# Patient Record
Sex: FEMALE | Race: BLACK OR AFRICAN AMERICAN | NOT HISPANIC OR LATINO | Employment: UNEMPLOYED | URBAN - METROPOLITAN AREA
[De-identification: names, ages, dates, MRNs, and addresses within clinical notes are randomized per-mention and may not be internally consistent; named-entity substitution may affect disease eponyms.]

---
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
## 2018-08-19 NOTE — PROGRESS NOTES
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 She underwent reconstruction of the right ventricular outflow tract and placement of a bioprosthetic pulmonary valve in early childhood.  She has had multiple surgical valve replacements. Her most recent open heart surgery involved placement of a bioprosthetic valve in the pulmonary position and placement of a bioprosthetic valve in the tricuspid position in 2007. Recently, she had a heart catheterization for recurrent tricuspid valve insufficiency and stenosis.  The procedure at this time employed a trans-catheter approach. It entailed tricuspid valve balloon valvuloplasty with a Z-Med 25 x 5 cm balloon followed by placement of an Sotelo S3 26 mm valve. The catheterization also showed that she had mild pulmonary valve stenosis and mild insufficiency.  She comes to [facility]iology Clinic today for followup, and for an exercise stress test..   Kacey reports that she is doing well.  She denies any problems with chest pain, abnormally rapid or slow heart rates. She does have palpitations. She lead a rather sedentary lifestyle, and is overweight.  She currently is on a baby aspirin a day, Digoxin 0.125 mg every day, Norvasc 2.5 mg every day. and Lasix 30 mg every day, which was recently changed to HCTZ 25 mg once a day.  (Kacey reports that she does not like the Lasix; it op-sets her stomach.  Consequentley, she did not take it ! )     REVIEW OF SYSTEMS:  There are no visual disturbances. No:   HAs, lightheadedness, syncope or seizures. No swallowing disorder. No difficulty breathing, SOB, wheezing or rhonchi. No asthma. No abdominal pain.  Bowel movements are normal.  No problems with urination.  No DM of thyroid disorder.  No lipid disorder. No arterial disease. No known intrinsic problem with the lungs, liver or  kidneys. No bleeding disorder. No smoking or ETOH use.        PHYSICAL EXAMINATION:   GENERAL:  The patient is overweight 17 -year-old female in no distress.  VITAL SIGNS:  Her weight is 121.9 kg (269 lbs) and her height is 1.702 meters (5'7''). Heart rate is 110 beats per minute and regular.  Sat 96%.   Blood pressure in the right arm is 120/76.  Color and perfusion are good.   HEENT:  Eye movements are normal.  No bruits are noted over the head.  No jugular venous distention or pulsations.  Mucous membranes are moist and pink.  There is no adenopathy.  The neck is supple.  SKIN:  Showed is pink and well perfused. CHEST:  There is a well-healed midline scar.  Lungs are clear to auscultation bilaterally, although the breath sounds are somewhat decreased at the base. There are no wheezes or rhonchi. CARDIOVASCULAR:  Precordial activity is normal.  The first heart sound is prominent and crisp.  The second heart sound is narrowly split. There is a grade 1/6 systolic ejection murmur heard best up the left sternal border and across the base.  No diastolic murmur is heard today.  ABDOMEN:  There is exogenous obesity.  The liver edge is palpable about 2-3 FB at the right costal margin.  It is nonpulsatile and nontender.  Bowel sounds appear to be normal.  EXTREMITIES:  Pulses are 2+ throughout.  Capillary refill is good.  There is no cyanosis or peripheral edema.  No bruising.              ........................................................................................................................................        ECG:  Normal sinus rhythm. Ist degree block. RBBB.  Slight LAD. T wave abnormality.               ECHOCARDIOGRAM (AT LAST VISIT):  An echocardiogram was performed.  There is no pericardial effusion.  No clots or vegetations. The bioprosthetic tricuspid valve is seen to be in good position. Annulus is 18 mm. The struts are easily seen.  The leaflets are mobile.  No evidence for thrombus formation.   The right atrium is enlarged measuring 5.4 x 4.8 cm (was 6.3 x 5.4 cm). One can also see  the bioprosthetic tricuspid valve in a cross sectional view.  It appears circular and measures 2 cm x 2 cm.  Again, no clots or vegetations are seen.  No right ventricular or left ventricular outflow tract obstruction.  The right ventricular circumferential area of shortening is 35%, which is reduced.  Circumferential area of the RA is 23.4 cm2 (enlarged).   M-MODE:   LV 5.7 cm, RV 3.6 cm, Ao 3.0 cm, LA 3.8 cm, Sep 1.0 cm, PW 1.1 cm, EF 42 % (reduced).   DOPPLER VELOCITY ANALYSIS:  There is no insufficiency of the recently-placed bioprosthetic tricuspid valve.  Mild turbulent antegrade flow is seen.  Continuous wave Doppler analysis shows a peak pressure drop of 19 mmHg with a mean value of 10 mmHg.  Importantly, the leaflets are moving freely.  There is no mitral insufficiency or aortic insufficiency.  Peak pressure drop across the aortic valve is 4 mmHg.  Peak pressure drop across the bioprosthetic pulmonary valve is 12 mmHg.           .................................................................................................................................................               ASSESSMENT:  In summary, we have a 17-year-old female originally diagnosed to have pulmonary atresia with intact inter-ventricular septum, who is status post multiple heart operations involving both the pulmonary and tricuspid valves.  She recently in February 2017 had placement of an Sotelo S3 26 mm bioprosthetic tricuspid valve via the transvenous approach.  She tolerated the procedure well.  I believe she has a good hemodynamic result at this time.  Clinically, she is doing well.  She does not have any significant symptoms compared to her status in the past.   PLAN:  Given our findings, our suggestions are as follows:  1.  She of course needs antibiotic prophylaxis for any invasive procedure.   2.  She continues on her baby aspirin  a day.  Also, she should continue with Digoxin as described above. I would like to place her on HCTZ 25 mg q 12 hrs, rather than Lasix.  3.  I want to see her back in Cardiology Clinic in about six months for followup.  At that time, an echocardiogram and ECG will be performed.  Today I have placed a Holter monitor. Sometime in the future, I would like to perform an exercise stress test to see how well she can tolerate the activity.     If there are any questions concerning to cardiac status of this patient, please feel free to contact us.  Thank you so much for allowing us to partake in the care of this patient. Donovan Gonzalez PhD, MD.                                                                                                                                                     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  She underwent reconstruction of the right ventricular outflow tract and placement of a bioprosthetic pulmonary valve in early childhood.  She has had multiple surgical valve replacements. Her most recent open heart surgery involved placement of a bioprosthetic valve in the pulmonary position and placement of a bioprosthetic valve in the tricuspid position in 2007. Recently, she had a heart catheterization for recurrent tricuspid valve insufficiency and stenosis.  The procedure at this time employed a trans-catheter approach. It entailed tricuspid valve balloon valvuloplasty with a Z-Med 25 x 5 cm balloon followed by placement of an Sotelo S3 26 mm valve. The catheterization also showed that she had mild pulmonary valve stenosis and mild insufficiency.  She comes to [facility]iology Clinic today for followup.   Kacey reports that she is doing well.  She denies any problems with chest pain, palpitations, abnormally rapid or slow heart rates.  She  does lead a rather sedentary lifestyle, and is overweight.  She currently is on a baby aspirin a day, Digoxin 0.125 mg every day, Lasix 30 mg every day. She is also on Norvasc 2.5 mg qd.     Kacey reports that she does not like the Lasix; it op-sets her stomach.  Consequentley, she does not take it !  In the last month, her face has been puffy in the am !     REVIEW OF SYSTEMS:  There are no visual disturbances. No:   HAs, lightheadedness, syncope or seizures. No difficulty breathing, SOB, wheezing or rhonchi. No asthma. No abdominal pain.  Bowel movements are normal.  No problems with urination.  No DM of thyroid disorder.  No lipid disorder. No arterial disease. No known intrinsic problem with the lungs, liver or kidneys. No bleeding disorder. No smoking or ETOH use.        PHYSICAL EXAMINATION:   GENERAL:  The patient is overweight 17 -year-old female in no distress.  VITAL SIGNS:  Her weight is 121.9 kg (269 lbs) and her height is 1.702 meters (5'7''). Heart rate is 110 beats per minute and regular.  Sat 96%.   Blood pressure in the right arm is 120/76.  Color and perfusion are good.   HEENT:  Eye movements are normal.  No bruits are noted over the head.  No jugular venous distention or pulsations.  Mucous membranes are moist and pink.  There is no adenopathy.  The neck is supple.  SKIN:  Showed is pink and well perfused.  CHEST:  There is a well-healed midline scar.  Lungs are clear to auscultation bilaterally, although the breath sounds are somewhat decreased at the base. There are no wheezes or rhonchi.  CARDIOVASCULAR:  Precordial activity is normal.  The first heart sound is prominent and crisp.  The second heart sound is narrowly split. There is a grade 1/6 systolic ejection murmur heard best up the left sternal border and across the base.  No diastolic murmur is heard today.  ABDOMEN:  There is exogenous obesity.  The liver edge is palpable about 2-3 FB at the right costal margin.  It is nonpulsatile and  nontender.  Bowel sounds appear to be normal.  EXTREMITIES:  Pulses are 2+ throughout.  Capillary refill is good.  There is no cyanosis or peripheral edema.  No bruising.          ........................................................................................................................................       ECG:  Normal sinus rhythm. Ist degree block. RBBB.  Slight LAD. T wave abnormality.           ECHOCARDIOGRAM:  An echocardiogram was performed.  There is no pericardial effusion.  No clots or vegetations. The bioprosthetic tricuspid valve is seen to be in good position. Annulus is 18 mm. The struts are easily seen.  The leaflets are mobile.  No evidence for thrombus formation.  The right atrium is enlarged measuring 5.4 x 4.8 cm (was 6.3 x 5.4 cm). One can also see  the bioprosthetic tricuspid valve in a cross sectional view.  It appears circular and measures 2 cm x 2 cm.  Again, no clots or vegetations are seen.  No right ventricular or left ventricular outflow tract obstruction.  The right ventricular circumferential area of shortening is 35%, which is reduced.  Circumferential area of the RA is 23.4 cm2 (enlarged).   M-MODE:   LV 5.7 cm, RV 3.6 cm, Ao 3.0 cm, LA 3.8 cm, Sep 1.0 cm, PW 1.1 cm, EF 42 % (reduced).   DOPPLER VELOCITY ANALYSIS:  There is no insufficiency of the recently-placed bioprosthetic tricuspid valve.  Mild turbulent antegrade flow is seen.  Continuous wave Doppler analysis shows a peak pressure drop of 19 mmHg with a mean value of 10 mmHg.  Importantly, the leaflets are moving freely.  There is no mitral insufficiency or aortic insufficiency.  Peak pressure drop across the aortic valve is 4 mmHg.  Peak pressure drop across the bioprosthetic pulmonary valve is 12 mmHg.        .................................................................................................................................................           ASSESSMENT:  In summary, we have a 17-year-old  female originally diagnosed to have pulmonary atresia with intact inter-ventricular septum, who is status post multiple heart operations involving both the pulmonary and tricuspid valves.  She recently in February 2017 had placement of an Sotelo S3 26 mm bioprosthetic tricuspid valve via the transvenous approach.  She tolerated the procedure well.  I believe she has a good hemodynamic result at this time.  Clinically, she is doing well.  She does not have any significant symptoms compared to her status in the past.   PLAN:  Given our findings, our suggestions are as follows:  1.  She of course needs antibiotic prophylaxis for any invasive procedure.   2.  She continues on her baby aspirin a day.  Also, she should continue with Digoxin as described above. I would like to place her on HCTZ 25 mg q 12 hrs, rather than Lasix.  3.  I want to see her back in Cardiology Clinic in about six months for followup.  At that time, an echocardiogram and ECG will be performed.  Today I have placed a Holter monitor. Sometime in the future, I would like to perform an exercise stress test to see how well she can tolerate the activity.     If there are any questions concerning to cardiac status of this patient, please feel free to contact us.  Thank you so much for allowing us to partake in the care of this patient. Donovan Gonzalez PhD, MD.

## 2018-08-20 ENCOUNTER — OFFICE VISIT (OUTPATIENT)
Dept: CARDIOLOGY | Facility: CLINIC | Age: 18
End: 2018-08-20
Payer: MEDICAID

## 2018-08-20 ENCOUNTER — CLINICAL SUPPORT (OUTPATIENT)
Dept: CARDIOLOGY | Facility: CLINIC | Age: 18
End: 2018-08-20
Payer: MEDICAID

## 2018-08-20 ENCOUNTER — TELEPHONE (OUTPATIENT)
Dept: CARDIOLOGY | Facility: CLINIC | Age: 18
End: 2018-08-20

## 2018-08-20 VITALS — OXYGEN SATURATION: 96 % | BODY MASS INDEX: 42.16 KG/M2 | HEIGHT: 67 IN | WEIGHT: 268.63 LBS | HEART RATE: 110 BPM

## 2018-08-20 DIAGNOSIS — R00.0 TACHYCARDIA: ICD-10-CM

## 2018-08-20 DIAGNOSIS — Z95.3 HISTORY OF TRICUSPID VALVE REPLACEMENT WITH BIOPROSTHETIC VALVE: ICD-10-CM

## 2018-08-20 DIAGNOSIS — R00.0 TACHYCARDIA: Primary | ICD-10-CM

## 2018-08-20 DIAGNOSIS — Z98.890 HISTORY OF OPEN HEART SURGERY: ICD-10-CM

## 2018-08-20 DIAGNOSIS — E66.01 MORBID OBESITY: ICD-10-CM

## 2018-08-20 PROCEDURE — 93303 ECHO TRANSTHORACIC: CPT | Mod: S$GLB,,, | Performed by: PEDIATRICS

## 2018-08-20 PROCEDURE — 99214 OFFICE O/P EST MOD 30 MIN: CPT | Mod: S$GLB,,, | Performed by: PEDIATRICS

## 2018-08-20 PROCEDURE — 93320 DOPPLER ECHO COMPLETE: CPT | Mod: S$GLB,,, | Performed by: PEDIATRICS

## 2018-08-20 PROCEDURE — 93000 ELECTROCARDIOGRAM COMPLETE: CPT | Mod: S$GLB,,, | Performed by: PEDIATRICS

## 2018-08-20 PROCEDURE — 93325 DOPPLER ECHO COLOR FLOW MAPG: CPT | Mod: S$GLB,,, | Performed by: PEDIATRICS

## 2018-09-04 ENCOUNTER — HOSPITAL ENCOUNTER (EMERGENCY)
Facility: OTHER | Age: 18
Discharge: HOME | End: 2018-09-04
Attending: EMERGENCY MEDICINE
Payer: MEDICAID

## 2018-09-04 VITALS
OXYGEN SATURATION: 98 % | SYSTOLIC BLOOD PRESSURE: 149 MMHG | TEMPERATURE: 100 F | WEIGHT: 180 LBS | RESPIRATION RATE: 18 BRPM | DIASTOLIC BLOOD PRESSURE: 70 MMHG | HEIGHT: 66 IN | HEART RATE: 88 BPM | BODY MASS INDEX: 28.93 KG/M2

## 2018-09-04 DIAGNOSIS — R51.9 NONINTRACTABLE EPISODIC HEADACHE, UNSPECIFIED HEADACHE TYPE: Primary | ICD-10-CM

## 2018-09-04 DIAGNOSIS — R00.0 TACHYCARDIA: ICD-10-CM

## 2018-09-04 LAB
B-HCG UR QL: NEGATIVE
BACTERIA #/AREA URNS HPF: ABNORMAL /HPF
BILIRUB UR QL STRIP: NEGATIVE
CAOX CRY URNS QL MICRO: ABNORMAL
CLARITY UR: ABNORMAL
COLOR UR: YELLOW
CTP QC/QA: YES
GLUCOSE UR QL STRIP: NEGATIVE
HGB UR QL STRIP: NEGATIVE
KETONES UR QL STRIP: NEGATIVE
LEUKOCYTE ESTERASE UR QL STRIP: ABNORMAL
MICROSCOPIC COMMENT: ABNORMAL
NITRITE UR QL STRIP: NEGATIVE
PH UR STRIP: 6 [PH] (ref 5–8)
PROT UR QL STRIP: NEGATIVE
RBC #/AREA URNS HPF: 2 /HPF (ref 0–4)
SP GR UR STRIP: 1.01 (ref 1–1.03)
SQUAMOUS #/AREA URNS HPF: 5 /HPF
URN SPEC COLLECT METH UR: ABNORMAL
UROBILINOGEN UR STRIP-ACNC: NEGATIVE EU/DL
WBC #/AREA URNS HPF: 6 /HPF (ref 0–5)

## 2018-09-04 PROCEDURE — 80048 BASIC METABOLIC PNL TOTAL CA: CPT

## 2018-09-04 PROCEDURE — 81025 URINE PREGNANCY TEST: CPT | Performed by: EMERGENCY MEDICINE

## 2018-09-04 PROCEDURE — 25000003 PHARM REV CODE 250: Performed by: EMERGENCY MEDICINE

## 2018-09-04 PROCEDURE — 96360 HYDRATION IV INFUSION INIT: CPT

## 2018-09-04 PROCEDURE — 83690 ASSAY OF LIPASE: CPT

## 2018-09-04 PROCEDURE — 81000 URINALYSIS NONAUTO W/SCOPE: CPT

## 2018-09-04 PROCEDURE — 93010 ELECTROCARDIOGRAM REPORT: CPT | Mod: ,,, | Performed by: INTERNAL MEDICINE

## 2018-09-04 PROCEDURE — 99284 EMERGENCY DEPT VISIT MOD MDM: CPT | Mod: 25

## 2018-09-04 PROCEDURE — 93005 ELECTROCARDIOGRAM TRACING: CPT

## 2018-09-04 RX ORDER — ACETAMINOPHEN 500 MG
1000 TABLET ORAL
Status: COMPLETED | OUTPATIENT
Start: 2018-09-04 | End: 2018-09-04

## 2018-09-04 RX ADMIN — LIDOCAINE HYDROCHLORIDE: 20 SOLUTION ORAL; TOPICAL at 06:09

## 2018-09-04 RX ADMIN — SODIUM CHLORIDE 500 ML: 0.9 INJECTION, SOLUTION INTRAVENOUS at 05:09

## 2018-09-04 RX ADMIN — ACETAMINOPHEN 1000 MG: 500 TABLET ORAL at 06:09

## 2018-09-04 NOTE — ED NOTES
"Per Simon in Lab, "green top and lavender top" are hemolyzed. MD notified and verbal order to discontinue collecting blood work at this time.   "

## 2018-09-04 NOTE — ED PROVIDER NOTES
Encounter Date: 9/4/2018    SCRIBE #1 NOTE: I, Juma Quezada, am scribing for, and in the presence of, Dr. Perry.       History     Chief Complaint   Patient presents with    Multiple medical complaints     Pt with reports of abdominal pain, SOB, diarrhea and HA x 3 days. Pt denies nausea, vomiting.      Seen by provider: 5:21 PM    Patient is a 17 y.o. female who presents to the ED with complaint of intermittent headaches for two weeks. Pain is located in the center of her head. She reports taking ibuprofen last night with minimal relief, however she has taken nothing for her headache today. She denies dizziness or photophobia. She was recently changed from lasix to HCTZ by her cardiologist, Dr. Gonzalez.    Patient also complains of abdominal pain which began two days ago. Pain is located in the periumbilical region. She reports associated nausea. She denies vomiting, dysuria, fever, or chest pain. She does note chronic shortness of breath which is unchanged. Her LMP was last month and she denies possible pregnancy.      The history is provided by the patient.     Review of patient's allergies indicates:  No Known Allergies  Past Medical History:   Diagnosis Date    Obesity     Pulmonary atresia with intact ventricular septum     SVT (supraventricular tachycardia)      Past Surgical History:   Procedure Laterality Date    MEMO PROCEDURE      bt shunt and transannular patch    CARDIAC VALVE REPLACEMENT      PULMONARY VALVE REPLACEMENT  01/02/2007    25mm sharyn charles    TRICUSPID VALVE REPLACEMENT  01/02/2007    25 mm sharyn-charles     No family history on file.  Social History     Tobacco Use    Smoking status: Never Smoker   Substance Use Topics    Alcohol use: No    Drug use: No     Review of Systems   Constitutional: Negative for chills and fever.   HENT: Negative for congestion.    Respiratory: Positive for shortness of breath (chronic). Negative for cough.    Cardiovascular:  Negative for chest pain.   Gastrointestinal: Positive for abdominal pain and nausea. Negative for vomiting.   Genitourinary: Negative for dysuria.   Musculoskeletal: Negative for back pain.   Skin: Negative for rash.   Neurological: Positive for headaches. Negative for dizziness.   Psychiatric/Behavioral: Negative for confusion.       Physical Exam     Initial Vitals [09/04/18 1708]   BP Pulse Resp Temp SpO2   (!) 141/69 (!) 130 18 99.5 °F (37.5 °C) 98 %      MAP       --         Physical Exam    Nursing note and vitals reviewed.  Constitutional: She appears well-developed and well-nourished. She is not diaphoretic. She is Obese . No distress.   HENT:   Head: Normocephalic and atraumatic.   Mouth/Throat: Oropharynx is clear and moist.   Eyes: Conjunctivae and EOM are normal. Pupils are equal, round, and reactive to light.   Neck: Normal range of motion. Neck supple. No JVD (no elevated JVD) present.   No meningismus.   Cardiovascular: Normal rate and regular rhythm.   Mechanical heart sounds.   Pulmonary/Chest: Breath sounds normal. No respiratory distress.   No crackles.   Abdominal: Soft. There is no tenderness.   Abdomen is obese, non-tender.   Musculoskeletal: Normal range of motion. She exhibits no edema (no lower extremity edema).   Neurological: She is alert and oriented to person, place, and time.   Skin: Skin is warm and dry.   Psychiatric: She has a normal mood and affect. Her behavior is normal. Judgment and thought content normal.         ED Course   Procedures  Labs Reviewed   URINALYSIS, REFLEX TO URINE CULTURE - Abnormal; Notable for the following components:       Result Value    Appearance, UA Hazy (*)     Leukocytes, UA 3+ (*)     All other components within normal limits    Narrative:     Preferred Collection Type->Urine, Clean Catch   URINALYSIS MICROSCOPIC - Abnormal; Notable for the following components:    WBC, UA 6 (*)     Bacteria, UA Few (*)     All other components within normal limits     Narrative:     Preferred Collection Type->Urine, Clean Catch   CBC W/ AUTO DIFFERENTIAL   BASIC METABOLIC PANEL   LIPASE   POCT URINE PREGNANCY     EKG Readings: (Independently Interpreted)   Sinus tachycardia. Rate of 136. Right bundle branch block present. T wave inversions in leads V2, V3, and V5. Unchanged from 2017.       Imaging Results    None          Medical Decision Making:   Initial Assessment:   Urgent evaluation of a 17-year-old female with prosthetic pulmonary/tricuspid valves  w cath performed recently 8/20 w pulm valv stenosis/mild insuff currently on asa, digoxin, norvasc and supposed to be on lasix, but not taking it and reportedly started on HCTZ instead 8/20.  Patient has known resting tachycardia, recent Holter monitor with average 98 beats per minute, here with complaints of headache and nausea. On exam pt is obese, well appearing, ambulatory without issue, obese non tender abd. I do not suspect serious bacterial infection at this time or serious abdominal pathology to warrant labs or imaging. Pt encouraged bland diet, NSAIDs, and fu PCP, headache diary.   Independently Interpreted Test(s):   I have ordered and independently interpreted EKG Reading(s) - see prior notes  Clinical Tests:   Lab Tests: Ordered and Reviewed  Medical Tests: Ordered and Reviewed            Scribe Attestation:   Scribe #1: I performed the above scribed service and the documentation accurately describes the services I performed. I attest to the accuracy of the note.    Attending Attestation:           Physician Attestation for Scribe:  Physician Attestation Statement for Scribe #1: I, Dr. Perry, reviewed documentation, as scribed by Juma Quezada in my presence, and it is both accurate and complete.                    Clinical Impression:     1. Nonintractable episodic headache, unspecified headache type    2. Tachycardia         Disposition:   Disposition: Discharged  Condition: Stable                         Bharti Perry MD  09/04/18 2771

## 2018-09-04 NOTE — ED NOTES
"Pt presents with c/o HA x3 days with lower abdominal pain. Pt   describes the pain " like someone is kicking me". Pt denies any releif from HA with Advil. Pt has hx of pulmonary atresia.  Pt denies any dizziness, V/D, or photophobia. Pt is AAOx4. RR are even and unlabored. Skin is warm and dry. Pt is placed on cont cardiac, bp and pulse ox monitoring. Bed is locked and in lowest position with side rails up x2. Call ligth is within reach. Mother is at the bedside.   "

## 2018-09-20 ENCOUNTER — OFFICE VISIT (OUTPATIENT)
Dept: CARDIOLOGY | Facility: CLINIC | Age: 18
End: 2018-09-20
Payer: MEDICAID

## 2018-09-20 ENCOUNTER — TELEPHONE (OUTPATIENT)
Dept: CARDIOLOGY | Facility: CLINIC | Age: 18
End: 2018-09-20

## 2018-09-20 ENCOUNTER — CLINICAL SUPPORT (OUTPATIENT)
Dept: CARDIOLOGY | Facility: CLINIC | Age: 18
End: 2018-09-20
Payer: MEDICAID

## 2018-09-20 VITALS
HEIGHT: 66 IN | SYSTOLIC BLOOD PRESSURE: 130 MMHG | WEIGHT: 268.19 LBS | HEART RATE: 98 BPM | DIASTOLIC BLOOD PRESSURE: 76 MMHG | OXYGEN SATURATION: 97 % | BODY MASS INDEX: 43.1 KG/M2

## 2018-09-20 DIAGNOSIS — Z95.2 S/P PULMONARY VALVE REPLACEMENT: ICD-10-CM

## 2018-09-20 DIAGNOSIS — Z95.3 HISTORY OF PULMONARY VALVE REPLACEMENT WITH BIOPROSTHETIC VALVE: ICD-10-CM

## 2018-09-20 DIAGNOSIS — E66.01 MORBID OBESITY: ICD-10-CM

## 2018-09-20 DIAGNOSIS — R00.2 PALPITATIONS: Primary | ICD-10-CM

## 2018-09-20 DIAGNOSIS — Q25.5 PULMONARY ATRESIA WITH INTACT VENTRICULAR SEPTUM: Primary | ICD-10-CM

## 2018-09-20 DIAGNOSIS — Z95.3 HISTORY OF TRICUSPID VALVE REPLACEMENT WITH BIOPROSTHETIC VALVE: ICD-10-CM

## 2018-09-20 DIAGNOSIS — Z95.4 S/P TRICUSPID VALVE REPLACEMENT: ICD-10-CM

## 2018-09-20 DIAGNOSIS — R00.2 PALPITATIONS: ICD-10-CM

## 2018-09-20 DIAGNOSIS — I07.2: ICD-10-CM

## 2018-09-20 DIAGNOSIS — Z98.890 HISTORY OF OPEN HEART SURGERY: ICD-10-CM

## 2018-09-20 PROCEDURE — 93000 ELECTROCARDIOGRAM COMPLETE: CPT | Mod: 59,S$GLB,, | Performed by: PEDIATRICS

## 2018-09-20 PROCEDURE — 99213 OFFICE O/P EST LOW 20 MIN: CPT | Mod: 25,S$GLB,, | Performed by: PEDIATRICS

## 2018-09-20 PROCEDURE — 93015 CV STRESS TEST SUPVJ I&R: CPT | Mod: S$GLB,,, | Performed by: PEDIATRICS

## 2018-09-20 NOTE — PROGRESS NOTES
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 She underwent reconstruction of the right ventricular outflow tract and placement of a bioprosthetic pulmonary valve in early childhood.  She has had multiple surgical valve replacements. Her most recent open heart surgery involved placement of a bioprosthetic valve in the pulmonary position and placement of a bioprosthetic valve in the tricuspid position in 2007. Recently, she had a heart catheterization for recurrent tricuspid valve insufficiency and stenosis.  The procedure at this time employed a trans-catheter approach. It entailed tricuspid valve balloon valvuloplasty with a Z-Med 25 x 5 cm balloon followed by placement of an Sotelo S3 26 mm valve. The catheterization also showed that she had mild pulmonary valve stenosis and mild insufficiency.  She comes to Cardiology Clinic today for followup, and for an exercise stress test..   Kacey reports that she is doing well.  She denies any problems with chest pain, abnormally rapid or slow heart rates. She does have palpitations. She lead a rather sedentary lifestyle, and is overweight.  She currently is on a baby aspirin a day, Digoxin 0.125 mg every day, Norvasc 2.5 mg every day. The Lasix was recently changed to HCTZ 25 mg once a day. She likes the HCTZ better.     REVIEW OF SYSTEMS:  There are no visual disturbances. No HAs, lightheadedness, syncope or seizures. No swallowing disorder. No difficulty breathing, SOB, wheezing or rhonchi. No asthma. No abdominal pain.  Bowel movements are normal.  No pelvic pain. No problems with urination.  No DM of thyroid disorder.  No lipid disorder. No arterial disease. No known intrinsic problem with the lungs, liver or kidneys. No bleeding disorder. No smoking or ETOH use.        PHYSICAL EXAMINATION:   GENERAL:  The patient is overweight  17 -year-old female in no distress.  VITAL SIGNS:  Her weight is 121.65 kg (268 lbs) and her height is 1.676 meters (5'6''). Heart rate is 98 beats per minute and regular.  Sat 97 %.   Blood pressure in the right arm is 130/76.  Color and perfusion are good.   HEENT:  Eye movements are normal.  No bruits are noted over the head.  No jugular venous distention or pulsations.  Mucous membranes are moist and pink.  There is no adenopathy.  The neck is supple.  SKIN:  Showed is pink and well perfused. CHEST:  There is a well-healed midline scar.  Lungs are clear to auscultation bilaterally, although the breath sounds are somewhat decreased at the base. There are no wheezes or rhonchi. CARDIOVASCULAR:  Precordial activity is normal.  The first heart sound is prominent and crisp.  The second heart sound is narrowly split. There is a grade 1/6 systolic ejection murmur heard best up the left sternal border and across the base.  No diastolic murmur is heard today.  ABDOMEN:  There is exogenous obesity.  The liver edge is palpable about 2-3 FB at the right costal margin.  It is nonpulsatile and nontender.  Bowel sounds appear to be normal.  EXTREMITIES:  Pulses are 2+ throughout.  Capillary refill is good.  There is no cyanosis or peripheral edema.  No bruising.              ........................................................................................................................................        ECG:  Normal sinus rhythm. Ist degree block. RBBB.  Slight LAD. T wave abnormality. QTc 514 ms.              ECHOCARDIOGRAM (AT LAST VISIT):  An echocardiogram was performed.  There is no pericardial effusion.  No clots or vegetations. The bioprosthetic tricuspid valve is seen to be in good position. Annulus is 18 mm. The struts are easily seen.  The leaflets are mobile.  No evidence for thrombus formation.  The right atrium is enlarged measuring 5.4 x 4.8 cm (was 6.3 x 5.4 cm). One can also see  the bioprosthetic  tricuspid valve in a cross sectional view.  It appears circular and measures 2 cm x 2 cm.  Again, no clots or vegetations are seen.  No right ventricular or left ventricular outflow tract obstruction.  The right ventricular circumferential area of shortening is 35%, which is reduced.  Circumferential area of the RA is 23.4 cm2 (enlarged).   M-MODE:   LV 5.7 cm, RV 3.6 cm, Ao 3.0 cm, LA 3.8 cm, Sep 1.0 cm, PW 1.1 cm, EF 42 % (reduced).   DOPPLER VELOCITY ANALYSIS:  There is no insufficiency of the recently-placed bioprosthetic tricuspid valve.  Mild turbulent antegrade flow is seen.  Continuous wave Doppler analysis shows a peak pressure drop of 19 mmHg with a mean value of 10 mmHg.  Importantly, the leaflets are moving freely.  There is no mitral insufficiency or aortic insufficiency.  Peak pressure drop across the aortic valve is 4 mmHg.  Peak pressure drop across the bioprosthetic pulmonary valve is 12 mmHg.      EXERCISE STRESS TEST:  This was a modified stress test. Zero grade. Baseline HR 83 bpm and /78 mmHg.  Decreased exercise capacity.  Three mins at 1.8 mph.  bpm.   Three mins at 2.8 mph.   bpm.  Went 1 min 39 s at 3.8 mph.  Stopped due to fatigue. Max  bpm.  /94 mmHg.  (Abnormal DBP).   No chest pain.  No rhythm disturbance. No ST-T abnormalties.   Normal recovery phase. I was present for the entire study.   Final  bpm and /85 mmHg.           .................................................................................................................................................               ASSESSMENT:  In summary, we have a 17-year-old female originally diagnosed to have pulmonary atresia with intact inter-ventricular septum, who is status post multiple heart operations involving both the pulmonary and tricuspid valves.  She recently in February 2017 had placement of an Sotelo S3 26 mm bioprosthetic tricuspid valve via the transvenous approach.  She  tolerated the procedure well.  I believe she has a good hemodynamic result at this time.  Clinically, she is doing well.  She does not have any significant symptoms compared to her status in the past.   PLAN:  Given our findings, our suggestions are as follows:  1.  She of course needs antibiotic prophylaxis for any invasive procedure.   2.  She continues on her baby aspirin a day.  Also, she should continue with Digoxin as described above. I placed her on HCTZ 25 mg qd, rather than Lasix.  3.  I want to see her back in Cardiology Clinic in about six months for followup.  At that time, an echocardiogram and ECG will be performed.  I advised her to start a weight reduction program.     If there are any questions concerning to cardiac status of this patient, please feel free to contact us.  Thank you so much for allowing us to partake in the care of this patient. Donovan Gonzalez PhD, MD.

## 2018-11-12 NOTE — PROGRESS NOTES
LETTER FOR SSI.      To whom it concerns:    I, Donovan Gonzalez, am a cardiologist that specializes in congenital heart disease.  I have taken care   of Kacey Ruth since her birth.   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 This is a rare form of congenital heart disease. She was critically-ill as a baby. Initially Kacey underwent reconstruction of the right ventricular outflow tract and placement of a bioprosthetic (pig) pulmonary valve in early childhood, at [facility]. She remained quite ill as a child, and required multiple open-heart surgical valve replacements. Her most recent operation involved replacement of the bioprosthetic (pig) valve in the pulmonary position and a bioprosthetic (pig) valve in the tricuspid position, in 2007 at [facility] in [facility].  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  The procedure at this time employed a trans-catheter approach. It entailed tricuspid valve balloon valvuloplasty with a Z-Med 25 x 5 cm balloon followed by placement of an Sotelo S3 2.6 cm valve. The catheterization also showed that she had only mild residual bioprosthetic pulmonary valve obstruction and leakage.  Thus, no bioprosthetic pulmonary valve intervention was undertaken.      As can be seen, Kacey has severe heart disease, and she continues to require close cardiology follow-up. The multiple open heart operations, which require stopping her heart, have greatly compromised her heart function, and her prognosis for the future is guarded.  The contractile function of the ventricles (pumping chambers) is significantly diminished. Also, Kacey continues to have problems  "with heart rhythm disturbances, even to this day. fatigue with minimal exercise, SOB, occasional CP, and palpitations.  She denies any sustained abnormally rapid or slow heart rates. Given these symptoms, she leads a rather sedentary lifestyle, and is overweight.  She currently is on a baby aspirin a day, Digoxin 0.125 mg every day, Norvasc 2.5 mg every day and HCTZ 25 mg once a day.  Kacey reports that on a few occasions she has experienced "  panic attacks involving shaking, tingling of the fingers, and SOB"; they are short-lived (~ few minutes) ? Suspect rhythm disturbance.                                HISTORY OF PRESENT ILLNESS:  This patient is now a 18 -year-old -American female, who originally was born with complex heart disease consisting of pulmonary atresia and intact inter-ventricular septum. She underwent reconstruction of the right ventricular outflow tract and placement of a biop[facility] She has had multiple surgical valve replacements over the years. Her most recent open heart surgery involved placement of a bioprosthetic (pig) valve in the pulmonary position and a bioprosthetic (pig) valve in the tricuspid position in 2007. 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  The procedure at this time employed a trans-catheter approach. It entailed tricuspid valve balloon valvuloplasty with a Z-Med 25 x 5 cm balloon followed by placement of an Sotelo S3 26 mm valve. The catheterization also showed that she had only mild residual bioprosthetic pulmonary valve stenosis and mild insufficiency. Thus, no bioprosthetic pulmonary valve intervention was undertaken. She comes to Cardiology Clinic today for follow-up.   Kacey continues to have fatigue with minimal exercise, SOB, occasional CP, and palpitations.  She denies any sustained abnormally rapid or slow heart rates. " "Given these symptoms, she leads a rather sedentary lifestyle, and is overweight.  She currently is on a baby aspirin a day, Digoxin 0.125 mg every day, Norvasc 2.5 mg every day and HCTZ 25 mg once a day.  Kacey reports that on a few occasions she has experienced "  panic attacks involving shaking, tingling of the fingers, and SOB"; they are short-lived (~ few minutes) ? Suspect rhythm disturbance.     REVIEW OF SYSTEMS:  There are no visual disturbances. No HAs, lightheadedness, syncope or seizures. No swallowing disorder. No PIETER.  No difficulty breathing, SOB or wheezing. No asthma. No abdominal pain.  Bowel movements are normal.  No pelvic pain. No problems with urination.  No DM of thyroid disorder.  No lipid disorder. No arterial disease. No known intrinsic problem with the lungs, liver or kidneys. No bleeding disorder. No smoking or ETOH use.        PHYSICAL EXAMINATION:   GENERAL:  The patient is overweight 17 -year-old female in no distress.  VITAL SIGNS:  Her weight is 127 kg (273 1/2 lbs) and her height is 1.676 meters (5'6''). Heart rate is ~ 120 beats per minute, occasional couplets, and a sudden pause to a rate of 66 bpm.  Sat 97 %.   Blood pressure in the right arm is 1107/75 mmHg.  Color and perfusion are good.   HEENT:  Eye movements are normal.  No bruits are noted over the head.  No jugular venous distention or pulsations.  Mucous membranes are moist and pink.  There is no adenopathy.  The neck is supple.  SKIN:  Showed is pink and well perfused. CHEST:  There is a well-healed midline scar.  Lungs are clear to auscultation bilaterally, although the breath sounds are somewhat decreased at the base. There are no wheezes or rhonchi. CARDIOVASCULAR:  At times the HR would abruptly increase to ~ 130 bpm and then suddenly stop. Precordial activity is normal.  The first heart sound is prominent and crisp.  The second heart sound is narrowly split. There is a grade 1/6 systolic ejection murmur heard " best up the left sternal border and across the base.  No diastolic murmur is heard today.  ABDOMEN:  There is exogenous obesity. The liver edge is about 2-3 FB at the right costal margin.  It is nonpulsatile and nontender.  Bowel sounds appear to be normal.  EXTREMITIES:  Pulses are 2+ throughout.  Capillary refill is good.  There is no cyanosis or peripheral edema.  No bruising.              ........................................................................................................................................        ECG (TODAY'S VISIT):  Normal sinus rhythm. Atrial abnormality. Ist degree block. RBBB.  Slight LAD. T wave abnormality. QTc 502 ms. The rhythm strip revealed episodes of supraventricular couplets. Several short runs of SVT or atrial flutter, at a ventricular rate of ~ 125 - 135 bpm.      ECHOCARDIOGRAM (TODAY'S VISIT):   An echocardiogram was performed because of the rhythm disturbance.  There is no pericardial effusion.  No clots or vegetations. The bioprosthetic tricuspid valve is seen to be in good position. Annulus is ~ 2.3 cm. The struts are easily seen.  The leaflets are mobile.  No evidence for thrombus formation.  The right atrium is enlarged measuring 5.5 x 5.0 cm. One can also see  the bioprosthetic tricuspid valve in a cross sectional view.  It appears circular and measures 2 cm x 2 cm.  Again, no clots or vegetations are seen. No right ventricular or left ventricular outflow tract obstruction.  The right ventricular circumferential area of shortening is 35%, which is reduced.  Circumferential area of the RA is 24 cm2, which is dilated. M-MODE:   LV 5.5 cm, RV 3.3 cm, Ao 3.0 cm, LA 4.5 cm, Sep 1.0 cm, PW 1.0 cm, EF 42 % (reduced).   DOPPLER VELOCITY ANALYSIS:  There is no insufficiency of the recently-placed bioprosthetic tricuspid valve.  Mild turbulent antegrade flow is seen across the Mami valve..  Continuous wave Doppler analysis shows a peak pressure drop of 14 mmHg  with a mean value of 8 mmHg.  Importantly, the leaflets are moving freely.  There is no mitral insufficiency or aortic insufficiency.  Peak pressure drop across the aortic valve is 4 mmHg.  Peak pressure drop across the bioprosthetic pulmonary valve is 5 mmHg with trivial leakage.       ECHOCARDIOGRAM (AT LAST VISIT):  An echocardiogram was performed.  There is no pericardial effusion.  No clots or vegetations. The bioprosthetic tricuspid valve is seen to be in good position. Annulus is 18 mm. The struts are easily seen.  The leaflets are mobile.  No evidence for thrombus formation.  The right atrium is enlarged measuring 5.4 x 4.8 cm (was 6.3 x 5.4 cm). One can also see  the bioprosthetic tricuspid valve in a cross sectional view.  It appears circular and measures 2 cm x 2 cm.  Again, no clots or vegetations are seen.  No right ventricular or left ventricular outflow tract obstruction.  The right ventricular circumferential area of shortening is 35%, which is reduced.  Circumferential area of the RA is 23.4 cm2, which is dilated. M-MODE:   LV 5.7 cm, RV 3.6 cm, Ao 3.0 cm, LA 3.8 cm, Sep 1.0 cm, PW 1.1 cm, EF 42 % (reduced).   DOPPLER VELOCITY ANALYSIS:  There is no insufficiency of the recently-placed bioprosthetic tricuspid valve.  Mild turbulent antegrade flow is seen across the valve..  Continuous wave Doppler analysis shows a peak pressure drop of 19 mmHg with a mean value of 10 mmHg.  Importantly, the leaflets are moving freely.  There is no mitral insufficiency or aortic insufficiency.  Peak pressure drop across the aortic valve is 4 mmHg.  Peak pressure drop across the bioprosthetic pulmonary valve is 12 mmHg.        EXERCISE STRESS TEST (FROM SEPT VISIT):  This was a modified stress test. Zero grade. Baseline HR 83 bpm and /78 mmHg. Markedly decreased exercise capacity.  Three mins at 1.8 mph.  bpm. Three mins at 2.8 mph.   bpm.  Went 1 min 39 s at 3.8 mph.  Stopped due to extream fatigue.  Max  bpm.  /94 mmHg.  (Abnormal DBP).   No chest pain.  No rhythm disturbance. No ST-T abnormalties.   Normal recovery phase. I was present for the entire study.   Final  bpm and /85 mmHg.           .................................................................................................................................................               ASSESSMENT:  In summary, we have a 17-year-old female originally diagnosed to have pulmonary atresia with intact inter-ventricular septum, who is status post multiple heart operations involving both the pulmonary and tricuspid valves.  She recently in February 2017 had placement of an Sotelo S3 26 mm bioprosthetic tricuspid valve via the transvenous approach.  She tolerated the procedure well.  I believe she has a good hemodynamic result at this time.  Clinically, she is doing well.  She does not have any significant symptoms compared to her status in the past.  However, in clinic today,  she was found to have short runs of SVT or possibility atrial flutter.  The ventricular rates were ~ 130 bpm.  PLAN:  Given our findings, our suggestions are as follows:  1.  She of course needs antibiotic prophylaxis for any invasive procedure.   2.  She continues on her baby aspirin a day.  Also, she should continue with Digoxin as described above. I placed her on HCTZ 25 mg qd, rather than Lasix, which she is tolerating better..  3. We started her on Metoprolol 25 mg po q 12 hrs, because of her rhythm disturbance today.   I want to see her back in Cardiology Clinic on Monday (i.e. in ~ 6 days) with an ECG and 24 hr Holter.  We will discuss Kacey with the EP team on main campus next week.    If there are any questions concerning to cardiac status of this patient, please feel free to contact us.  Thank you so much for allowing us to partake in the care of this patient. Donovan Gonzalez PhD, MD.

## 2018-11-13 ENCOUNTER — CLINICAL SUPPORT (OUTPATIENT)
Dept: CARDIOLOGY | Facility: CLINIC | Age: 18
End: 2018-11-13
Payer: MEDICAID

## 2018-11-13 ENCOUNTER — OFFICE VISIT (OUTPATIENT)
Dept: CARDIOLOGY | Facility: CLINIC | Age: 18
End: 2018-11-13
Payer: MEDICAID

## 2018-11-13 VITALS
OXYGEN SATURATION: 98 % | DIASTOLIC BLOOD PRESSURE: 75 MMHG | WEIGHT: 273.38 LBS | BODY MASS INDEX: 43.93 KG/M2 | SYSTOLIC BLOOD PRESSURE: 107 MMHG | HEIGHT: 66 IN | HEART RATE: 127 BPM

## 2018-11-13 DIAGNOSIS — Z95.3 HISTORY OF TRICUSPID VALVE REPLACEMENT WITH BIOPROSTHETIC VALVE: ICD-10-CM

## 2018-11-13 DIAGNOSIS — Z98.890 HISTORY OF OPEN HEART SURGERY: ICD-10-CM

## 2018-11-13 DIAGNOSIS — I47.10 SVT (SUPRAVENTRICULAR TACHYCARDIA): ICD-10-CM

## 2018-11-13 DIAGNOSIS — Z95.3 HISTORY OF PULMONARY VALVE REPLACEMENT WITH BIOPROSTHETIC VALVE: ICD-10-CM

## 2018-11-13 DIAGNOSIS — R00.0 TACHYCARDIA WITH HEART RATE 121-140 BEATS PER MINUTE: Primary | ICD-10-CM

## 2018-11-13 DIAGNOSIS — R00.0 TACHYCARDIA: ICD-10-CM

## 2018-11-13 PROCEDURE — 99214 OFFICE O/P EST MOD 30 MIN: CPT | Mod: S$GLB,,, | Performed by: PEDIATRICS

## 2018-11-13 PROCEDURE — 93325 DOPPLER ECHO COLOR FLOW MAPG: CPT | Mod: S$GLB,,, | Performed by: PEDIATRICS

## 2018-11-13 PROCEDURE — 93303 ECHO TRANSTHORACIC: CPT | Mod: S$GLB,,, | Performed by: PEDIATRICS

## 2018-11-13 PROCEDURE — 93000 ELECTROCARDIOGRAM COMPLETE: CPT | Mod: S$GLB,,, | Performed by: PEDIATRICS

## 2018-11-13 PROCEDURE — 93320 DOPPLER ECHO COMPLETE: CPT | Mod: S$GLB,,, | Performed by: PEDIATRICS

## 2018-11-14 NOTE — PROGRESS NOTES
LETTER FOR [facility]SI.         To whom it concerns:     I, Donovan Gonzalez, am a cardiologist that specializes in congenital heart disease.  I have taken care of Kacey Ruth since her birth.   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 This is a rare form of congenital heart disease. She remained critically-ill as a baby. Initially, Kacey underwent reconstruction of the right ventricular outflow tract and placement of a bioprosthetic (pig) valve in the pulmonary position, in early childhood, at [facility]. She remained quite ill, and subsequently required multiple open-heart operations for valve replacements. Her most-recent operation, in 2007, involved replacement of the bioprosthetic (pig) valve in the pulmonary position and then placement of a bioprosthetic (pig) valve in the tricuspid position, at [facility] in [place]. 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  The procedure this time employed a trans-catheter approach. It entailed tricuspid valve balloon valvuloplasty with a Z-Med 25 x 5 cm balloon followed by placement of an Sotelo S3 2.6 cm valve. The catheterization also showed that she had only mild residual bioprosthetic pulmonary valve obstruction and leakage.  Thus, no bioprosthetic pulmonary valve intervention was undertaken.        Kacey has severe heart disease, and continues to require close cardiology follow-up. The multiple open-heart operations, which required stopping the heart to replace the valves, have greatly compromised the heart's pumping chamber contractile function  (EF ~ 40%). Thus, her prognosis for the future remains guarded.   Also, Kacey continues to have problems with the heart's rhythm, even to this day.  In clinic recently, she was found to be in a dangerous rhythm disturbance that required emergency treatment. I suspect she will need an EP study of her heart. Kacey has continued to have:  fatigue with minimal exercise, SOB, occasional CP, palpitations and abnormally rapid or slow heart rates. Because of these symptoms, she leads a rather sedentary lifestyle, and is overweight.  She currently is on a baby aspirin a day, Digoxin 0.125 mg every day, Norvasc 2.5 mg every day and HCTZ 25 mg once a day.  I have started her on Metorprolol 25 mg q 12 hrs.     Kacey is a wonder young woman who needs our help. Mom has been completely dedicated to her care. Kacey is not capable of performing work- related physical activities, such an lifting, standing, walking more than a block or bending much.  She has poor sustained concentration and memory recall, and would have difficulty following directions.  I respectfully request that you virgie Kacey disability benefits under the Social Security Act.  If you need additional information, please feel free to contact me. Thank you for considering this matter.    Sincerely, Donovan Gonzalez PhD, MD.                   HISTORY OF PRESENT ILLNESS:  This patient now is a 18 -year-old -American female, who originally carried a diagnosis of pulmonary atresia with intact inter-ventricular septum. She underwent reconstruction of the right ventricular outflow tract and placement of a bioprosthetic pulmonary valve in early childhood.  She has had multiple surgical valve replacements. Her most recent open heart surgery involved placement of a bioprosthetic valve in the pulmonary position and placement of a bioprosthetic valve in the tricuspid position in 2007. Recently, she had a heart catheterization for recurrent tricuspid valve insufficiency and stenosis.  The procedure at this time employed a trans-catheter approach. It entailed tricuspid valve balloon valvuloplasty with a Z-Med 25 x 5  cm balloon followed by placement of an Sotelo S3 26 mm valve. The catheterization also showed that she had mild pulmonary valve stenosis and mild insufficiency.  She comes to Cardiology Clinic today for followup, and for an exercise stress test..   Kacey reports that she is doing well.  She denies any problems with chest pain, abnormally rapid or slow heart rates. She does have palpitations. She lead a rather sedentary lifestyle, and is overweight.  She currently is on a baby aspirin a day, Digoxin 0.125 mg every day, Norvasc 2.5 mg every day. The Lasix was recently changed to HCTZ 25 mg once a day. She likes the HCTZ better.     REVIEW OF SYSTEMS:  There are no visual disturbances. No HAs, lightheadedness, syncope or seizures. No swallowing disorder. No difficulty breathing, SOB, wheezing or rhonchi. No asthma. No abdominal pain.  Bowel movements are normal.  No pelvic pain. No problems with urination.  No DM of thyroid disorder.  No lipid disorder. No arterial disease. No known intrinsic problem with the lungs, liver or kidneys. No bleeding disorder. No smoking or ETOH use.        PHYSICAL EXAMINATION:   GENERAL:  The patient is overweight 17 -year-old female in no distress.  VITAL SIGNS:  Her weight is 121.65 kg (268 lbs) and her height is 1.676 meters (5'6''). Heart rate is 98 beats per minute and regular.  Sat 97 %.   Blood pressure in the right arm is 130/76.  Color and perfusion are good.   HEENT:  Eye movements are normal.  No bruits are noted over the head.  No jugular venous distention or pulsations.  Mucous membranes are moist and pink.  There is no adenopathy.  The neck is supple.  SKIN:  Showed is pink and well perfused. CHEST:  There is a well-healed midline scar.  Lungs are clear to auscultation bilaterally, although the breath sounds are somewhat decreased at the base. There are no wheezes or rhonchi. CARDIOVASCULAR:  Precordial activity is normal.  The first heart sound is prominent and crisp.   The second heart sound is narrowly split. There is a grade 1/6 systolic ejection murmur heard best up the left sternal border and across the base.  No diastolic murmur is heard today.  ABDOMEN:  There is exogenous obesity.  The liver edge is palpable about 2-3 FB at the right costal margin.  It is nonpulsatile and nontender.  Bowel sounds appear to be normal.  EXTREMITIES:  Pulses are 2+ throughout.  Capillary refill is good.  There is no cyanosis or peripheral edema.  No bruising.              ........................................................................................................................................        ECG:  Normal sinus rhythm. Ist degree block. RBBB.  Slight LAD. T wave abnormality. QTc 514 ms.              ECHOCARDIOGRAM (AT LAST VISIT):  An echocardiogram was performed.  There is no pericardial effusion.  No clots or vegetations. The bioprosthetic tricuspid valve is seen to be in good position. Annulus is 18 mm. The struts are easily seen.  The leaflets are mobile.  No evidence for thrombus formation.  The right atrium is enlarged measuring 5.4 x 4.8 cm (was 6.3 x 5.4 cm). One can also see  the bioprosthetic tricuspid valve in a cross sectional view.  It appears circular and measures 2 cm x 2 cm.  Again, no clots or vegetations are seen.  No right ventricular or left ventricular outflow tract obstruction.  The right ventricular circumferential area of shortening is 35%, which is reduced.  Circumferential area of the RA is 23.4 cm2 (enlarged).   M-MODE:   LV 5.7 cm, RV 3.6 cm, Ao 3.0 cm, LA 3.8 cm, Sep 1.0 cm, PW 1.1 cm, EF       ~ 40 % (reduced).   DOPPLER VELOCITY ANALYSIS:  There is no insufficiency of the recently-placed bioprosthetic tricuspid valve.  Mild turbulent antegrade flow is seen.  Continuous wave Doppler analysis shows a peak pressure drop of 19 mmHg with a mean value of 10 mmHg.  Importantly, the leaflets are moving freely.  There is no mitral insufficiency or  aortic insufficiency.  Peak pressure drop across the aortic valve is 4 mmHg.  Peak pressure drop across the bioprosthetic pulmonary valve is 12 mmHg.        EXERCISE STRESS TEST:  This was a modified stress test. Zero grade. Baseline HR 83 bpm and /78 mmHg.  Decreased exercise capacity.  Three mins at 1.8 mph.  bpm.   Three mins at 2.8 mph.   bpm.  Went 1 min 39 s at 3.8 mph.  Stopped due to fatigue. Max  bpm.  /94 mmHg.  (Abnormal DBP).   No chest pain.  No rhythm disturbance. No ST-T abnormalties.   Normal recovery phase. I was present for the entire study.   Final  bpm and /85 mmHg.           .................................................................................................................................................               ASSESSMENT:  In summary, we have a 17-year-old female originally diagnosed to have pulmonary atresia with intact inter-ventricular septum, who is status post multiple heart operations involving both the pulmonary and tricuspid valves.  She recently in February 2017 had placement of an Sotelo S3 26 mm bioprosthetic tricuspid valve via the transvenous approach.  She tolerated the procedure well.  I believe she has a good hemodynamic result at this time.  Clinically, she is doing well.  She does not have any significant symptoms compared to her status in the past.   PLAN:  Given our findings, our suggestions are as follows:  1.  She of course needs antibiotic prophylaxis for any invasive procedure.   2.  She continues on her baby aspirin a day.  Also, she should continue with Digoxin as described above. I placed her on HCTZ 25 mg qd, rather than Lasix.  3.  I want to see her back in Cardiology Clinic in about six months for followup.  At that time, an echocardiogram and ECG will be performed.  I advised her to start a weight reduction program.     If there are any questions concerning to cardiac status of this patient, please  feel free to contact us.  Thank you so much for allowing us to partake in the care of this patient. Donovan Gonzalez PhD, MD.      The patient was found to have SVT of atrial flutter, she was started on Metoprolol ER.  See note in the chart.

## 2018-11-26 ENCOUNTER — CLINICAL SUPPORT (OUTPATIENT)
Dept: CARDIOLOGY | Facility: CLINIC | Age: 18
End: 2018-11-26
Payer: MEDICAID

## 2018-11-26 VITALS
TEMPERATURE: 98 F | BODY MASS INDEX: 44.27 KG/M2 | DIASTOLIC BLOOD PRESSURE: 82 MMHG | OXYGEN SATURATION: 98 % | WEIGHT: 274.25 LBS | SYSTOLIC BLOOD PRESSURE: 120 MMHG | HEART RATE: 124 BPM

## 2018-11-26 DIAGNOSIS — Z98.890 HISTORY OF OPEN HEART SURGERY: ICD-10-CM

## 2018-11-26 DIAGNOSIS — E66.01 MORBID OBESITY: ICD-10-CM

## 2018-11-26 DIAGNOSIS — I48.92 ATRIAL FLUTTER, UNSPECIFIED TYPE: Primary | ICD-10-CM

## 2018-11-26 PROCEDURE — 99213 OFFICE O/P EST LOW 20 MIN: CPT | Mod: S$GLB,,, | Performed by: PEDIATRICS

## 2018-11-26 PROCEDURE — 93000 ELECTROCARDIOGRAM COMPLETE: CPT | Mod: S$GLB,,, | Performed by: PEDIATRICS

## 2018-11-26 NOTE — PROGRESS NOTES
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 She underwent reconstruction of the right ventricular outflow tract and placement of a bioprosthetic pulmonary valve in early childhood. She has had multiple valve replacements. Her most recent open heart surgery involved placement of a bioprosthetic valve in the pulmonary position and placement of a bioprosthetic valve in the tricuspid position in 2007. Recently, she had a heart catheterization for recurrent tricuspid valve insufficiency and stenosis.  The procedure at this time employed a trans-catheter approach. It entailed tricuspid valve balloon valvuloplasty with a Z-Med 25 x 5 cm balloon followed by placement of an Sotelo S3 26 mm valve. The catheterization also showed that she had only mild pulmonary valve stenosis and insufficiency.  She comes to Cardiology Clinic today for followup, and a Holter monitor.   Kacey reports that she is doing well.  She denies any problems with chest pain, abnormally rapid or slow heart rates. She does have palpitations. She lead a rather sedentary lifestyle, and is overweight.  She currently is on a baby aspirin a day, Digoxin 0.125 mg once a day, Norvasc 2.5 mg once a day. The Lasix was recently changed to HCTZ 25 mg once a day. She likes the HCTZ better. At the last clinic visit, Kacey was found to be in and out of either SVT or atrial flutter, and thus was started on Metoprolol 25 mg twice a day. Mom reports that since starting the beta blocker, the rhythm has gotten better.       REVIEW OF SYSTEMS:  There are no visual disturbances. No HAs, lightheadedness, syncope or seizures. No swallowing disorder. No difficulty breathing, SOB, wheezing or rhonchi. No asthma. No abdominal pain.  Bowel movements are normal.  No pelvic pain. No problems with urination.  No DM of thyroid  disorder.  No lipid disorder. No arterial disease. No known intrinsic problem with the lungs, liver or kidneys. No bleeding disorder. No smoking or ETOH use.        PHYSICAL EXAMINATION:   GENERAL:  The patient is overweight 17 -year-old female in no distress.  VITAL SIGNS:  Her weight is 124.4 kg (274 lbs) and her height is 1.676 meters (5'6''). Heart rate is 124 beats per minute and regular.  Sat 98 %.   Blood pressure in the right arm is 120/82.  Color and perfusion are good.   HEENT:  Eye movements are normal.  No bruits are noted over the head.  No jugular venous distention or pulsations.  Mucous membranes are moist and pink.  There is no adenopathy.  The neck is supple.  SKIN:  Showed is pink and well perfused. CHEST:  There is a well-healed midline scar.  Lungs are clear to auscultation bilaterally, although the breath sounds are somewhat decreased at the base. There are no wheezes or rhonchi. CARDIOVASCULAR:  Precordial activity is normal.  The first heart sound is prominent and crisp.  The second heart sound is narrowly split. There is a grade 1/6 systolic ejection murmur heard best up the left sternal border and across the base.  No diastolic murmur is heard today.  ABDOMEN:  There is exogenous obesity.  The liver edge is palpable about 2-3 FB at the right costal margin.  It is nonpulsatile and nontender.  Bowel sounds appear to be normal.  EXTREMITIES:  Pulses are 2+ throughout.  Capillary refill is good.  There is no cyanosis or peripheral edema.  No bruising.              ........................................................................................................................................        ECG:  Non-sinus rhythm at a ventricular rate of 115-120 bpm. (Her normal sinus rate is ~ 80 bpm).  Probably atrial flutter given an enlarged RA, rather than re-entry SVT..  RBBB.  Slight LAD. T wave abnormality. QTc 514 ms is prolonged.              ECHOCARDIOGRAM (AT LAST VISIT):  An  echocardiogram was performed.  2-D STUDY: There is no pericardial effusion.  No clots or vegetations. The bioprosthetic tricuspid valve is seen to be in good position. Annulus is 18 mm. The struts are easily seen.  The leaflets are mobile.  No evidence for thrombus formation.  The right atrium is enlarged measuring 5.4 x 4.8 cm (was 6.3 x 5.4 cm). One can also see  the bioprosthetic tricuspid valve in a cross sectional view.  It appears circular and measures 2 cm x 2 cm.  Again, no clots or vegetations are seen.  No right ventricular or left ventricular outflow tract obstruction.  The right ventricular circumferential area of shortening is 35%, which is reduced.  Circumferential area of the RA is 23.4 cm2 (enlarged).   M-MODE:   LV 5.7 cm, RV 3.6 cm, Ao 3.0 cm, LA 3.8 cm, Sep 1.0 cm, PW 1.1 cm, EF ~ 40 % (reduced). DOPPLER VELOCITY ANALYSIS:  There is no insufficiency of the recently-placed bioprosthetic tricuspid valve.  Mild turbulent antegrade flow is seen.  Continuous wave Doppler analysis shows a peak pressure drop of 19 mmHg with a mean value of 10 mmHg.  Importantly, the leaflets are moving freely.  There is no mitral insufficiency or aortic insufficiency.  Peak pressure drop across the aortic valve is 4 mmHg.  Peak pressure drop across the bioprosthetic pulmonary valve is 12 mmHg.                .................................................................................................................................................               ASSESSMENT:  In summary, we have a 18-year-old female originally diagnosed to have pulmonary atresia with intact inter-ventricular septum, who is status-post multiple open heart operations involving both the pulmonary and tricuspid valves.  She recently in February 2017 had placement of an Sotelo S3 26 mm bioprosthetic tricuspid valve via the transvenous approach.  She tolerated the procedure well.  Clinically, she has been stable. However, she continues to  have poor exercise capacity, chronic fatigue and more recently developed a new rhythm disturbance, which can be dangerous.    PLAN:  Given our findings, our suggestions are as follows:  1.  She of course needs antibiotic prophylaxis for any invasive procedure.   2.  She should continue with the current heart medications, as outlined above. She now will be taking Metoprolol in addition for the abnormal supraventricular tachycardia.  We increased the dose to 50 mg twice a day. We have placed a 24 hr Holter.  3.  I want to see her back in Cardiology Clinic in 24 hours, and then in one-to-two weeks, for followup.  At that time, an echocardiogram and ECG will be performed. 4.  I advised her to start a weight reduction program.     If there are any questions concerning the cardiac status of this patient, please feel free to contact us.  Thank you so much for allowing us to partake in the care of this patient. Donovan Gonzalez PhD, MD.

## 2018-11-27 ENCOUNTER — OFFICE VISIT (OUTPATIENT)
Dept: CARDIOLOGY | Facility: CLINIC | Age: 18
End: 2018-11-27
Payer: MEDICAID

## 2018-11-27 VITALS
HEART RATE: 79 BPM | HEIGHT: 66 IN | SYSTOLIC BLOOD PRESSURE: 128 MMHG | DIASTOLIC BLOOD PRESSURE: 67 MMHG | BODY MASS INDEX: 43.68 KG/M2 | WEIGHT: 271.81 LBS | OXYGEN SATURATION: 97 %

## 2018-11-27 DIAGNOSIS — Z98.890 HISTORY OF OPEN HEART SURGERY: ICD-10-CM

## 2018-11-27 DIAGNOSIS — I47.9 TACHYCARDIA, PAROXYSMAL: Primary | ICD-10-CM

## 2018-11-27 PROCEDURE — 99212 OFFICE O/P EST SF 10 MIN: CPT | Mod: S$GLB,,, | Performed by: PEDIATRICS

## 2018-11-27 PROCEDURE — 93000 ELECTROCARDIOGRAM COMPLETE: CPT | Mod: S$GLB,,, | Performed by: PEDIATRICS

## 2018-11-27 NOTE — PROGRESS NOTES
Progress Notes              HISTORY OF PRESENT ILLNESS:  This patient now is a 18 -year-old -American female, who was born with pulmonary atresia with intact inter-ventricular septum and an atrial septal defect. She underwent reconstruction of the right ventricular outflow tract and placement of a bioprosthetic pulmonary valve in early childhood. She has had multiple valve replacements. Her most recent open heart surgery involved placement of a bioprosthetic valve in the pulmonary position and placement of a bioprosthetic valve in the tricuspid position in 2007. Recently, she had a heart catheterization for recurrent tricuspid valve insufficiency and stenosis.  The procedure at this time employed a trans-catheter approach. It entailed tricuspid valve balloon valvuloplasty with a Z-Med 25 x 5 cm balloon followed by placement of an Sotelo S3 26 mm valve. The catheterization also showed that she had only mild pulmonary valve stenosis and insufficiency.  She comes to Cardiology Clinic today for followup, and a Holter monitor.   Kacey reports that she is doing well.  She denies any problems with chest pain, abnormally rapid or slow heart rates. She does have palpitations. She lead a rather sedentary lifestyle, and is overweight.  She currently is on a baby aspirin a day, Digoxin 0.125 mg once a day, Norvasc 2.5 mg once a day. The Lasix was recently changed to HCTZ 25 mg once a day. She likes the HCTZ better. At the last clinic visit, Kacey was found to be in and out of either SVT or atrial flutter, and thus was started on Metoprolol 25 mg twice a day. Mom reports that since starting the beta blocker, the rhythm has gotten better.  The ECG showed she was in and out of probably atrial flutter, given the dilated right atrium.        REVIEW OF SYSTEMS:  There are no visual disturbances. No HAs, lightheadedness, syncope or seizures. No swallowing disorder. No difficulty breathing, SOB, wheezing or rhonchi. No  asthma. No abdominal pain.  Bowel movements are normal.  No pelvic pain. No problems with urination.  No DM of thyroid disorder.  No lipid disorder. No arterial disease. No known intrinsic problem with the lungs, liver or kidneys. No bleeding disorder. No smoking or ETOH use.        PHYSICAL EXAMINATION:   GENERAL:  The patient is overweight 17 -year-old female in no distress.  VITAL SIGNS:  Her weight is 123.3 kg (272 lbs) and her height is 1.676 meters (5'6''). Heart rate is 79 beats per minute and regular.  Sat 97 %.   Blood pressure in the right arm is 128/67.  Color and perfusion are good.   HEENT:  Eye movements are normal.  No bruits are noted over the head.  No jugular venous distention or pulsations.  Mucous membranes are moist and pink.  There is no adenopathy.  The neck is supple.  SKIN:  Showed is pink and well perfused. CHEST:  There is a well-healed midline scar.  Lungs are clear to auscultation bilaterally, although the breath sounds are somewhat decreased at the base. There are no wheezes or rhonchi. CARDIOVASCULAR:  Precordial activity is normal.  The first heart sound is prominent and crisp.  The second heart sound is narrowly split. There is a grade 1/6 systolic ejection murmur heard best up the left sternal border and across the base.  No diastolic murmur is heard today.  ABDOMEN:  There is exogenous obesity.  The liver edge is palpable about 2-3 FB at the right costal margin.  It is nonpulsatile and nontender.  Bowel sounds appear to be normal.  EXTREMITIES:  Pulses are 2+ throughout.  Capillary refill is good.  There is no cyanosis or peripheral edema.  No bruising.              ........................................................................................................................................        ECG: Appears to have runs of sinus rhythm with ist-degree block, at a ventricular rate of 105 bpm. (Her normal sinus rate is usually ~ 80 bpm).  Premature supra-ventricular  beats present.  RBBB.  Slight LAD. T wave abnormality. QTc 502 ms is prolonged.              ECHOCARDIOGRAM (AT A PREVIOUS VISIT):  An echocardiogram was performed.  2-D STUDY: There is no pericardial effusion.  No clots or vegetations. The bioprosthetic tricuspid valve is seen to be in good position. Annulus is 18 mm. The struts are easily seen.  The leaflets are mobile.  No evidence for thrombus formation.  The right atrium is enlarged measuring 5.4 x 4.8 cm (was 6.3 x 5.4 cm). One can also see  the bioprosthetic tricuspid valve in a cross sectional view.  It appears circular and measures 2 cm x 2 cm.  Again, no clots or vegetations are seen.  No right ventricular or left ventricular outflow tract obstruction.  The right ventricular circumferential area of shortening is 35%, which is reduced.  Circumferential area of the RA is 23.4 cm2 (enlarged).   M-MODE:   LV 5.7 cm, RV 3.6 cm, Ao 3.0 cm, LA 3.8 cm, Sep 1.0 cm, PW 1.1 cm, EF ~ 40 % (reduced). DOPPLER VELOCITY ANALYSIS:  There is no insufficiency of the recently-placed bioprosthetic tricuspid valve.  Mild turbulent antegrade flow is seen.  Continuous wave Doppler analysis shows a peak pressure drop of 19 mmHg with a mean value of 10 mmHg.  Importantly, the leaflets are moving freely.  There is no mitral insufficiency or aortic insufficiency.  Peak pressure drop across the aortic valve is 4 mmHg.  Peak pressure drop across the bioprosthetic pulmonary valve is 12 mmHg.                 .................................................................................................................................................               ASSESSMENT:  In summary, we have a 18-year-old female originally diagnosed to have pulmonary atresia with intact inter-ventricular septum, who is status-post multiple open heart operations involving both the pulmonary and tricuspid valves.  She recently in February 2017 had placement of an Sotelo S3 26 mm bioprosthetic  tricuspid valve via the transvenous approach.  She tolerated the procedure well.  Clinically, she has been stable. However, she continues to have poor exercise capacity, chronic fatigue and more recently developed a new rhythm disturbance, which can be dangerous.    PLAN:  Given our findings, our suggestions are as follows:  1.  She of course needs antibiotic prophylaxis for any invasive procedure.   2.  She should continue with the current heart medications, as outlined above. She now will be taking Metoprolol for the abnormal supraventricular tachycardia.  We increased the dose to 50 mg twice a day. Yesterdal, we placed her on a 24 hr Holter. It will be reviewed today.  We started her on Warfarin 5 mg qd.      3.  I want to see her back in Cardiology Clinic in one week, for an INR and follow-up for the rhythm.  At that time, an echocardiogram and ECG will be performed. 4.  I advised her to start a weight reduction program.     If there are any questions concerning the cardiac status of this patient, please feel free to contact us.  Thank you so much for allowing us to partake in the care of this patient. Donovan Gonzalez PhD, MD.

## 2018-12-03 ENCOUNTER — CLINICAL SUPPORT (OUTPATIENT)
Dept: CARDIOLOGY | Facility: CLINIC | Age: 18
End: 2018-12-03
Payer: MEDICAID

## 2018-12-03 VITALS
OXYGEN SATURATION: 98 % | BODY MASS INDEX: 43.14 KG/M2 | WEIGHT: 268.44 LBS | SYSTOLIC BLOOD PRESSURE: 125 MMHG | DIASTOLIC BLOOD PRESSURE: 75 MMHG | HEIGHT: 66 IN | HEART RATE: 82 BPM

## 2018-12-03 DIAGNOSIS — Z95.3 HISTORY OF PULMONARY VALVE REPLACEMENT WITH BIOPROSTHETIC VALVE: ICD-10-CM

## 2018-12-03 DIAGNOSIS — Z98.890 HISTORY OF OPEN HEART SURGERY: ICD-10-CM

## 2018-12-03 DIAGNOSIS — I47.10 SVT (SUPRAVENTRICULAR TACHYCARDIA): Primary | ICD-10-CM

## 2018-12-03 DIAGNOSIS — Z95.3 HISTORY OF TRICUSPID VALVE REPLACEMENT WITH BIOPROSTHETIC VALVE: ICD-10-CM

## 2018-12-03 PROCEDURE — 99212 OFFICE O/P EST SF 10 MIN: CPT | Mod: 25,S$GLB,, | Performed by: PEDIATRICS

## 2018-12-03 PROCEDURE — 93000 ELECTROCARDIOGRAM COMPLETE: CPT | Mod: 59,S$GLB,, | Performed by: PEDIATRICS

## 2018-12-03 PROCEDURE — 93268 ECG RECORD/REVIEW: CPT | Mod: S$GLB,,, | Performed by: PEDIATRICS

## 2018-12-03 NOTE — PROGRESS NOTES
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 She underwent reconstruction of the right ventricular outflow tract and placement of a bioprosthetic pulmonary valve in early childhood. She has had multiple valve replacements. Her most recent open heart surgery involved placement of a bioprosthetic valve in the pulmonary position and placement of a bioprosthetic valve in the tricuspid position in 2007. Recently, she had a heart catheterization for recurrent tricuspid valve insufficiency and stenosis.  The procedure at this time employed a trans-catheter approach. It entailed tricuspid valve balloon valvuloplasty with a Z-Med 25 x 5 cm balloon followed by placement of an Sotelo S3 26 mm valve. The catheterization also showed that she had only mild pulmonary valve stenosis and insufficiency.  She comes to Cardiology Clinic today for followup, and a Holter monitor.   Kacey reports that she is doing well.  She denies any problems with chest pain, abnormally rapid or slow heart rates. She does have palpitations. She lead a rather sedentary lifestyle, and is overweight.  She currently is on a baby aspirin a day, Digoxin 0.125 mg once a day, Norvasc 2.5 mg once a day. The Lasix was recently changed to HCTZ 25 mg once a day. She likes the HCTZ better. At the last clinic visit, Kacey was found to be in and out of either SVT or atrial flutter, and thus was started on Metoprolol 25 mg twice a day. Mom reports that since starting the beta blocker, the rhythm has gotten better. There are still some epsisodes of lightheadedness, since starting the beta blocker.         REVIEW OF SYSTEMS:  There are no visual disturbances. No HAs, syncope or seizures. No swallowing disorder. No difficulty breathing, SOB, wheezing or rhonchi. No asthma. No abdominal pain.  Bowel movements are  normal.  No pelvic pain. No problems with urination.  No DM of thyroid disorder.  No lipid disorder. No arterial disease. No known intrinsic problem with the lungs, liver or kidneys. No bleeding disorder. No smoking or ETOH use.        PHYSICAL EXAMINATION:   GENERAL:  The patient is overweight 18 -year-old female in no distress.  VITAL SIGNS:  Her weight is 121.7 kg (268 1/2 lbs) and her height is 1.676 meters (5'6''). Heart rate is 82 beats per minute and regular.  Sat 98 %.   Blood pressure in the right arm is 125/75.  Color and perfusion are good.   HEENT:  Eye movements are normal.  No bruits are noted over the head.  No jugular venous distention or pulsations.  Mucous membranes are moist and pink.  There is no adenopathy.  The neck is supple.  SKIN:  Showed is pink and well perfused. CHEST:  There is a well-healed midline scar.  Lungs are clear to auscultation bilaterally, although the breath sounds are somewhat decreased at the base. There are no wheezes or rhonchi. CARDIOVASCULAR:  Precordial activity is normal.  The first heart sound is prominent and crisp.  The second heart sound is narrowly split. There is a grade 1/6 systolic ejection murmur heard best up the left sternal border and across the base.  No diastolic murmur is heard today.  ABDOMEN:  There is exogenous obesity.  The liver edge is palpable about 2-3 FB at the right costal margin.  It is nonpulsatile and nontender.  Bowel sounds appear to be normal.  EXTREMITIES:  Pulses are 2+ throughout.  Capillary refill is good.  There is no cyanosis or peripheral edema.  No bruising.              ........................................................................................................................................        ECG:  Sinus rhythm at a ventricular rate of 81 bpm.  RBBB.  Slight LAD. T wave abnormality. QTc 501 ms is prolonged.              ECHOCARDIOGRAM (AT A PREVIOUS VISIT):  An echocardiogram was performed.  2-D STUDY: There  is no pericardial effusion.  No clots or vegetations. The bioprosthetic tricuspid valve is seen to be in good position. Annulus is 18 mm. The struts are easily seen.  The leaflets are mobile.  No evidence for thrombus formation.  The right atrium is enlarged measuring 5.4 x 4.8 cm (was 6.3 x 5.4 cm). One can also see  the bioprosthetic tricuspid valve in a cross sectional view.  It appears circular and measures 2 cm x 2 cm.  Again, no clots or vegetations are seen.  No right ventricular or left ventricular outflow tract obstruction.  The right ventricular circumferential area of shortening is 35%, which is reduced.  Circumferential area of the RA is 23.4 cm2 (enlarged).   M-MODE:   LV 5.7 cm, RV 3.6 cm, Ao 3.0 cm, LA 3.8 cm, Sep 1.0 cm, PW 1.1 cm, EF ~ 40 % (reduced). DOPPLER VELOCITY ANALYSIS:  There is no insufficiency of the recently-placed bioprosthetic tricuspid valve.  Mild turbulent antegrade flow is seen.  Continuous wave Doppler analysis shows a peak pressure drop of 19 mmHg with a mean value of 10 mmHg.  Importantly, the leaflets are moving freely.  There is no mitral insufficiency or aortic insufficiency.  Peak pressure drop across the aortic valve is 4 mmHg.  Peak pressure drop across the bioprosthetic pulmonary valve is 12 mmHg.               HOLTER 24 HRS: Normal heart rate range, but mean value is increased at 100 bpm. Runs of SVT vs atrial flutter at ~ 130 bpm, which break into sinus.  Isolated PVCs.               ASSESSMENT:  In summary, we have a 18-year-old female originally diagnosed to have pulmonary atresia with intact inter-ventricular septum, who is status-post multiple open heart operations involving both the pulmonary and tricuspid valves.  She recently in February 2017 had placement of an Sotelo S3 26 mm bioprosthetic tricuspid valve via the transvenous approach.  She tolerated the procedure well.  Clinically, she has been stable. However, she continues to have poor exercise capacity,  chronic fatigue and more recently developed a new rhythm disturbance.    PLAN:  Given our findings, our suggestions are as follows:  1. She of course needs antibiotic prophylaxis for any invasive procedure.   2. She should continue with the current heart medications, as outlined above. She now will be taking Metoprolol 50 mg qd for the abnormal supraventricular tachycardia.  A previous Holter showed runs of non-sinus tachycardia at ~ 130 bpm, SVT vs atrial flutter.  She was given an event recorder today.  She will be seeing the EP team on Dec 11, 2018.  We started her on Warfarin 5 mg qd.  INR today was 1.3. We increased the dose to 7.5 mg qd.  3.  I want to see her back in Cardiology Clinic in one week, for an INR and follow-up with EP for the rhythm.  4.  I advised her to start a weight reduction program.     If there are any questions concerning the cardiac status of this patient, please feel free to contact us.  Thank you so much for allowing us to partake in the care of this patient. Donovan Gonzaelz PhD, MD.

## 2018-12-10 NOTE — PROGRESS NOTES
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 She underwent reconstruction of the right ventricular outflow tract and placement of a bioprosthetic pulmonary valve in early childhood. She has had multiple valve replacements. Her most recent open heart surgery involved placement of a bioprosthetic valve in the pulmonary position and placement of a bioprosthetic valve in the tricuspid position, in 2007. Recently, she had a heart catheterization for recurrent insufficiency and obstruction of the bioprosthetic valve in the tricuspid position.  The procedure at this time employed a trans-catheter approach. It entailed valvuloplasty with a Z-Med 25 x 5 cm balloon followed by placement of an Sotelo S3 26 mm bioprosthetic valve in the tricuspid position.. The catheterization also showed that she had only mild bioprosthetic pulmonary valve stenosis and insufficiency. She leads a rather sedentary lifestyle, and is overweight.  She currently is on a baby aspirin a day, Digoxin 0.125 mg once a day, Norvasc 2.5 mg once a day. The Lasix was recently changed to HCTZ 25 mg once a day. She likes the HCTZ better. At the last clinic visit, Kacey was found to be in and out of either SVT or more likely atrial flutter, and thus was started on Metoprolol 50 mg once a day. Mom reports that since starting the beta blocker, the rhythm has gotten better.          REVIEW OF SYSTEMS:  There are no visual disturbances. No HAs, lightheadedness, syncope or seizures. No swallowing disorder. No difficulty breathing, SOB, wheezing or rhonchi. No asthma. No abdominal pain.  Bowel movements are normal.  No pelvic pain. No problems with urination.  No DM of thyroid disorder.  No lipid disorder. No arterial disease. No known intrinsic problem with the lungs, liver or kidneys. No bleeding disorder. No smoking or ETOH  use.        PHYSICAL EXAMINATION:   GENERAL:  The patient is overweight 18 -year-old female in no distress.  VITAL SIGNS:  Her weight is 124.3 kg (274 lbs) and her height is 1.676 meters (5' 6''). Heart rate is 78 beats per minute and regular.  Sat 97 %.   Blood pressure in the right arm is 129/65 mmHg in the RA..  Color and perfusion are good.   HEENT:  Eye movements are normal.  No bruits are noted over the head.  No jugular venous distention or pulsations.  Mucous membranes are moist and pink.  There is no adenopathy.  The neck is supple.  No carotid bruits. SKIN:  Showed is pink and well perfused. CHEST:  There is a well-healed midline scar.  Lungs are clear to auscultation bilaterally, although the breath sounds are somewhat decreased at the base. There are no wheezes or rhonchi. CARDIOVASCULAR:  Precordial activity is normal.  The first heart sound is prominent and crisp.  The second heart sound is narrowly split. There is a grade 1/6 systolic ejection murmur heard best up the left sternal border and across the base.  No diastolic murmur is heard today.  ABDOMEN:  There is exogenous obesity.  The liver edge is palpable about 2-3 FB at the right costal margin.  It is nonpulsatile and nontender.  Bowel sounds appear to be normal.  EXTREMITIES:  Pulses are 2+ throughout.  Capillary refill is good.  There is no cyanosis or peripheral edema.  No bruising or rashes.              ........................................................................................................................................        ECG: Sinus rhythm at a rate of 78 bpm. RBBB. Minor T wave abnormality.  Prolonged QTc but she has a wide RBBB.              ECHOCARDIOGRAM (AT A PREVIOUS VISIT):  An echocardiogram was performed.  2-D STUDY: There is no pericardial effusion.  No clots or vegetations. The bioprosthetic tricuspid valve is seen to be in good position. Annulus is 18 mm. The struts are easily seen.  The leaflets are  mobile.  No evidence for thrombus formation.  The right atrium is enlarged measuring 5.4 x 4.8 cm (was 6.3 x 5.4 cm). One can also see  the bioprosthetic tricuspid valve in a cross sectional view.  It appears circular and measures 2 cm x 2 cm.  Again, no clots or vegetations are seen.  No right ventricular or left ventricular outflow tract obstruction.  The right ventricular circumferential area of shortening is 35%, which is reduced.  Circumferential area of the RA is 23.4 cm2 (enlarged).   M-MODE:   LV 5.7 cm, RV 3.6 cm, Ao 3.0 cm, LA 3.8 cm, Sep 1.0 cm, PW 1.1 cm, EF ~ 40 % (reduced). DOPPLER VELOCITY ANALYSIS:  There is no insufficiency of the recently-placed bioprosthetic tricuspid valve.  Mild turbulent antegrade flow is seen.  Continuous wave Doppler analysis shows a peak pressure drop of 19 mmHg with a mean value of 10 mmHg.  Importantly, the leaflets are moving freely.  There is no mitral insufficiency or aortic insufficiency.  Peak pressure drop across the aortic valve is 4 mmHg.  Peak pressure drop across the bioprosthetic pulmonary valve is 12 mmHg.       ECHO (Limited study today):  There is no pericardial effusion.  No clots or vegetations. The bioprosthetic tricuspid valve is seen to be in good position. Annulus is 18 mm. The struts are easily seen. Most of the valve struts sit in the RV.  At the junction of the medial portion of the bioprosthetic tricuspid valve and the atrial septum the region appears to be clear. The leaflets are mobile.  No evidence for thrombus formation in the right atrium. The right atrium is enlarged measuring 5.4 x 4.8 cm (was 6.3 x 5.4 cm). One can also see  the bioprosthetic tricuspid valve in a cross sectional view.  It appears circular and measures 2 cm x 2 cm.  No right ventricular or left ventricular outflow tract obstruction. The right ventricular circumferential area of shortening is ~ 35%, which is reduced. Circumferential area of the RA is 23.4 cm2  (enlarged).         .................................................................................................................................................               ASSESSMENT:  In summary, we have a 18-year-old female originally diagnosed to have pulmonary atresia with intact inter-ventricular septum, who is status-post multiple open heart operations involving both the pulmonary and tricuspid valves.  She recently in February 2017 had placement of an Sotelo S3 26 mm bioprosthetic tricuspid valve via the transvenous approach.  She tolerated the procedure well.  Clinically, she has been stable. However, she continues to have poor exercise capacity, chronic fatigue and more recently developed a new rhythm disturbance, SVT vs atrial flutter   PLAN:  Given our findings, our suggestions are as follows:  1.  She of course needs antibiotic prophylaxis for any invasive procedure.   2.  She should continue with the current heart medications, as outlined above. She now is also taking Metoprolol for the abnormal supraventricular tachycardia.  She is on 50 mg once a day.  (She did not tolerate the 50 mg twice a day). We also started her on Warfarin 5 mg qd.  INR today  Is 1.4.   Thus, we increased the Wararin dose  to 7.5 mg on Sat and Sun rather than 5 mg.  3. Kacey was seen by EP (Dr Woodward) today. She likely will require a study and possible ablation after Christmas.  4.  I advised her to start on a weight reduction program.  If there are any questions concerning the cardiac status of this patient, please feel free to contact us.  Thank you so much for allowing us to partake in the care of this patient. Donovan Gonzalez PhD, MD.

## 2018-12-11 ENCOUNTER — OFFICE VISIT (OUTPATIENT)
Dept: CARDIOLOGY | Facility: CLINIC | Age: 18
End: 2018-12-11
Payer: MEDICAID

## 2018-12-11 ENCOUNTER — OFFICE VISIT (OUTPATIENT)
Dept: PEDIATRIC CARDIOLOGY | Facility: CLINIC | Age: 18
End: 2018-12-11
Attending: PEDIATRICS
Payer: MEDICAID

## 2018-12-11 ENCOUNTER — CLINICAL SUPPORT (OUTPATIENT)
Dept: CARDIOLOGY | Facility: CLINIC | Age: 18
End: 2018-12-11
Payer: MEDICAID

## 2018-12-11 VITALS
HEART RATE: 78 BPM | SYSTOLIC BLOOD PRESSURE: 129 MMHG | HEIGHT: 66 IN | OXYGEN SATURATION: 97 % | WEIGHT: 274.06 LBS | RESPIRATION RATE: 18 BRPM | BODY MASS INDEX: 44.04 KG/M2 | DIASTOLIC BLOOD PRESSURE: 65 MMHG

## 2018-12-11 VITALS
HEART RATE: 118 BPM | HEIGHT: 66 IN | OXYGEN SATURATION: 97 % | SYSTOLIC BLOOD PRESSURE: 129 MMHG | BODY MASS INDEX: 44.04 KG/M2 | DIASTOLIC BLOOD PRESSURE: 65 MMHG | WEIGHT: 274.06 LBS

## 2018-12-11 DIAGNOSIS — I47.10 SVT (SUPRAVENTRICULAR TACHYCARDIA): Primary | ICD-10-CM

## 2018-12-11 DIAGNOSIS — Z95.3 HISTORY OF TRICUSPID VALVE REPLACEMENT WITH BIOPROSTHETIC VALVE: ICD-10-CM

## 2018-12-11 DIAGNOSIS — Z98.890 HISTORY OF OPEN HEART SURGERY: ICD-10-CM

## 2018-12-11 DIAGNOSIS — Q24.9 ADULT CONGENITAL HEART DISEASE: ICD-10-CM

## 2018-12-11 DIAGNOSIS — Z95.3 HISTORY OF PULMONARY VALVE REPLACEMENT WITH BIOPROSTHETIC VALVE: ICD-10-CM

## 2018-12-11 DIAGNOSIS — I47.10 SVT (SUPRAVENTRICULAR TACHYCARDIA): ICD-10-CM

## 2018-12-11 DIAGNOSIS — Q25.5 PULMONARY ATRESIA WITH INTACT VENTRICULAR SEPTUM: ICD-10-CM

## 2018-12-11 DIAGNOSIS — Z95.4 S/P TRICUSPID VALVE REPLACEMENT: ICD-10-CM

## 2018-12-11 DIAGNOSIS — I07.2 TRICUSPID STENOSIS AND INSUFFICIENCY, UNSPECIFIED ETIOLOGY: Primary | ICD-10-CM

## 2018-12-11 DIAGNOSIS — Z95.2 S/P PULMONARY VALVE REPLACEMENT: ICD-10-CM

## 2018-12-11 PROCEDURE — 99215 OFFICE O/P EST HI 40 MIN: CPT | Mod: 25,S$GLB,, | Performed by: PEDIATRICS

## 2018-12-11 PROCEDURE — 93308 TTE F-UP OR LMTD: CPT | Mod: S$GLB,,, | Performed by: PEDIATRICS

## 2018-12-11 PROCEDURE — 93000 ELECTROCARDIOGRAM COMPLETE: CPT | Mod: S$GLB,,, | Performed by: PEDIATRICS

## 2018-12-11 PROCEDURE — 99215 PR OFFICE/OUTPT VISIT, EST, LEVL V, 40-54 MIN: ICD-10-PCS | Mod: 25,S$GLB,, | Performed by: PEDIATRICS

## 2018-12-11 PROCEDURE — 99213 OFFICE O/P EST LOW 20 MIN: CPT | Mod: S$GLB,,, | Performed by: PEDIATRICS

## 2018-12-11 NOTE — PROGRESS NOTES
Thank you for referring your patient Kacey Ruth to the electrophysiology clinic for consultation. The patient is accompanied by her mother. Please review my findings below.    CHIEF COMPLAINT: EP evaluation of atrial arrhythmia vs. SVT    HISTORY OF PRESENT ILLNESS:   19 y/o female with history of pulmonary atresia intact ventricular septum and ASD.  She underwent reconstruction of the right ventricular outflow tract and placement of a bioprosthetic pulmonary valve in early childhood. She has had multiple valve replacements. Her most recent open heart surgery involved placement of a bioprosthetic valve in the pulmonary position and placement of a bioprosthetic valve in the tricuspid position in 2007. Recently, she had a heart catheterization for recurrent tricuspid valve insufficiency and stenosis.  The procedure at this time employed a trans-catheter approach. It entailed tricuspid valve balloon valvuloplasty with a Z-Med 25 x 5 cm balloon followed by placement of an Sotelo S3 26 mm valve in Feb 2017. The catheterization also showed that she had only mild pulmonary valve stenosis and insufficiency.  She was recently found by Dr. Gonzalez to have SVT vs. Atrial flutter and started on Metoprolol.    She was referred to EP clinic today by Dr. Gonzalez for further evaluation of arrhythmia.  In office Dr. Gonzalez was able to have her bear down and break arrhythmia and return to sinus.  She is currently on Metoprolol XL 50mg daily and digoxin 0.125 daily.     REVIEW OF SYSTEMS:     GENERAL: No fever, chills, fatigability or weight loss.  SKIN: No rashes, itching or changes in color or texture of skin.  CHEST: Denies SR, cyanosis, wheezing, cough and sputum production.  CARDIOVASCULAR: Denies chest pain or reduced exercise tolerance.  ABDOMEN: Appetite fine. No weight loss. Denies diarrhea, abdominal pain, or vomiting.  PERIPHERAL VASCULAR: No claudication or cyanosis.  MUSCULOSKELETAL: No joint stiffness or  "swelling.   NEUROLOGIC: No history of seizures,  alteration of gait or coordination.    PAST MEDICAL HISTORY:   Past Medical History:   Diagnosis Date    Obesity     Pulmonary atresia with intact ventricular septum     SVT (supraventricular tachycardia)            FAMILY HISTORY:   No family history on file.      SOCIAL HISTORY:   Social History     Socioeconomic History    Marital status: Single     Spouse name: Not on file    Number of children: Not on file    Years of education: Not on file    Highest education level: Not on file   Social Needs    Financial resource strain: Not on file    Food insecurity - worry: Not on file    Food insecurity - inability: Not on file    Transportation needs - medical: Not on file    Transportation needs - non-medical: Not on file   Occupational History    Not on file   Tobacco Use    Smoking status: Never Smoker   Substance and Sexual Activity    Alcohol use: No    Drug use: No    Sexual activity: No   Other Topics Concern    Not on file   Social History Narrative    Not on file       ALLERGIES:  Review of patient's allergies indicates:  No Known Allergies    MEDICATIONS:    Current Outpatient Medications:     amlodipine (NORVASC) 2.5 MG tablet, Take 1 tablet (2.5 mg total) by mouth once daily., Disp: 30 tablet, Rfl: 11    aspirin (ECOTRIN) 81 MG EC tablet, Take 1 tablet (81 mg total) by mouth once daily., Disp: , Rfl:     coenzyme Q10 (COQ-10) 100 mg capsule, Take 1 capsule (100 mg total) by mouth 2 (two) times daily., Disp: 62 capsule, Rfl: 6    digoxin (LANOXIN) 125 mcg tablet, Take 1 tablet (125 mcg total) by mouth once daily., Disp: 31 tablet, Rfl: 6    furosemide (LASIX) 20 MG tablet, Take 1 tablet (20 mg total) by mouth once daily., Disp: 31 tablet, Rfl: 6      PHYSICAL EXAM:   Vitals:    12/11/18 1025   BP: 129/65   Pulse: 78   Resp: 18   SpO2: 97%   Weight: 124.3 kg (274 lb 0.5 oz)   Height: 5' 6" (1.676 m)         GENERAL: Awake, well-developed " well-nourished, no apparent distress  HEENT: mucous membranes moist and pink, normocephalic atraumatic, no cranial or carotid bruits, sclera anicteric  NECK: no jugular venous distention, no lymphadenopathy  CHEST: Good air movement, clear to auscultation bilaterally  CARDIOVASCULAR: Quiet precordium, regular rate and rhythm, S1S2, no murmurs rubs or gallops  ABDOMEN: Soft, nontender nondistended, no hepatomegaly, no aortic bruits  EXTREMITIES: Warm well perfused, 2+ radial/pedal pulses, capillary refill 2 seconds, no clubbing, cyanosis, or edema  NEURO: Alert and oriented, cooperative with exam, face symmetric, moves all extremities well    STUDIES:  ECG: Normal Sinus rhythm, right bundle branch block      ASSESSMENT:  19 y/o female with pulmonary atresia intact septum with biventricular repair with charles valve in tricuspid position who has likely IART now controlled on beta blocker but feels tired with beta blocker.    PLAN:   Continue Metoprolol and Digoxin  Continue Coumadin  Schedule IART/SVT ablation and SAI and possible loop recorder implant with myself and Dr. Robles in January 2019 (EP RN to coordinate)  Will need to stop Metoprolol and digoxin 5 days prior to ablation.  Call for further palpitations, chest pain, syncope, or any other questions or concerns.      Time Spent: 40 (min) with over 50% in direct patient and family consultation regarding evaluation and management of SVT vs. IART with congenital heart disease..      The patient's doctor will be notified via EPIC    I hope this brings you up-to-date on Kacey Ruth  Please contact me with any questions or concerns.    Ricardo Woodward MD  Pediatric and Adult Congenital Electrophysiologist  Pediatric Cardiologist

## 2018-12-11 NOTE — LETTER
December 31, 2018      Donovan Gonzalez MD  [address]  [address]  [place] [facility] [place] [address]           [facility] - Pediatric Cardiology  [address]  [facility] [address]  Phone: [phone]  Fax: [phone]          Patient: Kacey Ruth   MR Number: [id number]   YOB: 2000   Date of Visit: 12/11/2018       Dear Dr. Donovan Gonzalez:    Thank you for referring Kacey Ruth to me for evaluation. Attached you will find relevant portions of my assessment and plan of care.    If you have questions, please do not hesitate to call me. I look forward to following Kacey Ruth along with you.    Sincerely,    Ricardo Woodward MD    Enclosure  CC:  No Recipients    If you would like to receive this communication electronically, please contact [email or web address] or [phone] to request more information on [employer] access.    For providers and/or their staff who would like to refer a patient to [facility], please contact us through our one-stop-shop provider referral line, [facility], at [phone].    If you feel you have received this communication in error or would no longer like to receive these types of communications, please e-mail [email or web address]

## 2018-12-13 ENCOUNTER — TELEPHONE (OUTPATIENT)
Dept: PEDIATRIC CARDIOLOGY | Facility: CLINIC | Age: 18
End: 2018-12-13

## 2018-12-18 ENCOUNTER — OFFICE VISIT (OUTPATIENT)
Dept: CARDIOLOGY | Facility: CLINIC | Age: 18
End: 2018-12-18
Payer: MEDICAID

## 2018-12-18 VITALS
SYSTOLIC BLOOD PRESSURE: 133 MMHG | HEART RATE: 116 BPM | WEIGHT: 272.94 LBS | HEIGHT: 66 IN | DIASTOLIC BLOOD PRESSURE: 77 MMHG | BODY MASS INDEX: 43.86 KG/M2 | OXYGEN SATURATION: 98 %

## 2018-12-18 DIAGNOSIS — I47.19 ATRIAL TACHYCARDIA: Primary | ICD-10-CM

## 2018-12-18 DIAGNOSIS — Z95.3 HISTORY OF TRICUSPID VALVE REPLACEMENT WITH BIOPROSTHETIC VALVE: ICD-10-CM

## 2018-12-18 DIAGNOSIS — Z98.890 HISTORY OF OPEN HEART SURGERY: ICD-10-CM

## 2018-12-18 PROCEDURE — 93000 ELECTROCARDIOGRAM COMPLETE: CPT | Mod: S$GLB,,, | Performed by: PEDIATRICS

## 2018-12-18 PROCEDURE — 99213 OFFICE O/P EST LOW 20 MIN: CPT | Mod: 25,S$GLB,, | Performed by: PEDIATRICS

## 2018-12-18 NOTE — PROGRESS NOTES
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 She underwent reconstruction of the right ventricular outflow tract and placement of a bioprosthetic pulmonary valve in early childhood. She has had multiple valve replacements. Her most recent open heart surgery involved placement of a bioprosthetic valve in the pulmonary position and placement of a bioprosthetic valve in the tricuspid position, in 2007. Recently, she had a heart catheterization for recurrent insufficiency and obstruction of the bioprosthetic valve in the tricuspid position.  The procedure at this time employed a trans-catheter approach. It entailed valvuloplasty with a Z-Med 25 x 5 cm balloon followed by placement of an Sotelo S3 26 mm bioprosthetic valve in the tricuspid position.. The catheterization also showed that she had only mild bioprosthetic pulmonary valve stenosis and insufficiency. She leads a rather sedentary lifestyle, and is overweight.  She currently is on a baby aspirin a day, Digoxin 0.125 mg once a day, Norvasc 2.5 mg once a day. The Lasix was recently changed to HCTZ 25 mg once a day. She likes the HCTZ better. At the last clinic visit, Kacey was found to be in "sinus rhythm", as opposed to either SVT or more likely atrial flutter.  She was started on Metoprolol 50 mg once a day. Mom reports that since starting the beta blocker, the rhythm has gotten better.          REVIEW OF SYSTEMS:  There are no visual disturbances. No HAs, lightheadedness, syncope or seizures. No swallowing disorder. No difficulty breathing, SOB, wheezing or rhonchi. No asthma. No abdominal pain.  Bowel movements are normal.  No pelvic pain. No problems with urination.  No DM of thyroid disorder.  No lipid disorder. No arterial disease. No known intrinsic problem with the lungs, liver or kidneys. No bleeding " disorder. No smoking or ETOH use.        PHYSICAL EXAMINATION:   GENERAL:  The patient is overweight 18 -year-old female in no distress.  VITAL SIGNS:  Her weight is 123.8 kg (274 lbs) and her height is 1.676 meters (5' 6''). Heart rate is 116 beats per minute and regular.  Sat 98 %.   Blood pressure in the right arm is 133/77 mmHg in the RA..  Color and perfusion are good.   HEENT:  Eye movements are normal.  No bruits are noted over the head.  No jugular venous distention or pulsations.  Mucous membranes are moist and pink.  There is no adenopathy.  The neck is supple.  No carotid bruits. SKIN:  Showed is pink and well perfused. CHEST:  There is a well-healed midline scar.  Lungs are clear to auscultation bilaterally, although the breath sounds are somewhat decreased at the base. There are no wheezes or rhonchi. CARDIOVASCULAR:  Precordial activity is normal.  The first heart sound is prominent and crisp.  The second heart sound is narrowly split. There is a grade 1/6 systolic ejection murmur heard best up the left sternal border and across the base.  No diastolic murmur is heard today.  ABDOMEN:  There is exogenous obesity.  The liver edge is palpable about 2-3 FB at the right costal margin.  It is nonpulsatile and nontender.  Bowel sounds appear to be normal.  EXTREMITIES:  Pulses are 2+ throughout.  Capillary refill is good.  There is no cyanosis or peripheral edema.  No bruising or rashes.              ........................................................................................................................................        ECG: Sinus rhythm at a rate of 85 bpm. RBBB (156 ms). Minor T wave abnormality.  Prolonged QTc but she has a wide RBBB.              ECHOCARDIOGRAM (AT LAST VISIT):  An echocardiogram was performed.  2-D STUDY: There is no pericardial effusion.  No clots or vegetations. The bioprosthetic tricuspid valve is seen to be in good position. Annulus is 18 mm. The struts are  easily seen.  The leaflets are mobile.  No evidence for thrombus formation.  The right atrium is enlarged measuring 5.4 x 4.8 cm (was 6.3 x 5.4 cm). One can also see  the bioprosthetic tricuspid valve in a cross sectional view.  It appears circular and measures 2 cm x 2 cm.  Again, no clots or vegetations are seen.  No right ventricular or left ventricular outflow tract obstruction.  The right ventricular circumferential area of shortening is 35%, which is reduced.  Circumferential area of the RA is 23.4 cm2 (enlarged).   M-MODE:   LV 5.7 cm, RV 3.6 cm, Ao 3.0 cm, LA 3.8 cm, Sep 1.0 cm, PW 1.1 cm, EF ~ 40 % (reduced). DOPPLER VELOCITY ANALYSIS:  There is no insufficiency of the recently-placed bioprosthetic tricuspid valve.  Mild turbulent antegrade flow is seen.  Continuous wave Doppler analysis shows a peak pressure drop of 19 mmHg with a mean value of 10 mmHg.  Importantly, the leaflets are moving freely.  There is no mitral insufficiency or aortic insufficiency.  Peak pressure drop across the aortic valve is 4 mmHg.  Peak pressure drop across the bioprosthetic pulmonary valve is 12 mmHg.                   .................................................................................................................................................               ASSESSMENT:  In summary, we have a 18-year-old female originally diagnosed to have pulmonary atresia with intact inter-ventricular septum, who is status-post multiple open heart operations involving both the pulmonary and tricuspid valves.  She recently in February 2017 had placement of an Sotelo S3 26 mm bioprosthetic tricuspid valve via the transvenous approach.  She tolerated the procedure well.  Clinically, she has been stable. However, she continues to have poor exercise capacity, chronic fatigue and more recently developed a new rhythm disturbance, SVT vs atrial flutter   PLAN:  Given our findings, our suggestions are as follows:  1.  She of  course needs antibiotic prophylaxis for any invasive procedure.   2.  She should continue with the current heart medications, as outlined above. She now is also taking Metoprolol for the abnormal supraventricular tachycardia.  She is on 50 mg once a day.  (She did not tolerate the 50 mg twice a day). We also started her on Warfarin.  INR today  Is 1.4.   We increased the Wararin dose  to 7.5 mg on M, T, W, Th.  F, Sat and Sun 5 mg.  3. Kacey was seen by EP (Dr Woodward) today. She will require a study and possible ablation in early Feb.  4.  I advised her to start on a weight reduction program.  If there are any questions concerning the cardiac status of this patient, please feel free to contact us.  Thank you so much for allowing us to partake in the care of this patient. Donovan Gonzalez PhD, MD.

## 2019-01-04 PROBLEM — I47.10 SVT (SUPRAVENTRICULAR TACHYCARDIA): Status: ACTIVE | Noted: 2019-01-04

## 2019-01-04 PROBLEM — Q24.9 ADULT CONGENITAL HEART DISEASE: Status: ACTIVE | Noted: 2019-01-04

## 2019-01-10 ENCOUNTER — OFFICE VISIT (OUTPATIENT)
Dept: CARDIOLOGY | Facility: CLINIC | Age: 19
End: 2019-01-10
Payer: MEDICAID

## 2019-01-10 ENCOUNTER — OFFICE VISIT (OUTPATIENT)
Dept: PEDIATRIC CARDIOLOGY | Facility: CLINIC | Age: 19
End: 2019-01-10
Attending: PEDIATRICS
Payer: MEDICAID

## 2019-01-10 VITALS
BODY MASS INDEX: 43.66 KG/M2 | DIASTOLIC BLOOD PRESSURE: 76 MMHG | SYSTOLIC BLOOD PRESSURE: 131 MMHG | HEIGHT: 66 IN | WEIGHT: 271.69 LBS | OXYGEN SATURATION: 98 % | HEART RATE: 86 BPM

## 2019-01-10 VITALS
BODY MASS INDEX: 43.66 KG/M2 | WEIGHT: 271.69 LBS | DIASTOLIC BLOOD PRESSURE: 76 MMHG | HEART RATE: 86 BPM | SYSTOLIC BLOOD PRESSURE: 131 MMHG | HEIGHT: 66 IN | OXYGEN SATURATION: 98 %

## 2019-01-10 DIAGNOSIS — I36.2: ICD-10-CM

## 2019-01-10 DIAGNOSIS — I48.92 ATRIAL FLUTTER, UNSPECIFIED TYPE: ICD-10-CM

## 2019-01-10 DIAGNOSIS — Q24.9 ADULT CONGENITAL HEART DISEASE: ICD-10-CM

## 2019-01-10 DIAGNOSIS — Z95.4 S/P TRICUSPID VALVE REPLACEMENT: ICD-10-CM

## 2019-01-10 DIAGNOSIS — E66.01 CLASS 2 SEVERE OBESITY DUE TO EXCESS CALORIES WITH SERIOUS COMORBIDITY IN ADULT, UNSPECIFIED BMI: ICD-10-CM

## 2019-01-10 DIAGNOSIS — I47.10 SVT (SUPRAVENTRICULAR TACHYCARDIA): Primary | ICD-10-CM

## 2019-01-10 DIAGNOSIS — Z95.2 S/P PULMONARY VALVE REPLACEMENT: ICD-10-CM

## 2019-01-10 PROCEDURE — 99213 PR OFFICE/OUTPT VISIT, EST, LEVL III, 20-29 MIN: ICD-10-PCS | Mod: 25,S$GLB,, | Performed by: PEDIATRICS

## 2019-01-10 PROCEDURE — 93000 ELECTROCARDIOGRAM COMPLETE: CPT | Mod: S$GLB,,, | Performed by: PEDIATRICS

## 2019-01-10 PROCEDURE — 93000 PR ELECTROCARDIOGRAM, COMPLETE: ICD-10-PCS | Mod: S$GLB,,, | Performed by: PEDIATRICS

## 2019-01-10 PROCEDURE — 99214 OFFICE O/P EST MOD 30 MIN: CPT | Mod: 25,S$GLB,, | Performed by: PEDIATRICS

## 2019-01-10 PROCEDURE — 99213 OFFICE O/P EST LOW 20 MIN: CPT | Mod: 25,S$GLB,, | Performed by: PEDIATRICS

## 2019-01-10 PROCEDURE — 99214 PR OFFICE/OUTPT VISIT, EST, LEVL IV, 30-39 MIN: ICD-10-PCS | Mod: 25,S$GLB,, | Performed by: PEDIATRICS

## 2019-01-10 NOTE — LETTER
January 23, 2019        Sharad Yun MD  [address]  [address]  [facility] [address]             [facility] Pediatric Cardiology  [address] [place] [address] [address] [place] [id number]  Phone: [phone]  Fax: [phone]   Patient: Kacey Ruth   MR Number: [id number]   YOB: 2000   Date of Visit: 1/10/2019       Dear Dr. Yun:    Thank you for referring Kacey Ruth to me for evaluation. Attached you will find relevant portions of my assessment and plan of care.    If you have questions, please do not hesitate to call me. I look forward to following Kacey Ruth along with you.    Sincerely,      Ricardo Woodward MD            CC  No Recipients    Enclosure

## 2019-01-10 NOTE — PROGRESS NOTES
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 She underwent reconstruction of the right ventricular outflow tract and placement of a bioprosthetic pulmonary valve in early childhood. She has had multiple valve replacements. Her most recent open heart surgery involved placement of a bioprosthetic valve in the pulmonary position and placement of a bioprosthetic valve in the tricuspid position, in 2007. Recently, she had a heart catheterization for recurrent insufficiency and obstruction of the bioprosthetic valve in the tricuspid position.  The procedure at this time employed a trans-catheter approach. It entailed valvuloplasty with a Z-Med 25 x 5 cm balloon followed by placement of an Sotelo S3 26 mm bioprosthetic valve in the tricuspid position.. 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 She leads a rather sedentary lifestyle, and is overweight.  She currently is on a baby aspirin a day, Digoxin 0.125 mg once a day, Norvasc 2.5 mg once a day. The Lasix was recently changed to HCTZ 25 mg once a day. She likes the HCTZ better. At the last clinic visit, Kacey was found to be in "sinus rhythm", as opposed to either SVT or more likely atrial flutter.  She was started on Metoprolol 50 mg once a day. Mom reports that since starting the beta blocker, the rhythm has gotten better. " Just a few short episodes of rapid HRs."        REVIEW OF SYSTEMS:  There are no visual disturbances. No HAs, lightheadedness, syncope or seizures. No swallowing disorder. No difficulty breathing, SOB, wheezing or rhonchi. No asthma. No abdominal pain.  Bowel movements are normal.  No pelvic pain. No problems with urination.  No DM of thyroid disorder.  No lipid disorder. No arterial disease. No known intrinsic problem with the lungs, " liver or kidneys. No bleeding disorder. No smoking or ETOH use.        PHYSICAL EXAMINATION:   GENERAL:  The patient is overweight 18 -year-old female in no distress.  VITAL SIGNS:  Her weight is 123.2 kg (272 lbs) and her height is 1.676 meters (5' 6''). Heart rate is 86 beats per minute and regular.  Sat 98 %.   Blood pressure in the right arm is 131/76 mmHg in the RA..  Color and perfusion are good.  HEENT:  Eye movements are normal.  No bruits are noted over the head.  No jugular venous distention or pulsations.  Mucous membranes are moist and pink.  There is no adenopathy.  The neck is supple.  No carotid bruits. SKIN:  Showed is pink and well perfused. CHEST:  There is a well-healed midline scar.  Lungs are clear to auscultation bilaterally, although the breath sounds are somewhat decreased at the base. There are no wheezes or rhonchi. CARDIOVASCULAR:  Precordial activity is normal.  The first heart sound is prominent and crisp.  The second heart sound is narrowly split. There is a grade 1/6 systolic ejection murmur heard best up the left sternal border and across the base.  No diastolic murmur is heard today.  ABDOMEN:  There is exogenous obesity.  The liver edge is palpable about 2-3 FB at the right costal margin.  It is nonpulsatile and nontender.  Bowel sounds appear to be normal.  EXTREMITIES:  Pulses are 2+ throughout.  Capillary refill is good.  There is no cyanosis or peripheral edema.  No bruising or rashes.              ........................................................................................................................................        ECG: Sinus rhythm at a rate of 81 bpm. RBBB (160 ms). Minor T wave abnormality.  Prolonged QTc but she has a wide RBBB.              ECHOCARDIOGRAM (AT LAST VISIT):  An echocardiogram was performed.  2-D STUDY: There is no pericardial effusion.  No clots or vegetations. The bioprosthetic tricuspid valve is seen to be in good position. Annulus  is 18 mm. The struts are easily seen.  The leaflets are mobile.  No evidence for thrombus formation.  The right atrium is enlarged measuring 5.4 x 4.8 cm (was 6.3 x 5.4 cm). One can also see  the bioprosthetic tricuspid valve in a cross sectional view.  It appears circular and measures 2 cm x 2 cm.  Again, no clots or vegetations are seen.  No right ventricular or left ventricular outflow tract obstruction.  The right ventricular circumferential area of shortening is 35%, which is reduced.  Circumferential area of the RA is 23.4 cm2 (enlarged).   M-MODE:   LV 5.7 cm, RV 3.6 cm, Ao 3.0 cm, LA 3.8 cm, Sep 1.0 cm, PW 1.1 cm, EF ~ 40 % (reduced). DOPPLER VELOCITY ANALYSIS:  There is no insufficiency of the recently-placed bioprosthetic tricuspid valve.  Mild turbulent antegrade flow is seen.  Continuous wave Doppler analysis shows a peak pressure drop of 19 mmHg with a mean value of 10 mmHg.  Importantly, the leaflets are moving freely.  There is no mitral insufficiency or aortic insufficiency.  Peak pressure drop across the aortic valve is 4 mmHg.  Peak pressure drop across the bioprosthetic pulmonary valve is 12 mmHg.                    .................................................................................................................................................               ASSESSMENT:  In summary, we have a 18-year-old female originally diagnosed to have pulmonary atresia with intact inter-ventricular septum, who is status-pos multiple open heart operations involving both the pulmonary and tricuspid valves.  She recently in February 2017 had placement of an Sotelo S3 26 mm bioprosthetic tricuspid valve via the transvenous approach.  She tolerated the procedure well.  Clinically, she has been stable. However, she continues to have poor exercise capacity, chronic fatigue and more recently developed a new rhythm disturbance, SVT vs atrial flutter   PLAN:  Given our findings, our suggestions are as  follows:  1.  She of course needs antibiotic prophylaxis for any invasive procedure.   2.  She should DC the ASA now, and the digoxin and beta blocker 5 days before the EPS.   She now is also taking Metoprolol for the abnormal supraventricular tachycardia.  Would increase to 50 mg in the AM and 25 in the PM. We also started her on Warfarin.  INR today  Is 1.7.   The Wararin dose  to 7.5 mg on M, T, W, Th.  F, Sat and Sun 5 mg.  3. Kacey was seen by EP (Dr Woodward) today. She has an EPS study and possible ablation on Feb 6 th.  If there are any questions concerning the cardiac status of this patient, please feel free to contact us.  Thank you so much for allowing us to partake in the care of this patient. Donovan Gonzalez PhD, MD.

## 2019-01-10 NOTE — PROGRESS NOTES
Thank you for referring your patient Kacey Ruth to the electrophysiology clinic for consultation. The patient is accompanied by her mother. Please review my findings below.    CHIEF COMPLAINT: EP evaluation of atrial arrhythmia vs. SVT    HISTORY OF PRESENT ILLNESS:   19 y/o female with history of pulmonary atresia intact ventricular septum and ASD.  She underwent reconstruction of the right ventricular outflow tract and placement of a bioprosthetic pulmonary valve in early childhood. She has had multiple valve replacements. Her most recent open heart surgery involved placement of a bioprosthetic valve in the pulmonary position and placement of a bioprosthetic valve in the tricuspid position in 2007. Recently, she had a heart catheterization for recurrent tricuspid valve insufficiency and stenosis.  The procedure at this time employed a trans-catheter approach. It entailed tricuspid valve balloon valvuloplasty with a Z-Med 25 x 5 cm balloon followed by placement of an Sotelo S3 26 mm valve in Feb 2017. The catheterization also showed that she had only mild pulmonary valve stenosis and insufficiency.  She was recently found by Dr. Gonzalez to have SVT vs. Atrial flutter and started on Metoprolol.    She was referred to EP clinic today by Dr. Gonzalez for further evaluation of arrhythmia.  In office Dr. Gonzalez was able to have her bear down and break arrhythmia and return to sinus. Kacey was seen by me in December 2018.  She was referred to EP lab for EP study and possible ablation.  She is scheduled for February 6th with Dr. Robles and myself for ablation procedure.      REVIEW OF SYSTEMS:     GENERAL: No fever, chills, fatigability or weight loss.  SKIN: No rashes, itching or changes in color or texture of skin.  CHEST: Denies SR, cyanosis, wheezing, cough and sputum production.  CARDIOVASCULAR: Denies chest pain or reduced exercise tolerance.  ABDOMEN: Appetite fine. No weight loss. Denies diarrhea,  abdominal pain, or vomiting.  PERIPHERAL VASCULAR: No claudication or cyanosis.  MUSCULOSKELETAL: No joint stiffness or swelling.   NEUROLOGIC: No history of seizures,  alteration of gait or coordination.    PAST MEDICAL HISTORY:   Past Medical History:   Diagnosis Date    Obesity     Pulmonary atresia with intact ventricular septum     SVT (supraventricular tachycardia)            FAMILY HISTORY:   No family history on file.      SOCIAL HISTORY:   Social History     Socioeconomic History    Marital status: Single     Spouse name: Not on file    Number of children: Not on file    Years of education: Not on file    Highest education level: Not on file   Social Needs    Financial resource strain: Not on file    Food insecurity - worry: Not on file    Food insecurity - inability: Not on file    Transportation needs - medical: Not on file    Transportation needs - non-medical: Not on file   Occupational History    Not on file   Tobacco Use    Smoking status: Never Smoker   Substance and Sexual Activity    Alcohol use: No    Drug use: No    Sexual activity: No   Other Topics Concern    Not on file   Social History Narrative    Not on file       ALLERGIES:  Review of patient's allergies indicates:  No Known Allergies    MEDICATIONS:    Current Outpatient Medications:     amlodipine (NORVASC) 2.5 MG tablet, Take 1 tablet (2.5 mg total) by mouth once daily., Disp: 30 tablet, Rfl: 11    aspirin (ECOTRIN) 81 MG EC tablet, Take 1 tablet (81 mg total) by mouth once daily., Disp: , Rfl:     coenzyme Q10 (COQ-10) 100 mg capsule, Take 1 capsule (100 mg total) by mouth 2 (two) times daily., Disp: 62 capsule, Rfl: 6    digoxin (LANOXIN) 125 mcg tablet, Take 1 tablet (125 mcg total) by mouth once daily., Disp: 31 tablet, Rfl: 6    furosemide (LASIX) 20 MG tablet, Take 1 tablet (20 mg total) by mouth once daily., Disp: 31 tablet, Rfl: 6      PHYSICAL EXAM:   Vitals:    01/10/19 1107   BP: 131/76   Pulse: 86  "  SpO2: 98%   Weight: 123.2 kg (271 lb 11.5 oz)   Height: 5' 6" (1.676 m)         GENERAL: Awake, well-developed well-nourished, no apparent distress  HEENT: mucous membranes moist and pink, normocephalic atraumatic, no cranial or carotid bruits, sclera anicteric  NECK: no jugular venous distention, no lymphadenopathy  CHEST: Good air movement, clear to auscultation bilaterally  CARDIOVASCULAR: Quiet precordium, regular rate and rhythm, S1S2, no murmurs rubs or gallops  ABDOMEN: Soft, nontender nondistended, no hepatomegaly, no aortic bruits  EXTREMITIES: Warm well perfused, 2+ radial/pedal pulses, capillary refill 2 seconds, no clubbing, cyanosis, or edema  NEURO: Alert and oriented, cooperative with exam, face symmetric, moves all extremities well    STUDIES:  ECG: Normal Sinus rhythm, right bundle branch block      ASSESSMENT:  19 y/o female with pulmonary atresia intact septum with biventricular repair with charles valve in tricuspid position who has likely IART now controlled on beta blocker but feels tired with beta blocker.  She is scheduled for ablation on 2/6/19.    PLAN:   Continue Metoprolol and Digoxin  Continue Coumadin  Schedule IART/SVT ablation and SAI and possible loop recorder implant with myself and Dr. Robles in January 2019 (EP RN to coordinate)  Will need to stop Metoprolol and digoxin 5 days prior to ablation.  Call for further palpitations, chest pain, syncope, or any other questions or concerns.      Time Spent: 40 (min) with over 50% in direct patient and family consultation regarding evaluation and management of SVT vs. IART with congenital heart disease..      The patient's doctor will be notified via EPIC    I hope this brings you up-to-date on Kacey Ruth  Please contact me with any questions or concerns.    Ricardo Woodward MD  Pediatric and Adult Congenital Electrophysiologist  Pediatric Cardiologist       "

## 2019-01-23 NOTE — PROGRESS NOTES
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 She underwent reconstruction of the right ventricular outflow tract and placement of a bioprosthetic pulmonary valve in early childhood. She has had multiple valve replacements. Her most recent open heart surgery involved placement of a bioprosthetic valve in the pulmonary position and placement of a bioprosthetic valve in the tricuspid position, in 2007. Recently, she had a heart catheterization for recurrent insufficiency and obstruction of the bioprosthetic valve in the tricuspid position.  The procedure at this time employed a trans-catheter approach. It entailed valvuloplasty with a Z-Med 25 x 5 cm balloon followed by placement of an Sotelo S3 26 mm bioprosthetic valve in the tricuspid position.. 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 She leads a rather sedentary lifestyle, and is overweight. She currently is on a baby aspirin a day, Digoxin 0.125 mg once a day, Norvasc 2.5 mg once a day. The Lasix was recently changed to HCTZ 25 mg once a day. (She likes the HCTZ better.) At the last clinic visit, Kacey was found to be in "sinus rhythm", as opposed to either SVT or more likely atrial flutter. She was started on Metoprolol 50 mg once a day and Warfarin 7.5 mg and 5 mg alternating. Mom reports that since starting the beta blocker, the rhythm has gotten better. " Just a few short episodes of rapid HRs."          REVIEW OF SYSTEMS:  There are no visual disturbances. No HAs, lightheadedness, syncope or seizures. No swallowing disorder or PIETER. No difficulty with breathing, SOB,  wheezing or rhonchi. No asthma. No abdominal pain.  Bowel movements are normal.  No pelvic pain. Urination is normal.  No DM of thyroid disorder.  No lipid disorder. No arterial " disease. No known intrinsic problem with the lungs, liver or kidneys. No bleeding disorder. No smoking or ETOH use.        PHYSICAL EXAMINATION:   GENERAL:  The patient is overweight 18 -year-old female in no distress.  VITAL SIGNS:  Her weight is 124.3 kg (274 lbs) and her height is 1.676 meters (5' 6''). Heart rate is 79 beats per minute and regular.  Sat 96 %.   Blood pressure in the right arm is 112/76 mmHg in the RA..  Color and perfusion are good.  HEENT:  Eye movements are normal.  No bruits are noted over the head.  No jugular venous distention or pulsations.  Mucous membranes are moist and pink.  There is no adenopathy.  The neck is supple.  No carotid bruits. SKIN:  Showed is pink and well perfused. CHEST:  There is a well-healed midline scar.  Lungs are clear to auscultation bilaterally, although the breath sounds are somewhat decreased at the base. There are no wheezes or rhonchi. CARDIOVASCULAR:  Precordial activity is normal.  The first heart sound is prominent and crisp.  The second heart sound is narrowly split. There is a grade 1/6 systolic ejection murmur heard best up the left sternal border and across the base.  No diastolic murmur is heard today.  ABDOMEN:  There is exogenous obesity.  The liver edge is palpable about 2-3 FB at the right costal margin.  It is nonpulsatile and nontender.  Bowel sounds appear to be normal.  EXTREMITIES:  Pulses are 2+ throughout.  Capillary refill is good.  There is no cyanosis or peripheral edema.  No bruising or rashes.              ........................................................................................................................................        ECG: Sinus rhythm at a ventricular rate of 77 bpm. Prolonged  ms. Atrial abnormality.  RBBB (160 ms). Minor T wave abnormality.  Prolonged QTc but she has a wide RBBB.              ECHOCARDIOGRAM (AT A RECENT VISIT):  An echocardiogram was performed.  2-D STUDY: There is no pericardial  effusion.  No clots or vegetations. The bioprosthetic tricuspid valve is seen to be in good position. Annulus is 18 mm. The struts are easily seen.  The leaflets are mobile.  No evidence for thrombus formation.  The right atrium is enlarged measuring 5.4 x 4.8 cm (was 6.3 x 5.4 cm). One can also see  the bioprosthetic tricuspid valve in a cross sectional view.  It appears circular and measures 2 cm x 2 cm.  Again, no clots or vegetations are seen.  No right ventricular or left ventricular outflow tract obstruction.  The right ventricular circumferential area of shortening is 35%, which is reduced.  Circumferential area of the RA is 23.4 cm2, which is dilated. M-MODE:   LV 5.7 cm, RV 3.6 cm, Ao 3.0 cm, LA 3.8 cm, Sep 1.0 cm, PW 1.1 cm, EF ~ 40 % (reduced). DOPPLER VELOCITY ANALYSIS:  There is no insufficiency of the recently-placed bioprosthetic tricuspid valve.  Mild turbulent antegrade flow is seen.  Continuous wave Doppler analysis shows a peak pressure drop of 19 mmHg with a mean value of 10 mmHg.  Importantly, the leaflets are moving freely.  There is no mitral insufficiency or aortic insufficiency.  Peak pressure drop across the aortic valve is 4 mmHg.  Peak pressure drop across the bioprosthetic pulmonary valve is 12 mmHg.                    .................................................................................................................................................               ASSESSMENT:  In summary, we have a 18-year-old female originally diagnosed to have pulmonary atresia with intact inter-ventricular septum, who is status-pos multiple open heart operations involving both the pulmonary and tricuspid valves.  She recently in February 2017 had placement of an Sotelo S3 26 mm bioprosthetic tricuspid valve via the transvenous approach.  She tolerated the procedure well.  Clinically, she has been stable. However, she continues to have poor exercise capacity, chronic fatigue and more  recently developed a new rhythm disturbance, SVT vs atrial flutter.   PLAN:  Given our findings, our suggestions are as follows:  1.  She of course needs antibiotic prophylaxis for any invasive procedure.   2.  She should DC the ASA now, and the digoxin and beta blocker 5 days before the EPS.   She now is also taking Metoprolol for the abnormal supraventricular tachycardia, at 50 mg in the AM. We also started her on Warfarin.  INR today  is (1.1) previous was 1.7.   Increase Wararin dose  to 7.5 mg qd.  3. Kacey has been seen by EP (Dr Woodward). She has an EPS study and possible ablation on Feb 6 th (a Weds).  The Metoprolol and Dig will be DCed 5 days prior to the EPS.  Can continue on the Warfarin. If there are any questions concerning the cardiac status of this patient, please feel free to contact us.  Thank you so much for allowing us to partake in the care of this patient. oDnovan Gonzalez PhD, MD.

## 2019-01-24 ENCOUNTER — OFFICE VISIT (OUTPATIENT)
Dept: CARDIOLOGY | Facility: CLINIC | Age: 19
End: 2019-01-24
Payer: MEDICAID

## 2019-01-24 ENCOUNTER — TELEPHONE (OUTPATIENT)
Dept: ELECTROPHYSIOLOGY | Facility: CLINIC | Age: 19
End: 2019-01-24

## 2019-01-24 VITALS
HEART RATE: 79 BPM | DIASTOLIC BLOOD PRESSURE: 76 MMHG | WEIGHT: 274.06 LBS | SYSTOLIC BLOOD PRESSURE: 112 MMHG | BODY MASS INDEX: 44.04 KG/M2 | HEIGHT: 66 IN | OXYGEN SATURATION: 96 %

## 2019-01-24 DIAGNOSIS — Z95.3 HISTORY OF TRICUSPID VALVE REPLACEMENT WITH BIOPROSTHETIC VALVE: ICD-10-CM

## 2019-01-24 DIAGNOSIS — Z98.890 HISTORY OF OPEN HEART SURGERY: ICD-10-CM

## 2019-01-24 DIAGNOSIS — I47.10 SVT (SUPRAVENTRICULAR TACHYCARDIA): Primary | ICD-10-CM

## 2019-01-24 DIAGNOSIS — I48.4 ATYPICAL ATRIAL FLUTTER: Primary | ICD-10-CM

## 2019-01-24 PROCEDURE — 93000 PR ELECTROCARDIOGRAM, COMPLETE: ICD-10-PCS | Mod: S$GLB,,, | Performed by: PEDIATRICS

## 2019-01-24 PROCEDURE — 99212 OFFICE O/P EST SF 10 MIN: CPT | Mod: S$GLB,,, | Performed by: PEDIATRICS

## 2019-01-24 PROCEDURE — 99212 PR OFFICE/OUTPT VISIT, EST, LEVL II, 10-19 MIN: ICD-10-PCS | Mod: S$GLB,,, | Performed by: PEDIATRICS

## 2019-01-24 PROCEDURE — 93000 ELECTROCARDIOGRAM COMPLETE: CPT | Mod: S$GLB,,, | Performed by: PEDIATRICS

## 2019-01-24 NOTE — TELEPHONE ENCOUNTER
Spoke with patient's mother-. Went over the following with patient: pre-operative lab work date and time, EP procedure date, arrival time, pre-operative hold of medications.     Patient's mother reports that patient is taking coumadin and  follows coumadin.      Annika SHEIKH CCM

## 2019-01-24 NOTE — PROGRESS NOTES
ABLATION EDUCATION CHECKLIST    PRE-PROCEDURE TESTIN-30-19 @ 10 AM  Pre-Procedure labs have been ordered for you at:  [facility]   · Be sure to arrive at your scheduled time. YOU DO NOT HAVE TO FAST FOR THIS LAB WORK!    DAY OF PROCEDURE:    19 @ 10:30 AM  Report to Cardiology Waiting Room on 3rd floor of the Hospital    · Do not eat or drink anything after: 12 mn on the night before your procedure  · Please do not wear makeup (especially mascara) when arriving for your procedure    Medications:   · Continue coumadin  · You may take your other usual morning medications with a sip of water      Directions to the Cardiology Waiting Room  If you park in the Parking Garage:  Take elevators to the 2nd floor  Walk up ramp and turn right by [address] Elevators  Take elevator to the 3rd floor  Upon exiting the elevator, turn away from the clinic areas  Walk long giles around to front of hospital to area with windows overlooking [facility]  Check in at Reception Desk  OR  If family is dropping you off:  Have them drop you off at the front of the Hospital  (Near the ER, where all the flags are hung).  Take the E elevators to the 3rd floor.  Check in at the Reception Desk in the waiting room.        · You will be spending the night after your procedure.  · You will need someone to drive you home the day after your procedure.  · Your pain during your procedure will be managed by the anesthesia team.     Any need to reschedule or cancel procedures, or any questions regarding your procedures should be addressed directly with the Arrhythmia Department Nurses at the following phone number: [phone]

## 2019-01-24 NOTE — TELEPHONE ENCOUNTER
----- Message from Cody Duff MA sent at 1/24/2019  1:49 PM CST -----  Contact: Patient's mother      ----- Message -----  From: Miya Dubois  Sent: 1/24/2019  12:52 PM  To: Travis Walters Staff    The Pt's mother is returning a call to Margy. Please call her back @ [phone]. Thanks, Miya

## 2019-01-29 ENCOUNTER — OFFICE VISIT (OUTPATIENT)
Dept: CARDIOLOGY | Facility: CLINIC | Age: 19
End: 2019-01-29
Payer: MEDICAID

## 2019-01-29 ENCOUNTER — HOSPITAL ENCOUNTER (OUTPATIENT)
Dept: CARDIOLOGY | Facility: OTHER | Age: 19
Discharge: HOME | End: 2019-01-29
Attending: PEDIATRICS
Payer: MEDICAID

## 2019-01-29 VITALS
DIASTOLIC BLOOD PRESSURE: 51 MMHG | HEART RATE: 112 BPM | WEIGHT: 267 LBS | HEIGHT: 66 IN | BODY MASS INDEX: 42.91 KG/M2 | SYSTOLIC BLOOD PRESSURE: 116 MMHG | OXYGEN SATURATION: 94 %

## 2019-01-29 DIAGNOSIS — Q25.5 PULMONARY ATRESIA WITH INTACT VENTRICULAR SEPTUM: Primary | ICD-10-CM

## 2019-01-29 DIAGNOSIS — Q25.5 PULMONARY ATRESIA WITH INTACT VENTRICULAR SEPTUM: ICD-10-CM

## 2019-01-29 PROCEDURE — 93000 PR ELECTROCARDIOGRAM, COMPLETE: ICD-10-PCS | Mod: S$GLB,,, | Performed by: PEDIATRICS

## 2019-01-29 PROCEDURE — 93005 ELECTROCARDIOGRAM TRACING: CPT

## 2019-01-29 PROCEDURE — 99213 OFFICE O/P EST LOW 20 MIN: CPT | Mod: S$GLB,,, | Performed by: PEDIATRICS

## 2019-01-29 PROCEDURE — 93000 ELECTROCARDIOGRAM COMPLETE: CPT | Mod: S$GLB,,, | Performed by: PEDIATRICS

## 2019-01-29 PROCEDURE — 93010 ELECTROCARDIOGRAM REPORT: CPT | Mod: ,,, | Performed by: INTERNAL MEDICINE

## 2019-01-29 PROCEDURE — 93010 EKG 12-LEAD: ICD-10-PCS | Mod: ,,, | Performed by: INTERNAL MEDICINE

## 2019-01-29 PROCEDURE — 99213 PR OFFICE/OUTPT VISIT, EST, LEVL III, 20-29 MIN: ICD-10-PCS | Mod: S$GLB,,, | Performed by: PEDIATRICS

## 2019-01-29 NOTE — PROGRESS NOTES
"      HISTORY OF PRESENT ILLNESS:  (1/29/2019) This patient is now a 18 -year-old female, who was born with pulmonary atresia with [id number] inter-ventricular septum, and an atrial septal defect. She underwent reconstruction of the right ventricular outflow tract and placement of a bioprosthetic pulmonary valve in early childhood. She has had multiple valve replacements. Her most recent open heart surgery involved placement of a bioprosthetic valve in the pulmonary position and placement of a bioprosthetic valve in the tricuspid position, in 2007. Recently, she had a heart catheterization for recurrent insufficiency and obstruction of the bioprosthetic valve in the tricuspid position.  The procedure at this time employed a trans-catheter approach. It entailed valvuloplasty with a Z-Med 25 x 5 cm balloon followed by placement of an Sotelo S3 26 mm bioprosthetic valve in the tricuspid position.. 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 She leads a rather sedentary lifestyle, and is overweight. She currently is on a baby aspirin a day, Digoxin 0.125 mg once a day, Norvasc 2.5 mg once a day. The Lasix was recently changed to HCTZ 25 mg once a day. (She likes the HCTZ better.) At the last clinic visit, Kacey was found to be in "sinus rhythm", as opposed to either SVT or more likely atrial flutter. She is on Metoprolol 50 mg once a day and Warfarin 7.5 mg and 5 mg alternating. Mom reports that since starting the beta blocker, the rhythm has gotten better. " Just a few short episodes of rapid HRs."       The INR today is 1.3.        REVIEW OF SYSTEMS:  There are no visual disturbances. No HAs, lightheadedness, syncope or seizures. No swallowing disorder or PIETER. No difficulty with breathing, SOB,  wheezing or rhonchi. No asthma. No abdominal pain.  Bowel movements are normal.  No pelvic pain. Urination is normal.  No DM of thyroid disorder.  No lipid " disorder. No arterial disease. No known intrinsic problem with the lungs, liver or kidneys. No bleeding disorder. No smoking or ETOH use.        PHYSICAL EXAMINATION:   GENERAL:  The patient is overweight 18 -year-old female in no distress.  VITAL SIGNS:  Her weight is 121.1 kg (267 lbs) and her height is 1.676 meters (5' 6''). Heart rate is 112 beats per minute (probably not sinus).  Sat 94 %.   Blood pressure in the right arm is 112/51 mmHg in the RA..  Color and perfusion are good.  HEENT:  Eye movements are normal.  No bruits are noted over the head.  No jugular venous distention or pulsations.  Mucous membranes are moist and pink.  There is no adenopathy.  The neck is supple.  No carotid bruits. SKIN:  Showed is pink and well perfused. CHEST:  There is a well-healed midline scar.  Lungs are clear to auscultation bilaterally, although the breath sounds are somewhat decreased at the base. There are no wheezes or rhonchi. CARDIOVASCULAR:  Precordial activity is normal.  The first heart sound is prominent and crisp.  The second heart sound is narrowly split. There is a grade 1/6 systolic ejection murmur heard best up the left sternal border and across the base.  Short diastolic murmur is heard today.  ABDOMEN:  There is exogenous obesity.  The liver edge is palpable about 2-3 FB at the right costal margin.  It is nonpulsatile and nontender.  Bowel sounds appear to be normal.  EXTREMITIES:  Pulses are 2+ throughout.  Capillary refill is good.  There is no cyanosis or peripheral edema.  No bruising or rashes.              ........................................................................................................................................        ECG (TODAY): Sinus rhythm at a ventricular rate of 83 bpm.  ms. Atrial abnormality.  RBBB (154 ms). Minor T wave abnormality.  Prolonged QTc but she has a wide RBBB.              ECHOCARDIOGRAM (AT A RECENT VISIT):  An echocardiogram was performed.   2-D STUDY: There is no pericardial effusion.  No clots or vegetations. The bioprosthetic tricuspid valve is seen to be in good position. Annulus is 18 mm. The struts are easily seen.  The leaflets are mobile.  No evidence for thrombus formation.  The right atrium is enlarged measuring 5.4 x 4.8 cm (was 6.3 x 5.4 cm). One can also see  the bioprosthetic tricuspid valve in a cross sectional view.  It appears circular and measures 2 cm x 2 cm.  Again, no clots or vegetations are seen.  No right ventricular or left ventricular outflow tract obstruction.  The right ventricular circumferential area of shortening is 35%, which is reduced.  Circumferential area of the RA is 23.4 cm2, which is dilated. M-MODE:   LV 5.7 cm, RV 3.6 cm, Ao 3.0 cm, LA 3.8 cm, Sep 1.0 cm, PW 1.1 cm, EF ~ 40 % (reduced). DOPPLER VELOCITY ANALYSIS:  There is no insufficiency of the recently-placed bioprosthetic tricuspid valve.  Mild turbulent antegrade flow is seen.  Continuous wave Doppler analysis shows a peak pressure drop of 19 mmHg with a mean value of 10 mmHg.  Importantly, the leaflets are moving freely.  There is no mitral insufficiency or aortic insufficiency.  Peak pressure drop across the aortic valve is 4 mmHg.  Peak pressure drop across the bioprosthetic pulmonary valve is 12 mmHg.                    .................................................................................................................................................               ASSESSMENT:  In summary, we have a 18-year-old female originally diagnosed to have pulmonary atresia with intact inter-ventricular septum, who is status-pos multiple open heart operations involving both the pulmonary and tricuspid valves.  She recently in February 2017 had placement of an Sotelo S3 26 mm bioprosthetic tricuspid valve via the transvenous approach.  She tolerated the procedure well.  Clinically, she has been stable. However, she continues to have poor exercise  capacity, chronic fatigue and more recently developed a new rhythm disturbance, SVT vs atrial flutter.   PLAN:  Given our findings, our suggestions are as follows:  1.  She of course needs antibiotic prophylaxis for any invasive procedure.   2.  She should DC the ASA now, and the digoxin and beta blocker 5 days before the EPS.   She now is also taking Metoprolol for the abnormal supraventricular tachycardia, at 50 mg in the PM. We also started her on Warfarin.  INR today is (1.3).  I AGAIN EMPHASIZED TAKING THE MEDICINE.  Increase Wararin dose  to 7.5 mg qd.  3. Kacey has been seen by EP (Dr Woodward). She has an EPS study and possible ablation on Feb 6 th (a Weds).  She will be off Metoprolol, Norvasc and Dig for 5 days prior to the EPS.  Can continue on the Warfarin and HCTZ.   If there are any questions concerning the cardiac status of this patient, please feel free to contact us.  Thank you so much for allowing us to partake in the care of this patient. Donovan Gonzalez PhD, MD.

## 2019-01-30 ENCOUNTER — LAB VISIT (OUTPATIENT)
Dept: LAB | Facility: HOSPITAL | Age: 19
End: 2019-01-30
Attending: INTERNAL MEDICINE
Payer: MEDICAID

## 2019-01-30 DIAGNOSIS — I47.10 SVT (SUPRAVENTRICULAR TACHYCARDIA): ICD-10-CM

## 2019-01-30 LAB
ANION GAP SERPL CALC-SCNC: 8 MMOL/L
APTT BLDCRRT: 28.9 SEC
BASOPHILS # BLD AUTO: 0.04 K/UL
BASOPHILS NFR BLD: 0.5 %
BUN SERPL-MCNC: 10 MG/DL
CALCIUM SERPL-MCNC: 9.8 MG/DL
CHLORIDE SERPL-SCNC: 102 MMOL/L
CO2 SERPL-SCNC: 27 MMOL/L
CREAT SERPL-MCNC: 0.8 MG/DL
DIFFERENTIAL METHOD: ABNORMAL
EOSINOPHIL # BLD AUTO: 0.1 K/UL
EOSINOPHIL NFR BLD: 1.1 %
ERYTHROCYTE [DISTWIDTH] IN BLOOD BY AUTOMATED COUNT: 12.9 %
EST. GFR  (AFRICAN AMERICAN): >60 ML/MIN/1.73 M^2
EST. GFR  (NON AFRICAN AMERICAN): >60 ML/MIN/1.73 M^2
GLUCOSE SERPL-MCNC: 129 MG/DL
HCT VFR BLD AUTO: 41.2 %
HGB BLD-MCNC: 12.9 G/DL
IMM GRANULOCYTES # BLD AUTO: 0.03 K/UL
IMM GRANULOCYTES NFR BLD AUTO: 0.4 %
INR PPP: 1.3
LYMPHOCYTES # BLD AUTO: 2.2 K/UL
LYMPHOCYTES NFR BLD: 27.2 %
MCH RBC QN AUTO: 26.7 PG
MCHC RBC AUTO-ENTMCNC: 31.3 G/DL
MCV RBC AUTO: 85 FL
MONOCYTES # BLD AUTO: 0.7 K/UL
MONOCYTES NFR BLD: 8.5 %
NEUTROPHILS # BLD AUTO: 4.9 K/UL
NEUTROPHILS NFR BLD: 62.3 %
NRBC BLD-RTO: 0 /100 WBC
PLATELET # BLD AUTO: 311 K/UL
PMV BLD AUTO: 9.8 FL
POTASSIUM SERPL-SCNC: 3.3 MMOL/L
PROTHROMBIN TIME: 13 SEC
RBC # BLD AUTO: 4.83 M/UL
SODIUM SERPL-SCNC: 137 MMOL/L
WBC # BLD AUTO: 7.89 K/UL

## 2019-01-30 PROCEDURE — 80048 BASIC METABOLIC PNL TOTAL CA: CPT

## 2019-01-30 PROCEDURE — 85730 THROMBOPLASTIN TIME PARTIAL: CPT

## 2019-01-30 PROCEDURE — 36415 COLL VENOUS BLD VENIPUNCTURE: CPT | Mod: PO

## 2019-01-30 PROCEDURE — 85025 COMPLETE CBC W/AUTO DIFF WBC: CPT

## 2019-01-30 PROCEDURE — 85610 PROTHROMBIN TIME: CPT

## 2019-02-05 ENCOUNTER — TELEPHONE (OUTPATIENT)
Dept: ELECTROPHYSIOLOGY | Facility: CLINIC | Age: 19
End: 2019-02-05

## 2019-02-05 NOTE — TELEPHONE ENCOUNTER
Left message for patient's mom to return call to review pre-op instructions for EPS/possible RFA in am. K+=3.3, INR is sub-therapeutic at 1.3. Messages sent to Dr Gonzalez, Dr Woodward, and Dr Robles. Call back # left for patient.

## 2019-02-05 NOTE — TELEPHONE ENCOUNTER
----- Message from Myrtle Love MA sent at 2/5/2019 10:52 AM CST -----  Contact: Addis mother [phone]  Pt.'s mother is returning your call about procedure tomorrow Wed.2/6. Please call Addis

## 2019-02-05 NOTE — TELEPHONE ENCOUNTER
Spoke to patient's momAddis    CONFIRMED procedure arrival time of 10:30am, 3rd floor SSCU for SVT, RFA  Reiterated instructions including:  -Directions to check in desk  -NPO after midnight night prior to procedure  - -Confirmed compliance of Coumadin. INR was sub-therapeutic and message was sent to Dr Hernandez  -Do not wear mascara day of procedure  -K+=3.3, message sent to Dr Hernandez and Dr Woodward. Dr Robles aware and is ok to proceed.      Patient's mom verbalizes understanding of above and appreciates call.

## 2019-02-06 ENCOUNTER — HOSPITAL ENCOUNTER (OUTPATIENT)
Dept: CARDIOLOGY | Facility: CLINIC | Age: 19
Discharge: HOME | End: 2019-02-06
Payer: MEDICAID

## 2019-02-06 ENCOUNTER — HOSPITAL ENCOUNTER (OUTPATIENT)
Facility: HOSPITAL | Age: 19
Discharge: HOME | End: 2019-02-07
Attending: INTERNAL MEDICINE | Admitting: INTERNAL MEDICINE
Payer: MEDICAID

## 2019-02-06 ENCOUNTER — ANESTHESIA EVENT (OUTPATIENT)
Dept: MEDSURG UNIT | Facility: HOSPITAL | Age: 19
End: 2019-02-06
Payer: MEDICAID

## 2019-02-06 ENCOUNTER — ANESTHESIA (OUTPATIENT)
Dept: MEDSURG UNIT | Facility: HOSPITAL | Age: 19
End: 2019-02-06
Payer: MEDICAID

## 2019-02-06 DIAGNOSIS — I47.10 SVT (SUPRAVENTRICULAR TACHYCARDIA): Primary | ICD-10-CM

## 2019-02-06 DIAGNOSIS — I47.19 INTRA-ATRIAL REENTRANT TACHYCARDIA: ICD-10-CM

## 2019-02-06 LAB
ANION GAP SERPL CALC-SCNC: 13 MMOL/L
B-HCG UR QL: NEGATIVE
BUN SERPL-MCNC: 8 MG/DL
CALCIUM SERPL-MCNC: 10.2 MG/DL
CHLORIDE SERPL-SCNC: 104 MMOL/L
CO2 SERPL-SCNC: 20 MMOL/L
CREAT SERPL-MCNC: 0.8 MG/DL
CTP QC/QA: YES
EST. GFR  (AFRICAN AMERICAN): >60 ML/MIN/1.73 M^2
EST. GFR  (NON AFRICAN AMERICAN): >60 ML/MIN/1.73 M^2
GLUCOSE SERPL-MCNC: 107 MG/DL
INR PPP: 1.3
POTASSIUM SERPL-SCNC: 4.1 MMOL/L
PROTHROMBIN TIME: 13.2 SEC
SODIUM SERPL-SCNC: 137 MMOL/L

## 2019-02-06 PROCEDURE — 93320 TRANSESOPHAGEAL ECHO (TEE) (CUPID ONLY): ICD-10-PCS | Mod: 26,S$PBB,, | Performed by: PEDIATRICS

## 2019-02-06 PROCEDURE — 00537 ANES CARDIAC EP PROCEDURES: CPT | Performed by: INTERNAL MEDICINE

## 2019-02-06 PROCEDURE — 37000008 HC ANESTHESIA 1ST 15 MINUTES: Performed by: INTERNAL MEDICINE

## 2019-02-06 PROCEDURE — 63600175 PHARM REV CODE 636 W HCPCS: Performed by: NURSE ANESTHETIST, CERTIFIED REGISTERED

## 2019-02-06 PROCEDURE — 93005 ELECTROCARDIOGRAM TRACING: CPT | Mod: 59

## 2019-02-06 PROCEDURE — 93653 PR ELECTROPHYS EVAL, COMPREHEN, W/SUPRAVENT TACHYCARD TRMT: ICD-10-PCS | Mod: 22,,, | Performed by: INTERNAL MEDICINE

## 2019-02-06 PROCEDURE — 93653 COMPRE EP EVAL TX SVT: CPT | Mod: 22,,, | Performed by: INTERNAL MEDICINE

## 2019-02-06 PROCEDURE — C1730 CATH, EP, 19 OR FEW ELECT: HCPCS | Performed by: INTERNAL MEDICINE

## 2019-02-06 PROCEDURE — 63600175 PHARM REV CODE 636 W HCPCS: Performed by: NURSE PRACTITIONER

## 2019-02-06 PROCEDURE — D9220A PRA ANESTHESIA: Mod: ANES,,, | Performed by: ANESTHESIOLOGY

## 2019-02-06 PROCEDURE — 93320 DOPPLER ECHO COMPLETE: CPT | Mod: 26,S$PBB,, | Performed by: PEDIATRICS

## 2019-02-06 PROCEDURE — 93312 TRANSESOPHAGEAL ECHO (TEE) (CUPID ONLY): ICD-10-PCS | Mod: 26,S$PBB,, | Performed by: PEDIATRICS

## 2019-02-06 PROCEDURE — C1894 INTRO/SHEATH, NON-LASER: HCPCS | Performed by: INTERNAL MEDICINE

## 2019-02-06 PROCEDURE — 25000003 PHARM REV CODE 250: Performed by: ANESTHESIOLOGY

## 2019-02-06 PROCEDURE — 63600175 PHARM REV CODE 636 W HCPCS: Performed by: INTERNAL MEDICINE

## 2019-02-06 PROCEDURE — 63600175 PHARM REV CODE 636 W HCPCS: Performed by: ANESTHESIOLOGY

## 2019-02-06 PROCEDURE — 93010 EKG 12-LEAD: ICD-10-PCS | Mod: ,,, | Performed by: INTERNAL MEDICINE

## 2019-02-06 PROCEDURE — 25000003 PHARM REV CODE 250: Performed by: INTERNAL MEDICINE

## 2019-02-06 PROCEDURE — 93010 EKG 12-LEAD: ICD-10-PCS | Mod: ,,, | Performed by: PEDIATRICS

## 2019-02-06 PROCEDURE — 27201423 OPTIME MED/SURG SUP & DEVICES STERILE SUPPLY: Performed by: INTERNAL MEDICINE

## 2019-02-06 PROCEDURE — 93653 COMPRE EP EVAL TX SVT: CPT | Mod: 22 | Performed by: INTERNAL MEDICINE

## 2019-02-06 PROCEDURE — 85610 PROTHROMBIN TIME: CPT

## 2019-02-06 PROCEDURE — 93010 ELECTROCARDIOGRAM REPORT: CPT | Mod: ,,, | Performed by: PEDIATRICS

## 2019-02-06 PROCEDURE — D9220A PRA ANESTHESIA: Mod: CRNA,,, | Performed by: NURSE ANESTHETIST, CERTIFIED REGISTERED

## 2019-02-06 PROCEDURE — 93621 COMP EP EVL L PAC&REC C SINS: CPT | Mod: 26,,, | Performed by: INTERNAL MEDICINE

## 2019-02-06 PROCEDURE — 93621 COMP EP EVL L PAC&REC C SINS: CPT | Performed by: INTERNAL MEDICINE

## 2019-02-06 PROCEDURE — 80048 BASIC METABOLIC PNL TOTAL CA: CPT

## 2019-02-06 PROCEDURE — 81025 URINE PREGNANCY TEST: CPT | Performed by: NURSE PRACTITIONER

## 2019-02-06 PROCEDURE — 93621: ICD-10-PCS | Mod: 26,,, | Performed by: INTERNAL MEDICINE

## 2019-02-06 PROCEDURE — 93613 INTRACARDIAC EPHYS 3D MAPG: CPT | Performed by: INTERNAL MEDICINE

## 2019-02-06 PROCEDURE — D9220A PRA ANESTHESIA: ICD-10-PCS | Mod: ANES,,, | Performed by: ANESTHESIOLOGY

## 2019-02-06 PROCEDURE — 37000009 HC ANESTHESIA EA ADD 15 MINS: Performed by: INTERNAL MEDICINE

## 2019-02-06 PROCEDURE — 25000003 PHARM REV CODE 250: Performed by: NURSE ANESTHETIST, CERTIFIED REGISTERED

## 2019-02-06 PROCEDURE — 25000003 PHARM REV CODE 250: Performed by: NURSE PRACTITIONER

## 2019-02-06 PROCEDURE — 93613 PR INTRACARD ELECTROPHYS 3-DIMENS MAPPING: ICD-10-PCS | Mod: ,,, | Performed by: INTERNAL MEDICINE

## 2019-02-06 PROCEDURE — 94761 N-INVAS EAR/PLS OXIMETRY MLT: CPT

## 2019-02-06 PROCEDURE — D9220A PRA ANESTHESIA: ICD-10-PCS | Mod: CRNA,,, | Performed by: NURSE ANESTHETIST, CERTIFIED REGISTERED

## 2019-02-06 PROCEDURE — 27000221 HC OXYGEN, UP TO 24 HOURS

## 2019-02-06 PROCEDURE — 93312 ECHO TRANSESOPHAGEAL: CPT | Mod: 26,S$PBB,, | Performed by: PEDIATRICS

## 2019-02-06 PROCEDURE — 36415 COLL VENOUS BLD VENIPUNCTURE: CPT

## 2019-02-06 PROCEDURE — C2630 CATH, EP, COOL-TIP: HCPCS | Performed by: INTERNAL MEDICINE

## 2019-02-06 PROCEDURE — 93613 INTRACARDIAC EPHYS 3D MAPG: CPT | Mod: ,,, | Performed by: INTERNAL MEDICINE

## 2019-02-06 PROCEDURE — 93010 ELECTROCARDIOGRAM REPORT: CPT | Mod: ,,, | Performed by: INTERNAL MEDICINE

## 2019-02-06 PROCEDURE — 93325 TRANSESOPHAGEAL ECHO (TEE) (CUPID ONLY): ICD-10-PCS | Mod: 26,S$PBB,, | Performed by: PEDIATRICS

## 2019-02-06 PROCEDURE — 93325 DOPPLER ECHO COLOR FLOW MAPG: CPT | Mod: PBBFAC | Performed by: PEDIATRICS

## 2019-02-06 RX ORDER — SODIUM CHLORIDE 0.9 % (FLUSH) 0.9 %
5 SYRINGE (ML) INJECTION
Status: ACTIVE | OUTPATIENT
Start: 2019-02-06

## 2019-02-06 RX ORDER — HYDROCHLOROTHIAZIDE 12.5 MG/1
25 TABLET ORAL DAILY
Status: ON HOLD | COMMUNITY
End: 2019-05-09 | Stop reason: SDUPTHER

## 2019-02-06 RX ORDER — SODIUM CHLORIDE 9 MG/ML
INJECTION, SOLUTION INTRAVENOUS CONTINUOUS
Status: ACTIVE | OUTPATIENT
Start: 2019-02-06

## 2019-02-06 RX ORDER — FENTANYL CITRATE 50 UG/ML
INJECTION, SOLUTION INTRAMUSCULAR; INTRAVENOUS
Status: DISCONTINUED | OUTPATIENT
Start: 2019-02-06 | End: 2019-02-06

## 2019-02-06 RX ORDER — FENTANYL CITRATE 50 UG/ML
25 INJECTION, SOLUTION INTRAMUSCULAR; INTRAVENOUS EVERY 5 MIN PRN
Status: DISCONTINUED | OUTPATIENT
Start: 2019-02-06 | End: 2019-02-06 | Stop reason: HOSPADM

## 2019-02-06 RX ORDER — SUCCINYLCHOLINE CHLORIDE 20 MG/ML
INJECTION INTRAMUSCULAR; INTRAVENOUS
Status: DISCONTINUED | OUTPATIENT
Start: 2019-02-06 | End: 2019-02-06

## 2019-02-06 RX ORDER — METOPROLOL SUCCINATE 25 MG/1
50 TABLET, EXTENDED RELEASE ORAL DAILY
Status: DISCONTINUED | OUTPATIENT
Start: 2019-02-07 | End: 2019-02-07

## 2019-02-06 RX ORDER — ROCURONIUM BROMIDE 10 MG/ML
INJECTION, SOLUTION INTRAVENOUS
Status: DISCONTINUED | OUTPATIENT
Start: 2019-02-06 | End: 2019-02-06

## 2019-02-06 RX ORDER — PROPOFOL 10 MG/ML
VIAL (ML) INTRAVENOUS
Status: DISCONTINUED | OUTPATIENT
Start: 2019-02-06 | End: 2019-02-06

## 2019-02-06 RX ORDER — ADENOSINE 3 MG/ML
INJECTION, SOLUTION INTRAVENOUS
Status: DISCONTINUED | OUTPATIENT
Start: 2019-02-06 | End: 2019-02-06 | Stop reason: HOSPADM

## 2019-02-06 RX ORDER — SODIUM CHLORIDE 0.9 % (FLUSH) 0.9 %
3 SYRINGE (ML) INJECTION
Status: DISCONTINUED | OUTPATIENT
Start: 2019-02-06 | End: 2019-02-06 | Stop reason: HOSPADM

## 2019-02-06 RX ORDER — WARFARIN 7.5 MG/1
7.5 TABLET ORAL DAILY
Status: DISCONTINUED | OUTPATIENT
Start: 2019-02-07 | End: 2019-02-07 | Stop reason: HOSPADM

## 2019-02-06 RX ORDER — ACETAMINOPHEN 325 MG/1
650 TABLET ORAL EVERY 6 HOURS PRN
Status: DISCONTINUED | OUTPATIENT
Start: 2019-02-06 | End: 2019-02-07 | Stop reason: HOSPADM

## 2019-02-06 RX ORDER — LIDOCAINE HCL/PF 100 MG/5ML
SYRINGE (ML) INTRAVENOUS
Status: DISCONTINUED | OUTPATIENT
Start: 2019-02-06 | End: 2019-02-06

## 2019-02-06 RX ORDER — MIDAZOLAM HYDROCHLORIDE 1 MG/ML
INJECTION, SOLUTION INTRAMUSCULAR; INTRAVENOUS
Status: DISCONTINUED | OUTPATIENT
Start: 2019-02-06 | End: 2019-02-06

## 2019-02-06 RX ORDER — METOPROLOL SUCCINATE 50 MG/1
50 TABLET, EXTENDED RELEASE ORAL DAILY
Status: ON HOLD | COMMUNITY
End: 2019-05-09 | Stop reason: HOSPADM

## 2019-02-06 RX ORDER — WARFARIN SODIUM 5 MG/1
7.5 TABLET ORAL DAILY
Status: ON HOLD | COMMUNITY
End: 2019-05-09 | Stop reason: HOSPADM

## 2019-02-06 RX ORDER — DIGOXIN 125 MCG
125 TABLET ORAL DAILY
Status: DISCONTINUED | OUTPATIENT
Start: 2019-02-06 | End: 2019-02-07 | Stop reason: HOSPADM

## 2019-02-06 RX ORDER — LIDOCAINE HYDROCHLORIDE 20 MG/ML
INJECTION, SOLUTION INFILTRATION; PERINEURAL
Status: DISCONTINUED | OUTPATIENT
Start: 2019-02-06 | End: 2019-02-06 | Stop reason: HOSPADM

## 2019-02-06 RX ORDER — HYDROCHLOROTHIAZIDE 12.5 MG/1
12.5 TABLET ORAL DAILY
Status: DISCONTINUED | OUTPATIENT
Start: 2019-02-06 | End: 2019-02-07 | Stop reason: HOSPADM

## 2019-02-06 RX ORDER — PHENYLEPHRINE HYDROCHLORIDE 10 MG/ML
INJECTION INTRAVENOUS
Status: DISCONTINUED | OUTPATIENT
Start: 2019-02-06 | End: 2019-02-06

## 2019-02-06 RX ORDER — CEFAZOLIN SODIUM 1 G/3ML
2 INJECTION, POWDER, FOR SOLUTION INTRAMUSCULAR; INTRAVENOUS
Status: COMPLETED | OUTPATIENT
Start: 2019-02-06 | End: 2019-02-06

## 2019-02-06 RX ORDER — ONDANSETRON 2 MG/ML
INJECTION INTRAMUSCULAR; INTRAVENOUS
Status: DISCONTINUED | OUTPATIENT
Start: 2019-02-06 | End: 2019-02-06

## 2019-02-06 RX ADMIN — PHENYLEPHRINE HYDROCHLORIDE 100 MCG: 10 INJECTION INTRAVENOUS at 02:02

## 2019-02-06 RX ADMIN — SODIUM CHLORIDE, SODIUM GLUCONATE, SODIUM ACETATE, POTASSIUM CHLORIDE, MAGNESIUM CHLORIDE, SODIUM PHOSPHATE, DIBASIC, AND POTASSIUM PHOSPHATE: .53; .5; .37; .037; .03; .012; .00082 INJECTION, SOLUTION INTRAVENOUS at 03:02

## 2019-02-06 RX ADMIN — ONDANSETRON 4 MG: 2 INJECTION INTRAMUSCULAR; INTRAVENOUS at 04:02

## 2019-02-06 RX ADMIN — FENTANYL CITRATE 25 MCG: 50 INJECTION, SOLUTION INTRAMUSCULAR; INTRAVENOUS at 03:02

## 2019-02-06 RX ADMIN — MIDAZOLAM 2 MG: 1 INJECTION INTRAMUSCULAR; INTRAVENOUS at 01:02

## 2019-02-06 RX ADMIN — SUCCINYLCHOLINE CHLORIDE 60 MG: 20 INJECTION, SOLUTION INTRAMUSCULAR; INTRAVENOUS at 02:02

## 2019-02-06 RX ADMIN — DIGOXIN 125 MCG: 125 TABLET ORAL at 09:02

## 2019-02-06 RX ADMIN — PROPOFOL 200 MG: 10 INJECTION, EMULSION INTRAVENOUS at 01:02

## 2019-02-06 RX ADMIN — ISOPROTERENOL HYDROCHLORIDE 2 MCG/MIN: 0.2 INJECTION, SOLUTION INTRAMUSCULAR; INTRAVENOUS at 03:02

## 2019-02-06 RX ADMIN — SUCCINYLCHOLINE CHLORIDE 140 MG: 20 INJECTION, SOLUTION INTRAMUSCULAR; INTRAVENOUS at 01:02

## 2019-02-06 RX ADMIN — FENTANYL CITRATE 50 MCG: 50 INJECTION, SOLUTION INTRAMUSCULAR; INTRAVENOUS at 04:02

## 2019-02-06 RX ADMIN — SODIUM CHLORIDE: 0.9 INJECTION, SOLUTION INTRAVENOUS at 01:02

## 2019-02-06 RX ADMIN — LIDOCAINE HYDROCHLORIDE 100 MG: 20 INJECTION, SOLUTION INTRAVENOUS at 01:02

## 2019-02-06 RX ADMIN — CEFAZOLIN 2 G: 330 INJECTION, POWDER, FOR SOLUTION INTRAMUSCULAR; INTRAVENOUS at 02:02

## 2019-02-06 RX ADMIN — PROPOFOL 50 MG: 10 INJECTION, EMULSION INTRAVENOUS at 02:02

## 2019-02-06 RX ADMIN — ROCURONIUM BROMIDE 10 MG: 10 INJECTION, SOLUTION INTRAVENOUS at 01:02

## 2019-02-06 RX ADMIN — PROPOFOL 100 MG: 10 INJECTION, EMULSION INTRAVENOUS at 05:02

## 2019-02-06 RX ADMIN — HYDROCHLOROTHIAZIDE 12.5 MG: 12.5 TABLET ORAL at 09:02

## 2019-02-06 RX ADMIN — ACETAMINOPHEN 650 MG: 325 TABLET, FILM COATED ORAL at 09:02

## 2019-02-06 NOTE — BRIEF OP NOTE
Patient is s/p CTI ablation and right atrial lateral wall scar flutter ablation:     Tolerated procedure well. No acute complication noted.  Post op care per protocol.  Will monitor in recovery on tele overnight  Resume warfarin 6 hr after hemostasis  Access: right CFV, hemostasis manual pressure .   Cont digoxin and metoprolol.   Fu EKG  EP service to follow

## 2019-02-06 NOTE — HPI
18 -year-old female, who was born with pulmonary atresia with [id number] inter-ventricular septum, and an atrial septal defect. She underwent reconstruction of the right ventricular outflow tract and placement of a bioprosthetic pulmonary valve in early childhood. She has had multiple valve replacements. Her most recent open heart surgery involved placement of a bioprosthetic valve in the pulmonary position and placement of a bioprosthetic valve in the tricuspid position, in 2007. Recently, she had a heart catheterization for recurrent insufficiency and obstruction of the bioprosthetic valve in the tricuspid position.  The procedure at this time employed a trans-catheter approach. It entailed valvuloplasty with a Z-Med 25 x 5 cm balloon followed by placement of an Sotelo S3 26 mm bioprosthetic valve in the tricuspid position.. 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    Her Pediatric cardiologist is MD Donovan Gonzalez, and given  more recently developed a new rhythm disturbance, SVT vs atrial flutter.   Planned for EPS/Ablation +/- ILR . Patient elected to proceed for planned procedures.     Patient is currently on Digoxin 0.125 mg once a day, Norvasc 2.5 mg once a day. The Lasix was recently changed to HCTZ 25 mg once a day. (She likes the HCTZ better.), Metoprolol 50 mg once daily. Last INR was 1.3 > she is currently on coumadin 7.5 mg daily.   Patient reports occasional  palpitations. Patient denies any chest pains, shortness of breath, fevers/chills.

## 2019-02-06 NOTE — SUBJECTIVE & OBJECTIVE
Past Medical History:   Diagnosis Date    Obesity     Pulmonary atresia with intact ventricular septum     SVT (supraventricular tachycardia)        Past Surgical History:   Procedure Laterality Date    MEMO PROCEDURE      bt shunt and transannular patch    CARDIAC VALVE REPLACEMENT      PULMONARY VALVE REPLACEMENT  01/02/2007    25mm sharyn charles    REPLACEMENT-VALVE-PULMONARY N/A 2/16/2017    Performed by [facility] at [facility] C[facility]    TRANSESOPHAGEAL ECHOCARDIOGRAM (SAI) N/A 2/16/2017    Performed by [facility] Jr[facility] at [facility] CATH LAB    TRICUSPID VALVE REPLACEMENT  01/02/2007    25 mm sharyn-charles       Review of patient's allergies indicates:  No Known Allergies    No current facility-administered medications on file prior to encounter.      Current Outpatient Medications on File Prior to Encounter   Medication Sig    hydroCHLOROthiazide (HYDRODIURIL) 12.5 MG Tab Take 25 mg by mouth once daily.    metoprolol succinate (TOPROL-XL) 50 MG 24 hr tablet Take 50 mg by mouth once daily.    warfarin (COUMADIN) 5 MG tablet Take 7.5 mg by mouth once daily.    [DISCONTINUED] furosemide (LASIX) 20 MG tablet Take 1 tablet (20 mg total) by mouth once daily.    amlodipine (NORVASC) 2.5 MG tablet Take 1 tablet (2.5 mg total) by mouth once daily.    coenzyme Q10 (COQ-10) 100 mg capsule Take 1 capsule (100 mg total) by mouth 2 (two) times daily.    digoxin (LANOXIN) 125 mcg tablet Take 1 tablet (125 mcg total) by mouth once daily.    [DISCONTINUED] aspirin (ECOTRIN) 81 MG EC tablet Take 1 tablet (81 mg total) by mouth once daily.     Family History     None        Tobacco Use    Smoking status: Never Smoker   Substance and Sexual Activity    Alcohol use: No    Drug use: No    Sexual activity: No     ROS  Objective:     Vital Signs (Most Recent):  Temp: 99.4 °F (37.4 °C) (02/06/19 1044)  Pulse: 97 (02/06/19 1044)  Resp: 18 (02/06/19 1044)  BP: 128/63 (02/06/19 1049)  SpO2: 97  % (02/06/19 1044) Vital Signs (24h Range):  Temp:  [99.4 °F (37.4 °C)] 99.4 °F (37.4 °C)  Pulse:  [97] 97  Resp:  [18] 18  SpO2:  [97 %] 97 %  BP: (128-137)/(63-65) 128/63       Weight: 122.5 kg (270 lb 1 oz)  Body mass index is 43.59 kg/m².    SpO2: 97 %  O2 Device (Oxygen Therapy): room air    Physical Exam          PHYSICAL EXAMINATION:     General: Well developed, well nourished, No distress on room air   HEENT: Head is normocephalic, atraumatic;  Lungs: Clear to auscultation bilaterally.  No wheezing. Normal respiratory effort.  Cardiovascular: S1 S2   1/6 systolic ejection murmur heard best up the left sternal border and across the base.  Short diastolic murmur is heard today.    There is a well-healed midline scar  Extremities: No LE edema. Pulses 2+ and symmetric.   Abdomen: Abdomen is soft, non-tender non-distended with normal bowel sounds.  Skin: Skin color, texture, turgor normal. No rashes.  Musculoskeletal: normal range of motion   Neurologic: Normal strength and tone. No focal numbness or weakness.   Psychiatric: normal mood and affect.  behavior is normal. Alert and oriented X 3.  Labs reviewed       Significant Imaging:

## 2019-02-06 NOTE — H&P
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 She underwent reconstruction of the right ventricular outflow tract and placement of a bioprosthetic pulmonary valve in early childhood. She has had multiple valve replacements. Her most recent open heart surgery involved placement of a bioprosthetic valve in the pulmonary position and placement of a bioprosthetic valve in the tricuspid position, in 2007. Recently, she had a heart catheterization for recurrent insufficiency and obstruction of the bioprosthetic valve in the tricuspid position.  The procedure at this time employed a trans-catheter approach. It entailed valvuloplasty with a Z-Med 25 x 5 cm balloon followed by placement of an Sotelo S3 26 mm bioprosthetic valve in the tricuspid position.. 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    Her Pediatric cardiologist is MD Donovan Gonzalez, and given  more recently developed a new rhythm disturbance, SVT vs atrial flutter and was started on metoprolol   Patient follows with EP MD Crissy ATKINS.> recommended for   for EPS/Ablation +/- ILR . Patient elected to proceed for planned procedures.     Patient presented to short stay cardiac unit for planned procedure, she denies any acute symptoms at present     Patient is currently on Digoxin 0.125 mg once a day, Norvasc 2.5 mg once a day. The Lasix was recently changed to HCTZ 25 mg once a day. (She likes the HCTZ better.), Metoprolol 50 mg once daily. Last INR was 1.3 > she is currently on coumadin 7.5 mg daily. Denies any bleeding    Patient reports occasional  palpitations. Patient denies any chest pains, shortness of breath, fevers/chills.       Past Medical History:   Diagnosis Date    Obesity     Pulmonary atresia with intact ventricular septum     SVT (supraventricular tachycardia)        Past Surgical History:   Procedure Laterality Date    MEMO PROCEDURE      bt shunt and transannular patch    CARDIAC VALVE REPLACEMENT      PULMONARY VALVE REPLACEMENT  01/02/2007    25mm sharyn charles    REPLACEMENT-VALVE-PULMONARY N/A 2/16/2017    [facility] CATH LAB    TRANSESOPHAGEAL ECHOCARDIOGRAM (SAI) N/A 2/16/2017    Per[facility] by [facility] CATH LAB    TRICUSPID VALVE REPLACEMENT  01/02/2007    25 mm sharyn-charles       Review of patient's allergies indicates:  No Known Allergies    No current facility-administered medications on file prior to encounter.      Current Outpatient Medications on File Prior to Encounter   Medication Sig    hydroCHLOROthiazide (HYDRODIURIL) 12.5 MG Tab Take 25 mg by mouth once daily.    metoprolol succinate (TOPROL-XL) 50 MG 24 hr tablet Take 50 mg by mouth once daily.    warfarin (COUMADIN) 5 MG tablet Take 7.5 mg by mouth once daily.    [DISCONTINUED] furosemide (LASIX) 20 MG tablet Take 1 tablet (20 mg total) by mouth once daily.    amlodipine (NORVASC) 2.5 MG tablet Take 1 tablet (2.5 mg total) by mouth once daily.    coenzyme Q10 (COQ-10) 100 mg capsule Take 1 capsule (100 mg total) by mouth 2 (two) times daily.    digoxin (LANOXIN) 125 mcg tablet Take 1 tablet (125 mcg total) by mouth once daily.    [DISCONTINUED] aspirin (ECOTRIN) 81 MG EC tablet Take 1 tablet (81 mg total) by mouth once daily.     Family History     None        Tobacco Use    Smoking status: Never Smoker   Substance and Sexual Activity    Alcohol use: No    Drug use: No    Sexual activity: No     ROS  Review of Systems   Constitution: Negative for  fevers/chills.   Positive for easily gets    weak and has malaise/fatigue.   HENT: Negative for ear pain and tinnitus.   Eyes: Negative for blurred vision.   Cardiovascular: Positive for palpitations. Negative for chest pain,  near-syncope, and syncope.   Respiratory:  Negative  for shortness of breath   Endocrine:  Negative for polyuria.   Hematologic/Lymphatic: Negative for bruise/bleed easily.   Skin:  Negative for rash.   Musculoskeletal: Negative for joint pain and muscle weakness.   Extremity: Negative for swelling in the lower extremities.   Gastrointestinal:  Negative for abdominal pain and change in bowel habit.   Genitourinary: Negative for frequency.   Neurological:  Negative for dizziness.   Psychiatric/Behavioral:  Negative for depression, anxiety         Objective:     Vital Signs (Most Recent):  Temp: 99.4 °F (37.4 °C) (02/06/19 1044)  Pulse: 97 (02/06/19 1044)  Resp: 18 (02/06/19 1044)  BP: 128/63 (02/06/19 1049)  SpO2: 97 % (02/06/19 1044) Vital Signs (24h Range):  Temp:  [99.4 °F (37.4 °C)] 99.4 °F (37.4 °C)  Pulse:  [97] 97  Resp:  [18] 18  SpO2:  [97 %] 97 %  BP: (128-137)/(63-65) 128/63       Weight: 122.5 kg (270 lb 1 oz)  Body mass index is 43.59 kg/m².    SpO2: 97 %  O2 Device (Oxygen Therapy): room air    Physical Exam    General: Well developed, well nourished, No distress on room air   HEENT: Head is normocephalic, atraumatic;  Lungs: Clear to auscultation bilaterally.  No wheezing. Normal respiratory effort.  Cardiovascular: S1 S2   1/6 systolic ejection murmur heard best up the left sternal border and across the base.  Short diastolic murmur is heard today.    There is a well-healed midline scar  Extremities: No LE edema. Pulses 2+ and symmetric.   Abdomen: Abdomen is soft, non-tender non-distended with normal bowel sounds.  Skin: Skin color, texture, turgor normal. No rashes.  Musculoskeletal: normal range of motion   Neurologic: Normal strength and tone. No focal numbness or weakness.    Psychiatric: normal mood and affect.  behavior is normal. Alert and oriented X 3.  Labs reviewed  > INR/BPM in progress       Significant Imaging: chart reviewed  EKG 2/6/19 Sinus tachycardia at 105 BPM      Assessment and Plan:     * SVT (supraventricular tachycardia)       EPS/ ART/SVT ablation  and possible loop recorder implant ( by  Dr. Travis MCCANN  and Crissy ATKINS) >metoprolol /digoxin held last dose 1/29/19  Last coumadin was last night, took 7.5 mg   SAI prior to rule out thrombus   Anesthesia for sedation          Prior to procedure, extensive discussion with patient regarding risks and benefits of EPS/   ablation, and patient  would like to proceed.    The patient and mother Ms jhonatan dutta  ( at bedside)  voices understanding and all questions have been answered. No further questions/concerns voiced at this time.        Tamera Quintero, VEENA  Cardiac Electrophysiology  [facility]    STAFF MD Blu Robles

## 2019-02-06 NOTE — HPI
Pt is a 18 year old who we are asked to perform SAI during EPS/Ablation    She has a hx of pulmonary atresia with intact inter-ventricular septum, and an atrial septal defect. She underwent reconstruction of the right ventricular outflow tract and placement of a bioprosthetic pulmonary valve in early childhood. She has had multiple valve replacements. Her most recent open heart surgery involved placement of a bioprosthetic valve in the pulmonary position and placement of a bioprosthetic valve in the tricuspid position, in 2007. Recently, she had a heart catheterization for recurrent insufficiency and obstruction of the bioprosthetic valve in the tricuspid position.  The procedure at this time employed a trans-catheter approach. It entailed valvuloplasty with a Z-Med 25 x 5 cm balloon followed by placement of an Sotelo S3 26 mm bioprosthetic valve in the tricuspid position. At a recent clinic visit she was noted to be in AFL vs SVT.

## 2019-02-06 NOTE — OPERATIVE NOTE ADDENDUM
Certification of Assistant at Surgery       Surgery Date: 2/6/2019     Participating Surgeons:  Surgeon(s) and Role:  Panel 1:     * Romeo Robles MD - Primary     * Ricardo Woodward MD - Second Attending     * Irvin Roberts MD - Fellow  Panel 2:     * [facility] Diagnostic Provider - Primary     * Romeo Babcock MD - Assisting     * Kay Billingsley MD - Fellow    Procedures:  Procedure(s) (LRB):  ABLATION, SVT, ACCESSORY PATHWAY (Bilateral)  INSERTION, IMPLANTABLE LOOP RECORDER (N/A)  ECHOCARDIOGRAM,TRANSESOPHAGEAL (N/A)    Assistant Surgeon's Certification of Necessity:  I understand that section 1842 (b) (6) (d) of the Social Security Act generally prohibits Medicare Part B reasonable charge payment for the services of assistants at surgery in teaching hospitals when qualified residents are available to furnish such services. I certify that the services for which payment is claimed were medically necessary, and that no qualified resident was available to perform the services. I further understand that these services are subject to post-payment review by the Medicare carrier.    I was personally present and participated in the EP study and ablation portions of this procedure.  I was needed for the case given the complex adult congenital heart disease and complex adult congenital arrhythmia.  Although a fellow was present he did not have adequate expertise in congenital heart disease and congenital arrhythmia ablation that I was able to provide for this case.  I performed the mapping and pacing protocols while Dr. Robles was manipulating the catheters.      Ricardo Woodward MD  Pediatric and Adult Congenital Electrophysiologist  Pediatric Cardiologist    02/06/2019  4:46 PM

## 2019-02-06 NOTE — ANESTHESIA PREPROCEDURE EVALUATION
02/06/2019  Kacey Ruth is a 18 y.o., female with history of pulmonary atresia, intact ventricular septum, - s/p pulmonary valve  replacement  - s/p tricuspid valve replacement  - s/p Sotelo Valve (26 mm) in the tricuspid position.    2D ECHO:  Sotelo valve in the tricuspid position. The valve is well seated.  There is a mean gradient of ~8 mm Hg across the Sotelo valve and no  regurgitation.  Moderate right atrial dilation.  Normal left atrial size.  Right ventricle appears relatively normal in size  Normal left ventricle structure and size.  There is paradoxical septal motion with normal movement of left ventricular free  wall suggesting low normal left ventricular systolic function  Mildly depressed right ventricular systolic function.    Anesthesia Evaluation    I have reviewed the Patient Summary Reports.    I have reviewed the Nursing Notes.   I have reviewed the Medications.     Review of Systems  Anesthesia Hx:  No problems with previous Anesthesia  History of prior surgery of interest to airway management or planning: Denies Family Hx of Anesthesia complications.   Denies Personal Hx of Anesthesia complications.   Hematology/Oncology:  Hematology Normal   Oncology Normal     EENT/Dental:EENT/Dental Normal   Cardiovascular:   Exercise tolerance: poor ECG has been reviewed. Hx of Pulmonary atresia with intact ventricular septum, SVT   Pulmonary:   Sleep Apnea    Renal/:  Renal/ Normal     Hepatic/GI:  Hepatic/GI Normal    Neurological:  Neurology Normal    Endocrine:  Endocrine Normal        Physical Exam  General:  Well nourished, Morbid Obesity    Airway/Jaw/Neck:  Airway Findings: Mouth Opening: Normal Tongue: Large  Mallampati: II  TM Distance: 4 - 6 cm  Jaw/Neck Findings:  Neck ROM: Normal ROM      Dental:  Dental Findings: In tact   Chest/Lungs:  Chest/Lungs Findings: Clear to  auscultation, Normal Respiratory Rate     Heart/Vascular:  Heart Findings: Rate: Normal  Rhythm: Regular Rhythm        Mental Status:  Mental Status Findings:  Cooperative, Anxious         Anesthesia Plan  Type of Anesthesia, risks & benefits discussed:  Anesthesia Type:  general  Patient's Preference:   Intra-op Monitoring Plan: standard ASA monitors  Intra-op Monitoring Plan Comments:   Post Op Pain Control Plan: multimodal analgesia  Post Op Pain Control Plan Comments:   Induction:   IV  Beta Blocker:  Patient is on a Beta-Blocker and has received one dose within the past 24 hours (No further documentation required).       Informed Consent: Patient understands risks and agrees with Anesthesia plan.  Questions answered. Anesthesia consent signed with patient.  ASA Score: 3     Day of Surgery Review of History & Physical:    H&P update referred to the surgeon.         Ready For Surgery From Anesthesia Perspective.

## 2019-02-06 NOTE — H&P
[facility]  Cardiology  History and Physical     Patient Name: Kacey Ruth  MRN: [id number]  Admission Date: 2/6/2019  Code Status: No Order   Attending Provider: Romeo Robles MD   Primary Care Physician: Sharad Yun MD  Principal Problem:SVT (supraventricular tachycardia)    Patient information was obtained from patient and ER records.     Subjective:     Chief Complaint:  AFL     HPI:  Pt is a 18 year old who we are asked to perform SAI during EPS/Ablation    She has a hx of pulmonary atresia with intact inter-ventricular septum, and an atrial septal defect. She underwent reconstruction of the right ventricular outflow tract and placement of a bioprosthetic pulmonary valve in early childhood. She has had multiple valve replacements. Her most recent open heart surgery involved placement of a bioprosthetic valve in the pulmonary position and placement of a bioprosthetic valve in the tricuspid position, in 2007. Recently, she had a heart catheterization for recurrent insufficiency and obstruction of the bioprosthetic valve in the tricuspid position.  The procedure at this time employed a trans-catheter approach. It entailed valvuloplasty with a Z-Med 25 x 5 cm balloon followed by placement of an Sotelo S3 26 mm bioprosthetic valve in the tricuspid position. At a recent clinic visit she was noted to be in AFL vs SVT.     Past Medical History:   Diagnosis Date    Obesity     Pulmonary atresia with intact ventricular septum     SVT (supraventricular tachycardia)        Past Surgical History:   Procedure Laterality Date    MEMO PROCEDURE      bt shunt and transannular patch    CARDIAC VALVE REPLACEMENT      PULMONARY VALVE REPLACEMENT  01/02/2007    25mm sharyn sotelo    REPLACEMENT-VALVE-PULMONARY N/A 2/16/2017    [facility] CATH LAB    TRANSESOPHAGEAL ECHOCARDIOGRAM (SAI) N/A 2/16/2017    Performed by [facility] at [facility]  CATH LAB    TRICUSPID VALVE REPLACEMENT  01/02/2007    25 mm sharyn-charles       Review of patient's allergies indicates:  No Known Allergies    No current facility-administered medications on file prior to encounter.      Current Outpatient Medications on File Prior to Encounter   Medication Sig    hydroCHLOROthiazide (HYDRODIURIL) 12.5 MG Tab Take 25 mg by mouth once daily.    metoprolol succinate (TOPROL-XL) 50 MG 24 hr tablet Take 50 mg by mouth once daily.    warfarin (COUMADIN) 5 MG tablet Take 7.5 mg by mouth once daily.    [DISCONTINUED] furosemide (LASIX) 20 MG tablet Take 1 tablet (20 mg total) by mouth once daily.    amlodipine (NORVASC) 2.5 MG tablet Take 1 tablet (2.5 mg total) by mouth once daily.    coenzyme Q10 (COQ-10) 100 mg capsule Take 1 capsule (100 mg total) by mouth 2 (two) times daily.    digoxin (LANOXIN) 125 mcg tablet Take 1 tablet (125 mcg total) by mouth once daily.    [DISCONTINUED] aspirin (ECOTRIN) 81 MG EC tablet Take 1 tablet (81 mg total) by mouth once daily.     Family History     None        Tobacco Use    Smoking status: Never Smoker   Substance and Sexual Activity    Alcohol use: No    Drug use: No    Sexual activity: No     Review of Systems   All other systems reviewed and are negative.    Objective:     Vital Signs (Most Recent):  Temp: 99.4 °F (37.4 °C) (02/06/19 1044)  Pulse: 97 (02/06/19 1044)  Resp: 18 (02/06/19 1044)  BP: 128/63 (02/06/19 1049)  SpO2: 97 % (02/06/19 1044) Vital Signs (24h Range):  Temp:  [99.4 °F (37.4 °C)] 99.4 °F (37.4 °C)  Pulse:  [97] 97  Resp:  [18] 18  SpO2:  [97 %] 97 %  BP: (128-137)/(63-65) 128/63     Weight: 122.5 kg (270 lb 1 oz)  Body mass index is 43.59 kg/m².    SpO2: 97 %  O2 Device (Oxygen Therapy): room air    No intake or output data in the 24 hours ending 02/06/19 1259    Lines/Drains/Airways     Peripheral Intravenous Line                 Peripheral IV - Single Lumen 02/06/19 1129 Right Hand less than 1 day                 Physical Exam  GEN: Alert and oriented in NAD  NECK: no JVD appreciated  CVS: RRR, s1/s2, no MRG  PULM: CTAB no rales  ABD: NT/ND BS +  Extremities: warm and dry, palpable pulses, no edema  NEURO: Alert and oriented x 3  PSYCH: appropriate affect.         Significant Labs:   Recent Lab Results       02/06/19  1141   02/06/19  1133        Anion Gap 13       BUN, Bld 8       Calcium 10.2       Chloride 104       CO2 20       Creatinine 0.8       eGFR if  >60.0       eGFR if non  >60.0  Comment:  Calculation used to obtain the estimated glomerular filtration  rate (eGFR) is the CKD-EPI equation.          Glucose 107       Potassium 4.1  Comment:  *Result may be interfered by visible hemolysis       Preg Test, Ur   Negative      Acceptable   Yes     Sodium 137             Significant Imaging: EKG: NSR    Assessment and Plan:     * SVT (supraventricular tachycardia)        Assessment & Plan:     PLAN:  1. SAI for evaluation of NIKO    -The risks, benefits & alternatives of the procedure were explained to the patient.    -The risks of transesophageal echo include but are not limited to:  Dental trauma, esophageal trauma/perforation, bleeding, laryngospasm/brochospasm, aspiration, sore throat/hoarseness, & dislodgement of the endotracheal tube/nasogastric tube (where applicable).    -The risks of moderate sedation include hypotension, respiratory depression, arrhythmias, bronchospasm, & death.    -Informed consent was obtained & the patient is agreeable to proceed with the procedure.    I will discuss with the attending physician. Attending addendum is to follow.     Further recommendations per attending addendum    Kay Billinsgley MD  Cardiology Fellow               VTE Risk Mitigation (From admission, onward)    None          Kay Billingsley MD  Cardiology   [facility]

## 2019-02-06 NOTE — TRANSFER OF CARE
"Anesthesia Transfer of Care Note    Patient: Kacey Ruth    Procedure(s) Performed: Procedure(s) (LRB):  ABLATION, SVT, ACCESSORY PATHWAY (Bilateral)  INSERTION, IMPLANTABLE LOOP RECORDER (N/A)  ECHOCARDIOGRAM,TRANSESOPHAGEAL (N/A)    Patient location: PACU    Anesthesia Type: general    Transport from OR: Transported from OR on 6-10 L/min O2 by face mask with adequate spontaneous ventilation    Post pain: adequate analgesia    Post assessment: no apparent anesthetic complications and tolerated procedure well    Post vital signs: stable    Level of consciousness: responds to stimulation    Nausea/Vomiting: no nausea/vomiting    Complications: none    Transfer of care protocol was followed      Last vitals:   Visit Vitals  /74 (BP Location: Left arm, Patient Position: Lying)   Pulse 97   Temp 36.2 °C (97.2 °F) (Temporal)   Resp 17   Ht 5' 6" (1.676 m)   Wt 122.5 kg (270 lb 1 oz)   LMP 02/05/2019 (Exact Date)   SpO2 99%   Breastfeeding? No   BMI 43.59 kg/m²     "

## 2019-02-06 NOTE — ASSESSMENT & PLAN NOTE
Assessment & Plan:     PLAN:  1. SAI for evaluation of NIKO    -The risks, benefits & alternatives of the procedure were explained to the patient.    -The risks of transesophageal echo include but are not limited to:  Dental trauma, esophageal trauma/perforation, bleeding, laryngospasm/brochospasm, aspiration, sore throat/hoarseness, & dislodgement of the endotracheal tube/nasogastric tube (where applicable).    -The risks of moderate sedation include hypotension, respiratory depression, arrhythmias, bronchospasm, & death.    -Informed consent was obtained & the patient is agreeable to proceed with the procedure.    I will discuss with the attending physician. Attending addendum is to follow.     Further recommendations per attending addendum    Kay Billingsley MD  Cardiology Fellow

## 2019-02-06 NOTE — SUBJECTIVE & OBJECTIVE
Past Medical History:   Diagnosis Date    Obesity     Pulmonary atresia with intact ventricular septum     SVT (supraventricular tachycardia)        Past Surgical History:   Procedure Laterality Date    MEMO PROCEDURE      bt shunt and transannular patch    CARDIAC VALVE REPLACEMENT      PULMONARY VALVE REPLACEMENT  01/02/2007    25mm sharyn charles    REPLACEMENT-VALVE-PULMONARY N/A 2/16/2017    [facility] CATH LAB    TRANSESOPHAGEAL ECHOCARDIOGRAM (SAI) N/A 2/16/2017    Performed by [facility] at [facility] CATH LAB    TRICUSPID VALVE REPLACEMENT  01/02/2007    25 mm sharyn-charles       Review of patient's allergies indicates:  No Known Allergies    No current facility-administered medications on file prior to encounter.      Current Outpatient Medications on File Prior to Encounter   Medication Sig    hydroCHLOROthiazide (HYDRODIURIL) 12.5 MG Tab Take 25 mg by mouth once daily.    metoprolol succinate (TOPROL-XL) 50 MG 24 hr tablet Take 50 mg by mouth once daily.    warfarin (COUMADIN) 5 MG tablet Take 7.5 mg by mouth once daily.    [DISCONTINUED] furosemide (LASIX) 20 MG tablet Take 1 tablet (20 mg total) by mouth once daily.    amlodipine (NORVASC) 2.5 MG tablet Take 1 tablet (2.5 mg total) by mouth once daily.    coenzyme Q10 (COQ-10) 100 mg capsule Take 1 capsule (100 mg total) by mouth 2 (two) times daily.    digoxin (LANOXIN) 125 mcg tablet Take 1 tablet (125 mcg total) by mouth once daily.    [DISCONTINUED] aspirin (ECOTRIN) 81 MG EC tablet Take 1 tablet (81 mg total) by mouth once daily.     Family History     None        Tobacco Use    Smoking status: Never Smoker   Substance and Sexual Activity    Alcohol use: No    Drug use: No    Sexual activity: No     Review of Systems   All other systems reviewed and are negative.    Objective:     Vital Signs (Most Recent):  Temp: 99.4 °F (37.4 °C) (02/06/19 1044)  Pulse: 97 (02/06/19  1044)  Resp: 18 (02/06/19 1044)  BP: 128/63 (02/06/19 1049)  SpO2: 97 % (02/06/19 1044) Vital Signs (24h Range):  Temp:  [99.4 °F (37.4 °C)] 99.4 °F (37.4 °C)  Pulse:  [97] 97  Resp:  [18] 18  SpO2:  [97 %] 97 %  BP: (128-137)/(63-65) 128/63     Weight: 122.5 kg (270 lb 1 oz)  Body mass index is 43.59 kg/m².    SpO2: 97 %  O2 Device (Oxygen Therapy): room air    No intake or output data in the 24 hours ending 02/06/19 1259    Lines/Drains/Airways     Peripheral Intravenous Line                 Peripheral IV - Single Lumen 02/06/19 1129 Right Hand less than 1 day                Physical Exam  GEN: Alert and oriented in NAD  NECK: no JVD appreciated  CVS: RRR, s1/s2, no MRG  PULM: CTAB no rales  ABD: NT/ND BS +  Extremities: warm and dry, palpable pulses, no edema  NEURO: Alert and oriented x 3  PSYCH: appropriate affect.         Significant Labs:   Recent Lab Results       02/06/19  1141   02/06/19  1133        Anion Gap 13       BUN, Bld 8       Calcium 10.2       Chloride 104       CO2 20       Creatinine 0.8       eGFR if  >60.0       eGFR if non  >60.0  Comment:  Calculation used to obtain the estimated glomerular filtration  rate (eGFR) is the CKD-EPI equation.          Glucose 107       Potassium 4.1  Comment:  *Result may be interfered by visible hemolysis       Preg Test, Ur   Negative      Acceptable   Yes     Sodium 137             Significant Imaging: EKG: NSR

## 2019-02-07 VITALS
RESPIRATION RATE: 18 BRPM | DIASTOLIC BLOOD PRESSURE: 75 MMHG | OXYGEN SATURATION: 95 % | SYSTOLIC BLOOD PRESSURE: 123 MMHG | BODY MASS INDEX: 43.4 KG/M2 | WEIGHT: 270.06 LBS | HEIGHT: 66 IN | HEART RATE: 86 BPM | TEMPERATURE: 99 F

## 2019-02-07 PROCEDURE — 25000003 PHARM REV CODE 250: Performed by: INTERNAL MEDICINE

## 2019-02-07 RX ORDER — ACETAMINOPHEN 325 MG/1
650 TABLET ORAL EVERY 6 HOURS PRN
Qty: 15 TABLET | Refills: 0 | Status: ON HOLD | OUTPATIENT
Start: 2019-02-07 | End: 2019-05-09 | Stop reason: HOSPADM

## 2019-02-07 RX ORDER — METOPROLOL SUCCINATE 25 MG/1
50 TABLET, EXTENDED RELEASE ORAL DAILY
Status: DISCONTINUED | OUTPATIENT
Start: 2019-02-07 | End: 2019-02-07 | Stop reason: HOSPADM

## 2019-02-07 RX ADMIN — METOPROLOL SUCCINATE 50 MG: 25 TABLET, EXTENDED RELEASE ORAL at 08:02

## 2019-02-07 RX ADMIN — HYDROCHLOROTHIAZIDE 12.5 MG: 12.5 TABLET ORAL at 08:02

## 2019-02-07 RX ADMIN — DIGOXIN 125 MCG: 125 TABLET ORAL at 08:02

## 2019-02-07 NOTE — NURSING TRANSFER
Nursing Transfer Note      2/6/2019     Transfer To: [facility]    Transfer via stretcher    Transfer with cardiac monitoring, pt glasses and pt belongings bag    Transported by RN    Medicines sent: N/A    Chart send with patient: Yes    Notified: family

## 2019-02-07 NOTE — PLAN OF CARE
Problem: Adult Inpatient Plan of Care  Goal: Plan of Care Review  Outcome: Ongoing (interventions implemented as appropriate)  Pt remains free of trauma, falls, injury. VSS. Pt no longer on bed rest. Pt complains of throat soreness with cough drops given. Pt groin site remains clean dry intact. Pt to be discharged home today. Reviewed plan of care with pt and family answered all questions. Pt tolerating plan of care.

## 2019-02-07 NOTE — DISCHARGE SUMMARY
[facility]  Discharge Summary      Admit Date: 2/6/2019    Discharge Date and Time:  02/07/2019 7:41 AM    Attending Physician: Romeo Rboles MD     Reason for Admission: A flutter ablation     Procedures Performed: Procedure(s) (LRB):  ABLATION, SVT, ACCESSORY PATHWAY (Bilateral)  INSERTION, IMPLANTABLE LOOP RECORDER (N/A)  ECHOCARDIOGRAM,TRANSESOPHAGEAL (N/A)    Hospital Course     Patient is s/p CTI ablation and right atrial lateral wall scar flutter ablation:     Tolerated procedure well.   No acute complication noted.  Clinically she denies any complains to me in the morning.   HR b/w 75 and 110 overnight . Seems sinus rhythm/sinus tach.   Access site RFV,  Soft and non tender, no bleeding or hematoma.   Cont digoxin and metoprolol.   Cont warfarin for now - further duration of OAC will b decided in the clinic.   All instructions given to patient and family. All questions answered in detail.   Cont  warfarin for now, Follow up with coumadin clinic on d/s  Follow with Dr Woodward and Dr Gonzalez on 3/14/19.  Discussed plan with Dr Woodward and Dr Robles.    Final Diagnoses:    Principal Problem: SVT (supraventricular tachycardia)   Secondary Diagnoses:   Active Hospital Problems    Diagnosis  POA    *SVT (supraventricular tachycardia) [I47.1]  Yes      Resolved Hospital Problems   No resolved problems to display.       Discharged Condition: stable    Disposition: Home or Self Care    Follow Up/Patient Instructions:     Medications:  Reconciled Home Medications:      Medication List      START taking these medications    acetaminophen 325 MG tablet  Commonly known as:  TYLENOL  Take 2 tablets (650 mg total) by mouth every 6 (six) hours as needed.        CONTINUE taking these medications    amLODIPine 2.5 MG tablet  Commonly known as:  NORVASC  Take 1 tablet (2.5 mg total) by mouth once daily.     coenzyme Q10 100 mg capsule  Commonly known as:  COQ-10  Take 1 capsule (100 mg total) by  mouth 2 (two) times daily.     digoxin 125 mcg tablet  Commonly known as:  LANOXIN  Take 1 tablet (125 mcg total) by mouth once daily.     hydroCHLOROthiazide 12.5 MG Tab  Commonly known as:  HYDRODIURIL  Take 25 mg by mouth once daily.     metoprolol succinate 50 MG 24 hr tablet  Commonly known as:  TOPROL-XL  Take 50 mg by mouth once daily.     warfarin 5 MG tablet  Commonly known as:  COUMADIN  Take 7.5 mg by mouth once daily.          Discharge Procedure Orders   Diet Cardiac     No driving until:   Scheduling Instructions: For 24 hrs     Lifting restrictions   Scheduling Instructions: For 7 days     Notify your health care provider if you experience any of the following:  temperature >100.4     Notify your health care provider if you experience any of the following:  persistent nausea and vomiting or diarrhea     Notify your health care provider if you experience any of the following:  severe uncontrolled pain     Notify your health care provider if you experience any of the following:  redness, tenderness, or signs of infection (pain, swelling, redness, odor or green/yellow discharge around incision site)     Notify your health care provider if you experience any of the following:  difficulty breathing or increased cough     Notify your health care provider if you experience any of the following:  severe persistent headache     Notify your health care provider if you experience any of the following:  worsening rash     Notify your health care provider if you experience any of the following:  persistent dizziness, light-headedness, or visual disturbances     Notify your health care provider if you experience any of the following:  increased confusion or weakness     No dressing needed     Follow-up Information     Ricardo Woodward MD On 3/14/2019.    Specialty:  Pediatric Cardiology  Why:  post ablation  Contact information:  [address]  [place] [address]  [phone]             Donovan Gonzalez MD  On 3/14/2019.    Specialty:  Pediatric Cardiology  Why:  post ablation  Contact information:  [address] [id number] [phone]

## 2019-02-07 NOTE — PLAN OF CARE
Problem: Adult Inpatient Plan of Care  Goal: Plan of Care Review  Outcome: Ongoing (interventions implemented as appropriate)  Patient verbalizes no complaints overnight; denies chest pain, SOB, or other discomfort. Pt s/p ablation. R groin access site CDI. Family at BS. Pt remains free of falls or injuries. Pt verbalizes complete understanding of plan of care. Will continue to monitor.

## 2019-02-07 NOTE — NURSING
Pt discharged per MD orders.  Tele and IV discontinued.  Catheter tip intact x2.  Medication list and prescriptions reviewed; prescriptions sent to pt preferred pharmacy.  Pt verbalizes understanding of all written and verbal discharge instructions. Pt awaiting escort arrival.

## 2019-02-07 NOTE — NURSING TRANSFER
Nursing Transfer Note      2/6/2019    Transfer From: PACU    Transfer via stretcher    Transfer with cardiac monitoring    Transported by RN    Medicines sent: n/a    Chart send with patient: Yes    Notified: family in waiting area    Patient reassessed at: 02/06/2019 @ 1920    Upon arrival to floor: cardiac monitor applied, patient oriented to room, call bell in reach and bed in lowest position

## 2019-02-12 ENCOUNTER — OFFICE VISIT (OUTPATIENT)
Dept: CARDIOLOGY | Facility: CLINIC | Age: 19
End: 2019-02-12
Payer: MEDICAID

## 2019-02-12 ENCOUNTER — HOSPITAL ENCOUNTER (OUTPATIENT)
Dept: CARDIOLOGY | Facility: OTHER | Age: 19
Discharge: HOME | End: 2019-02-12
Attending: PEDIATRICS
Payer: MEDICAID

## 2019-02-12 VITALS
OXYGEN SATURATION: 98 % | HEART RATE: 102 BPM | WEIGHT: 269.81 LBS | DIASTOLIC BLOOD PRESSURE: 80 MMHG | SYSTOLIC BLOOD PRESSURE: 132 MMHG | HEIGHT: 66 IN | BODY MASS INDEX: 43.36 KG/M2

## 2019-02-12 DIAGNOSIS — Q25.5 PULMONARY ATRESIA WITH INTACT VENTRICULAR SEPTUM: ICD-10-CM

## 2019-02-12 DIAGNOSIS — Z95.3 HISTORY OF TRICUSPID VALVE REPLACEMENT WITH BIOPROSTHETIC VALVE: ICD-10-CM

## 2019-02-12 DIAGNOSIS — I47.10 SVT (SUPRAVENTRICULAR TACHYCARDIA): ICD-10-CM

## 2019-02-12 DIAGNOSIS — I48.4 ATYPICAL ATRIAL FLUTTER: Primary | ICD-10-CM

## 2019-02-12 LAB — BSA FOR ECHO PROCEDURE: 2.39 M2

## 2019-02-12 PROCEDURE — 93010 ELECTROCARDIOGRAM REPORT: CPT | Mod: ,,, | Performed by: INTERNAL MEDICINE

## 2019-02-12 PROCEDURE — 93272 PR EXT ECG,PT DEMAND EVENT, SYMPT MEMORY LOOP, PHYS REVIEW&INTERP: ICD-10-PCS | Mod: S$GLB,,, | Performed by: PEDIATRICS

## 2019-02-12 PROCEDURE — 93325 DOPPLER ECHO COLOR FLOW MAPG: CPT

## 2019-02-12 PROCEDURE — 99214 PR OFFICE/OUTPT VISIT, EST, LEVL IV, 30-39 MIN: ICD-10-PCS | Mod: 25,S$GLB,, | Performed by: PEDIATRICS

## 2019-02-12 PROCEDURE — 99214 OFFICE O/P EST MOD 30 MIN: CPT | Mod: 25,S$GLB,, | Performed by: PEDIATRICS

## 2019-02-12 PROCEDURE — 93010 EKG 12-LEAD: ICD-10-PCS | Mod: ,,, | Performed by: INTERNAL MEDICINE

## 2019-02-12 PROCEDURE — 93272 ECG/REVIEW INTERPRET ONLY: CPT | Mod: S$GLB,,, | Performed by: PEDIATRICS

## 2019-02-12 PROCEDURE — 93005 ELECTROCARDIOGRAM TRACING: CPT

## 2019-02-12 PROCEDURE — 93306 TTE W/DOPPLER COMPLETE: CPT

## 2019-02-18 NOTE — PROGRESS NOTES
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 She underwent reconstruction of the right ventricular outflow tract and placement of a bioprosthetic pulmonary valve in early childhood. She subsequently has required multiple valve replacements. Her most recent heart surgery involved placement of a bioprosthetic valve in the pulmonary position and placement of a bioprosthetic valve in the tricuspid position, in 2007. Recently, she had a heart catheterization for recurrent insufficiency and obstruction of the bioprosthetic valve that had been placed in the tricuspid position.  The current procedure employed a trans-catheter approach. It entailed valvuloplasty with a Z-Med 25 x 5 cm balloon followed by placement of an Sotelo S3 26 mm bioprosthetic valve in the tricuspid position.. 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 She leads a rather sedentary lifestyle, and is overweight. She currently is on: Digoxin 0.125 mg once a day, Norvasc 2.5 mg once a day. The Lasix was recently changed to HCTZ 25 mg once a day. (She likes the HCTZ better.) She should additionally be on Metoprolol Succinate 50 mg qd and Warfarin 7.5 mg qd     Kacey recently underwent an EPS with successful transcatheter ablation for both typical and atypical reentry atrial tachycardia, at [facility], by Dr Robles and Dr Woodward.  She comes to clinic today for follow-up. She reports having problems with rapid HRs in the middle of the night. It happens about 3 times per week, and it gets her up.     .         REVIEW OF SYSTEMS:  There are no visual disturbances. No HAs, lightheadedness, syncope or seizures. No swallowing disorder or PIETER. No difficulty with breathing, SOB,  wheezing or rhonchi. No asthma. No abdominal pain.   Bowel movements are normal.  No pelvic pain. Urination is normal.  No DM of thyroid disorder.  No lipid disorder. No arterial disease. No known intrinsic problem with the lungs, liver or kidneys. No bleeding disorder. No smoking or ETOH use.        PHYSICAL EXAMINATION:   GENERAL:  The patient is overweight 18 -year-old female in no distress.  VITAL SIGNS:  Her weight is 122.4.4 kg (270 lbs) and her height is 1.676 meters (5' 6''). Heart rate is 72 beats per minute ( sinus).  Sat 98 %.   Blood pressure in the right arm is 137/82 mmHg in the RA..  Color and perfusion are good.  HEENT:  Eye movements are normal.  No bruits are noted over the head.  No jugular venous distention or pulsations.  Mucous membranes are moist and pink.  There is no adenopathy.  The neck is supple.  No carotid bruits. SKIN:  Showed is pink and well perfused. CHEST:  There is a well-healed midline scar.  Lungs are clear to auscultation bilaterally, although the breath sounds are somewhat decreased at the base. There are no wheezes or rhonchi. CARDIOVASCULAR:  Precordial activity is normal.  HR ~ 140 bpm. The first heart sound is prominent and crisp.  The second heart sound is narrowly split. There is a grade 1/6 systolic ejection murmur heard best up the left sternal border and across the base.  Short diastolic murmur is heard today.  ABDOMEN:  There is exogenous obesity.  The liver edge is palpable about 2-3 FB at the right costal margin.  It is nonpulsatile and nontender.  Bowel sounds appear to be normal.  EXTREMITIES:  Pulses are 2+ throughout.  Capillary refill is good.  There is no cyanosis or peripheral edema.  No bruising in the groins or exts and no rashes.  No bruits in the groins.              ........................................................................................................................................        ECG (TODAY): Sinus rhythm at a ventricular rate of 89 bpm.  ms.  RBBB (154 ms). Minor T wave  abnormality.  Prolonged QTc but she has a wide RBBB.                ECHOCARDIOGRAM  RECENT VISIT):  An echocardiogram was performed.  2-D STUDY: There is no pericardial effusion.  No clots or vegetations. The bioprosthetic tricuspid valve is seen to be in good position. Annulus is 18 mm. The struts are easily seen.  The leaflets are mobile.  No evidence for thrombus formation.  The right atrium is enlarged measuring 5.4 x 4.8 cm. One can also see  the bioprosthetic tricuspid valve in a cross sectional view.  It appears circular and measures 2 cm x 2 cm. Again, no clots or vegetations are seen.  No right ventricular or left ventricular outflow tract obstruction.  The right ventricular circumferential area of shortening is 30%,  which is reduced.  Circumferential area of the RA is 23.4 cm2, which is dilated. M-MODE:   LV 5.1 cm, RV 3.1 cm, Ao 3.0 cm, LA 3.4 cm, Sep 1.1 cm, PW 1.1 cm, EF ~ 39 % (reduced). DOPPLER VELOCITY ANALYSIS:  There is no insufficiency of the recently-placed bioprosthetic tricuspid valve.  Mild turbulent antegrade flow is seen. Continuous wave Doppler analysis shows a peak pressure drop of 19 mmHg with a mean value of 10 mmHg.  Importantly, the leaflets are moving freely.  There is no mitral insufficiency or aortic insufficiency.  Peak pressure drop across the aortic valve is 4 mmHg.  Peak pressure drop across the bioprosthetic pulmonary valve is 12 mmHg.                    .................................................................................................................................................               ASSESSMENT:  In summary, we have a 18-year-old AA female originally diagnosed to have pulmonary atresia with intact inter-ventricular septum, who is status-post multiple open heart operations involving both the pulmonary and tricuspid valves.  She recently in February 2017 had placement of an Sotelo S3 26 mm bioprosthetic tricuspid valve via the transvenous approach.   She tolerated the procedure well.  Clinically, she has been stable. However, she continues to have poor exercise capacity, chronic fatigue and more recently developed a new rhythm disturbance, diagnosed to be atrial flutter. She recently underwent successful ablation for the atrial rhythm disturbance. She has complaints of continued rapid HRs at night, even after the ablation. PLAN:  Given our findings, our suggestions are as follows:  1.  She of course needs antibiotic prophylaxis for any invasive procedure, especially dental work.   2.  She should continue taking Metoprolol 50 mg in the PM, HCTZ 25 mg qd, and her other meds.  We restarted Warfarin 7.5 mg qd.  INR today was 1.3.  Would increase dose to 10 mg Fri, Sat and Sun..   3. Kacey is also being seen by (Dr Woodward) post-ablation.  4. RTC in 1 wk with ECHO, ECG and INR.  5. Place event recorder.  If there are any questions concerning the cardiac status of this patient, please feel free to contact us.  Thank you so much for allowing us to partake in the care of this patient. Donovan Gonzalez PhD, MD.                     PS:  RECENT EPS with ABLATION for Atypical and Typical Atrial Flutter. (S/P) Mami type valve placement in the tricuspid position.  The ablation catheter was advanced into the right atrium and positioned at the atrial aspect of the tricuspid annulus.  Lesions were tracked using the [employer] (3D) mapping system. The catheter was guided using fluoroscopy and electroanatomic mapping. The post ablation rhythm was sinus rhythm. After a continuous linear lesion set was made from the prosthetic tricuspid valve to the IVC, the TCL was changed from 205 to 215 ms. Ablation from the lateral scar inferior border to the CTI line did not change the TCL. Ablation along the most anterior portion of the line, near the inferior/lateral tricuspid annulus resulted in termination of the tachycardia. Bidirectional block across the CTI and lateral scar lines was  confirmed. This was maintained after 20 minutes.

## 2019-02-19 ENCOUNTER — OFFICE VISIT (OUTPATIENT)
Dept: CARDIOLOGY | Facility: CLINIC | Age: 19
End: 2019-02-19
Payer: MEDICAID

## 2019-02-19 DIAGNOSIS — I48.92 ATRIAL FLUTTER, UNSPECIFIED TYPE: Primary | ICD-10-CM

## 2019-02-19 PROCEDURE — 99213 OFFICE O/P EST LOW 20 MIN: CPT | Mod: 25,S$GLB,, | Performed by: PEDIATRICS

## 2019-02-19 PROCEDURE — 93000 ELECTROCARDIOGRAM COMPLETE: CPT | Mod: S$GLB,,, | Performed by: PEDIATRICS

## 2019-02-19 PROCEDURE — 93000 EKG 12-LEAD: ICD-10-PCS | Mod: S$GLB,,, | Performed by: INTERNAL MEDICINE

## 2019-02-19 PROCEDURE — 93000 ELECTROCARDIOGRAM COMPLETE: CPT | Mod: S$GLB,,, | Performed by: INTERNAL MEDICINE

## 2019-02-19 PROCEDURE — 93000 PR ELECTROCARDIOGRAM, COMPLETE: ICD-10-PCS | Mod: S$GLB,,, | Performed by: PEDIATRICS

## 2019-02-19 PROCEDURE — 99213 PR OFFICE/OUTPT VISIT, EST, LEVL III, 20-29 MIN: ICD-10-PCS | Mod: 25,S$GLB,, | Performed by: PEDIATRICS

## 2019-02-26 ENCOUNTER — CLINICAL SUPPORT (OUTPATIENT)
Dept: CARDIOLOGY | Facility: CLINIC | Age: 19
End: 2019-02-26
Payer: MEDICAID

## 2019-02-26 ENCOUNTER — OFFICE VISIT (OUTPATIENT)
Dept: CARDIOLOGY | Facility: CLINIC | Age: 19
End: 2019-02-26
Payer: MEDICAID

## 2019-02-26 VITALS
BODY MASS INDEX: 43.62 KG/M2 | SYSTOLIC BLOOD PRESSURE: 93 MMHG | HEIGHT: 66 IN | DIASTOLIC BLOOD PRESSURE: 74 MMHG | OXYGEN SATURATION: 98 % | HEART RATE: 86 BPM | WEIGHT: 271.38 LBS

## 2019-02-26 DIAGNOSIS — Z95.3 HISTORY OF PULMONARY VALVE REPLACEMENT WITH BIOPROSTHETIC VALVE: ICD-10-CM

## 2019-02-26 DIAGNOSIS — I48.92 ATRIAL FLUTTER: ICD-10-CM

## 2019-02-26 DIAGNOSIS — I50.20 SYSTOLIC CHF: ICD-10-CM

## 2019-02-26 DIAGNOSIS — Z95.3 HISTORY OF TRICUSPID VALVE REPLACEMENT WITH BIOPROSTHETIC VALVE: ICD-10-CM

## 2019-02-26 DIAGNOSIS — I48.92 ATRIAL FLUTTER, UNSPECIFIED TYPE: Primary | ICD-10-CM

## 2019-02-26 DIAGNOSIS — I50.20 SYSTOLIC CONGESTIVE HEART FAILURE, UNSPECIFIED HF CHRONICITY: ICD-10-CM

## 2019-02-26 PROCEDURE — 99213 OFFICE O/P EST LOW 20 MIN: CPT | Mod: 25,S$GLB,, | Performed by: PEDIATRICS

## 2019-02-26 PROCEDURE — 93000 PR ELECTROCARDIOGRAM, COMPLETE: ICD-10-PCS | Mod: S$GLB,,, | Performed by: PEDIATRICS

## 2019-02-26 PROCEDURE — 93325 DOPPLER ECHO COLOR FLOW MAPG: CPT | Mod: S$GLB,,, | Performed by: PEDIATRICS

## 2019-02-26 PROCEDURE — 93320 DOPPLER ECHO COMPLETE: CPT | Mod: S$GLB,,, | Performed by: PEDIATRICS

## 2019-02-26 PROCEDURE — 93000 ELECTROCARDIOGRAM COMPLETE: CPT | Mod: S$GLB,,, | Performed by: PEDIATRICS

## 2019-02-26 PROCEDURE — 99213 PR OFFICE/OUTPT VISIT, EST, LEVL III, 20-29 MIN: ICD-10-PCS | Mod: 25,S$GLB,, | Performed by: PEDIATRICS

## 2019-02-26 PROCEDURE — 93303 ECHO TRANSTHORACIC: CPT | Mod: S$GLB,,, | Performed by: PEDIATRICS

## 2019-02-26 PROCEDURE — 93325 PR DOPPLER COLOR FLOW VELOCITY MAP: ICD-10-PCS | Mod: S$GLB,,, | Performed by: PEDIATRICS

## 2019-02-26 PROCEDURE — 93320 PR DOPPLER ECHO HEART,COMPLETE: ICD-10-PCS | Mod: S$GLB,,, | Performed by: PEDIATRICS

## 2019-02-26 PROCEDURE — 93303 PR ECHO XTHORACIC,CONG A2M,COMPLETE: ICD-10-PCS | Mod: S$GLB,,, | Performed by: PEDIATRICS

## 2019-02-26 NOTE — PROGRESS NOTES
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 She underwent reconstruction of the right ventricular outflow tract and placement of a bioprosthetic pulmonary valve in early childhood. She subsequently has required multiple valve replacements. Her most recent heart surgery involved placement of a bioprosthetic valve in the pulmonary position and placement of a bioprosthetic valve in the tricuspid position, in 2007. Recently, she had a heart catheterization for recurrent insufficiency and obstruction of the bioprosthetic valve that had been placed in the tricuspid position.  The current procedure employed a trans-catheter approach. It entailed valvuloplasty with a Z-Med 25 x 5 cm balloon followed by placement of an Sotelo S3 26 mm bioprosthetic valve in the tricuspid position.. 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 She leads a rather sedentary lifestyle, and is overweight. She currently is on: Digoxin 0.125 mg once a day, Norvasc 2.5 mg once a day for BP. The Lasix was changed to HCTZ 25 mg once a day. (She likes the HCTZ better.) She should additionally be on Metoprolol ER 50 mg qd and Warfarin 7.5 mg M, Tu, W, and Th; and 10 mg Fr, Sa, andSun.     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  She comes to clinic today for follow-up. She reports having had several short episodes of rapid HRs in the middle of the night. It happens about 3 times per week; they get her up. A recent transmission showed a run of atrial flutter at ~ 170 bpm.     .         REVIEW OF SYSTEMS:  There are no visual disturbances. No HAs, lightheadedness, syncope or seizures.  No swallowing disorder or PIETER. No difficulty with breathing, SOB,  wheezing or rhonchi. No asthma. No abdominal pain.  Bowel movements are normal.  No pelvic pain. Urination is normal.  No DM of thyroid disorder.  No lipid disorder. No arterial disease. No known intrinsic problem with the lungs, liver or kidneys. No bleeding disorder. No smoking or ETOH use.        PHYSICAL EXAMINATION:   GENERAL:  The patient is overweight 18 -year-old female in no distress.  VITAL SIGNS:  Her weight is 123.1 kg (271 lbs) and her height is 1.676 meters (5' 6''). Heart rate is 86 beats per minute ( sinus).  Sat 98 %.   Blood pressure in the right arm is 98/74 mmHg in the RA..  Color and perfusion are good.  HEENT:  Eye movements are normal.  No bruits are noted over the head.  No jugular venous distention or pulsations.  Mucous membranes are moist and pink.  There is no adenopathy.  The neck is supple.  No carotid bruits. SKIN:  Showed is pink and well perfused. CHEST:  There is a well-healed midline scar.  Lungs are clear to auscultation bilaterally, although the breath sounds are somewhat decreased at the base. There are no wheezes or rhonchi. CARDIOVASCULAR:  Precordial activity is normal.  HR ~ 80 bpm.  The first heart sound is prominent and crisp.  The second heart sound is narrowly split. There is a grade 1/6 systolic ejection murmur heard best up the left sternal border and across the base.  Short diastolic murmur is heard today.  ABDOMEN:  There is exogenous obesity.  The liver edge is palpable about 2-3 FB at the right costal margin.  It is nonpulsatile and nontender.  Bowel sounds appear to be normal.  EXTREMITIES:  Pulses are 2+ throughout.  Capillary refill is good.  There is no cyanosis or peripheral edema.  No bruising in the groins or exts and no rashes.  No bruits in the  groins.              ........................................................................................................................................        ECG (TODAY): Sinus rhythm at a ventricular rate of 86 bpm. HI 198 ms.  RBBB (158 ms). Minor T wave abnormality.  Prolonged QTc (512 ms) but she has a wide RBBB.                ECHOCARDIOGRAM  (PREVIOUS VISIT): She is in sinus rhythm.  An echocardiogram was performed.  2-D STUDY: There is no pericardial effusion.  No clots or vegetations. The bioprosthetic tricuspid valve is seen to be in good position. Annulus is 18 mm. The struts are easily seen.  The leaflets are mobile.  No evidence for thrombus formation.  The right atrium is enlarged measuring 5.4 x 4.8 cm or ~ 28.2 cm2 One can also see  the bioprosthetic tricuspid valve in a cross sectional view.  It appears circular and measures 2 cm x 2 cm. Again, no clots or vegetations are seen.  No right ventricular or left ventricular outflow tract obstruction.  The right ventricular circumferential area of shortening is 30%,  which is reduced.  Circumferential area of the RA is 23.4 cm2, which is dilated. M-MODE:   LV 4.4 cm, RV 3.8 cm, Ao 2.8 cm, LA 4.4 cm, Sep 1.1 cm, PW 1.1 cm, EF ~ 49 % (reduced). DOPPLER VELOCITY ANALYSIS:  There is no insufficiency of the recently-placed bioprosthetic tricuspid valve.  Mild turbulent antegrade flow is seen. Continuous wave Doppler analysis shows a peak pressure drop of 12-17 mmHg with a mean value of 7-10 mmHg.  Importantly, the leaflets are moving freely.  There is no mitral insufficiency.  Mild aortic insufficiency  (P1/2 567 ms).  Peak pressure drop across the aortic valve is 5 mmHg.  Peak pressure drop across the bioprosthetic pulmonary valve is 16 mmHg.                    .................................................................................................................................................               ASSESSMENT:  In summary, we have a  18-year-old AA female originally diagnosed to have pulmonary atresia with intact inter-ventricular septum, who is status-post multiple open heart operations involving both the pulmonary and tricuspid valves.  She recently in February 2017 had placement of an Sotelo S3 26 mm bioprosthetic tricuspid valve via the transvenous approach.  She tolerated the procedure well.  Clinically, she has been stable. However, she continues to have poor exercise capacity, chronic fatigue and more recently developed a new rhythm disturbance, diagnosed to be atrial flutter. She recently underwent successful ablation for the atrial rhythm abnormality. She continues to have complaints of rapid HRs at night.         PLAN:  Given our findings, our suggestions are as follows:  1.  She of course needs antibiotic prophylaxis for any invasive procedure, especially dental work.   2.  She should continue taking Metoprolol 50 mg in the AM and increased to 25 mg in the PM, HCTZ 25 mg qd, and her other remaining meds.  We restarted Warfarin 7.5 mg M, and Tu ; and 10 mg Wed, Thur, Fr, Sat, and Edmondson.  INR today was 1.2.  Would increase dose to 7.5 mg Mon, Tues; and 10 mg Wed, Thur, Fri, Sat and Sun.   3. Kacey is also will be seen by (Dr Woodward) post-ablation.  4. RTC in 1 wk with ECG and INR.  5. Continue with event recorder.  If there are any questions concerning the cardiac status of this patient, please feel free to contact us.  Thank you so much for allowing us to partake in the care of this patient. Donovan Gonzalez PhD, MD.                                        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 She underwent reconstruction of the right ventricular outflow tract and placement of a bioprosthetic pulmonary valve in early childhood. She subsequently has required multiple valve replacements. Her  most recent heart surgery involved placement of a bioprosthetic valve in the pulmonary position and placement of a bioprosthetic valve in the tricuspid position, in 2007. Recently, she had a heart catheterization for recurrent insufficiency and obstruction of the bioprosthetic valve that had been placed in the tricuspid position.  The current procedure employed a trans-catheter approach. It entailed valvuloplasty with a Z-Med 25 x 5 cm balloon followed by placement of an Sotelo S3 26 mm bioprosthetic valve in the tricuspid position.. 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 She leads a rather sedentary lifestyle, and is overweight. She currently is on: Digoxin 0.125 mg once a day, Norvasc 2.5 mg once a day for BP. The Lasix was recently changed to HCTZ 25 mg once a day. (She likes the HCTZ better.) She should additionally be on Metoprolol ER 50 mg qd and Warfarin 7.5 mg M, Tu, W, and Th; and 10 mg Fr, Sa, andSun.     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  She comes to clinic today for follow-up. She reports having problems with rapid HRs in the middle of the night. It happens about 3 times per week, and it gets her up. A transmission from last night showed a run of atrial flutter at ~ 170 bpm.     .         REVIEW OF SYSTEMS:  There are no visual disturbances. No HAs, lightheadedness, syncope or seizures. No swallowing disorder or PIETER. No difficulty with breathing, SOB,  wheezing or rhonchi. No asthma. No abdominal pain.  Bowel movements are normal.  No pelvic pain. Urination is normal.  No DM of thyroid disorder.  No lipid disorder. No arterial disease. No known intrinsic problem with the lungs, liver or kidneys. No bleeding disorder. No smoking or ETOH use.        PHYSICAL  EXAMINATION:   GENERAL:  The patient is overweight 18 -year-old female in no distress.  VITAL SIGNS:  Her weight is 123.1 kg (271 lbs) and her height is 1.676 meters (5' 6''). Heart rate is 86 beats per minute ( sinus).  Sat 98 %.   Blood pressure in the right arm is 98/74 mmHg in the RA..  Color and perfusion are good.  HEENT:  Eye movements are normal.  No bruits are noted over the head.  No jugular venous distention or pulsations.  Mucous membranes are moist and pink.  There is no adenopathy.  The neck is supple.  No carotid bruits. SKIN:  Showed is pink and well perfused. CHEST:  There is a well-healed midline scar.  Lungs are clear to auscultation bilaterally, although the breath sounds are somewhat decreased at the base. There are no wheezes or rhonchi. CARDIOVASCULAR:  Precordial activity is normal.  HR ~ 80 bpm.  The first heart sound is prominent and crisp.  The second heart sound is narrowly split. There is a grade 1/6 systolic ejection murmur heard best up the left sternal border and across the base.  Short diastolic murmur is heard today.  ABDOMEN:  There is exogenous obesity.  The liver edge is palpable about 2-3 FB at the right costal margin.  It is nonpulsatile and nontender.  Bowel sounds appear to be normal.  EXTREMITIES:  Pulses are 2+ throughout.  Capillary refill is good.  There is no cyanosis or peripheral edema.  No bruising in the groins or exts and no rashes.  No bruits in the groins.              ........................................................................................................................................        ECG (TODAY): Sinus rhythm at a ventricular rate of 86 bpm.  ms.  RBBB (158 ms). Minor T wave abnormality.  Prolonged QTc (512 ms) but she has a wide RBBB.                ECHOCARDIOGRAM  TODAYs VISIT): She is in sinus rhythm.  An echocardiogram was performed.  2-D STUDY: There is no pericardial effusion.  No clots or vegetations. The bioprosthetic  tricuspid valve is seen to be in good position. Annulus is 18 mm. The struts are easily seen.  The leaflets are mobile.  No evidence for thrombus formation.  The right atrium is enlarged measuring 5.4 x 4.8 cm or ~ 28.2 cm2 One can also see  the bioprosthetic tricuspid valve in a cross sectional view.  It appears circular and measures 2 cm x 2 cm. Again, no clots or vegetations are seen.  No right ventricular or left ventricular outflow tract obstruction.  The right ventricular circumferential area of shortening is 30%,  which is reduced.  Circumferential area of the RA is 23.4 cm2, which is dilated. M-MODE:   LV 4.4 cm, RV 3.8 cm, Ao 2.8 cm, LA 4.4 cm, Sep 1.1 cm, PW 1.1 cm, EF ~ 49 % (reduced). DOPPLER VELOCITY ANALYSIS:  There is no insufficiency of the recently-placed bioprosthetic tricuspid valve.  Mild turbulent antegrade flow is seen. Continuous wave Doppler analysis shows a peak pressure drop of 12-17 mmHg with a mean value of 7-10 mmHg.  Importantly, the leaflets are moving freely.  There is no mitral insufficiency.  Mild aortic insufficiency  (P1/2 567 ms).  Peak pressure drop across the aortic valve is 5 mmHg.  Peak pressure drop across the bioprosthetic pulmonary valve is 16 mmHg.                    .................................................................................................................................................               ASSESSMENT:  In summary, we have a 18-year-old AA female originally diagnosed to have pulmonary atresia with intact inter-ventricular septum, who is status-post multiple open heart operations involving both the pulmonary and tricuspid valves.  She recently in February 2017 had placement of an Sotelo S3 26 mm bioprosthetic tricuspid valve via the transvenous approach.  She tolerated the procedure well.  Clinically, she has been stable. However, she continues to have poor exercise capacity, chronic fatigue and more recently developed a new rhythm  disturbance, diagnosed to be atrial flutter. She recently underwent successful ablation for the atrial rhythm abnormality. She continues to have complaints of rapid HRs at night.       PLAN:  Given our findings, our suggestions are as follows:  1.  She of course needs antibiotic prophylaxis for any invasive procedure, especially dental work.   2.  She should continue taking Metoprolol 50 mg in the AM and increase to 25 mg in the PM, HCTZ 25 mg qd, and her other meds.  We restarted Warfarin 7.5 mg M, Tu, Wed, Th; and 10 mg  Fr, Sat, and Edmondson.  INR today was 1.2.  Would increase dose to 7.5 mg Mon, Tues; and 10 mg Wed, Thur, Fri, Sat and Sun.   3. Kacey is also being seen by (Dr Woodward) post-ablation.  4. RTC in 1 wk with ECG and INR.  5. Continue with event recorder.  If there are any questions concerning the cardiac status of this patient, please feel free to contact us.  Thank you so much for allowing us to partake in the care of this patient. Donovan Gonzalez PhD, MD.                     PS:  RECENT EPS with ABLATION for Atypical and Typical Atrial Flutter. (S/P) Mami type valve placement in the tricuspid position.  The ablation catheter was advanced into the right atrium and positioned at the atrial aspect of the tricuspid annulus.  Lesions were tracked using the "[employer] (3D) mapping system. The catheter was guided using fluoroscopy and electroanatomic mapping. The post ablation rhythm was sinus rhythm. After a continuous linear lesion set was made from the prosthetic tricuspid valve to the IVC, the TCL was changed from 205 to 215 ms. Ablation from the lateral scar inferior border to the CTI line did not change the TCL. Ablation along the most anterior portion of the line, near the inferior/lateral tricuspid annulus resulted in termination of the tachycardia. Bidirectional block across the CTI and lateral scar lines was confirmed. This was maintained after 20 minutes.

## 2019-02-27 ENCOUNTER — LAB VISIT (OUTPATIENT)
Dept: LAB | Facility: HOSPITAL | Age: 19
End: 2019-02-27
Attending: PEDIATRICS
Payer: MEDICAID

## 2019-02-27 DIAGNOSIS — I50.20 SYSTOLIC CONGESTIVE HEART FAILURE, UNSPECIFIED HF CHRONICITY: ICD-10-CM

## 2019-02-27 LAB
CHOLEST SERPL-MCNC: 192 MG/DL
CHOLEST/HDLC SERPL: 4.1 {RATIO}
HDLC SERPL-MCNC: 47 MG/DL
HDLC SERPL: 24.5 %
LDLC SERPL CALC-MCNC: 126.6 MG/DL
NONHDLC SERPL-MCNC: 145 MG/DL
T4 FREE SERPL-MCNC: 1.03 NG/DL
TRIGL SERPL-MCNC: 92 MG/DL
TSH SERPL DL<=0.005 MIU/L-ACNC: 4.3 UIU/ML

## 2019-02-27 PROCEDURE — 84439 ASSAY OF FREE THYROXINE: CPT

## 2019-02-27 PROCEDURE — 84443 ASSAY THYROID STIM HORMONE: CPT

## 2019-02-27 PROCEDURE — 36415 COLL VENOUS BLD VENIPUNCTURE: CPT | Mod: PO

## 2019-02-27 PROCEDURE — 80061 LIPID PANEL: CPT

## 2019-03-11 ENCOUNTER — OFFICE VISIT (OUTPATIENT)
Dept: CARDIOLOGY | Facility: CLINIC | Age: 19
End: 2019-03-11
Payer: MEDICAID

## 2019-03-11 VITALS
HEART RATE: 91 BPM | HEIGHT: 66 IN | DIASTOLIC BLOOD PRESSURE: 82 MMHG | SYSTOLIC BLOOD PRESSURE: 125 MMHG | WEIGHT: 270.31 LBS | BODY MASS INDEX: 43.44 KG/M2 | OXYGEN SATURATION: 97 %

## 2019-03-11 DIAGNOSIS — I48.92 ATRIAL FLUTTER, UNSPECIFIED TYPE: Primary | ICD-10-CM

## 2019-03-11 DIAGNOSIS — Z98.890 HISTORY OF OPEN HEART SURGERY: ICD-10-CM

## 2019-03-11 DIAGNOSIS — Z95.3 HISTORY OF TRICUSPID VALVE REPLACEMENT WITH BIOPROSTHETIC VALVE: ICD-10-CM

## 2019-03-11 PROCEDURE — 99213 OFFICE O/P EST LOW 20 MIN: CPT | Mod: S$GLB,,, | Performed by: PEDIATRICS

## 2019-03-11 PROCEDURE — 99213 PR OFFICE/OUTPT VISIT, EST, LEVL III, 20-29 MIN: ICD-10-PCS | Mod: S$GLB,,, | Performed by: PEDIATRICS

## 2019-03-11 PROCEDURE — 93000 ELECTROCARDIOGRAM COMPLETE: CPT | Mod: S$GLB,,, | Performed by: PEDIATRICS

## 2019-03-11 PROCEDURE — 93000 PR ELECTROCARDIOGRAM, COMPLETE: ICD-10-PCS | Mod: S$GLB,,, | Performed by: PEDIATRICS

## 2019-03-11 NOTE — PROGRESS NOTES
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 She underwent reconstruction of the right ventricular outflow tract and placement of a bioprosthetic pulmonary valve in early childhood. She subsequently has required multiple valve replacements. Her most recent heart surgery involved placement of a bioprosthetic valve in the pulmonary position and placement of a bioprosthetic valve in the tricuspid position, in 2007. Recently, she had a heart catheterization for recurrent insufficiency and obstruction of the bioprosthetic valve that had been placed in the tricuspid position.  The current procedure employed a trans-catheter approach. It entailed valvuloplasty with a Z-Med 25 x 5 cm balloon followed by placement of an Sotelo S3 26 mm bioprosthetic valve in the tricuspid position.. 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 She leads a rather sedentary lifestyle, and is overweight. She currently is on: Digoxin 0.125 mg once a day, Norvasc 2.5 mg once a day for BP. The Lasix was changed to HCTZ 25 mg once a day.  She should additionally be on Metoprolol ER 50 mg am and 25 mg pm, and Warfarin 7.5 mg M, Tu, W, and Th; and 10 mg Fr, Sa, andSun.     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  She comes to clinic today for follow-up. She reports having had several short episodes of rapid HRs in the middle of the night. It happens about 3 times per week; they get her up. A recent transmission showed a run of atrial flutter at ~ 170 bpm.     .         REVIEW OF SYSTEMS:  There are no visual disturbances. No HAs, lightheadedness, syncope or seizures. No swallowing  disorder or PIETER. No difficulty with breathing, SOB,  wheezing or rhonchi. No asthma. No abdominal pain.  Bowel movements are normal.  No pelvic pain. Urination is normal.  No DM of thyroid disorder.  No lipid disorder. No arterial disease. No known intrinsic problem with the lungs, liver or kidneys. No bleeding disorder. No smoking or ETOH use.        PHYSICAL EXAMINATION:   GENERAL:  The patient is overweight 18 -year-old female in no distress.  VITAL SIGNS:  Her weight is 122.6 kg (270 lbs) and her height is 1.676 meters (5' 6''). Heart rate is 91 beats per minute ( sinus).  Sat 97 %.   Blood pressure in the right arm is 125/82 mmHg in the RA..  Color and perfusion are good.  HEENT:  Eye movements are normal.  No bruits are noted over the head.  No jugular venous distention or pulsations.  Mucous membranes are moist and pink.  There is no adenopathy.  The neck is supple.  No carotid bruits. SKIN:  Showed is pink and well perfused. CHEST:  There is a well-healed midline scar.  Lungs are clear to auscultation bilaterally, although the breath sounds are somewhat decreased at the base. There are no wheezes or rhonchi. CARDIOVASCULAR:  Precordial activity is normal.  HR ~ 80 bpm.  The first heart sound is prominent and crisp.  The second heart sound is narrowly split. There is a grade 1/6 systolic ejection murmur heard best up the left sternal border and across the base.  Short diastolic murmur is heard today.  ABDOMEN:  There is exogenous obesity.  The liver edge is palpable about 2-3 FB at the right costal margin.  It is nonpulsatile and nontender.  Bowel sounds appear to be normal.  EXTREMITIES:  Pulses are 2+ throughout.  Capillary refill is good.  There is no cyanosis or peripheral edema.  No bruising in the groins or exts and no rashes.  No bruits in the  groins.              ........................................................................................................................................        ECG (TODAY): Sinus rhythm at a ventricular rate of 79 bpm. AK 190 ms.  RBBB (160 ms). Minor T wave abnormality.  Prolonged QTc (495 ms) but she has a wide RBBB.                ECHOCARDIOGRAM  (A PREVIOUS VISIT): She is in sinus rhythm.  An echocardiogram was performed.  2-D STUDY: There is no pericardial effusion.  No clots or vegetations. The bioprosthetic tricuspid valve is seen to be in good position. Annulus is 18 mm. The struts are easily seen.  The leaflets are mobile.  No evidence for thrombus formation.  The right atrium is enlarged measuring 5.4 x 4.8 cm or ~ 28.2 cm2 One can also see  the bioprosthetic tricuspid valve in a cross sectional view.  It appears circular and measures 2 cm x 2 cm. Again, no clots or vegetations are seen.  No right ventricular or left ventricular outflow tract obstruction.  The right ventricular circumferential area of shortening is 30%,  which is reduced.  Circumferential area of the RA is 23.4 cm2, which is dilated. M-MODE:   LV 4.4 cm, RV 3.8 cm, Ao 2.8 cm, LA 4.4 cm, Sep 1.1 cm, PW 1.1 cm, EF ~ 49 % (reduced). DOPPLER VELOCITY ANALYSIS:  There is no insufficiency of the recently-placed bioprosthetic tricuspid valve.  Mild turbulent antegrade flow is seen. Continuous wave Doppler analysis shows a peak pressure drop of 12-17 mmHg with a mean value of 7-10 mmHg.  Importantly, the leaflets are moving freely.  There is no mitral insufficiency.  Mild aortic insufficiency  (P1/2 567 ms).  Peak pressure drop across the aortic valve is 5 mmHg.  Peak pressure drop across the bioprosthetic pulmonary valve is 16 mmHg.                    .................................................................................................................................................               ASSESSMENT:  In summary, we have  a 18-year-old AA female originally diagnosed to have pulmonary atresia with intact inter-ventricular septum, who is status-post multiple open heart operations involving both the pulmonary and tricuspid valves.  She recently in February 2017 had placement of an Sotelo S3 26 mm bioprosthetic tricuspid valve via the transvenous approach.  She tolerated the procedure well.  Clinically, she has been stable. However, she continues to have poor exercise capacity, chronic fatigue and more recently developed a new rhythm disturbance, diagnosed to be atrial flutter. She recently underwent successful ablation for the atrial rhythm abnormality. She continues to have complaints of rapid HRs mostly at night.         PLAN:  Given our findings, our suggestions are as follows:  1.  She of course needs antibiotic prophylaxis for any invasive procedure, especially dental work.   2.  She should continue taking Metoprolol 50 mg in the AM and increased to 25 mg in the PM, HCTZ 25 mg qd, and her other remaining meds.  We restarted Warfarin at 7.5 mg on Mon, Tu , Wed, Th, and Fri; and 5 mg Sat, and Sun.  INR today was 1.9.  3. Kacey also will be seening (Dr Woodward) post-ablation on Thurs. 4. Holter today.   If there are any questions concerning the cardiac status of this patient, please feel free to contact us.  Thank you so much for allowing us to partake in the care of this patient. Donovan Gonzalez PhD, MD.

## 2019-03-14 ENCOUNTER — OFFICE VISIT (OUTPATIENT)
Dept: CARDIOLOGY | Facility: CLINIC | Age: 19
End: 2019-03-14
Payer: MEDICAID

## 2019-03-14 ENCOUNTER — OFFICE VISIT (OUTPATIENT)
Dept: PEDIATRIC CARDIOLOGY | Facility: CLINIC | Age: 19
End: 2019-03-14
Attending: PEDIATRICS
Payer: MEDICAID

## 2019-03-14 VITALS
WEIGHT: 268.31 LBS | DIASTOLIC BLOOD PRESSURE: 81 MMHG | HEIGHT: 66 IN | SYSTOLIC BLOOD PRESSURE: 129 MMHG | OXYGEN SATURATION: 97 % | BODY MASS INDEX: 43.12 KG/M2 | HEART RATE: 84 BPM

## 2019-03-14 VITALS
HEART RATE: 84 BPM | BODY MASS INDEX: 43.12 KG/M2 | SYSTOLIC BLOOD PRESSURE: 129 MMHG | WEIGHT: 268.31 LBS | OXYGEN SATURATION: 97 % | DIASTOLIC BLOOD PRESSURE: 81 MMHG | HEIGHT: 66 IN

## 2019-03-14 DIAGNOSIS — Z95.3 HISTORY OF TRICUSPID VALVE REPLACEMENT WITH BIOPROSTHETIC VALVE: ICD-10-CM

## 2019-03-14 DIAGNOSIS — I36.2: ICD-10-CM

## 2019-03-14 DIAGNOSIS — Z95.2 S/P PULMONARY VALVE REPLACEMENT: ICD-10-CM

## 2019-03-14 DIAGNOSIS — I47.10 SVT (SUPRAVENTRICULAR TACHYCARDIA): Primary | ICD-10-CM

## 2019-03-14 DIAGNOSIS — I48.92 ATRIAL FLUTTER, UNSPECIFIED TYPE: Primary | ICD-10-CM

## 2019-03-14 DIAGNOSIS — Z98.890 HISTORY OF OPEN HEART SURGERY: ICD-10-CM

## 2019-03-14 DIAGNOSIS — Q24.9 ADULT CONGENITAL HEART DISEASE: ICD-10-CM

## 2019-03-14 DIAGNOSIS — I51.9 LV DYSFUNCTION: ICD-10-CM

## 2019-03-14 DIAGNOSIS — Q25.5 PULMONARY ATRESIA WITH INTACT VENTRICULAR SEPTUM: ICD-10-CM

## 2019-03-14 DIAGNOSIS — R00.2 PALPITATIONS: ICD-10-CM

## 2019-03-14 PROCEDURE — 99212 OFFICE O/P EST SF 10 MIN: CPT | Mod: S$GLB,,, | Performed by: PEDIATRICS

## 2019-03-14 PROCEDURE — 93000 ELECTROCARDIOGRAM COMPLETE: CPT | Mod: S$GLB,,, | Performed by: PEDIATRICS

## 2019-03-14 PROCEDURE — 99214 OFFICE O/P EST MOD 30 MIN: CPT | Mod: 25,S$GLB,, | Performed by: PEDIATRICS

## 2019-03-14 PROCEDURE — 93000 PR ELECTROCARDIOGRAM, COMPLETE: ICD-10-PCS | Mod: S$GLB,,, | Performed by: PEDIATRICS

## 2019-03-14 PROCEDURE — 99214 PR OFFICE/OUTPT VISIT, EST, LEVL IV, 30-39 MIN: ICD-10-PCS | Mod: 25,S$GLB,, | Performed by: PEDIATRICS

## 2019-03-14 PROCEDURE — 99212 PR OFFICE/OUTPT VISIT, EST, LEVL II, 10-19 MIN: ICD-10-PCS | Mod: S$GLB,,, | Performed by: PEDIATRICS

## 2019-03-14 NOTE — PROGRESS NOTES
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 She underwent reconstruction of the right ventricular outflow tract and placement of a bioprosthetic pulmonary valve in early childhood. She subsequently has required multiple valve replacements. Her most recent heart surgery involved placement of a bioprosthetic valve in the pulmonary position and placement of a bioprosthetic valve in the tricuspid position, in 2007. Recently, she had a heart catheterization for recurrent insufficiency and obstruction of the bioprosthetic valve that had been placed in the tricuspid position.  The current procedure employed a trans-catheter approach. It entailed valvuloplasty with a Z-Med 25 x 5 cm balloon followed by placement of an Sotelo S3 26 mm bioprosthetic valve in the tricuspid position.. 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 She leads a rather sedentary lifestyle, and is overweight. She currently is on: Digoxin 0.125 mg once a day, Norvasc 2.5 mg once a day for BP. The Lasix was changed to HCTZ 25 mg once a day.  She should additionally be on Metoprolol ER 25 mg am and pm, and Warfarin 7.5 mg M, Tu, W, and Th; and 10 mg Fr, Sa, andSun.     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  She comes to clinic today for follow-up. She reports having had several short episodes of rapid HRs in the middle of the night. It happens about 3 times per week; they get her up. A recent transmission showed a run of atrial flutter at ~ 170 bpm.  She has complaints of high HRs, mostly at night.     .         REVIEW OF SYSTEMS:  There are no visual disturbances. No HAs,  lightheadedness, syncope or seizures. No swallowing disorder or PIETER. No difficulty with breathing, SOB,  wheezing or rhonchi. No asthma. No abdominal pain.  Bowel movements are normal.  No pelvic pain. Urination is normal.  No DM of thyroid disorder.  No lipid disorder. No arterial disease. No known intrinsic problem with the lungs, liver or kidneys. No bleeding disorder. No smoking or ETOH use.        PHYSICAL EXAMINATION:   GENERAL:  The patient is overweight 18 -year-old female in no distress.  VITAL SIGNS:  Her weight is 121.7 kg (268 lbs) and her height is 1.676 meters (5' 6''). Heart rate is 84 beats per minute ( sinus).  Sat 97 %.   Blood pressure in the right arm is 129/81 mmHg in the RA..  Color and perfusion are good.  HEENT:  Eye movements are normal.  No bruits are noted over the head.  No jugular venous distention or pulsations.  Mucous membranes are moist and pink.  There is no adenopathy.  The neck is supple.  No carotid bruits. The thyroid is not enlarged. SKIN:  Showed is pink and well perfused. CHEST:  There is a well-healed midline scar.  Lungs are clear to auscultation bilaterally, although the breath sounds are somewhat decreased at the base. There are no wheezes or rhonchi. CARDIOVASCULAR:  Precordial activity is normal.  HR ~ 80 bpm.  The first heart sound is prominent and crisp.  The second heart sound is narrowly split. There is a grade 1/6 systolic ejection murmur heard best up the left sternal border and across the base.  Short diastolic murmur is heard today.  ABDOMEN:  There is exogenous obesity.  The liver edge is palpable about 2-3 FB at the right costal margin.  It is nonpulsatile and nontender.  Bowel sounds appear to be normal.  EXTREMITIES:  Pulses are 2+ throughout.  Capillary refill is good.  There is no cyanosis or peripheral edema.  No bruising in the groins or exts and no rashes.  No bruits in the  groins.              ........................................................................................................................................        ECG (TODAY): Sinus rhythm at a ventricular rate of 77 bpm. OK 200 ms.  RBBB (158 ms). Minor T wave abnormality.  Prolonged QTc (450 -495 ms) but she has a wide RBBB.                ECHOCARDIOGRAM  (A PREVIOUS VISIT): She is in sinus rhythm.  An echocardiogram was performed.  2-D STUDY: There is no pericardial effusion.  No clots or vegetations. The bioprosthetic tricuspid valve is seen to be in good position. Annulus is 18 mm. The struts are easily seen.  The leaflets are mobile.  No evidence for thrombus formation.  The right atrium is enlarged measuring 5.4 x 4.8 cm or ~ 28.2 cm2 One can also see  the bioprosthetic tricuspid valve in a cross sectional view.  It appears circular and measures 2 cm x 2 cm. Again, no clots or vegetations are seen.  No right ventricular or left ventricular outflow tract obstruction.  The right ventricular circumferential area of shortening is 30%,  which is reduced.  Circumferential area of the RA is 23.4 cm2, which is dilated. M-MODE:   LV 4.4 cm, RV 3.8 cm, Ao 2.8 cm, LA 4.4 cm, Sep 1.1 cm, PW 1.1 cm, EF ~ 49 % (reduced). DOPPLER VELOCITY ANALYSIS:  There is no insufficiency of the recently-placed bioprosthetic tricuspid valve.  Mild turbulent antegrade flow is seen. Continuous wave Doppler analysis shows a peak pressure drop of 12-17 mmHg with a mean value of 7-10 mmHg.  Importantly, the leaflets are moving freely.  There is no mitral insufficiency.  Mild aortic insufficiency  (P1/2 567 ms).  Peak pressure drop across the aortic valve is 5 mmHg.  Peak pressure drop across the bioprosthetic pulmonary valve is 16 mmHg.                    .................................................................................................................................................               ASSESSMENT:  In summary, we  "have a 18-year-old AA female originally diagnosed to have pulmonary atresia with intact inter-ventricular septum, who is status-post multiple open heart operations involving both the pulmonary and tricuspid valves.  She recently in February 2017 had placement of an Sotelo S3 26 mm bioprosthetic tricuspid valve via the transvenous approach.  She tolerated the procedure well.  Clinically, she has been stable. However, she continues to have poor exercise capacity, chronic fatigue and more recently developed a new rhythm disturbance, diagnosed to be atrial flutter. She recently underwent successful ablation for the atrial rhythm abnormality. "She continues to have complaints of rapid HRs mostly at night".  Transitions show episodes of ~ 170 bpm.     PLAN:  Given our findings, our suggestions are as follows:  1.  She of course needs antibiotic prophylaxis for any invasive procedure, especially dental work.   2.  She should continue taking "Metoprolol 25 mg in the AM and increased to 25 mg in the PM", HCTZ 25 mg qd, and her other remaining meds.  Change "Warfarin to 7.5 mg on Mon, Tu , Wed, Th; and 5 mg on Fri, Sat, and Sun".  INR today was 3.4 (elevated).  3. Kacey saw Dr Woodward today and team is considering repeat EPS. 4. Holter pending.   If there are any questions concerning the cardiac status of this patient, please feel free to contact us.  Thank you so much for allowing us to partake in the care of this patient. Donovan Gonzalez PhD, MD.              "

## 2019-03-14 NOTE — PROGRESS NOTES
Thank you for referring your patient Kacey Ruth to the electrophysiology clinic for consultation. The patient is accompanied by her mother. Please review my findings below.    CHIEF COMPLAINT: EP evaluation of atrial arrhythmia vs. SVT    HISTORY OF PRESENT ILLNESS:   17 y/o female with history of pulmonary atresia intact ventricular septum and ASD.  She underwent reconstruction of the right ventricular outflow tract and placement of a bioprosthetic pulmonary valve in early childhood. She has had multiple valve replacements. Her most recent open heart surgery involved placement of a bioprosthetic valve in the pulmonary position and placement of a bioprosthetic valve in the tricuspid position in 2007. Recently, she had a heart catheterization for recurrent tricuspid valve insufficiency and stenosis.  The procedure at this time employed a trans-catheter approach. It entailed tricuspid valve balloon valvuloplasty with a Z-Med 25 x 5 cm balloon followed by placement of an Sotelo S3 26 mm valve in Feb 2017. The catheterization also showed that she had only mild pulmonary valve stenosis and insufficiency.  She was recently found by Dr. Gonzalez to have SVT vs. Atrial flutter and started on Metoprolol.  Kacey was seen by me in December 2018.  She was referred to EP lab for EP study and possible ablation.     Kacey underwent EP study and ablation with Dr. Robles and myself in Feburary 2019.  She returns today for scheduled follow up.  She complains of intermittent palpitations.  Her event monitor shows brief episodes of nonsustained atrial tachycardia intermittently.  She has a Holter pending.  She has episodes at rest with heart rates in 150's.  She had slowing and then termination with breath holding.  Dr. Gonzalez increased beta blocker.      REVIEW OF SYSTEMS:     GENERAL: No fever, chills, fatigability or weight loss.  SKIN: No rashes, itching or changes in color or texture of skin.  CHEST: Denies SR,  cyanosis, wheezing, cough and sputum production.  CARDIOVASCULAR: see HPI  ABDOMEN: Appetite fine. No weight loss. Denies diarrhea, abdominal pain, or vomiting.  PERIPHERAL VASCULAR: No claudication or cyanosis.  MUSCULOSKELETAL: No joint stiffness or swelling.   NEUROLOGIC: No history of seizures,  alteration of gait or coordination.    PAST MEDICAL HISTORY:   Past Medical History:   Diagnosis Date    Obesity     Pulmonary atresia with intact ventricular septum     SVT (supraventricular tachycardia)        FAMILY HISTORY:   No family history on file.      SOCIAL HISTORY:   Social History     Socioeconomic History    Marital status: Single     Spouse name: Not on file    Number of children: Not on file    Years of education: Not on file    Highest education level: Not on file   Occupational History    Not on file   Social Needs    Financial resource strain: Not on file    Food insecurity:     Worry: Not on file     Inability: Not on file    Transportation needs:     Medical: Not on file     Non-medical: Not on file   Tobacco Use    Smoking status: Never Smoker   Substance and Sexual Activity    Alcohol use: No    Drug use: No    Sexual activity: Never   Lifestyle    Physical activity:     Days per week: Not on file     Minutes per session: Not on file    Stress: Not on file   Relationships    Social connections:     Talks on phone: Not on file     Gets together: Not on file     Attends Shinto service: Not on file     Active member of club or organization: Not on file     Attends meetings of clubs or organizations: Not on file     Relationship status: Not on file   Other Topics Concern    Not on file   Social History Narrative    Not on file       ALLERGIES:  Review of patient's allergies indicates:  No Known Allergies    MEDICATIONS:    Current Outpatient Medications:     acetaminophen (TYLENOL) 325 MG tablet, Take 2 tablets (650 mg total) by mouth every 6 (six) hours as needed., Disp: 15  "tablet, Rfl: 0    amlodipine (NORVASC) 2.5 MG tablet, Take 1 tablet (2.5 mg total) by mouth once daily., Disp: 30 tablet, Rfl: 11    coenzyme Q10 (COQ-10) 100 mg capsule, Take 1 capsule (100 mg total) by mouth 2 (two) times daily., Disp: 62 capsule, Rfl: 6    digoxin (LANOXIN) 125 mcg tablet, Take 1 tablet (125 mcg total) by mouth once daily., Disp: 31 tablet, Rfl: 6    hydroCHLOROthiazide (HYDRODIURIL) 12.5 MG Tab, Take 25 mg by mouth once daily., Disp: , Rfl:     metoprolol succinate (TOPROL-XL) 50 MG 24 hr tablet, Take 50 mg by mouth once daily., Disp: , Rfl:     warfarin (COUMADIN) 5 MG tablet, Take 7.5 mg by mouth once daily., Disp: , Rfl:   No current facility-administered medications for this visit.     Facility-Administered Medications Ordered in Other Visits:     0.9%  NaCl infusion, , Intravenous, Continuous, Slime Salcido NP, Stopped at 02/06/19 1521    sodium chloride 0.9% flush 5 mL, 5 mL, Intravenous, PRN, Slime Salcido NP      PHYSICAL EXAM:   Vitals:    03/14/19 1211   BP: 129/81   Pulse: 84   SpO2: 97%   Weight: 121.7 kg (268 lb 4.8 oz)   Height: 5' 6" (1.676 m)         GENERAL: Awake, well-developed well-nourished, no apparent distress  HEENT: mucous membranes moist and pink, normocephalic atraumatic, no cranial or carotid bruits, sclera anicteric  NECK: no jugular venous distention, no lymphadenopathy  CHEST: Good air movement, clear to auscultation bilaterally  CARDIOVASCULAR: Quiet precordium, regular rate and rhythm, S1S2, no murmurs rubs or gallops  ABDOMEN: Soft, nontender nondistended, no hepatomegaly, no aortic bruits  EXTREMITIES: Warm well perfused, 2+ radial/pedal pulses, capillary refill 2 seconds, no clubbing, cyanosis, or edema  NEURO: Alert and oriented, cooperative with exam, face symmetric, moves all extremities well    STUDIES:  ECG: Normal Sinus rhythm, right bundle branch block      ASSESSMENT:  17 y/o female with pulmonary atresia intact septum with " biventricular repair with charles valve in tricuspid position who has likely IART now controlled on beta blocker but feels tired with beta blocker.  She is s/p atrial flutter ablation now with recurrent palpitations.    PLAN:   Increase Metoprolol to 25mg po BID and Digoxin  Continue Coumadin  F/u on Holter results  [facility].  Discuss with Dr. Robles but likely plan redo EP study (for SVT vs. Flutter vs. AT) and ablation when initial swelling has gone down and would plan for loop implant at that time.. Start Sotalol if nothing inducible.  Call for further palpitations, chest pain, syncope, or any other questions or concerns.      Time Spent: 40 (min) with over 50% in direct patient and family consultation regarding evaluation and management of SVT vs. IART with congenital heart disease..      The patient's doctor will be notified via EPIC    I hope this brings you up-to-date on Kacey Ruth  Please contact me with any questions or concerns.    Ricardo Woodward MD  Pediatric and Adult Congenital Electrophysiologist  Pediatric Cardiologist

## 2019-03-17 NOTE — PROGRESS NOTES
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 She underwent reconstruction of the right ventricular outflow tract and placement of a bioprosthetic pulmonary valve in early childhood. She subsequently has required multiple valve replacements. Her most recent heart surgery involved placement of a bioprosthetic valve in the pulmonary position and placement of a bioprosthetic valve in the tricuspid position, in 2007. Recently, she had a heart catheterization for recurrent insufficiency and obstruction of the bioprosthetic valve that had been placed in the tricuspid position.  The current procedure employed a trans-catheter approach. It entailed valvuloplasty with a Z-Med 25 x 5 cm balloon followed by placement of an Sotelo S3 26 mm bioprosthetic valve in the tricuspid position.. 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 She leads a rather sedentary lifestyle, and is overweight. She currently is on: Digoxin 0.125 mg once a day, Norvasc 2.5 mg once a day for BP. The Lasix was changed to HCTZ 25 mg once a day.  She should additionally be on Metoprolol ER 25 mg am and ? 25 mg pm, and Warfarin 7.5 mg M, Tu, W, and Th; and 5 mg Fr, Sa, andSun.     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  She comes to clinic today for follow-up. She reports having had several short episodes of rapid HRs in the middle of the night. It happens about 3 times per week; they get her up and she gets very frighten. A recent transmission showed a run of probable atrial flutter at        ~ 170 bpm.  "She did recently have thyroid studies and they are normal".   At today's " visit, there has been some improvement since increasing the Metoprolol to 25 mg in the am and 25 mg in the pm.  .         REVIEW OF SYSTEMS:  There are no visual disturbances. No HAs, lightheadedness, syncope or seizures. No swallowing disorder or PIETER. No difficulty with breathing, SOB,  wheezing or rhonchi. No asthma. No abdominal pain.  Bowel movements are normal.  No pelvic pain. Urination is normal.  No DM of thyroid disorder.  No lipid disorder. No arterial disease. No known intrinsic problem with the lungs, liver or kidneys. No bleeding disorder. No smoking or ETOH use.        PHYSICAL EXAMINATION:   GENERAL:  The patient is overweight 18 -year-old female in no distress.  VITAL SIGNS:  Her weight is 122.8 kg (271 lbs) and her height is 1.676 meters (5' 6''). Heart rate is 70 beats per minute ( sinus).  Sat 97 %.   Blood pressure in the right arm is 105/56 mmHg in the RA..  Color and perfusion are good.  HEENT:  Eye movements are normal.  No bruits are noted over the head.  No jugular venous distention or pulsations.  Mucous membranes are moist and pink.  There is no adenopathy.  The neck is supple.  No carotid bruits. The thyroid is not enlarged. SKIN:  Showed is pink and well perfused. CHEST:  There is a well-healed midline scar.  Lungs are clear to auscultation bilaterally, although the breath sounds are somewhat decreased at the base. There are no wheezes or rhonchi. CARDIOVASCULAR:  Precordial activity is normal.  HR ~ 70 bpm.  The first heart sound is prominent and crisp.  The second heart sound is narrowly split. There is a grade 1/6 systolic ejection murmur heard best up the left sternal border and across the base.  Short diastolic murmur is heard today.  ABDOMEN:  There is exogenous obesity.  The liver edge is palpable about 2-3 FB at the right costal margin.  It is nonpulsatile and nontender.  Bowel sounds appear to be normal.  EXTREMITIES:  Pulses are 2+ throughout.  Capillary refill is good.  There  is no cyanosis or peripheral edema.  No bruising in the groins or exts and no rashes.  No bruits in the groins.              ........................................................................................................................................        ECG (TODAY): Sinus rhythm at a ventricular rate of 68 bpm. Atrial abnormality.  DC 168 ms.  RBBB (156 ms). Minor T wave abnormality.  Prolonged QTc,  but she has a wide RBBB.                ECHOCARDIOGRAM  (A PREVIOUS VISIT): She is in sinus rhythm.  An echocardiogram was performed.  2-D STUDY: There is no pericardial effusion.  No clots or vegetations. The bioprosthetic tricuspid valve is seen to be in good position. Annulus is 18 mm. The struts are easily seen.  The leaflets are mobile.  No evidence for thrombus formation.  The right atrium is enlarged measuring 5.4 x 4.8 cm or ~ 28.2 cm2 One can also see  the bioprosthetic tricuspid valve in a cross sectional view.  It appears circular and measures 2 cm x 2 cm. Again, no clots or vegetations are seen.  No right ventricular or left ventricular outflow tract obstruction.  The right ventricular circumferential area of shortening is 30%,  which is reduced.  Circumferential area of the RA is 23.4 cm2, which is dilated. M-MODE:   LV 4.4 cm, RV 3.8 cm, Ao 2.8 cm, LA 4.4 cm, Sep 1.1 cm, PW 1.1 cm, EF ~ 49 % (reduced). DOPPLER VELOCITY ANALYSIS:  There is no insufficiency of the recently-placed bioprosthetic tricuspid valve.  Mild turbulent antegrade flow is seen. Continuous wave Doppler analysis shows a peak pressure drop of 12-17 mmHg with a mean value of 7-10 mmHg.  Importantly, the leaflets are moving freely.  There is no mitral insufficiency.  Mild aortic insufficiency  (P1/2 567 ms).  Peak pressure drop across the aortic valve is 5 mmHg.  Peak pressure drop across the bioprosthetic pulmonary valve  "is 16 mmHg.                    .................................................................................................................................................               ASSESSMENT:  In summary, we have a 18-year-old AA female originally diagnosed to have pulmonary atresia with intact inter-ventricular septum, who is status-post multiple open heart operations involving both the pulmonary and tricuspid valves.  She recently in February 2017 had placement of an Sotelo S3 26 mm bioprosthetic tricuspid valve via the transvenous approach.  She tolerated the procedure well.  Clinically, she has been stable. However, she continues to have poor exercise capacity, chronic fatigue and more recently developed a new rhythm disturbance, diagnosed to be atrial flutter. She recently underwent successful ablation for the atrial rhythm abnormality. "She continues to have rapid HRs mostly at night".  They have decreased since increasing the beta blocker.    PLAN:  Given our findings, our suggestions are as follows:  1.  She of course needs antibiotic prophylaxis for any invasive procedure, especially dental work.   2.  She should continue taking "Metoprolol 25 mg in the AM and 25 mg in the PM", HCTZ 25 mg qd, and her other remaining meds.  We changed "Warfarin to 7.5 mg on Mon, Tu , Wed, Th, Fri and 5 mg on Sat, and Sun".       INR today was 1.7 .       3. Kacey saw Dr Woodward recently and their team is considering repeat EPS.  4. RTC in 2 weeks.    If there are any questions concerning the cardiac status of this patient, please feel free to contact us.  Thank you so much for allowing us to partake in the care of this patient. Donovan Gonzalez PhD, MD.     "

## 2019-03-19 ENCOUNTER — OFFICE VISIT (OUTPATIENT)
Dept: CARDIOLOGY | Facility: CLINIC | Age: 19
End: 2019-03-19
Payer: MEDICAID

## 2019-03-19 VITALS
HEART RATE: 70 BPM | SYSTOLIC BLOOD PRESSURE: 105 MMHG | WEIGHT: 270.81 LBS | BODY MASS INDEX: 43.52 KG/M2 | HEIGHT: 66 IN | DIASTOLIC BLOOD PRESSURE: 56 MMHG | OXYGEN SATURATION: 97 %

## 2019-03-19 DIAGNOSIS — Z95.3 HISTORY OF TRICUSPID VALVE REPLACEMENT WITH BIOPROSTHETIC VALVE: ICD-10-CM

## 2019-03-19 DIAGNOSIS — I48.92 ATRIAL FLUTTER, UNSPECIFIED TYPE: Primary | ICD-10-CM

## 2019-03-19 DIAGNOSIS — Z98.890 HISTORY OF OPEN HEART SURGERY: ICD-10-CM

## 2019-03-19 PROCEDURE — 99212 OFFICE O/P EST SF 10 MIN: CPT | Mod: S$GLB,,, | Performed by: PEDIATRICS

## 2019-03-19 PROCEDURE — 93000 PR ELECTROCARDIOGRAM, COMPLETE: ICD-10-PCS | Mod: S$GLB,,, | Performed by: PEDIATRICS

## 2019-03-19 PROCEDURE — 99212 PR OFFICE/OUTPT VISIT, EST, LEVL II, 10-19 MIN: ICD-10-PCS | Mod: S$GLB,,, | Performed by: PEDIATRICS

## 2019-03-19 PROCEDURE — 93000 ELECTROCARDIOGRAM COMPLETE: CPT | Mod: S$GLB,,, | Performed by: PEDIATRICS

## 2019-04-01 ENCOUNTER — OFFICE VISIT (OUTPATIENT)
Dept: CARDIOLOGY | Facility: CLINIC | Age: 19
End: 2019-04-01
Payer: MEDICAID

## 2019-04-01 VITALS
WEIGHT: 272.94 LBS | SYSTOLIC BLOOD PRESSURE: 121 MMHG | BODY MASS INDEX: 43.86 KG/M2 | OXYGEN SATURATION: 96 % | DIASTOLIC BLOOD PRESSURE: 87 MMHG | HEART RATE: 94 BPM | HEIGHT: 66 IN

## 2019-04-01 DIAGNOSIS — Q24.9 ADULT CONGENITAL HEART DISEASE: ICD-10-CM

## 2019-04-01 DIAGNOSIS — Z98.890 HISTORY OF OPEN HEART SURGERY: ICD-10-CM

## 2019-04-01 DIAGNOSIS — I48.92 ATRIAL FLUTTER, UNSPECIFIED TYPE: Primary | ICD-10-CM

## 2019-04-01 DIAGNOSIS — Z95.3 HISTORY OF TRICUSPID VALVE REPLACEMENT WITH BIOPROSTHETIC VALVE: ICD-10-CM

## 2019-04-01 PROCEDURE — 93224 XTRNL ECG REC UP TO 48 HRS: CPT | Mod: S$GLB,,, | Performed by: PEDIATRICS

## 2019-04-01 PROCEDURE — 93000 PR ELECTROCARDIOGRAM, COMPLETE: ICD-10-PCS | Mod: 59,S$GLB,, | Performed by: PEDIATRICS

## 2019-04-01 PROCEDURE — 93224 PR EXT ECG RECORD CONTIN 48 HR, RECORD/SCAN ANALYSIS/ PHYS REVIEW&INTERP: ICD-10-PCS | Mod: S$GLB,,, | Performed by: PEDIATRICS

## 2019-04-01 PROCEDURE — 99213 OFFICE O/P EST LOW 20 MIN: CPT | Mod: 25,S$GLB,, | Performed by: PEDIATRICS

## 2019-04-01 PROCEDURE — 93000 ELECTROCARDIOGRAM COMPLETE: CPT | Mod: 59,S$GLB,, | Performed by: PEDIATRICS

## 2019-04-01 PROCEDURE — 99213 PR OFFICE/OUTPT VISIT, EST, LEVL III, 20-29 MIN: ICD-10-PCS | Mod: 25,S$GLB,, | Performed by: PEDIATRICS

## 2019-04-01 NOTE — PROGRESS NOTES
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 She underwent reconstruction of the right ventricular outflow tract and placement of a bioprosthetic pulmonary valve in early childhood. She subsequently has required multiple valve replacements. Her most recent heart surgery involved placement of a bioprosthetic valve in the pulmonary position and placement of a bioprosthetic valve in the tricuspid position, in 2007. Recently, she had a heart catheterization for recurrent insufficiency and obstruction of the bioprosthetic valve that had been placed in the tricuspid position.  The current procedure employed a trans-catheter approach. It entailed valvuloplasty with a Z-Med 25 x 5 cm balloon followed by placement of an Sotelo S3 26 mm bioprosthetic valve in the tricuspid position.. 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 She leads a rather sedentary lifestyle, and is overweight. She currently is on: Digoxin 0.125 mg once a day, Norvasc 2.5 mg once a day for BP. The Lasix was changed to HCTZ 25 mg once a day.  She should additionally be on Metoprolol ER 25 mg am and 25 mg pm, and Warfarin 7.5 mg M, Tu, W, and Th; and 5 mg Fr, Sa, andSun.     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  She comes to clinic today for follow-up. She reports having had several short episodes of rapid HRs in the middle of the night. It happens about 3 times per week; they get her up and she gets very frighten. A recent transmission showed a run of probable atrial flutter at  ~ 170 bpm.  "She did recently have thyroid studies and they are normal".   At today's visit, there has " been some improvement since increasing the Metoprolol to 50 mg in the am and 25 mg in the pm.  .         REVIEW OF SYSTEMS:  There are no visual disturbances. No HAs, lightheadedness, syncope or seizures. No swallowing disorder or PIETER. No difficulty with breathing, SOB,  wheezing or rhonchi. No asthma. No abdominal pain.  Bowel movements are normal.  No pelvic pain. Urination is normal.  No DM of thyroid disorder.  No lipid disorder. No arterial disease. No known intrinsic problem with the lungs, liver or kidneys. No bleeding disorder. No smoking or ETOH use.        PHYSICAL EXAMINATION:   GENERAL:  The patient is overweight 18 -year-old female in no distress.  VITAL SIGNS:  Her weight is 122.8 kg (271 lbs) and her height is 1.676 meters (5' 6''). Heart rate is ~ 80  beats per minute ( sinus).  Sat 96 %.   Blood pressure in the right arm is 121/87 mmHg in the RA..  Color and perfusion are good.  HEENT:  Eye movements are normal.  No bruits are noted over the head.  No jugular venous distention or pulsations.  Mucous membranes are moist and pink.  There is no adenopathy.  The neck is supple.  No carotid bruits. The thyroid is not enlarged. SKIN:  Showed is pink and well perfused. CHEST:  There is a well-healed midline scar.  Lungs are clear to auscultation bilaterally, although the breath sounds are somewhat decreased at the base. There are no wheezes or rhonchi. CARDIOVASCULAR:  Precordial activity is normal.  HR ~ 80 bpm.  The first heart sound is prominent and crisp.  The second heart sound is narrowly split. There is a grade 1/6 systolic ejection murmur heard best up the left sternal border and across the base.  Short diastolic murmur is heard today.  ABDOMEN:  There is exogenous obesity.  The liver edge is about 2-3 FB at the right costal margin by percussion  It is nonpulsatile and nontender.  Bowel sounds appear to be normal.  EXTREMITIES:  Pulses are 2+ throughout.  Capillary refill is good.  There is no  cyanosis or peripheral edema.  No bruising in the groins or exts and no rashes.  No bruits in the groins.              ........................................................................................................................................        ECG (TODAY): Sinus rhythm at a ventricular rate of 86 bpm. Atrial abnormality.  RI 190 ms.  RBBB (160 ms). Minor T wave abnormality.  Prolonged QTc 500 ms,  but she has a wide RBBB.                ECHOCARDIOGRAM  (A RECENT VISIT): She is in sinus rhythm.  An echocardiogram was performed.  2-D STUDY: There is no pericardial effusion.  No clots or vegetations. The bioprosthetic tricuspid valve is seen to be in good position. Annulus is 18 mm. The struts are easily seen.  The leaflets are mobile.  No evidence for thrombus formation.  The right atrium is enlarged measuring 5.4 x 4.8 cm or ~ 28.2 cm2 One can also see  the bioprosthetic tricuspid valve in a cross sectional view.  It appears circular and measures 2 cm x 2 cm. Again, no clots or vegetations are seen.  No right ventricular or left ventricular outflow tract obstruction.  The right ventricular circumferential area of shortening is 30%,  which is reduced.  Circumferential area of the RA is 23.4 cm2, which is dilated. M-MODE:   LV 4.4 cm, RV 3.8 cm, Ao 2.8 cm, LA 4.4 cm, Sep 1.1 cm, PW 1.1 cm, EF ~ 49 % (reduced). DOPPLER VELOCITY ANALYSIS:  There is no insufficiency of the recently-placed bioprosthetic tricuspid valve.  Mild turbulent antegrade flow is seen. Continuous wave Doppler analysis shows a peak pressure drop of 12-17 mmHg with a mean value of 7-10 mmHg.  Importantly, the leaflets are moving freely.  There is no mitral insufficiency.  Mild aortic insufficiency  (P1/2 567 ms).  Peak pressure drop across the aortic valve is 5 mmHg.  Peak pressure drop across the bioprosthetic pulmonary valve  "is 16 mmHg.                    .................................................................................................................................................               ASSESSMENT:  In summary, we have a 18-year-old AA female originally diagnosed to have pulmonary atresia with intact inter-ventricular septum, who is status-post multiple open heart operations involving both the pulmonary and tricuspid valves.  She recently in February 2017 had placement of an Sotelo S3 26 mm bioprosthetic tricuspid valve via the transvenous approach.  She tolerated the procedure well.  Clinically, she has been stable. However, she continues to have poor exercise capacity, chronic fatigue and more recently developed a new rhythm disturbance, diagnosed to be atrial flutter. She recently underwent successful ablation for the atrial rhythm abnormality. "She continues to have rapid HRs mostly at night".  They have decreased since increasing the beta blocker.    PLAN:  Given our findings, our suggestions are as follows:  1.  She of course needs antibiotic prophylaxis for any invasive procedure, especially dental work.   2.  She should continue taking "Metoprolol 50 mg in the AM and 25 mg in the PM", HCTZ 25 mg qd, and her other remaining meds.  We increased "Warfarin to 10 mg on Mon, Tu , Wed, Th, Fri and 5 mg on Sat, and Sun".       INR today was 1.1 .       3. Kacey saw Dr Woodward recently and their team is considering repeat EPS.  4. RTC in 2 weeks.    If there are any questions concerning the cardiac status of this patient, please feel free to contact us.  Thank you so much for allowing us to partake in the care of this patient. Donovan Gonzalez PhD, MD.     "

## 2019-04-08 ENCOUNTER — OFFICE VISIT (OUTPATIENT)
Dept: CARDIOLOGY | Facility: CLINIC | Age: 19
End: 2019-04-08
Payer: MEDICAID

## 2019-04-08 VITALS
HEIGHT: 66 IN | SYSTOLIC BLOOD PRESSURE: 129 MMHG | HEART RATE: 90 BPM | BODY MASS INDEX: 43.35 KG/M2 | DIASTOLIC BLOOD PRESSURE: 78 MMHG | WEIGHT: 269.75 LBS | OXYGEN SATURATION: 97 %

## 2019-04-08 DIAGNOSIS — Z98.890 HISTORY OF OPEN HEART SURGERY: ICD-10-CM

## 2019-04-08 DIAGNOSIS — I48.4 ATYPICAL ATRIAL FLUTTER: Primary | ICD-10-CM

## 2019-04-08 DIAGNOSIS — Z95.3 HISTORY OF TRICUSPID VALVE REPLACEMENT WITH BIOPROSTHETIC VALVE: ICD-10-CM

## 2019-04-08 PROCEDURE — 93000 ELECTROCARDIOGRAM COMPLETE: CPT | Mod: S$GLB,,, | Performed by: PEDIATRICS

## 2019-04-08 PROCEDURE — 99213 OFFICE O/P EST LOW 20 MIN: CPT | Mod: 25,S$GLB,, | Performed by: PEDIATRICS

## 2019-04-08 PROCEDURE — 99213 PR OFFICE/OUTPT VISIT, EST, LEVL III, 20-29 MIN: ICD-10-PCS | Mod: 25,S$GLB,, | Performed by: PEDIATRICS

## 2019-04-08 PROCEDURE — 93000 PR ELECTROCARDIOGRAM, COMPLETE: ICD-10-PCS | Mod: S$GLB,,, | Performed by: PEDIATRICS

## 2019-04-08 NOTE — PROGRESS NOTES
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 She underwent reconstruction of the right ventricular outflow tract and placement of a bioprosthetic pulmonary valve in early childhood. She subsequently has required multiple valve replacements. Her most recent heart surgery involved placement of a bioprosthetic valve in the pulmonary position and placement of a bioprosthetic valve in the tricuspid position, in 2007. Recently, she had a heart catheterization for recurrent insufficiency and obstruction of the bioprosthetic valve that had been placed in the tricuspid position.  The current procedure employed a trans-catheter approach. It entailed valvuloplasty with a Z-Med 25 x 5 cm balloon followed by placement of an Sotelo S3 26 mm bioprosthetic valve in the tricuspid position.. 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 She leads a rather sedentary lifestyle, and is overweight. She currently is on: Digoxin 0.125 mg once a day, Norvasc 2.5 mg once a day for BP. The Lasix was changed to HCTZ 25 mg once a day.  She should additionally be on Metoprolol ER 50 mg am and 25 mg pm, and Warfarin 7.5 mg M, Tu, W, and Th; and 10 mg Fr, Sa, andSun.     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 She comes to clinic today for follow-up. She reports having had several short episodes of rapid HRs in the middle of the night. It happens about 3 times per week; they get her up and she gets very frighten. A recent transmission showed a run of probable atrial flutter at  ~ 170 bpm.  "She did recently have thyroid studies and they are normal".   At today's visit, " there has been some improvement since increasing the Metoprolol to 50 mg in the am and 25 mg in the pm.  .         REVIEW OF SYSTEMS:  There are no visual disturbances. No HAs, lightheadedness, syncope or seizures. No swallowing disorder or PIETER. No difficulty with breathing, SOB,  wheezing or rhonchi. No asthma. No abdominal pain.  Bowel movements are normal.  No pelvic pain. Urination is normal.  No DM of thyroid disorder.  No lipid disorder. No arterial disease. No known intrinsic problem with the lungs, liver or kidneys. No bleeding disorder. No smoking or ETOH use.        PHYSICAL EXAMINATION:   GENERAL:  The patient is overweight 18 -year-old female in no distress.  VITAL SIGNS:  Her weight is 122.3 kg (270 lbs) and her height is 1.676 meters (5' 6''). Heart rate is 90 beats per minute ( sinus).  Sat 97 %.   Blood pressure in the right arm is 129/78 mmHg in the RA..  Color and perfusion are good.  HEENT:  Eye movements are normal.  No bruits are noted over the head.  No jugular venous distention or pulsations.  Mucous membranes are moist and pink.  There is no adenopathy.  The neck is supple.  No carotid bruits. The thyroid is not enlarged. SKIN:  Showed is pink and well perfused. CHEST:  There is a well-healed midline scar.  Lungs are clear to auscultation bilaterally, although the breath sounds are somewhat decreased at the base. There are no wheezes or rhonchi. CARDIOVASCULAR:  Precordial activity is normal.  HR ~ 70 bpm.  The first heart sound is prominent and crisp.  The second heart sound is narrowly split. There is a grade 1/6 systolic ejection murmur heard best up the left sternal border and across the base.  Short diastolic murmur is heard today.  ABDOMEN:  There is exogenous obesity.  The liver edge is palpable about 2-3 FB at the right costal margin.  It is nonpulsatile and nontender.  Bowel sounds appear to be normal.  EXTREMITIES:  Pulses are 2+ throughout.  Capillary refill is good.  There is no  cyanosis or peripheral edema.  No bruising in the groins or exts and no rashes.  No bruits in the groins.              ........................................................................................................................................        ECG (TODAY): Sinus rhythm at a ventricular rate of 83 bpm. Atrial abnormality.  UT 202 ms.  RBBB (160 ms). Minor T wave abnormality.  Prolonged QTc at 507,  but she has a wide RBBB.                ECHOCARDIOGRAM  (A PREVIOUS VISIT): She is in sinus rhythm.  An echocardiogram was performed.  2-D STUDY: There is no pericardial effusion.  No clots or vegetations. The bioprosthetic tricuspid valve is seen to be in good position. Annulus is 18 mm. The struts are easily seen.  The leaflets are mobile.  No evidence for thrombus formation.  The right atrium is enlarged measuring 5.4 x 4.8 cm or ~ 28.2 cm2 One can also see  the bioprosthetic tricuspid valve in a cross sectional view.  It appears circular and measures 2 cm x 2 cm. Again, no clots or vegetations are seen.  No right ventricular or left ventricular outflow tract obstruction.  The right ventricular circumferential area of shortening is 30%,  which is reduced.  Circumferential area of the RA is 23.4 cm2, which is dilated. M-MODE:   LV 4.4 cm, RV 3.8 cm, Ao 2.8 cm, LA 4.4 cm, Sep 1.1 cm, PW 1.1 cm, EF ~ 49 % (reduced). DOPPLER VELOCITY ANALYSIS:  There is no insufficiency of the recently-placed bioprosthetic tricuspid valve.  Mild turbulent antegrade flow is seen. Continuous wave Doppler analysis shows a peak pressure drop of 12-17 mmHg with a mean value of 7-10 mmHg.  Importantly, the leaflets are moving freely.  There is no mitral insufficiency.  Mild aortic insufficiency  (P1/2 567 ms).  Peak pressure drop across the aortic valve is 5 mmHg.  Peak pressure drop across the bioprosthetic pulmonary valve  "is 16 mmHg.                    .................................................................................................................................................               ASSESSMENT:  In summary, we have a 18-year-old AA female originally diagnosed to have pulmonary atresia with intact inter-ventricular septum, who is status-post multiple open heart operations involving both the pulmonary and tricuspid valves.  She recently in February 2017 had placement of an Sotelo S3 26 mm bioprosthetic tricuspid valve via the transvenous approach.  She tolerated the procedure well.  Clinically, she has been stable. However, she continues to have poor exercise capacity, chronic fatigue and more recently developed a new rhythm disturbance, diagnosed to be atrial flutter.       She recently underwent successful ablation for the atrial rhythm abnormality.  However, "She continues to have rapid HRs mostly at night".  They have decreased since increasing the beta blocker.  At last visit, we obtained a Holter to assess the rhythm at night.  At today's visit, Holter pending.          PLAN:  Given our findings, our suggestions are as follows:  1.  She of course needs antibiotic prophylaxis for any invasive procedure, especially dental work.   2.  She should continue taking "Metoprolol ER 50 mg in the AM and 25 mg in the PM", HCTZ 25 mg qd, and her other remaining meds.  We changed "Warfarin to 10 mg qd".       INR today was 1.2 !!!!.   Lovenox 60 mg qd x 3 days.    3. Kacey saw Dr Woodward recently and their team is considering a repeat EPS.  4. RTC in 2 weeks.    If there are any questions concerning the cardiac status of this patient, please feel free to contact us.  Thank you so much for allowing us to partake in the care of this patient. Donovan Gonzalez PhD, MD.     "

## 2019-04-18 ENCOUNTER — OFFICE VISIT (OUTPATIENT)
Dept: CARDIOLOGY | Facility: CLINIC | Age: 19
End: 2019-04-18
Payer: MEDICAID

## 2019-04-18 VITALS
WEIGHT: 273.06 LBS | HEIGHT: 66 IN | HEART RATE: 97 BPM | OXYGEN SATURATION: 98 % | BODY MASS INDEX: 43.88 KG/M2 | SYSTOLIC BLOOD PRESSURE: 129 MMHG | DIASTOLIC BLOOD PRESSURE: 85 MMHG

## 2019-04-18 DIAGNOSIS — I48.92 ATRIAL FLUTTER, UNSPECIFIED TYPE: Primary | ICD-10-CM

## 2019-04-18 DIAGNOSIS — Z95.3 HISTORY OF TRICUSPID VALVE REPLACEMENT WITH BIOPROSTHETIC VALVE: ICD-10-CM

## 2019-04-18 DIAGNOSIS — E66.01 MORBID OBESITY: ICD-10-CM

## 2019-04-18 PROCEDURE — 93000 PR ELECTROCARDIOGRAM, COMPLETE: ICD-10-PCS | Mod: S$GLB,,, | Performed by: PEDIATRICS

## 2019-04-18 PROCEDURE — 93000 ELECTROCARDIOGRAM COMPLETE: CPT | Mod: S$GLB,,, | Performed by: PEDIATRICS

## 2019-04-18 PROCEDURE — 99213 OFFICE O/P EST LOW 20 MIN: CPT | Mod: 25,S$GLB,, | Performed by: PEDIATRICS

## 2019-04-18 PROCEDURE — 99213 PR OFFICE/OUTPT VISIT, EST, LEVL III, 20-29 MIN: ICD-10-PCS | Mod: 25,S$GLB,, | Performed by: PEDIATRICS

## 2019-04-18 NOTE — PROGRESS NOTES
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 She underwent reconstruction of the right ventricular outflow tract and placement of a bioprosthetic pulmonary valve in early childhood. She subsequently has required multiple valve replacements. Her most recent heart surgery involved placement of a bioprosthetic valve in the pulmonary position and placement of a bioprosthetic valve in the tricuspid position, in 2007. Recently, she had a heart catheterization for recurrent insufficiency and obstruction of the bioprosthetic valve that had been placed in the tricuspid position.  The current procedure employed a trans-catheter approach. It entailed valvuloplasty with a Z-Med 25 x 5 cm balloon followed by placement of an Sotelo S3 26 mm bioprosthetic valve in the tricuspid position.. 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 She leads a rather sedentary lifestyle, and is overweight. She currently is on: Digoxin 0.125 mg once a day, Norvasc 2.5 mg once a day for BP. The Lasix was changed to HCTZ 25 mg once a day.  She should additionally be on Metoprolol ER 50 mg am and 25 mg pm, and Warfarin 10 mg M, Tu, W, and Th; and 7.5 mg Fr, Sa, andSun.     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 She comes to clinic today for follow-up. She reports having had several short episodes of rapid HRs in the middle of the night. It happens about 3 times per week; they get her up and she gets very frighten. A recent transmission showed a run of probable atrial flutter at  ~ 170 bpm.  "She did recently have thyroid studies and they are normal".   At today's visit, there has " been some improvement since increasing the Metoprolol to 50 mg in the am and 25 mg in the pm.    Her INR today is 1.9.  .         REVIEW OF SYSTEMS:  There are no visual disturbances. No HAs, lightheadedness, syncope or seizures. No swallowing disorder or PIETER. No difficulty with breathing, SOB or wheezing.  No asthma. No abdominal pain.  Bowel movements are normal.  No pelvic pain. Urination is normal.  No DM of thyroid disorder.  No lipid disorder. No arterial disease. No known intrinsic problem with the lungs, liver or kidneys. No bleeding disorder. No smoking or ETOH use.  There is a FH of strokes and Adult unset DM.         PHYSICAL EXAMINATION:   GENERAL:  The patient is overweight 18 -year-old female in no distress.  VITAL SIGNS:  Her weight is 123.9 kg (273 lbs) and her height is 1.676 meters (5' 6''). Heart rate is 97 beats per minute ( sinus).  Sat 98 %.   Blood pressure in the right arm is 129/85 mmHg in the RA..  Color and perfusion are good.  HEENT:  Eye movements are normal.  No bruits are noted over the head.  No jugular venous distention or pulsations.  Mucous membranes are moist and pink.  There is no adenopathy.  The neck is supple.  No carotid bruits. The thyroid is not enlarged. SKIN:  Is pink and well perfused. CHEST:  There is a well-healed midline scar.  Lungs are clear to auscultation bilaterally, although the breath sounds are somewhat decreased at the base. There are no wheezes or rhonchi. CARDIOVASCULAR:  Precordial activity is normal.  HR ~ 85 bpm.  The first heart sound is prominent and crisp.  The second heart sound is narrowly split and eccentuated. There is a grade 1/6 systolic ejection murmur heard best up the left sternal border and across the base.  Short diastolic murmur is heard today.  ABDOMEN:  There is exogenous obesity.  Mild bruising at the mid-abdomen from Lovenox shots. The liver edge is precussed about 2-3 FB at the right costal margin.  It is nonpulsatile and nontender.   Bowel sounds appear to be normal.  EXTREMITIES:  Pulses are 2+ throughout.  Capillary refill is good.  There is no cyanosis or peripheral edema.  No bruising in the groins or exts.  No rashes or cyanosis.  No bruits in the groins.              ........................................................................................................................................        ECG (TODAY): Sinus rhythm at a ventricular rate of 83 bpm. Atrial abnormality.  ME 206 ms.  RBBB (158 ms). Minor T wave abnormality.  Prolonged QTc at 509,  but she has a wide RBBB.                ECHOCARDIOGRAM  (A PREVIOUS VISIT): She is in sinus rhythm.  An echocardiogram was performed.  2-D STUDY: There is no pericardial effusion.  No clots or vegetations. The bioprosthetic tricuspid valve is seen to be in good position. Annulus is 18 mm. The struts are easily seen.  The leaflets are mobile.  No evidence for thrombus formation.  The right atrium is enlarged measuring 5.4 x 4.8 cm or ~ 28.2 cm2 One can also see  the bioprosthetic tricuspid valve in a cross sectional view.  It appears circular and measures 2 cm x 2 cm. Again, no clots or vegetations are seen.  No right ventricular or left ventricular outflow tract obstruction.  The right ventricular circumferential area of shortening is 30%,  which is reduced.  Circumferential area of the RA is 23.4 cm2, which is dilated. M-MODE:   LV 4.4 cm, RV 3.8 cm, Ao 2.8 cm, LA 4.4 cm, Sep 1.1 cm, PW 1.1 cm, EF ~ 49 % (reduced). DOPPLER VELOCITY ANALYSIS:  There is no insufficiency of the recently-placed bioprosthetic tricuspid valve.  Mild turbulent antegrade flow is seen. Continuous wave Doppler analysis shows a peak pressure drop of 12-17 mmHg with a mean value of 7-10 mmHg.  Importantly, the leaflets are moving freely.  There is no mitral insufficiency.  Mild aortic insufficiency  (P1/2 567 ms).  Peak pressure drop across the aortic valve is 5 mmHg.  Peak pressure drop across the  "bioprosthetic pulmonary valve is 16 mmHg.                    .................................................................................................................................................               ASSESSMENT:  In summary, we have a 18-year-old AA female originally diagnosed to have pulmonary atresia with intact inter-ventricular septum, who is status-post multiple open heart operations involving both the pulmonary and tricuspid valves.  She recently in February 2017 had placement of an Sotelo S3 26 mm bioprosthetic tricuspid valve via the transvenous approach.  She tolerated the procedure well. Clinically, she has been stable. However, she continues to have poor exercise capacity, chronic fatigue and more recently developed a new rhythm disturbance, diagnosed to be atrial flutter.         She recently underwent ablation for the atrial rhythm abnormality.  However, "She continues to have short runs of rapid HRs mostly at night".  They have decreased since increasing the beta blocker.  At last visit, we obtained a Holter to assess the rhythm at night.  At today's visit, Holter pending.            PLAN:  Given our findings, our suggestions are as follows:  1.  She of course needs antibiotic prophylaxis for any invasive procedure, especially dental work.   2.  She should continue taking "Metoprolol ER 50 mg in the AM and 25 mg in the PM", HCTZ 25 mg qd, and her other remaining meds.  We changed "Warfarin to 10 mg M, Tu, W, Th and 7.5 mg F, Sat, Sun".       INR today was 1.9.   3. Kacey saw Dr Woodward recently and their team is considering a repeat EPS.  4. RTC in 2 weeks.    If there are any questions concerning the cardiac status of this patient, please feel free to contact us.  Thank you so much for allowing us to partake in the care of this patient. Donovan Gonzalez PhD, MD.     "

## 2019-04-28 PROBLEM — R00.2 PALPITATIONS: Status: ACTIVE | Noted: 2019-04-28

## 2019-04-30 NOTE — PROGRESS NOTES
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 She underwent reconstruction of the right ventricular outflow tract and placement of a bioprosthetic pulmonary valve in early childhood. She subsequently has required multiple valve replacements. Her most recent heart surgery involved placement of a bioprosthetic valve in the pulmonary position and placement of a bioprosthetic valve in the tricuspid position, in 2007. Recently, she had a heart catheterization for recurrent insufficiency and obstruction of the bioprosthetic valve that had been placed in the tricuspid position.  The current procedure employed a trans-catheter approach. It entailed valvuloplasty with a Z-Med 25 x 5 cm balloon followed by placement of an Sotelo S3 26 mm bioprosthetic valve in the tricuspid position.. 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 She leads a rather sedentary lifestyle, and is overweight. She currently is on: Digoxin 0.125 mg once a day, Norvasc 2.5 mg once a day for BP. The Lasix was changed to HCTZ 25 mg once a day.  She should additionally be on Metoprolol ER 50 mg am and 25 mg pm, and Warfarin 10 mg M, Tu, W,Th and Fri; and 7.5 mg Sa, andSun.     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 She comes to clinic today for follow-up. She reports having had several short episodes of rapid HRs in the middle of the night. It happens about 3 times per week; they get her up and she gets very frighten. A recent transmission showed a run of probable atrial flutter at  ~ 170 bpm.  "She did recently have thyroid studies and they are normal".   At today's visit, there has been " some improvement since increasing the Metoprolol to 50 mg in the am and 25 mg in the pm.    Her INR today is 1.6.  .         REVIEW OF SYSTEMS:  There are no visual disturbances. No HAs, lightheadedness, syncope or seizures. No swallowing disorder or PIETER. No difficulty with breathing, SOB or wheezing.  No asthma. No abdominal pain.  Bowel movements are normal.  No pelvic pain. Urination is normal.  No DM of thyroid disorder.  No lipid disorder. No arterial disease. No known intrinsic problem with the lungs, liver or kidneys. No bleeding disorder. No smoking or ETOH use.  There is a FH of strokes and Adult unset DM.         PHYSICAL EXAMINATION:   GENERAL:  The patient is overweight 18 -year-old female in no distress.  VITAL SIGNS:  Her weight is 122.7 kg (270 1/2 lbs) and her height is 1.676 meters (5' 6''). Heart rate is 85 beats per minute ( sinus).  Sat 98 %.   Blood pressure in the right arm is 119/76 mmHg in the RA..  Color and perfusion are good.  HEENT:  Eye movements are normal.  No bruits are noted over the head.  No jugular venous distention or pulsations.  Mucous membranes are moist and pink.  There is no adenopathy.  The neck is supple.  No carotid bruits. The thyroid is not enlarged. SKIN:  Is pink and well perfused. CHEST:  There is a well-healed midline scar.  Lungs are clear to auscultation bilaterally, although the breath sounds are somewhat decreased at the base. There are no wheezes or rhonchi. CARDIOVASCULAR:  Precordial activity is normal.  HR ~ 85 bpm.  The first heart sound is prominent and crisp.  The second heart sound is narrowly split and eccentuated. There is a grade 1/6 systolic ejection murmur heard best up the left sternal border and across the base.  Short diastolic murmur is heard today.  ABDOMEN:  There is exogenous obesity.  Mild bruising at the mid-abdomen from Lovenox shots. The liver edge is precussed about 2-3 FB at the right costal margin.  It is nonpulsatile and nontender.   Bowel sounds appear to be normal.  EXTREMITIES:  Pulses are 2+ throughout.  Capillary refill is good.  There is no cyanosis or peripheral edema.  No bruising in the groins or exts.  No rashes or cyanosis.  No bruits in the groins.              ........................................................................................................................................        ECG (TODAY): Sinus rhythm at a ventricular rate of 85 bpm. Atrial abnormality.  Ist degree block, FL 206 ms.  RBBB (160 ms). Minor T wave abnormality.  Prolonged QTc at 490 ms,  but she has a RBBB.                ECHOCARDIOGRAM  (A PREVIOUS VISIT): She is in sinus rhythm.  An echocardiogram was performed.  2-D STUDY: There is no pericardial effusion.  No clots or vegetations. The bioprosthetic tricuspid valve is seen to be in good position. Annulus is 18 mm. The struts are easily seen.  The leaflets are mobile.  No evidence for thrombus formation.  The right atrium is enlarged measuring 5.4 x 4.8 cm or ~ 28.2 cm2 One can also see  the bioprosthetic tricuspid valve in a cross sectional view.  It appears circular and measures 2 cm x 2 cm. Again, no clots or vegetations are seen.  No right ventricular or left ventricular outflow tract obstruction.  The right ventricular circumferential area of shortening is 30%,  which is reduced.  Circumferential area of the RA is 23.4 cm2, which is dilated. M-MODE:   LV 4.4 cm, RV 3.8 cm, Ao 2.8 cm, LA 4.4 cm, Sep 1.1 cm, PW 1.1 cm, EF ~ 49 % (reduced). DOPPLER VELOCITY ANALYSIS:  There is no insufficiency of the recently-placed bioprosthetic tricuspid valve.  Mild turbulent antegrade flow is seen. Continuous wave Doppler analysis shows a peak pressure drop of 12-17 mmHg with a mean value of 7-10 mmHg.  Importantly, the leaflets are moving freely.  There is no mitral insufficiency.  Mild aortic insufficiency  (P1/2 567 ms).  Peak pressure drop across the aortic valve is 5 mmHg.  Peak pressure drop  "across the bioprosthetic pulmonary valve is 16 mmHg.                    .................................................................................................................................................               ASSESSMENT:  In summary, we have a 18-year-old AA female originally diagnosed to have pulmonary atresia with intact inter-ventricular septum, who is status-post multiple open heart operations involving both the pulmonary and tricuspid valves.  She recently in February 2017 had placement of an Sotelo S3 26 mm bioprosthetic tricuspid valve via the transvenous approach.  She tolerated the procedure well. Clinically, she has been stable. However, she continues to have poor exercise capacity, chronic fatigue and more recently developed a new rhythm disturbance, diagnosed to be atrial flutter.   She recently underwent ablation for an atrial rhythm disturbance.  However, "She continues to have short runs of rapid HRs mostly at night".  They have decreased since increasing the beta blocker.  At last visit, we obtained a Holter to assess the rhythm at night.  The Holter showed short runs of atrial tachycardia at night.            PLAN:  Given our findings, our suggestions are as follows:  1.  She of course needs antibiotic prophylaxis for any invasive procedure, especially dental work.   2.  She should continue taking "Metoprolol ER 50 mg in the AM and 25 mg in the PM", HCTZ 25 mg qd, and her other remaining meds.  We now changed to "Warfarin 10 mg M, Tu, W, Th, Fri, Sat and Sun".       INR today was 1.6.   3. Kacey saw Dr Woodward recently and their team is considering a repeat EPS.  4. RTC in 1 week to see me and Dr Woodward.    If there are any questions concerning the cardiac status of this patient, please feel free to contact us.  Thank you so much for allowing us to partake in the care of this patient. Donovan Gonzalez PhD, MD.         "

## 2019-05-02 ENCOUNTER — OFFICE VISIT (OUTPATIENT)
Dept: CARDIOLOGY | Facility: CLINIC | Age: 19
End: 2019-05-02
Payer: MEDICAID

## 2019-05-02 VITALS
WEIGHT: 270.5 LBS | DIASTOLIC BLOOD PRESSURE: 76 MMHG | BODY MASS INDEX: 43.47 KG/M2 | OXYGEN SATURATION: 98 % | HEART RATE: 85 BPM | HEIGHT: 66 IN | SYSTOLIC BLOOD PRESSURE: 119 MMHG

## 2019-05-02 DIAGNOSIS — Q24.9 ADULT CONGENITAL HEART DISEASE: ICD-10-CM

## 2019-05-02 DIAGNOSIS — E66.01 MORBID OBESITY: ICD-10-CM

## 2019-05-02 DIAGNOSIS — I47.19 ATRIAL TACHYCARDIA: Primary | ICD-10-CM

## 2019-05-02 DIAGNOSIS — Z95.3 HISTORY OF TRICUSPID VALVE REPLACEMENT WITH BIOPROSTHETIC VALVE: ICD-10-CM

## 2019-05-02 DIAGNOSIS — Z95.3 HISTORY OF PULMONARY VALVE REPLACEMENT WITH BIOPROSTHETIC VALVE: ICD-10-CM

## 2019-05-02 PROCEDURE — 99213 PR OFFICE/OUTPT VISIT, EST, LEVL III, 20-29 MIN: ICD-10-PCS | Mod: 25,S$GLB,, | Performed by: PEDIATRICS

## 2019-05-02 PROCEDURE — 93000 ELECTROCARDIOGRAM COMPLETE: CPT | Mod: S$GLB,,, | Performed by: PEDIATRICS

## 2019-05-02 PROCEDURE — 93000 PR ELECTROCARDIOGRAM, COMPLETE: ICD-10-PCS | Mod: S$GLB,,, | Performed by: PEDIATRICS

## 2019-05-02 PROCEDURE — 99213 OFFICE O/P EST LOW 20 MIN: CPT | Mod: 25,S$GLB,, | Performed by: PEDIATRICS

## 2019-05-02 RX ORDER — ENOXAPARIN SODIUM 100 MG/ML
60 INJECTION SUBCUTANEOUS
Status: ON HOLD | COMMUNITY
End: 2019-05-09 | Stop reason: HOSPADM

## 2019-05-06 ENCOUNTER — OFFICE VISIT (OUTPATIENT)
Dept: CARDIOLOGY | Facility: CLINIC | Age: 19
End: 2019-05-06
Payer: MEDICAID

## 2019-05-06 ENCOUNTER — HOSPITAL ENCOUNTER (INPATIENT)
Facility: HOSPITAL | Age: 19
LOS: 3 days | Discharge: HOME | DRG: 262 | End: 2019-05-09
Attending: PEDIATRICS | Admitting: PEDIATRICS
Payer: MEDICAID

## 2019-05-06 VITALS — HEART RATE: 87 BPM | SYSTOLIC BLOOD PRESSURE: 117 MMHG | OXYGEN SATURATION: 99 % | DIASTOLIC BLOOD PRESSURE: 81 MMHG

## 2019-05-06 DIAGNOSIS — Z95.818 STATUS POST PLACEMENT OF IMPLANTABLE LOOP RECORDER: ICD-10-CM

## 2019-05-06 DIAGNOSIS — I47.19 ATRIAL TACHYCARDIA: Primary | ICD-10-CM

## 2019-05-06 DIAGNOSIS — I48.92 ATRIAL FLUTTER: ICD-10-CM

## 2019-05-06 DIAGNOSIS — Z98.890 HISTORY OF OPEN HEART SURGERY: ICD-10-CM

## 2019-05-06 DIAGNOSIS — R00.2 PALPITATIONS: ICD-10-CM

## 2019-05-06 DIAGNOSIS — Q25.5 PULMONARY ATRESIA WITH INTACT VENTRICULAR SEPTUM: ICD-10-CM

## 2019-05-06 DIAGNOSIS — Q24.9 ADULT CONGENITAL HEART DISEASE: ICD-10-CM

## 2019-05-06 DIAGNOSIS — Z95.3 HISTORY OF PULMONARY VALVE REPLACEMENT WITH BIOPROSTHETIC VALVE: ICD-10-CM

## 2019-05-06 DIAGNOSIS — Z95.2 S/P PULMONARY VALVE REPLACEMENT: ICD-10-CM

## 2019-05-06 DIAGNOSIS — I47.10 SVT (SUPRAVENTRICULAR TACHYCARDIA): ICD-10-CM

## 2019-05-06 DIAGNOSIS — Z95.3 HISTORY OF TRICUSPID VALVE REPLACEMENT WITH BIOPROSTHETIC VALVE: ICD-10-CM

## 2019-05-06 DIAGNOSIS — I48.92 ATRIAL FLUTTER, UNSPECIFIED TYPE: ICD-10-CM

## 2019-05-06 LAB
ANION GAP SERPL CALC-SCNC: 9 MMOL/L (ref 8–16)
B-HCG UR QL: NEGATIVE
BUN SERPL-MCNC: 8 MG/DL (ref 6–20)
CALCIUM SERPL-MCNC: 10 MG/DL (ref 8.7–10.5)
CHLORIDE SERPL-SCNC: 107 MMOL/L (ref 95–110)
CO2 SERPL-SCNC: 23 MMOL/L (ref 23–29)
CREAT SERPL-MCNC: 0.7 MG/DL (ref 0.5–1.4)
EST. GFR  (AFRICAN AMERICAN): >60 ML/MIN/1.73 M^2
EST. GFR  (NON AFRICAN AMERICAN): >60 ML/MIN/1.73 M^2
GLUCOSE SERPL-MCNC: 92 MG/DL (ref 70–110)
POTASSIUM SERPL-SCNC: 4 MMOL/L (ref 3.5–5.1)
SODIUM SERPL-SCNC: 139 MMOL/L (ref 136–145)

## 2019-05-06 PROCEDURE — 93010 EKG 12-LEAD PEDIATRIC: ICD-10-PCS | Mod: ,,, | Performed by: PEDIATRICS

## 2019-05-06 PROCEDURE — 93005 ELECTROCARDIOGRAM TRACING: CPT

## 2019-05-06 PROCEDURE — 99222 PR INITIAL HOSPITAL CARE,LEVL II: ICD-10-PCS | Mod: ,,, | Performed by: PEDIATRICS

## 2019-05-06 PROCEDURE — 93010 ELECTROCARDIOGRAM REPORT: CPT | Mod: ,,, | Performed by: PEDIATRICS

## 2019-05-06 PROCEDURE — 99222 1ST HOSP IP/OBS MODERATE 55: CPT | Mod: ,,, | Performed by: PEDIATRICS

## 2019-05-06 PROCEDURE — 80048 BASIC METABOLIC PNL TOTAL CA: CPT

## 2019-05-06 PROCEDURE — 25000003 PHARM REV CODE 250: Performed by: STUDENT IN AN ORGANIZED HEALTH CARE EDUCATION/TRAINING PROGRAM

## 2019-05-06 PROCEDURE — 93000 ELECTROCARDIOGRAM COMPLETE: CPT | Mod: S$GLB,,, | Performed by: PEDIATRICS

## 2019-05-06 PROCEDURE — 93000 PR ELECTROCARDIOGRAM, COMPLETE: ICD-10-PCS | Mod: S$GLB,,, | Performed by: PEDIATRICS

## 2019-05-06 PROCEDURE — 81025 URINE PREGNANCY TEST: CPT

## 2019-05-06 PROCEDURE — 99213 OFFICE O/P EST LOW 20 MIN: CPT | Mod: 25,S$GLB,, | Performed by: PEDIATRICS

## 2019-05-06 PROCEDURE — 11300000 HC PEDIATRIC PRIVATE ROOM

## 2019-05-06 PROCEDURE — 99213 PR OFFICE/OUTPT VISIT, EST, LEVL III, 20-29 MIN: ICD-10-PCS | Mod: 25,S$GLB,, | Performed by: PEDIATRICS

## 2019-05-06 RX ORDER — HYDROCHLOROTHIAZIDE 25 MG/1
25 TABLET ORAL DAILY
Status: DISCONTINUED | OUTPATIENT
Start: 2019-05-07 | End: 2019-05-09 | Stop reason: HOSPADM

## 2019-05-06 RX ORDER — DIGOXIN 125 MCG
125 TABLET ORAL DAILY
Status: DISCONTINUED | OUTPATIENT
Start: 2019-05-07 | End: 2019-05-09 | Stop reason: HOSPADM

## 2019-05-06 RX ORDER — SOTALOL HYDROCHLORIDE 80 MG/1
80 TABLET ORAL 2 TIMES DAILY
Status: DISCONTINUED | OUTPATIENT
Start: 2019-05-06 | End: 2019-05-09 | Stop reason: HOSPADM

## 2019-05-06 RX ORDER — ACETAMINOPHEN 325 MG/1
650 TABLET ORAL EVERY 6 HOURS PRN
Status: DISCONTINUED | OUTPATIENT
Start: 2019-05-06 | End: 2019-05-09 | Stop reason: HOSPADM

## 2019-05-06 RX ORDER — EPINEPHRINE 0.22MG
100 AEROSOL WITH ADAPTER (ML) INHALATION 2 TIMES DAILY
Status: DISCONTINUED | OUTPATIENT
Start: 2019-05-06 | End: 2019-05-09 | Stop reason: HOSPADM

## 2019-05-06 RX ORDER — WARFARIN 10 MG/1
10 TABLET ORAL DAILY
Status: DISCONTINUED | OUTPATIENT
Start: 2019-05-07 | End: 2019-05-08

## 2019-05-06 RX ORDER — IBUPROFEN 600 MG/1
600 TABLET ORAL EVERY 6 HOURS PRN
Status: DISCONTINUED | OUTPATIENT
Start: 2019-05-06 | End: 2019-05-09 | Stop reason: HOSPADM

## 2019-05-06 RX ORDER — AMLODIPINE BESYLATE 2.5 MG/1
2.5 TABLET ORAL DAILY
Status: DISCONTINUED | OUTPATIENT
Start: 2019-05-07 | End: 2019-05-09 | Stop reason: HOSPADM

## 2019-05-06 RX ADMIN — ACETAMINOPHEN 650 MG: 325 TABLET ORAL at 08:05

## 2019-05-06 RX ADMIN — Medication 100 MG: at 09:05

## 2019-05-06 RX ADMIN — SOTALOL HYDROCHLORIDE 80 MG: 80 TABLET ORAL at 09:05

## 2019-05-06 NOTE — NURSING
Received from drs office. Awake, alert. Denies pain, n/v or dizziness at this time. Vss. Placed on bedside monitor. Notified md of arrival. Aunt at bedside, oriented to unit

## 2019-05-06 NOTE — PROGRESS NOTES
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 She underwent reconstruction of the right ventricular outflow tract and placement of a bioprosthetic pulmonary valve in early childhood. She subsequently has required multiple valve replacements. Her most recent heart surgery involved placement of a bioprosthetic valve in the pulmonary position and placement of a bioprosthetic valve in the tricuspid position, in 2007. Recently, she had a heart catheterization for recurrent insufficiency and obstruction of the bioprosthetic valve that had been placed in the tricuspid position.  The current procedure employed a trans-catheter approach. It entailed valvuloplasty with a Z-Med 25 x 5 cm balloon followed by placement of an Sotelo S3 26 mm bioprosthetic valve in the tricuspid position.. 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 She leads a rather sedentary lifestyle, and is overweight. She currently is on: Digoxin 0.125 mg once a day, Norvasc 2.5 mg once a day for BP. The Lasix was changed to HCTZ 25 mg once a day.  She should additionally be on Metoprolol ER 50 mg am and 25 mg pm, and Warfarin 10 mg M, Tu, W,Th and Fri; and 7.5 mg Sa, andSun.     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 She comes to clinic today for follow-up. She reports having had several short episodes of rapid HRs in the middle of the night. It happens about 3 times per week; they get her up and she gets very frighten. A recent transmission showed a run of probable atrial flutter at  ~ 170 bpm.  "She did recently have thyroid studies and they are normal".   At " today's visit, there has been some improvement since increasing the Metoprolol to 50 mg in the am and 25 mg in the pm.      However, Kacey comes to clinic today with SOB, pale and a HR of about 180 bpm by ascultation. Her INR today is 2.4.  She slowed to about 90 bpm after Valsava.   Followup ECGs showed sinus rhythm.   About 30 mins later, there was another short episode.  INR today is 2.4.  .         REVIEW OF SYSTEMS:  There are no visual disturbances. No HAs, lightheadedness, syncope or seizures. No swallowing disorder or PIETER. No difficulty with breathing, SOB or wheezing.  No asthma. No abdominal pain.  Bowel movements are normal.  No pelvic pain. Urination is normal.  No DM of thyroid disorder.  No lipid disorder. No arterial disease. No known intrinsic problem with the lungs, liver or kidneys. No bleeding disorder. No smoking or ETOH use.  There is a FH of strokes and Adult unset DM.         PHYSICAL EXAMINATION:   GENERAL:  The patient is overweight 18 -year-old female in no distress.  VITAL SIGNS:  Her weight is 122.7 kg (270 1/2 lbs) and her height is 1.676 meters (5' 6''). Heart rate is 87 beats per minute ( sinus).  Sat 98 %.   Blood pressure in the right arm is 119/76 mmHg in the RA..  Color and perfusion are good.  HEENT:  Eye movements are normal.  No bruits are noted over the head.  No jugular venous distention or pulsations.  Mucous membranes are moist and pink.  There is no adenopathy.  The neck is supple.  No carotid bruits. The thyroid is not enlarged. SKIN:  Is pink and well perfused. CHEST:  There is a well-healed midline scar.  Lungs are clear to auscultation bilaterally, although the breath sounds are somewhat decreased at the base. There are no wheezes or rhonchi. CARDIOVASCULAR:  Precordial activity is normal.  HR ~ 85 bpm.  The first heart sound is prominent and crisp.  The second heart sound is narrowly split and eccentuated. There is a grade 1/6 systolic ejection murmur heard  best up the left sternal border and across the base.  Short diastolic murmur is heard today.  ABDOMEN:  There is exogenous obesity.  Mild bruising at the mid-abdomen from Lovenox shots. The liver edge is precussed about 2-3 FB at the right costal margin.  It is nonpulsatile and nontender.  Bowel sounds appear to be normal.  EXTREMITIES:  Pulses are 2+ throughout.  Capillary refill is good.  There is no cyanosis or peripheral edema.  No bruising in the groins or exts.  No rashes or cyanosis.  No bruits in the groins.              ........................................................................................................................................        ECG (TODAY): Sinus rhythm at a ventricular rate of 87 bpm. Occasional PVC.  Atrial abnormality.  Ist degree block, NE 204 ms. Mild LAD. RBBB (154 ms). T wave abnormality.  Prolonged QTc at 490-500 ms,  but she has a RBBB.                ECHOCARDIOGRAM  (A PREVIOUS VISIT): She is in sinus rhythm.  An echocardiogram was performed.  2-D STUDY: There is no pericardial effusion.  No clots or vegetations. The bioprosthetic tricuspid valve is seen to be in good position. Annulus is 18 mm. The struts are easily seen.  The leaflets are mobile.  No evidence for thrombus formation.  The right atrium is enlarged measuring 5.4 x 4.8 cm or ~ 28.2 cm2 One can also see  the bioprosthetic tricuspid valve in a cross sectional view.  It appears circular and measures 2 cm x 2 cm. Again, no clots or vegetations are seen.  No right ventricular or left ventricular outflow tract obstruction.  The right ventricular circumferential area of shortening is 30%,  which is reduced.  Circumferential area of the RA is 23.4 cm2, which is dilated. M-MODE:   LV 4.4 cm, RV 3.8 cm, Ao 2.8 cm, LA 4.4 cm, Sep 1.1 cm, PW 1.1 cm, EF ~ 49 % (reduced). DOPPLER VELOCITY ANALYSIS:  There is no insufficiency of the recently-placed bioprosthetic tricuspid valve.  Mild turbulent antegrade flow is  "seen. Continuous wave Doppler analysis shows a peak pressure drop of 12-17 mmHg with a mean value of 7-10 mmHg.  Importantly, the leaflets are moving freely.  There is no mitral insufficiency.  Mild aortic insufficiency  (P1/2 567 ms).  Peak pressure drop across the aortic valve is 5 mmHg.  Peak pressure drop across the bioprosthetic pulmonary valve is 16 mmHg.                    .................................................................................................................................................               ASSESSMENT:  In summary, we have a 18-year-old AA female originally diagnosed to have pulmonary atresia with intact inter-ventricular septum, who is status-post multiple open heart operations involving both the pulmonary and tricuspid valves.  She recently in February 2017 had placement of an Sotelo S3 26 mm bioprosthetic tricuspid valve via the transvenous approach.  She tolerated the procedure well. Clinically, she has been stable. However, she continues to have poor exercise capacity, chronic fatigue and more recently developed a new rhythm disturbance, diagnosed to be atrial flutter.   She recently underwent ablation for an atrial rhythm disturbance.  However, "She continues to have short runs of rapid HRs mostly at night".  They have decreased since increasing the beta blocker. HOWEVER, AT THIS EMERGENCY VISIT TODAY, SHE HAD A HEART RATE  bpm by ascultation.  The episode lasted ~ 30 mins, initially it started at home.  I gave her an additional dose of 25 mgs, as she just had a very short burst prior to leaving for [facility].        PLAN:  Given our findings, our suggestions are as follows:  1.  She of course needs antibiotic prophylaxis for any invasive procedure, especially dental work.   2.  She should continue taking "Metoprolol ER 50 mg in the AM and 25 mg in the PM", HCTZ 25 mg qd, and her other remaining meds.  We now changed to "Warfarin 10 mg M, Tu, W, Th, Fri, Sat " "and Sun".       INR today was 2.4.   3. I spoke with Dr Woodward and we agreed to admit her to the Peds floor at [facility], for additional or Rx.     If there are any questions concerning the cardiac status of this patient, please feel free to contact us.  Thank you so much for allowing us to partake in the care of this patient. Donovan Gonzalez PhD, MD.   (Cell ).         ergency to clinic (5/6/19)  "

## 2019-05-06 NOTE — Clinical Note
A dressing is applied to the incision. 30 minutes after glue is finished drying, then applied pressure dressing and ice pack to incision site.

## 2019-05-07 PROCEDURE — 93010 ELECTROCARDIOGRAM REPORT: CPT | Mod: 76,,, | Performed by: PEDIATRICS

## 2019-05-07 PROCEDURE — 25000003 PHARM REV CODE 250: Performed by: STUDENT IN AN ORGANIZED HEALTH CARE EDUCATION/TRAINING PROGRAM

## 2019-05-07 PROCEDURE — 99232 PR SUBSEQUENT HOSPITAL CARE,LEVL II: ICD-10-PCS | Mod: ,,, | Performed by: PEDIATRICS

## 2019-05-07 PROCEDURE — 99232 SBSQ HOSP IP/OBS MODERATE 35: CPT | Mod: ,,, | Performed by: PEDIATRICS

## 2019-05-07 PROCEDURE — 93010 ELECTROCARDIOGRAM REPORT: CPT | Mod: ,,, | Performed by: PEDIATRICS

## 2019-05-07 PROCEDURE — 93005 ELECTROCARDIOGRAM TRACING: CPT

## 2019-05-07 PROCEDURE — 93010 EKG 12-LEAD: ICD-10-PCS | Mod: 76,,, | Performed by: PEDIATRICS

## 2019-05-07 PROCEDURE — 11300000 HC PEDIATRIC PRIVATE ROOM

## 2019-05-07 RX ADMIN — HYDROCHLOROTHIAZIDE 25 MG: 25 TABLET ORAL at 08:05

## 2019-05-07 RX ADMIN — WARFARIN SODIUM 10 MG: 2.5 TABLET ORAL at 04:05

## 2019-05-07 RX ADMIN — ACETAMINOPHEN 650 MG: 325 TABLET ORAL at 07:05

## 2019-05-07 RX ADMIN — ACETAMINOPHEN 650 MG: 325 TABLET ORAL at 09:05

## 2019-05-07 RX ADMIN — SOTALOL HYDROCHLORIDE 80 MG: 80 TABLET ORAL at 08:05

## 2019-05-07 RX ADMIN — DIGOXIN 125 MCG: 125 TABLET ORAL at 08:05

## 2019-05-07 RX ADMIN — Medication 100 MG: at 08:05

## 2019-05-07 RX ADMIN — IBUPROFEN 600 MG: 600 TABLET ORAL at 11:05

## 2019-05-07 RX ADMIN — IBUPROFEN 600 MG: 600 TABLET ORAL at 01:05

## 2019-05-07 RX ADMIN — AMLODIPINE BESYLATE 2.5 MG: 2.5 TABLET ORAL at 08:05

## 2019-05-07 NOTE — SUBJECTIVE & OBJECTIVE
Interval History: No rhythm concerns overnight on telemetry. She has complaints of a mild headache this morning but otherwise did well overnight.     Objective:     Vital Signs (Most Recent):  Temp: 98 °F (36.7 °C) (05/07/19 0916)  Pulse: 67 (05/07/19 1041)  Resp: 20 (05/07/19 0916)  BP: (!) 123/57 (05/07/19 0916)  SpO2: (!) 63 % (05/07/19 1041) Vital Signs (24h Range):  Temp:  [97.4 °F (36.3 °C)-98.4 °F (36.9 °C)] 98 °F (36.7 °C)  Pulse:  [54-92] 67  Resp:  [18-20] 20  SpO2:  [63 %-100 %] 63 %  BP: (111-133)/(57-81) 123/57     Weight: 123.9 kg (273 lb 2.4 oz)  Body mass index is 44.09 kg/m².     SpO2: (!) 63 %  O2 Device (Oxygen Therapy): room air    Intake/Output - Last 3 Shifts       05/05 0700 - 05/06 0659 05/06 0700 - 05/07 0659 05/07 0700 - 05/08 0659    P.O.  240 520    Total Intake(mL/kg)  240 (1.9) 520 (4.2)    Net  +240 +520           Urine Occurrence  1 x 1 x    Stool Occurrence  0 x     Emesis Occurrence  0 x           Lines/Drains/Airways     Peripheral Intravenous Line                 Peripheral IV - Single Lumen 05/06/19 1800 24 G Posterior;Right Hand less than 1 day                Scheduled Medications:    amLODIPine  2.5 mg Oral Daily    coenzyme Q10  100 mg Oral BID    digoxin  125 mcg Oral Daily    hydroCHLOROthiazide  25 mg Oral Daily    sotalol  80 mg Oral BID    warfarin  10 mg Oral Daily       Continuous Medications:       PRN Medications: acetaminophen, ibuprofen    Physical Exam  General: Well-developed, well-nourished/obese. Awake/Alert and in NAD.   HEENT: Normocephalic. Atraumatic. EOMI. Nares/Oropharynx clear. MMM.   Neck: Supple.   Respiratory: Symmetrical chest wall rise. CTA bilaterally.   Cardiac: Regular rate and normal Rhythm. Normal S1 and physiologically split S2. No murmur, rub or gallop.   Abdomen: Soft. NTND. No juanita hepatosplenomegaly but obese abdomen makes it difficult to palpate. +BS.   Extremities: No cyanosis, clubbing or edema. Brisk capillary refill. Pulses 2+  bilaterally to upper and lower extremities.  Derm: No rashes or lesions noted.   MS: No focal motor weakness. Normal muscle tone.  Neuro: Intact.   Psych: Patient appropriate.     Significant Labs:     CMP  Sodium   Date Value Ref Range Status   05/06/2019 139 136 - 145 mmol/L Final     Potassium   Date Value Ref Range Status   05/06/2019 4.0 3.5 - 5.1 mmol/L Final     Chloride   Date Value Ref Range Status   05/06/2019 107 95 - 110 mmol/L Final     CO2   Date Value Ref Range Status   05/06/2019 23 23 - 29 mmol/L Final     Glucose   Date Value Ref Range Status   05/06/2019 92 70 - 110 mg/dL Final     BUN, Bld   Date Value Ref Range Status   05/06/2019 8 6 - 20 mg/dL Final     Creatinine   Date Value Ref Range Status   05/06/2019 0.7 0.5 - 1.4 mg/dL Final     Calcium   Date Value Ref Range Status   05/06/2019 10.0 8.7 - 10.5 mg/dL Final     Total Protein   Date Value Ref Range Status   02/02/2017 7.9 6.0 - 8.4 g/dL Final     Albumin   Date Value Ref Range Status   02/02/2017 4.2 3.2 - 4.7 g/dL Final     Total Bilirubin   Date Value Ref Range Status   02/02/2017 1.1 (H) 0.1 - 1.0 mg/dL Final     Comment:     For infants and newborns, interpretation of results should be based  on gestational age, weight and in agreement with clinical  observations.  Premature Infant recommended reference ranges:  Up to 24 hours.............<8.0 mg/dL  Up to 48 hours............<12.0 mg/dL  3-5 days..................<15.0 mg/dL  6-29 days.................<15.0 mg/dL       Alkaline Phosphatase   Date Value Ref Range Status   02/02/2017 77 52 - 171 U/L Final     AST   Date Value Ref Range Status   02/02/2017 21 10 - 40 U/L Final     ALT   Date Value Ref Range Status   02/02/2017 15 10 - 44 U/L Final     Anion Gap   Date Value Ref Range Status   05/06/2019 9 8 - 16 mmol/L Final     eGFR if    Date Value Ref Range Status   05/06/2019 >60.0 >60 mL/min/1.73 m^2 Final     eGFR if non    Date Value Ref Range  Status   05/06/2019 >60.0 >60 mL/min/1.73 m^2 Final     Comment:     Calculation used to obtain the estimated glomerular filtration  rate (eGFR) is the CKD-EPI equation.            Significant Imaging:     No new images

## 2019-05-07 NOTE — PLAN OF CARE
Problem: Adult Inpatient Plan of Care  Goal: Plan of Care Review  Outcome: Ongoing (interventions implemented as appropriate)  Vitals stable, afebrile. HR 60s-80s. No tele alarms. Happy and calm behavior. Meds given per order. EKG performed. Patient to be on clears at 6am and NPO at 8 am tomorrow morning, verbalized understanding. Eating/drinking well, voiding well. Plan reviewed with father and patient. Verbalized all understanding. Safety maintained. Will cont to monitor.

## 2019-05-07 NOTE — HPI
Kacey Ruth is a 19 yo female with h/o pulmonary atresia with intact inter-ventricular septum and an atrial septal defect s/p reconstruction of the right ventricular outflow tract and placement of a bioprosthetic pulmonary valve. She subsequently has required multiple valve replacements. Her most recent heart surgery involved placement of a bioprosthetic valve in the pulmonary position and placement of a bioprosthetic valve in the tricuspid position, in 2007. Recently, she had a heart catheterization for recurrent insufficiency and obstruction of the bioprosthetic valve that had been placed in the tricuspid position. 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 She leads a rather sedentary lifestyle, and is overweight.     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 At her recent clinic follow-up, she reported having had several short episodes of rapid HRs in the middle of the night whiched about 3 times per week, woke her up from sleep and frightened her. A recent transmission showed a run of probable atrial flutter at  ~ 170 bpm.  According to the patient, she recently had thyroid studies and they were all normal.     Due to symptomatic tachycardia (180-210 bpm) and hypertension, in addition to prolonged coagulation labs (INR 2.4), the patient was admitted for further management.

## 2019-05-07 NOTE — PLAN OF CARE
05/07/19 1533   Discharge Assessment   Assessment Type Discharge Planning Assessment   Confirmed/corrected address and phone number on facesheet? Yes   Assessment information obtained from? Patient   Expected Length of Stay (days) 3   Communicated expected length of stay with patient/caregiver yes   Prior to hospitilization cognitive status: Alert/Oriented   Prior to hospitalization functional status: Independent   Current cognitive status: Alert/Oriented   Current Functional Status: Independent   Lives With parent(s)   Able to Return to Prior Arrangements yes   Is patient able to care for self after discharge? Yes   Who are your caregiver(s) and their phone number(s)? mother: Addis Fish [phone]   Patient's perception of discharge disposition admitted as an inpatient   Readmission Within the Last 30 Days no previous admission in last 30 days   Patient currently being followed by outpatient case management? No   Patient currently receives any other outside agency services? No   Equipment Currently Used at Home none   Do you have any problems affording any of your prescribed medications? No   Is the patient taking medications as prescribed? yes   Does the patient have transportation home? Yes   Transportation Anticipated family or friend will provide   Does the patient receive services at the [facility]? No   Discharge Plan A Home with family   Discharge Plan B Home with family   DME Needed Upon Discharge  none   Patient/Family in Agreement with Plan yes   Pt admitted with tachycardia, on telemetry, adjusting medications. Pt lives with her mother, has + ride home for dc and [place], [place]. No dc needs notified with pt, explained role of case management, wrote name and number on white board. Will follow.

## 2019-05-07 NOTE — ASSESSMENT & PLAN NOTE
Kacey is a 18-year-old AA female with pulmonary atresia with intact ventricular septum. She underwent reconstruction of the right ventricular outflow tract and placement of a bioprosthetic pulmonary valve in early childhood. She has had multiple valve replacements. Her most recent open heart surgery involved placement of a bioprosthetic valve in the pulmonary position and placement of a bioprosthetic valve in the tricuspid position in 2007. Recently, she had a heart catheterization for recurrent tricuspid valve insufficiency and stenosis. It entailed tricuspid valve balloon valvuloplasty with a Z-Med 25 x 5 cm balloon followed by placement of an Sotelo S3 26 mm valve in Feb 2017. The catheterization also showed that she had only mild pulmonary valve stenosis and insufficiency.  She was found by Dr. Gonzalez to have SVT vs. Atrial flutter and started on Metoprolol. Patient reportedly non-compliant and with complaints of tachycardia in clinic yesterday so admitted for transition to Sotalol.   Plan:  - Monitor on telemetry  - Continue home Amlodipine, CoEnzyme Q10, Digoxin, HCTZ, and Warfarin  - Maintain Sotalol 80mg BID  - EKG daily  - Plan for loop tomorrow.   - NPO at 0600, Clears until 0800  - Need to monitor until at least Thursday due to high risk of lethal arrhythmias with initiation of Sotalol.

## 2019-05-07 NOTE — SUBJECTIVE & OBJECTIVE
Past Medical History:   Diagnosis Date    Obesity     Pulmonary atresia with intact ventricular septum     SVT (supraventricular tachycardia)        Past Surgical History:   Procedure Laterality Date    ABLATION, SVT, ACCESSORY PATHWAY Bilateral 2/6/2019    Performed by [facility]o [facility]les [facility] at [facility] EP LAB    MEMO PROCEDURE      bt shunt and transannular patch    CARDIAC VALVE REPLACEMENT      ECHOCARDIOGRAM,TRANSESOPHAGEAL N/A 2/6/2019    Performed by [facility] at [facility] EP LAB    INSERTION, IMPLANTABLE LOOP RECORDER N/A 2/6/2019    Performed by Romeo Robles [facility] at [facility] EP LAB    PULMONARY VALVE REPLACEMENT  01/02/2007    25mm sharyn charles    REPLACEMENT-VALVE-PULMONARY N/A 2/16/2017    Performed by Zachary Berman Jr., MD at [facility] CATH LAB    TRANSESOPHAGEAL ECHOCARDIOGRAM (SAI) N/A 2/16/2017    Performed by Zachary Berman Jr., MD at [facility] CATH LAB    TRICUSPID VALVE REPLACEMENT  01/02/2007    25 mm sharyn-charles       Review of patient's allergies indicates:  No Known Allergies    Current Facility-Administered Medications on File Prior to Encounter   Medication    0.9%  NaCl infusion    sodium chloride 0.9% flush 5 mL     Current Outpatient Medications on File Prior to Encounter   Medication Sig    acetaminophen (TYLENOL) 325 MG tablet Take 2 tablets (650 mg total) by mouth every 6 (six) hours as needed.    digoxin (LANOXIN) 125 mcg tablet Take 1 tablet (125 mcg total) by mouth once daily.    hydroCHLOROthiazide (HYDRODIURIL) 12.5 MG Tab Take 25 mg by mouth once daily.    metoprolol succinate (TOPROL-XL) 50 MG 24 hr tablet Take 50 mg by mouth once daily.    warfarin (COUMADIN) 5 MG tablet Take 7.5 mg by mouth once daily.    amlodipine (NORVASC) 2.5 MG tablet Take 1 tablet (2.5 mg total) by mouth once daily.    coenzyme Q10 (COQ-10) 100 mg capsule Take 1 capsule (100 mg total) by mouth 2 (two) times daily.    enoxaparin (LOVENOX) 60 mg/0.6 mL Syrg Inject 60 mg  into the skin every 12 (twelve) hours.     Family History     None        Social History     Social History Narrative    Not on file     Review of Systems   Constitutional: Negative.    HENT: Negative.    Eyes: Negative.    Respiratory: Positive for cough and shortness of breath. Negative for wheezing and stridor.    Cardiovascular: Positive for palpitations. Negative for chest pain and leg swelling.   Gastrointestinal: Negative.    Endocrine: Negative.    Genitourinary: Negative.    Musculoskeletal: Negative.    Skin: Negative.    Allergic/Immunologic: Negative.    Neurological: Negative.    Hematological: Negative.    Psychiatric/Behavioral: Negative.      Objective:     Vital Signs (Most Recent):  Temp: 98.4 °F (36.9 °C) (05/06/19 1952)  Pulse: 92 (05/06/19 1952)  Resp: 20 (05/06/19 1952)  BP: 127/72 (05/06/19 1952)  SpO2: 99 % (05/06/19 1952) Vital Signs (24h Range):  Temp:  [98.3 °F (36.8 °C)-98.4 °F (36.9 °C)] 98.4 °F (36.9 °C)  Pulse:  [79-92] 92  Resp:  [20] 20  SpO2:  [99 %-100 %] 99 %  BP: (117-129)/(63-81) 127/72     Weight: 123.9 kg (273 lb 2.4 oz)  Body mass index is 44.09 kg/m².    SpO2: 99 %  O2 Device (Oxygen Therapy): room air    No intake or output data in the 24 hours ending 05/06/19 2026    Lines/Drains/Airways     Peripheral Intravenous Line                 Peripheral IV - Single Lumen 05/06/19 1800 24 G Posterior;Right Hand less than 1 day                Physical Exam   Constitutional: She is oriented to person, place, and time. She appears well-developed and well-nourished. No distress.   Obese, in NAD   HENT:   Head: Normocephalic and atraumatic.   Nose: Nose normal.   Mouth/Throat: Oropharynx is clear and moist.   Eyes: Pupils are equal, round, and reactive to light. Conjunctivae and EOM are normal.   Neck: Normal range of motion. Neck supple.   Cardiovascular: Regular rhythm and normal heart sounds. Exam reveals no gallop and no friction rub.   No murmur heard.  Tachycardic to 130s    Pulmonary/Chest: Effort normal and breath sounds normal. No stridor. She has no wheezes. She has no rales.   Abdominal: Soft. Bowel sounds are normal. She exhibits no distension. There is no tenderness.   Genitourinary:   Genitourinary Comments: Deferred   Musculoskeletal: Normal range of motion.   Neurological: She is alert and oriented to person, place, and time. No cranial nerve deficit or sensory deficit. She exhibits normal muscle tone. Coordination normal.   Skin: Skin is warm. Capillary refill takes less than 2 seconds. No rash noted. She is not diaphoretic.   Psychiatric: She has a normal mood and affect. Her behavior is normal.       Significant Labs:   Recent Lab Results       05/06/19  1720   05/06/19  1708        Anion Gap 9       BUN, Bld 8       Calcium 10.0       Chloride 107       CO2 23       Creatinine 0.7       eGFR if  >60.0       eGFR if non  >60.0  Comment:  Calculation used to obtain the estimated glomerular filtration  rate (eGFR) is the CKD-EPI equation.          Glucose 92       Potassium 4.0       Preg Test, Ur   Negative     Sodium 139             Significant Imaging: EKG: Pending

## 2019-05-07 NOTE — PLAN OF CARE
Problem: Adult Inpatient Plan of Care  Goal: Plan of Care Review  Outcome: Ongoing (interventions implemented as appropriate)  VSS, afebrile. Tele in place, no alarms. RRR. PIV to R hand saline locked. C/o headache pain, resolved with PRN tylenol x1 and motrin x1. EKG done this AM. Started on sotalol this shift. Eating/drinking well. Void x1. Family at bedside. Safety maintained, will continue to monitor.

## 2019-05-07 NOTE — H&P
[facility]  Pediatric Cardiology  History and Physical     Patient Name: Kacey Ruth  MRN: [id number]  Admission Date: 5/6/2019  Code Status: No Order   Attending Provider: Pretty Sawyer MD   Primary Cardiologist: Dr. Donovan Gonzalez  Primary Care Physician: Sharad Yun MD  Principal Problem:<principal problem not specified>    Subjective:     Chief Complaint:  tachycardia     HPI:  [HPI taken from Primary Cardiologist, Dr. Donovan Gonzalez, and expanded upon]     Kacey Ruth is a 19 yo female with h/o pulmonary atresia with intact inter-ventricular septum and an atrial septal defect s/p reconstruction of the right ventricular outflow tract and placement of a bioprosthetic pulmonary valve. She subsequently has required multiple valve replacements. Her most recent heart surgery involved placement of a bioprosthetic valve in the pulmonary position and placement of a bioprosthetic valve in the tricuspid position, in 2007. Recently, she had a heart catheterization for recurrent insufficiency and obstruction of the bioprosthetic valve that had been placed in the tricuspid position. 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 She leads a rather sedentary lifestyle, and is overweight. She currently is on Digoxin 0.125 mg once a day, Norvasc 2.5 mg once a day for BP. The Lasix was changed to HCTZ 25 mg once a day.  She should additionally be on Metoprolol ER 50 mg am and 25 mg pm, and Warfarin 10 mg M, Tu, W,Th and Fri; and 7.5 mg Sa, andSun.     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 She comes to clinic today for follow-up. She reports having had several short episodes of rapid HRs in the middle of the night. It happens about 3 times per week; they get her up and she gets very  magdy. A recent transmission showed a run of probable atrial flutter at  ~ 170 bpm.  According to the patient, she recently have thyroid studies and they were all normal. At today's visit, there has been some improvement since increasing the Metoprolol to 50 mg in the am and 25 mg in the pm.       However, due to symptomatic tachycardia (180-210 bpm) and hypertension, in addition to prolonged coagulation labs (INR 2.4), the patient was admitted for further management.         Past Medical History:   Diagnosis Date    Obesity     Pulmonary atresia with intact ventricular septum     SVT (supraventricular tachycardia)        Past Surgical History:   Procedure Laterality Date    ABLATION, SVT, ACCESSORY PATHWAY Bilateral 2/6/2019    Performed by [facility]les MD at [facility] EP LAB    MEMO PROCEDURE      bt shunt and transannular patch    CARDIAC VALVE REPLACEMENT      ECHOCARDIOGRAM,TRANSESOPHAGEAL N/A 2/6/2019    Performed by [facility] Provider at [facility] EP LAB    INSERTION, IMPLANTABLE LOOP RECORDER N/A 2/6/2019    Performed by Romeo Robles MD at [facility] EP LAB    PULMONARY VALVE REPLACEMENT  01/02/2007    25mm sharyn charles    REPLACEMENT-VALVE-PULMONARY N/A 2/16/2017    Performed by Zachary Berman Jr., MD at [facility] CATH LAB    TRANSESOPHAGEAL ECHOCARDIOGRAM (SAI) N/A 2/16/2017    Performed by Zachary Berman Jr., MD at [facility] CATH LAB    TRICUSPID VALVE REPLACEMENT  01/02/2007    25 mm sharyn-charles       Review of patient's allergies indicates:  No Known Allergies    Current Facility-Administered Medications on File Prior to Encounter   Medication    0.9%  NaCl infusion    sodium chloride 0.9% flush 5 mL     Current Outpatient Medications on File Prior to Encounter   Medication Sig    acetaminophen (TYLENOL) 325 MG tablet Take 2 tablets (650 mg total) by mouth every 6 (six) hours as needed.    digoxin (LANOXIN) 125 mcg tablet Take 1 tablet (125 mcg total) by mouth once daily.     hydroCHLOROthiazide (HYDRODIURIL) 12.5 MG Tab Take 25 mg by mouth once daily.    metoprolol succinate (TOPROL-XL) 50 MG 24 hr tablet Take 50 mg by mouth once daily.    warfarin (COUMADIN) 5 MG tablet Take 7.5 mg by mouth once daily.    amlodipine (NORVASC) 2.5 MG tablet Take 1 tablet (2.5 mg total) by mouth once daily.    coenzyme Q10 (COQ-10) 100 mg capsule Take 1 capsule (100 mg total) by mouth 2 (two) times daily.    enoxaparin (LOVENOX) 60 mg/0.6 mL Syrg Inject 60 mg into the skin every 12 (twelve) hours.     Family History     None        Social History     Social History Narrative    Not on file     Review of Systems   Constitutional: Negative.    HENT: Negative.    Eyes: Negative.    Respiratory: Positive for cough and shortness of breath. Negative for wheezing and stridor.    Cardiovascular: Positive for palpitations. Negative for chest pain and leg swelling.   Gastrointestinal: Negative.    Endocrine: Negative.    Genitourinary: Negative.    Musculoskeletal: Negative.    Skin: Negative.    Allergic/Immunologic: Negative.    Neurological: Negative.    Hematological: Negative.    Psychiatric/Behavioral: Negative.      Objective:     Vital Signs (Most Recent):  Temp: 98.4 °F (36.9 °C) (05/06/19 1952)  Pulse: 92 (05/06/19 1952)  Resp: 20 (05/06/19 1952)  BP: 127/72 (05/06/19 1952)  SpO2: 99 % (05/06/19 1952) Vital Signs (24h Range):  Temp:  [98.3 °F (36.8 °C)-98.4 °F (36.9 °C)] 98.4 °F (36.9 °C)  Pulse:  [79-92] 92  Resp:  [20] 20  SpO2:  [99 %-100 %] 99 %  BP: (117-129)/(63-81) 127/72     Weight: 123.9 kg (273 lb 2.4 oz)  Body mass index is 44.09 kg/m².    SpO2: 99 %  O2 Device (Oxygen Therapy): room air    No intake or output data in the 24 hours ending 05/06/19 2026    Lines/Drains/Airways     Peripheral Intravenous Line                 Peripheral IV - Single Lumen 05/06/19 1800 24 G Posterior;Right Hand less than 1 day                Physical Exam   Constitutional: She is oriented to person,  place, and time. She appears well-developed and well-nourished. No distress.   Obese, in NAD   HENT:   Head: Normocephalic and atraumatic.   Nose: Nose normal.   Mouth/Throat: Oropharynx is clear and moist.   Eyes: Pupils are equal, round, and reactive to light. Conjunctivae and EOM are normal.   Neck: Normal range of motion. Neck supple.   Cardiovascular: Regular rhythm and normal heart sounds. Exam reveals no gallop and no friction rub.   No murmur heard.  Tachycardic to 130s   Pulmonary/Chest: Effort normal and breath sounds normal. No stridor. She has no wheezes. She has no rales.   Abdominal: Soft. Bowel sounds are normal. She exhibits no distension. There is no tenderness.   Genitourinary:   Genitourinary Comments: Deferred   Musculoskeletal: Normal range of motion.   Neurological: She is alert and oriented to person, place, and time. No cranial nerve deficit or sensory deficit. She exhibits normal muscle tone. Coordination normal.   Skin: Skin is warm. Capillary refill takes less than 2 seconds. No rash noted. She is not diaphoretic.   Psychiatric: She has a normal mood and affect. Her behavior is normal.       Significant Labs:   Recent Lab Results       05/06/19  1720   05/06/19  1708        Anion Gap 9       BUN, Bld 8       Calcium 10.0       Chloride 107       CO2 23       Creatinine 0.7       eGFR if  >60.0       eGFR if non  >60.0  Comment:  Calculation used to obtain the estimated glomerular filtration  rate (eGFR) is the CKD-EPI equation.          Glucose 92       Potassium 4.0       Preg Test, Ur   Negative     Sodium 139             Significant Imaging: EKG: Pending    Assessment and Plan:     Cardiac/Vascular  Atrial flutter  Kacey is a 18-year-old AA female originally diagnosed to have pulmonary atresia with intact inter-ventricular septum, who is status-post multiple open heart operations involving both the pulmonary and tricuspid valves, who presents with  prolonged tachycardia of 180+, presumably due IART.    Tachycardia  - Currently resolved, will continue to monitor  - Ordered home Amlodipine, CoEnzyme Q10, Digoxin, HCTZ, and Warfarin  - Added Sotalol 80mg BID  - Ordered EKG  - Monitoring for symptoms    FEN/GI  - Tolerating regular diet  - No acute intervention needed at this time            Vadim Vee MD  Pediatric Cardiology   [facility]

## 2019-05-07 NOTE — PROGRESS NOTES
[facility]  Pediatric Cardiology  Progress Note    Patient Name: Kacey Ruth  MRN: [id number]  Admission Date: 5/6/2019  Hospital Length of Stay: 1 days  Code Status: No Order   Attending Physician: Pretty Sawyer MD   Primary Care Physician: Sharad Yun MD  Expected Discharge Date: 5/10/2019  Principal Problem:<principal problem not specified>    Subjective:     Interval History: No rhythm concerns overnight on telemetry. She has complaints of a mild headache this morning but otherwise did well overnight.     Objective:     Vital Signs (Most Recent):  Temp: 98 °F (36.7 °C) (05/07/19 0916)  Pulse: 67 (05/07/19 1041)  Resp: 20 (05/07/19 0916)  BP: (!) 123/57 (05/07/19 0916)  SpO2: (!) 63 % (05/07/19 1041) Vital Signs (24h Range):  Temp:  [97.4 °F (36.3 °C)-98.4 °F (36.9 °C)] 98 °F (36.7 °C)  Pulse:  [54-92] 67  Resp:  [18-20] 20  SpO2:  [63 %-100 %] 63 %  BP: (111-133)/(57-81) 123/57     Weight: 123.9 kg (273 lb 2.4 oz)  Body mass index is 44.09 kg/m².     SpO2: (!) 63 %  O2 Device (Oxygen Therapy): room air    Intake/Output - Last 3 Shifts       05/05 0700 - 05/06 0659 05/06 0700 - 05/07 0659 05/07 0700 - 05/08 0659    P.O.  240 520    Total Intake(mL/kg)  240 (1.9) 520 (4.2)    Net  +240 +520           Urine Occurrence  1 x 1 x    Stool Occurrence  0 x     Emesis Occurrence  0 x           Lines/Drains/Airways     Peripheral Intravenous Line                 Peripheral IV - Single Lumen 05/06/19 1800 24 G Posterior;Right Hand less than 1 day                Scheduled Medications:    amLODIPine  2.5 mg Oral Daily    coenzyme Q10  100 mg Oral BID    digoxin  125 mcg Oral Daily    hydroCHLOROthiazide  25 mg Oral Daily    sotalol  80 mg Oral BID    warfarin  10 mg Oral Daily       Continuous Medications:       PRN Medications: acetaminophen, ibuprofen    Physical Exam  General: Well-developed, well-nourished/obese. Awake/Alert and in NAD.   HEENT: Normocephalic. Atraumatic. EOMI.  Nares/Oropharynx clear. MMM.   Neck: Supple.   Respiratory: Symmetrical chest wall rise. CTA bilaterally.   Cardiac: Regular rate and normal Rhythm. Normal S1 and physiologically split S2. No murmur, rub or gallop.   Abdomen: Soft. NTND. No juanita hepatosplenomegaly but obese abdomen makes it difficult to palpate. +BS.   Extremities: No cyanosis, clubbing or edema. Brisk capillary refill. Pulses 2+ bilaterally to upper and lower extremities.  Derm: No rashes or lesions noted.   MS: No focal motor weakness. Normal muscle tone.  Neuro: Intact.   Psych: Patient appropriate.     Significant Labs:     CMP  Sodium   Date Value Ref Range Status   05/06/2019 139 136 - 145 mmol/L Final     Potassium   Date Value Ref Range Status   05/06/2019 4.0 3.5 - 5.1 mmol/L Final     Chloride   Date Value Ref Range Status   05/06/2019 107 95 - 110 mmol/L Final     CO2   Date Value Ref Range Status   05/06/2019 23 23 - 29 mmol/L Final     Glucose   Date Value Ref Range Status   05/06/2019 92 70 - 110 mg/dL Final     BUN, Bld   Date Value Ref Range Status   05/06/2019 8 6 - 20 mg/dL Final     Creatinine   Date Value Ref Range Status   05/06/2019 0.7 0.5 - 1.4 mg/dL Final     Calcium   Date Value Ref Range Status   05/06/2019 10.0 8.7 - 10.5 mg/dL Final     Total Protein   Date Value Ref Range Status   02/02/2017 7.9 6.0 - 8.4 g/dL Final     Albumin   Date Value Ref Range Status   02/02/2017 4.2 3.2 - 4.7 g/dL Final     Total Bilirubin   Date Value Ref Range Status   02/02/2017 1.1 (H) 0.1 - 1.0 mg/dL Final     Comment:     For infants and newborns, interpretation of results should be based  on gestational age, weight and in agreement with clinical  observations.  Premature Infant recommended reference ranges:  Up to 24 hours.............<8.0 mg/dL  Up to 48 hours............<12.0 mg/dL  3-5 days..................<15.0 mg/dL  6-29 days.................<15.0 mg/dL       Alkaline Phosphatase   Date Value Ref Range Status   02/02/2017 77 52 -  171 U/L Final     AST   Date Value Ref Range Status   02/02/2017 21 10 - 40 U/L Final     ALT   Date Value Ref Range Status   02/02/2017 15 10 - 44 U/L Final     Anion Gap   Date Value Ref Range Status   05/06/2019 9 8 - 16 mmol/L Final     eGFR if    Date Value Ref Range Status   05/06/2019 >60.0 >60 mL/min/1.73 m^2 Final     eGFR if non    Date Value Ref Range Status   05/06/2019 >60.0 >60 mL/min/1.73 m^2 Final     Comment:     Calculation used to obtain the estimated glomerular filtration  rate (eGFR) is the CKD-EPI equation.            Significant Imaging:     No new images      Assessment and Plan:     Cardiac/Vascular  Atrial flutter  Kacey is a 18-year-old AA female with pulmonary atresia with intact ventricular septum. She underwent reconstruction of the right ventricular outflow tract and placement of a bioprosthetic pulmonary valve in early childhood. She has had multiple valve replacements. Her most recent open heart surgery involved placement of a bioprosthetic valve in the pulmonary position and placement of a bioprosthetic valve in the tricuspid position in 2007. Recently, she had a heart catheterization for recurrent tricuspid valve insufficiency and stenosis. It entailed tricuspid valve balloon valvuloplasty with a Z-Med 25 x 5 cm balloon followed by placement of an Sotelo S3 26 mm valve in Feb 2017. The catheterization also showed that she had only mild pulmonary valve stenosis and insufficiency.  She was found by Dr. Gonzalez to have SVT vs. Atrial flutter and started on Metoprolol. Patient reportedly non-compliant and with complaints of tachycardia in clinic yesterday so admitted for transition to Sotalol.   Plan:  - Monitor on telemetry  - Continue home Amlodipine, CoEnzyme Q10, Digoxin, HCTZ, and Warfarin  - Maintain Sotalol 80mg BID  - EKG daily  - Plan for loop tomorrow.   - NPO at 0600, Clears until 0800  - Need to monitor until at least Thursday due to high  risk of lethal arrhythmias with initiation of Sotalol.         PAWAN Gomes  Pediatric Cardiology  [facility]

## 2019-05-07 NOTE — ASSESSMENT & PLAN NOTE
Kacey is a 18-year-old AA female originally diagnosed to have pulmonary atresia with intact inter-ventricular septum, who is status-post multiple open heart operations involving both the pulmonary and tricuspid valves, who presents with prolonged tachycardia of 180+, presumably due to A-fib.    Tachycardia  - Currently resolved, will continue to monitor  - Ordered home Amlodipine, CoEnzyme Q10, Digoxin, HCTZ, and Warfarin  - Added Sotalol 80mg BID  - Ordered EKG  - Monitoring for symptoms    FEN/GI  - Tolerating regular diet  - No acute intervention needed at this time

## 2019-05-08 ENCOUNTER — ANESTHESIA EVENT (OUTPATIENT)
Dept: MEDSURG UNIT | Facility: HOSPITAL | Age: 19
DRG: 262 | End: 2019-05-08
Payer: MEDICAID

## 2019-05-08 ENCOUNTER — ANESTHESIA (OUTPATIENT)
Dept: MEDSURG UNIT | Facility: HOSPITAL | Age: 19
DRG: 262 | End: 2019-05-08
Payer: MEDICAID

## 2019-05-08 DIAGNOSIS — I48.92 ATRIAL FLUTTER, UNSPECIFIED TYPE: ICD-10-CM

## 2019-05-08 DIAGNOSIS — I47.10 SVT (SUPRAVENTRICULAR TACHYCARDIA): Primary | ICD-10-CM

## 2019-05-08 PROBLEM — Z95.818 STATUS POST PLACEMENT OF IMPLANTABLE LOOP RECORDER: Status: ACTIVE | Noted: 2019-05-08

## 2019-05-08 PROCEDURE — D9220A PRA ANESTHESIA: Mod: ANES,,, | Performed by: ANESTHESIOLOGY

## 2019-05-08 PROCEDURE — 25000003 PHARM REV CODE 250: Performed by: STUDENT IN AN ORGANIZED HEALTH CARE EDUCATION/TRAINING PROGRAM

## 2019-05-08 PROCEDURE — 33285 PR INSERTION,SUBQ CARDIAC RHYTHM MONITOR, W/PRGRMG: ICD-10-PCS | Mod: ,,, | Performed by: PEDIATRICS

## 2019-05-08 PROCEDURE — 99232 SBSQ HOSP IP/OBS MODERATE 35: CPT | Mod: ,,, | Performed by: PEDIATRICS

## 2019-05-08 PROCEDURE — 93005 ELECTROCARDIOGRAM TRACING: CPT

## 2019-05-08 PROCEDURE — 11300000 HC PEDIATRIC PRIVATE ROOM

## 2019-05-08 PROCEDURE — 25000003 PHARM REV CODE 250: Performed by: PEDIATRICS

## 2019-05-08 PROCEDURE — 33285 INSJ SUBQ CAR RHYTHM MNTR: CPT | Mod: ,,, | Performed by: PEDIATRICS

## 2019-05-08 PROCEDURE — 25000003 PHARM REV CODE 250: Performed by: NURSE PRACTITIONER

## 2019-05-08 PROCEDURE — 37000008 HC ANESTHESIA 1ST 15 MINUTES: Performed by: PEDIATRICS

## 2019-05-08 PROCEDURE — 93010 ELECTROCARDIOGRAM REPORT: CPT | Mod: ,,, | Performed by: PEDIATRICS

## 2019-05-08 PROCEDURE — 99232 PR SUBSEQUENT HOSPITAL CARE,LEVL II: ICD-10-PCS | Mod: ,,, | Performed by: PEDIATRICS

## 2019-05-08 PROCEDURE — 33285 INSJ SUBQ CAR RHYTHM MNTR: CPT | Performed by: PEDIATRICS

## 2019-05-08 PROCEDURE — D9220A PRA ANESTHESIA: Mod: CRNA,,, | Performed by: NURSE ANESTHETIST, CERTIFIED REGISTERED

## 2019-05-08 PROCEDURE — 37000009 HC ANESTHESIA EA ADD 15 MINS: Performed by: PEDIATRICS

## 2019-05-08 PROCEDURE — C1764 EVENT RECORDER, CARDIAC: HCPCS | Performed by: PEDIATRICS

## 2019-05-08 PROCEDURE — 63600175 PHARM REV CODE 636 W HCPCS: Performed by: NURSE ANESTHETIST, CERTIFIED REGISTERED

## 2019-05-08 PROCEDURE — 93010 EKG 12-LEAD: ICD-10-PCS | Mod: ,,, | Performed by: PEDIATRICS

## 2019-05-08 PROCEDURE — D9220A PRA ANESTHESIA: ICD-10-PCS | Mod: CRNA,,, | Performed by: NURSE ANESTHETIST, CERTIFIED REGISTERED

## 2019-05-08 PROCEDURE — D9220A PRA ANESTHESIA: ICD-10-PCS | Mod: ANES,,, | Performed by: ANESTHESIOLOGY

## 2019-05-08 DEVICE — MON LNQ11 REVEAL LINQ USA FW2.0
Type: IMPLANTABLE DEVICE | Site: CHEST | Status: FUNCTIONAL
Brand: REVEAL LINQ™

## 2019-05-08 RX ORDER — PROPOFOL 10 MG/ML
VIAL (ML) INTRAVENOUS CONTINUOUS PRN
Status: DISCONTINUED | OUTPATIENT
Start: 2019-05-08 | End: 2019-05-08

## 2019-05-08 RX ORDER — MIDAZOLAM HYDROCHLORIDE 1 MG/ML
INJECTION, SOLUTION INTRAMUSCULAR; INTRAVENOUS
Status: DISCONTINUED | OUTPATIENT
Start: 2019-05-08 | End: 2019-05-08

## 2019-05-08 RX ORDER — CEFAZOLIN SODIUM 1 G/3ML
INJECTION, POWDER, FOR SOLUTION INTRAMUSCULAR; INTRAVENOUS
Status: DISCONTINUED | OUTPATIENT
Start: 2019-05-08 | End: 2019-05-08

## 2019-05-08 RX ORDER — HYDROMORPHONE HYDROCHLORIDE 1 MG/ML
0.2 INJECTION, SOLUTION INTRAMUSCULAR; INTRAVENOUS; SUBCUTANEOUS EVERY 5 MIN PRN
Status: DISCONTINUED | OUTPATIENT
Start: 2019-05-08 | End: 2019-05-09

## 2019-05-08 RX ORDER — CEFAZOLIN SODIUM 1 G/3ML
2 INJECTION, POWDER, FOR SOLUTION INTRAMUSCULAR; INTRAVENOUS
Status: DISCONTINUED | OUTPATIENT
Start: 2019-05-08 | End: 2019-05-09

## 2019-05-08 RX ORDER — SODIUM CHLORIDE 0.9 % (FLUSH) 0.9 %
3 SYRINGE (ML) INJECTION
Status: DISCONTINUED | OUTPATIENT
Start: 2019-05-08 | End: 2019-05-09 | Stop reason: HOSPADM

## 2019-05-08 RX ORDER — LIDOCAINE HYDROCHLORIDE AND EPINEPHRINE 20; 10 MG/ML; UG/ML
INJECTION, SOLUTION INFILTRATION; PERINEURAL
Status: DISCONTINUED | OUTPATIENT
Start: 2019-05-08 | End: 2019-05-08

## 2019-05-08 RX ORDER — FENTANYL CITRATE 50 UG/ML
25 INJECTION, SOLUTION INTRAMUSCULAR; INTRAVENOUS EVERY 5 MIN PRN
Status: DISCONTINUED | OUTPATIENT
Start: 2019-05-08 | End: 2019-05-09

## 2019-05-08 RX ORDER — FENTANYL CITRATE 50 UG/ML
INJECTION, SOLUTION INTRAMUSCULAR; INTRAVENOUS
Status: DISCONTINUED | OUTPATIENT
Start: 2019-05-08 | End: 2019-05-08

## 2019-05-08 RX ORDER — PROPOFOL 10 MG/ML
VIAL (ML) INTRAVENOUS
Status: DISCONTINUED | OUTPATIENT
Start: 2019-05-08 | End: 2019-05-08

## 2019-05-08 RX ORDER — LIDOCAINE HCL/PF 100 MG/5ML
SYRINGE (ML) INTRAVENOUS
Status: DISCONTINUED | OUTPATIENT
Start: 2019-05-08 | End: 2019-05-08

## 2019-05-08 RX ADMIN — HYDROCHLOROTHIAZIDE 25 MG: 25 TABLET ORAL at 08:05

## 2019-05-08 RX ADMIN — LIDOCAINE HYDROCHLORIDE 80 MG: 20 INJECTION, SOLUTION INTRAVENOUS at 02:05

## 2019-05-08 RX ADMIN — SOTALOL HYDROCHLORIDE 80 MG: 80 TABLET ORAL at 08:05

## 2019-05-08 RX ADMIN — MIDAZOLAM 2 MG: 1 INJECTION INTRAMUSCULAR; INTRAVENOUS at 02:05

## 2019-05-08 RX ADMIN — CEFAZOLIN 2 G: 330 INJECTION, POWDER, FOR SOLUTION INTRAMUSCULAR; INTRAVENOUS at 02:05

## 2019-05-08 RX ADMIN — FENTANYL CITRATE 25 MCG: 50 INJECTION, SOLUTION INTRAMUSCULAR; INTRAVENOUS at 02:05

## 2019-05-08 RX ADMIN — FENTANYL CITRATE 50 MCG: 50 INJECTION, SOLUTION INTRAMUSCULAR; INTRAVENOUS at 02:05

## 2019-05-08 RX ADMIN — Medication 100 MG: at 08:05

## 2019-05-08 RX ADMIN — PROPOFOL 100 MCG/KG/MIN: 10 INJECTION, EMULSION INTRAVENOUS at 02:05

## 2019-05-08 RX ADMIN — SODIUM CHLORIDE: 0.9 INJECTION, SOLUTION INTRAVENOUS at 02:05

## 2019-05-08 RX ADMIN — PROPOFOL 70 MG: 10 INJECTION, EMULSION INTRAVENOUS at 02:05

## 2019-05-08 RX ADMIN — ACETAMINOPHEN 650 MG: 325 TABLET ORAL at 11:05

## 2019-05-08 RX ADMIN — DIGOXIN 125 MCG: 125 TABLET ORAL at 08:05

## 2019-05-08 RX ADMIN — AMLODIPINE BESYLATE 2.5 MG: 2.5 TABLET ORAL at 08:05

## 2019-05-08 NOTE — PROGRESS NOTES
Patient admitted to recovery see [facility] for complete assessment pacu bcg's maintained safety measures verified patient instructed on pain scale and patient verbalized understanding. Dr. hoffmann here to check on patient.

## 2019-05-08 NOTE — NURSING TRANSFER
Nursing Transfer Note      5/8/2019     Transfer To: [id number]    Transfer via stretcher    Transfer with cardiac monitoring    Transported by selena     Medicines sent: none    Chart send with patient: Yes    Notified: reported to peds nurse and also let her know to update patient's mom and she stated ok    Patient reassessed at: see epic (date, time)    Upon arrival to floor: to room no complaints no distress noted.

## 2019-05-08 NOTE — TRANSFER OF CARE
"Anesthesia Transfer of Care Note    Patient: Kacey Ruth    Procedure(s) Performed: Procedure(s) (LRB):  INSERTION, IMPLANTABLE LOOP RECORDER (N/A)    Patient location: PACU    Anesthesia Type: general    Transport from OR: Transported from OR on 6-10 L/min O2 by face mask with adequate spontaneous ventilation    Post pain: adequate analgesia    Post assessment: no apparent anesthetic complications and tolerated procedure well    Post vital signs: stable    Level of consciousness: awake and responds to stimulation    Nausea/Vomiting: no nausea/vomiting    Complications: none    Transfer of care protocol was followed      Last vitals:   Visit Vitals  BP (!) 102/54 (BP Location: Right arm, Patient Position: Lying)   Pulse (!) 98   Temp 36.7 °C (98 °F) (Oral)   Resp 18   Ht 5' 6" (1.676 m)   Wt 123.9 kg (273 lb 2.4 oz)   LMP 03/06/2019 (Approximate)   SpO2 97%   Breastfeeding? No   BMI 44.09 kg/m²     "

## 2019-05-08 NOTE — SUBJECTIVE & OBJECTIVE
Interval History: No rhythm concerns overnight on telemetry.     Objective:     Vital Signs (Most Recent):  Temp: 97.6 °F (36.4 °C) (05/08/19 0912)  Pulse: 70 (05/08/19 1051)  Resp: 18 (05/08/19 0912)  BP: (!) 122/56 (05/08/19 0912)  SpO2: 97 % (05/08/19 0912) Vital Signs (24h Range):  Temp:  [97.3 °F (36.3 °C)-98 °F (36.7 °C)] 97.6 °F (36.4 °C)  Pulse:  [57-79] 70  Resp:  [18-20] 18  SpO2:  [97 %-100 %] 97 %  BP: ()/(50-68) 122/56     Weight: 123.9 kg (273 lb 2.4 oz)  Body mass index is 44.09 kg/m².     SpO2: 97 %  O2 Device (Oxygen Therapy): room air    Intake/Output - Last 3 Shifts       05/06 0700 - 05/07 0659 05/07 0700 - 05/08 0659 05/08 0700 - 05/09 0659    P.O. 240 2060     Total Intake(mL/kg) 240 (1.9) 2060 (16.6)     Net +240 +2060            Urine Occurrence 1 x 7 x     Stool Occurrence 0 x      Emesis Occurrence 0 x            Lines/Drains/Airways     Peripheral Intravenous Line                 Peripheral IV - Single Lumen 05/06/19 1800 24 G Posterior;Right Hand 1 day                Scheduled Medications:    amLODIPine  2.5 mg Oral Daily    coenzyme Q10  100 mg Oral BID    digoxin  125 mcg Oral Daily    hydroCHLOROthiazide  25 mg Oral Daily    sotalol  80 mg Oral BID    warfarin  10 mg Oral Daily       Continuous Medications:       PRN Medications: acetaminophen, ibuprofen    Physical Exam  General: Well-developed, well-nourished/obese. Awake/Alert and in NAD.   HEENT: Normocephalic. Atraumatic. EOMI. Nares/Oropharynx clear. MMM.   Neck: Supple.   Respiratory: Symmetrical chest wall rise. CTA bilaterally.   Cardiac: Regular rate and normal Rhythm. Normal S1 and physiologically split S2. No murmur, rub or gallop.   Abdomen: Soft. NTND. No juanita hepatosplenomegaly but obese abdomen makes it difficult to palpate. +BS.   Extremities: No cyanosis, clubbing or edema. Brisk capillary refill. Pulses 2+ bilaterally to upper and lower extremities.  Derm: No rashes or lesions noted.   MS: No focal motor  weakness. Normal muscle tone.  Neuro: Intact.   Psych: Patient appropriate.     Significant Labs:     No new labs today.     Significant Imaging:     No new images.

## 2019-05-08 NOTE — ANESTHESIA PREPROCEDURE EVALUATION
05/08/2019  Pre-operative evaluation for Procedure(s) (LRB):  INSERTION, IMPLANTABLE LOOP RECORDER (N/A)    Kacey Ruth is a 18 y.o. female with hx of pulm atresia, s/p PVR/TVR, reconstruction of RV-PA outflow. Here for loop recorder    Patient Active Problem List   Diagnosis    Pulmonary atresia with intact ventricular septum    S/P pulmonary valve replacement    S/P tricuspid valve replacement    Obesity    Tricuspid valve stenosis and regurgitation    LV dysfunction    SVT (supraventricular tachycardia)    Adult congenital heart disease    Palpitations    Atrial flutter       Review of patient's allergies indicates:  No Known Allergies    Current Facility-Administered Medications on File Prior to Encounter   Medication Dose Route Frequency Provider Last Rate Last Dose    0.9%  NaCl infusion   Intravenous Continuous Slime Salcido NP   Stopped at 02/06/19 1521    sodium chloride 0.9% flush 5 mL  5 mL Intravenous PRN Slime Salcido NP         Current Outpatient Medications on File Prior to Encounter   Medication Sig Dispense Refill    acetaminophen (TYLENOL) 325 MG tablet Take 2 tablets (650 mg total) by mouth every 6 (six) hours as needed. 15 tablet 0    digoxin (LANOXIN) 125 mcg tablet Take 1 tablet (125 mcg total) by mouth once daily. 31 tablet 6    hydroCHLOROthiazide (HYDRODIURIL) 12.5 MG Tab Take 25 mg by mouth once daily.      metoprolol succinate (TOPROL-XL) 50 MG 24 hr tablet Take 50 mg by mouth once daily.      warfarin (COUMADIN) 5 MG tablet Take 7.5 mg by mouth once daily.      amlodipine (NORVASC) 2.5 MG tablet Take 1 tablet (2.5 mg total) by mouth once daily. 30 tablet 11    coenzyme Q10 (COQ-10) 100 mg capsule Take 1 capsule (100 mg total) by mouth 2 (two) times daily. 62 capsule 6    enoxaparin (LOVENOX) 60 mg/0.6 mL Syrg Inject 60 mg into the skin every  12 (twelve) hours.         Past Surgical History:   Procedure Laterality Date    ABLATION, SVT, ACCESSORY PATHWAY Bilateral 2/6/2019    Performed by Romeo Robles MD at [facility] EP LAB    MEMO PROCEDURE      bt shunt and transannular patch    CARDIAC VALVE REPLACEMENT      ECHOCARDIOGRAM,TRANSESOPHAGEAL N/A 2/6/2019    Performed by [facility] Diagnostic Provider at [facility] EP LAB    INSERTION, IMPLANTABLE LOOP RECORDER N/A 2/6/2019    Performed by Romeo Robles MD at [facility] EP LAB    PULMONARY VALVE REPLACEMENT  01/02/2007    25mm sharyn charles    REPLACEMENT-VALVE-PULMONARY N/A 2/16/2017    Performed by Zachary Berman Jr., MD at [facility] CATH LAB    TRANSESOPHAGEAL ECHOCARDIOGRAM (SAI) N/A 2/16/2017    Performed by Zachary Berman Jr., MD at [facility] CATH LAB    TRICUSPID VALVE REPLACEMENT  01/02/2007    25 mm sharyn-charles       Social History     Socioeconomic History    Marital status: Single     Spouse name: Not on file    Number of children: Not on file    Years of education: Not on file    Highest education level: Not on file   Occupational History    Not on file   Social Needs    Financial resource strain: Not on file    Food insecurity:     Worry: Not on file     Inability: Not on file    Transportation needs:     Medical: Not on file     Non-medical: Not on file   Tobacco Use    Smoking status: Never Smoker   Substance and Sexual Activity    Alcohol use: No    Drug use: No    Sexual activity: Never   Lifestyle    Physical activity:     Days per week: Not on file     Minutes per session: Not on file    Stress: Not on file   Relationships    Social connections:     Talks on phone: Not on file     Gets together: Not on file     Attends Church service: Not on file     Active member of club or organization: Not on file     Attends meetings of clubs or organizations: Not on file     Relationship status: Not on file   Other Topics Concern    Not on file   Social History Narrative     Not on file         CMP:   Recent Labs     05/06/19  1720      K 4.0      CO2 23   BUN 8   CREATININE 0.7   GLU 92   CALCIUM 10.0         Anesthesia Evaluation    I have reviewed the Patient Summary Reports.    I have reviewed the Nursing Notes.   I have reviewed the Medications.     Review of Systems  Anesthesia Hx:  No problems with previous Anesthesia    Cardiovascular:   S/p PVR, TVR. Hx of pulm atresia   Pulmonary:  Pulmonary Normal    Renal/:  Renal/ Normal     Hepatic/GI:  Hepatic/GI Normal    Neurological:  Neurology Normal    Endocrine:  Endocrine Normal        Physical Exam  General:  Obesity    Airway/Jaw/Neck:  Airway Findings: Mouth Opening: Normal Tongue: Normal  General Airway Assessment: Adult  Mallampati: II  TM Distance: Normal, at least 6 cm       Chest/Lungs:  Chest/Lungs Findings: Clear to auscultation, Normal Respiratory Rate     Heart/Vascular:  Heart Findings: Rate: Normal  Rhythm: Regular Rhythm             Anesthesia Plan  Type of Anesthesia, risks & benefits discussed:  Anesthesia Type:  general, MAC  Patient's Preference:   Intra-op Monitoring Plan: standard ASA monitors  Intra-op Monitoring Plan Comments:   Post Op Pain Control Plan: multimodal analgesia and IV/PO Opioids PRN  Post Op Pain Control Plan Comments:   Induction:   IV  Beta Blocker:         Informed Consent: Patient understands risks and agrees with Anesthesia plan.  Questions answered. Anesthesia consent signed with patient.  ASA Score: 3     Day of Surgery Review of History & Physical:            Ready For Surgery From Anesthesia Perspective.

## 2019-05-08 NOTE — NURSING
Pt VSS, afebrile, no acute distress noted. Tele active, no significant alarms. Pt had loop device put in today. Dressing CDI. Pressure dressing on top. Pt putting ice intermittently on wound. No complaints of discomfort. HR's in the 70's since being on floor. Pt has had sips of water and food since returning to floor. Will advance to regular diet for dinner. POC reviewed w/ Pt and Mom, verbalized understanding. Will continue to monitor.

## 2019-05-08 NOTE — NURSING
Nursing Transfer Note    Sending Transfer Note      5/8/2019 2:34 PM  Transfer via strecherFrom peds [facility] to [facility]   Transfered with mom  Transported by: rn  Report given as documented in PER Handoff on Doc Flowsheet  VS's per Doc Flowsheet  Medicines sent: No  Chart sent with patient: Yes  What caregiver / guardian was Notified of transfer: Mother and Father  JATIN Schwartz RN  5/8/2019 2:15 PM

## 2019-05-08 NOTE — ASSESSMENT & PLAN NOTE
Kacey is a 18-year-old AA female with pulmonary atresia with intact ventricular septum. She underwent reconstruction of the right ventricular outflow tract and placement of a bioprosthetic pulmonary valve in early childhood. She has had multiple valve replacements. Her most recent open heart surgery involved placement of a bioprosthetic valve in the pulmonary position and placement of a bioprosthetic valve in the tricuspid position in 2007. Recently, she had a heart catheterization for recurrent tricuspid valve insufficiency and stenosis. It entailed tricuspid valve balloon valvuloplasty with a Z-Med 25 x 5 cm balloon followed by placement of an Sotelo S3 26 mm valve in Feb 2017. The catheterization also showed that she had only mild pulmonary valve stenosis and insufficiency.  She was found by Dr. Gonzalez to have SVT vs. Atrial flutter and started on Metoprolol. Patient reportedly non-compliant and with complaints of tachycardia in clinic yesterday so admitted for transition to Sotalol.   Plan:  - Monitor on telemetry  - Continue home Amlodipine, CoEnzyme Q10, Digoxin, HCTZ, and Warfarin  - Maintain Sotalol 80mg BID  - EKG daily  - Plan for loop today.   - Need to monitor until at least Thursday due to high risk of lethal arrhythmias with initiation of Sotalol.

## 2019-05-08 NOTE — PLAN OF CARE
Problem: Adult Inpatient Plan of Care  Goal: Plan of Care Review  Outcome: Ongoing (interventions implemented as appropriate)  POC reviewed w/ patient, mother, and brother. Verbalized understanding. VSS, afebrile, no distress noted. Continuous tele and pulse ox in place, no alarms noted. All medications given as scheduled. PRN dose of Tylenol and Motrin given x1 for headache, relief noted. Pt did well overnight. Tolerating regular diet. Voiding well. Pt resting well in bed with brother at bedside. Well continue to monitor.

## 2019-05-08 NOTE — PROGRESS NOTES
[facility]  Pediatric Cardiology  Progress Note    Patient Name: Kacey Ruth  MRN: [id number]  Admission Date: 5/6/2019  Hospital Length of Stay: 2 days  Code Status: No Order   Attending Physician: Pretty Sawyer MD   Primary Care Physician: Sharad Yun MD  Expected Discharge Date: 5/10/2019  Principal Problem:<principal problem not specified>    Subjective:     Interval History: No rhythm concerns overnight on telemetry.     Objective:     Vital Signs (Most Recent):  Temp: 97.6 °F (36.4 °C) (05/08/19 0912)  Pulse: 70 (05/08/19 1051)  Resp: 18 (05/08/19 0912)  BP: (!) 122/56 (05/08/19 0912)  SpO2: 97 % (05/08/19 0912) Vital Signs (24h Range):  Temp:  [97.3 °F (36.3 °C)-98 °F (36.7 °C)] 97.6 °F (36.4 °C)  Pulse:  [57-79] 70  Resp:  [18-20] 18  SpO2:  [97 %-100 %] 97 %  BP: ()/(50-68) 122/56     Weight: 123.9 kg (273 lb 2.4 oz)  Body mass index is 44.09 kg/m².     SpO2: 97 %  O2 Device (Oxygen Therapy): room air    Intake/Output - Last 3 Shifts       05/06 0700 - 05/07 0659 05/07 0700 - 05/08 0659 05/08 0700 - 05/09 0659    P.O. 240 2060     Total Intake(mL/kg) 240 (1.9) 2060 (16.6)     Net +240 +2060            Urine Occurrence 1 x 7 x     Stool Occurrence 0 x      Emesis Occurrence 0 x            Lines/Drains/Airways     Peripheral Intravenous Line                 Peripheral IV - Single Lumen 05/06/19 1800 24 G Posterior;Right Hand 1 day                Scheduled Medications:    amLODIPine  2.5 mg Oral Daily    coenzyme Q10  100 mg Oral BID    digoxin  125 mcg Oral Daily    hydroCHLOROthiazide  25 mg Oral Daily    sotalol  80 mg Oral BID    warfarin  10 mg Oral Daily       Continuous Medications:       PRN Medications: acetaminophen, ibuprofen    Physical Exam  General: Well-developed, well-nourished/obese. Awake/Alert and in NAD.   HEENT: Normocephalic. Atraumatic. EOMI. Nares/Oropharynx clear. MMM.   Neck: Supple.   Respiratory: Symmetrical chest wall rise. CTA  bilaterally.   Cardiac: Regular rate and normal Rhythm. Normal S1 and physiologically split S2. No murmur, rub or gallop.   Abdomen: Soft. NTND. No juanita hepatosplenomegaly but obese abdomen makes it difficult to palpate. +BS.   Extremities: No cyanosis, clubbing or edema. Brisk capillary refill. Pulses 2+ bilaterally to upper and lower extremities.  Derm: No rashes or lesions noted.   MS: No focal motor weakness. Normal muscle tone.  Neuro: Intact.   Psych: Patient appropriate.     Significant Labs:     No new labs today.     Significant Imaging:     No new images.      Assessment and Plan:     Cardiac/Vascular  Atrial flutter  Kacey is a 18-year-old AA female with pulmonary atresia with intact ventricular septum. She underwent reconstruction of the right ventricular outflow tract and placement of a bioprosthetic pulmonary valve in early childhood. She has had multiple valve replacements. Her most recent open heart surgery involved placement of a bioprosthetic valve in the pulmonary position and placement of a bioprosthetic valve in the tricuspid position in 2007. Recently, she had a heart catheterization for recurrent tricuspid valve insufficiency and stenosis. It entailed tricuspid valve balloon valvuloplasty with a Z-Med 25 x 5 cm balloon followed by placement of an Sotelo S3 26 mm valve in Feb 2017. The catheterization also showed that she had only mild pulmonary valve stenosis and insufficiency.  She was found by Dr. Gonzalez to have SVT vs. Atrial flutter and started on Metoprolol. Patient reportedly non-compliant and with complaints of tachycardia in clinic yesterday so admitted for transition to Sotalol.   Plan:  - Monitor on telemetry  - Continue home Amlodipine, CoEnzyme Q10, Digoxin, HCTZ, and Warfarin  - Maintain Sotalol 80mg BID  - EKG daily  - Plan for loop today.   - Need to monitor until at least Thursday due to high risk of lethal arrhythmias with initiation of Sotalol.         Jacquelyn John,  PA  Pediatric Cardiology  [facility]

## 2019-05-08 NOTE — NURSING
Nursing Transfer Note    Receiving Transfer Note    5/8/2019 5:39 PM  Received in transfer from PACU to PEDS 441  Report received as documented in PER Handoff on Doc Flowsheet.  See Doc Flowsheet for VS's and complete assessment.  Continuous EKG monitoring in place Yes  Chart received with patient: Yes  What Caregiver / Guardian was Notified of Arrival: Mother and Father  Patient and / or caregiver / guardian oriented to room and nurse call system.  JATIN Schwartz RN  5/8/2019 5:39 PM

## 2019-05-09 VITALS
HEART RATE: 82 BPM | RESPIRATION RATE: 20 BRPM | OXYGEN SATURATION: 99 % | DIASTOLIC BLOOD PRESSURE: 73 MMHG | SYSTOLIC BLOOD PRESSURE: 125 MMHG | TEMPERATURE: 98 F | WEIGHT: 273.13 LBS | HEIGHT: 66 IN | BODY MASS INDEX: 43.9 KG/M2

## 2019-05-09 LAB
INR PPP: 2.7 (ref 0.8–1.2)
PROTHROMBIN TIME: 25.9 SEC (ref 9–12.5)

## 2019-05-09 PROCEDURE — 93010 EKG 12-LEAD PEDIATRIC: ICD-10-PCS | Mod: ,,, | Performed by: INTERNAL MEDICINE

## 2019-05-09 PROCEDURE — 99239 HOSP IP/OBS DSCHRG MGMT >30: CPT | Mod: ,,, | Performed by: PEDIATRICS

## 2019-05-09 PROCEDURE — 25000003 PHARM REV CODE 250: Performed by: PEDIATRICS

## 2019-05-09 PROCEDURE — 36592 COLLECT BLOOD FROM PICC: CPT

## 2019-05-09 PROCEDURE — 93005 ELECTROCARDIOGRAM TRACING: CPT

## 2019-05-09 PROCEDURE — 85610 PROTHROMBIN TIME: CPT

## 2019-05-09 PROCEDURE — 63600175 PHARM REV CODE 636 W HCPCS: Performed by: PEDIATRICS

## 2019-05-09 PROCEDURE — 63600175 PHARM REV CODE 636 W HCPCS: Performed by: STUDENT IN AN ORGANIZED HEALTH CARE EDUCATION/TRAINING PROGRAM

## 2019-05-09 PROCEDURE — 93010 ELECTROCARDIOGRAM REPORT: CPT | Mod: ,,, | Performed by: INTERNAL MEDICINE

## 2019-05-09 PROCEDURE — 99239 PR HOSPITAL DISCHARGE DAY,>30 MIN: ICD-10-PCS | Mod: ,,, | Performed by: PEDIATRICS

## 2019-05-09 RX ORDER — CEFAZOLIN SODIUM 1 G/3ML
2 INJECTION, POWDER, FOR SOLUTION INTRAMUSCULAR; INTRAVENOUS ONCE
Status: DISCONTINUED | OUTPATIENT
Start: 2019-05-09 | End: 2019-05-09

## 2019-05-09 RX ORDER — HYDROCHLOROTHIAZIDE 25 MG/1
25 TABLET ORAL DAILY
Qty: 30 TABLET | Refills: 1 | Status: SHIPPED | OUTPATIENT
Start: 2019-05-09

## 2019-05-09 RX ORDER — SOTALOL HYDROCHLORIDE 80 MG/1
80 TABLET ORAL 2 TIMES DAILY
Qty: 60 TABLET | Refills: 1 | Status: SHIPPED | OUTPATIENT
Start: 2019-05-09 | End: 2019-08-08

## 2019-05-09 RX ORDER — CEFAZOLIN SODIUM 2 G/50ML
2 SOLUTION INTRAVENOUS ONCE
Status: COMPLETED | OUTPATIENT
Start: 2019-05-09 | End: 2019-05-09

## 2019-05-09 RX ORDER — WARFARIN 10 MG/1
10 TABLET ORAL DAILY
Qty: 30 TABLET | Refills: 1 | Status: ON HOLD | OUTPATIENT
Start: 2019-05-09 | End: 2019-06-05 | Stop reason: HOSPADM

## 2019-05-09 RX ORDER — IBUPROFEN 600 MG/1
600 TABLET ORAL EVERY 6 HOURS PRN
Status: DISCONTINUED | OUTPATIENT
Start: 2019-05-09 | End: 2019-05-09 | Stop reason: HOSPADM

## 2019-05-09 RX ORDER — CEFAZOLIN SODIUM 2 G/50ML
2 SOLUTION INTRAVENOUS ONCE
Status: DISCONTINUED | OUTPATIENT
Start: 2019-05-09 | End: 2019-05-09

## 2019-05-09 RX ORDER — CEPHALEXIN 500 MG/1
500 CAPSULE ORAL EVERY 8 HOURS
Qty: 12 CAPSULE | Refills: 0 | Status: SHIPPED | OUTPATIENT
Start: 2019-05-09 | End: 2019-05-13

## 2019-05-09 RX ADMIN — ACETAMINOPHEN 650 MG: 325 TABLET ORAL at 02:05

## 2019-05-09 RX ADMIN — CEFAZOLIN SODIUM 2 G: 2 SOLUTION INTRAVENOUS at 08:05

## 2019-05-09 RX ADMIN — IBUPROFEN 600 MG: 600 TABLET ORAL at 12:05

## 2019-05-09 RX ADMIN — Medication 100 MG: at 08:05

## 2019-05-09 RX ADMIN — AMLODIPINE BESYLATE 2.5 MG: 2.5 TABLET ORAL at 08:05

## 2019-05-09 RX ADMIN — DEXTROSE 2 G: 50 INJECTION, SOLUTION INTRAVENOUS at 02:05

## 2019-05-09 RX ADMIN — HYDROCHLOROTHIAZIDE 25 MG: 25 TABLET ORAL at 08:05

## 2019-05-09 RX ADMIN — SOTALOL HYDROCHLORIDE 80 MG: 80 TABLET ORAL at 08:05

## 2019-05-09 RX ADMIN — ACETAMINOPHEN 650 MG: 325 TABLET ORAL at 09:05

## 2019-05-09 RX ADMIN — DIGOXIN 125 MCG: 125 TABLET ORAL at 08:05

## 2019-05-09 RX ADMIN — CEFAZOLIN SODIUM 2 G: 2 SOLUTION INTRAVENOUS at 12:05

## 2019-05-09 NOTE — ASSESSMENT & PLAN NOTE
Kacey is a 18-year-old AA female with pulmonary atresia with intact ventricular septum. She underwent reconstruction of the right ventricular outflow tract and placement of a bioprosthetic pulmonary valve in early childhood. She has had multiple valve replacements. Her most recent open heart surgery involved placement of a bioprosthetic valve in the pulmonary position and placement of a bioprosthetic valve in the tricuspid position in 2007. Recently, she had a heart catheterization for recurrent tricuspid valve insufficiency and stenosis. It entailed tricuspid valve balloon valvuloplasty with a Z-Med 25 x 5 cm balloon followed by placement of an Sotelo S3 26 mm valve in Feb 2017. The catheterization also showed that she had only mild pulmonary valve stenosis and insufficiency.  She was found by Dr. Gonzalez to have SVT vs. Atrial flutter and started on Metoprolol. Patient reportedly non-compliant and with complaints of tachycardia in clinic so admitted for transition to Sotalol.   Plan:  - Monitor on telemetry  - Continue home Amlodipine, CoEnzyme Q10, Digoxin, HCTZ, and Warfarin  - Maintain Sotalol 80mg BID  - EKG today at 3 pm.   - Plan for one more dose of Ancef then total 5 days of antibiotics with Keflex.   - Discharge later this afternoon.   - F/u with Carlos's 5/23/19.

## 2019-05-09 NOTE — SUBJECTIVE & OBJECTIVE
Interval History: No rhythm concerns overnight on telemetry. She did well after Loop yesterday.     Objective:     Vital Signs (Most Recent):  Temp: 98 °F (36.7 °C) (05/09/19 0842)  Pulse: 63 (05/09/19 1142)  Resp: 20 (05/09/19 0842)  BP: 125/73 (05/09/19 0842)  SpO2: 99 % (05/09/19 0842) Vital Signs (24h Range):  Temp:  [97.6 °F (36.4 °C)-98.3 °F (36.8 °C)] 98 °F (36.7 °C)  Pulse:  [61-88] 63  Resp:  [18-22] 20  SpO2:  [97 %-100 %] 99 %  BP: ()/(50-73) 125/73     Weight: 123.9 kg (273 lb 2.4 oz)  Body mass index is 44.09 kg/m².     SpO2: 99 %  O2 Device (Oxygen Therapy): room air    Intake/Output - Last 3 Shifts       05/07 0700 - 05/08 0659 05/08 0700 - 05/09 0659 05/09 0700 - 05/10 0659    P.O. 2060 330     IV Piggyback  50     Total Intake(mL/kg) 2060 (16.6) 380 (3.1)     Urine (mL/kg/hr)  650 (0.2)     Total Output  650     Net +2060 -270            Urine Occurrence 7 x 2 x 1 x          Lines/Drains/Airways     Peripheral Intravenous Line                 Peripheral IV - Single Lumen 05/06/19 1800 24 G Posterior;Right Hand 2 days         Peripheral IV - Single Lumen 05/08/19 1449 20 G Right Antecubital less than 1 day                Scheduled Medications:    amLODIPine  2.5 mg Oral Daily    ceFAZolin (ANCEF) IVPB  2 g Intravenous Once    coenzyme Q10  100 mg Oral BID    digoxin  125 mcg Oral Daily    hydroCHLOROthiazide  25 mg Oral Daily    sotalol  80 mg Oral BID       Continuous Medications:       PRN Medications: acetaminophen, ibuprofen, ibuprofen, promethazine (PHENERGAN) IVPB, sodium chloride 0.9%    Physical Exam  General: Well-developed, well-nourished/obese. Awake/Alert and in NAD.   HEENT: Normocephalic. Atraumatic. EOMI. Nares/Oropharynx clear. MMM.   Neck: Supple.   Respiratory: Symmetrical chest wall rise. CTA bilaterally.   Cardiac: Regular rate and normal Rhythm. Normal S1 and physiologically split S2. No murmur, rub or gallop.   Abdomen: Soft. NTND. No juanita hepatosplenomegaly but  obese abdomen makes it difficult to palpate. +BS.   Extremities: No cyanosis, clubbing or edema. Brisk capillary refill. Pulses 2+ bilaterally to upper and lower extremities.  Derm: No rashes or lesions noted.   MS: No focal motor weakness. Normal muscle tone.  Neuro: Intact.   Psych: Patient appropriate.     Significant Labs:     Lab Results   Component Value Date    INR 2.7 (H) 05/09/2019    INR 1.3 (H) 02/06/2019    INR 1.3 (H) 01/30/2019         Significant Imaging:     No new images.

## 2019-05-09 NOTE — HOSPITAL COURSE
Patient was admitted and home meds were continued except Metoprolol was discontinued and Sotalol 80mg BID was started. She was observed for 72 hours during which she remained on telemetry and pulse ox , got daily EKGs and had a loop recorder placed. She remained vitally stable except occasional asymptomatic bradycardia episodes that were short lived and resolved spontaneously and had no concerning rhythms caught on telemetry or daily EKGs.      She was discharged to home on 5/9/2019 on her home meds, Sotalol 80mg BID and Keflex TID x 4 days.  She was instructed to follow up with Dr. Gonzalez on 5/23/2019.

## 2019-05-09 NOTE — DISCHARGE SUMMARY
[facility]  Cardiology  Discharge Summary      Patient Name: Kacey Ruth  MRN: [id number]  Admission Date: 5/6/2019  Hospital Length of Stay: 3 days  Discharge Date and Time:  05/09/2019 16:00PM  Attending Physician: Pretty Sawyer MD  Discharging Provider: Marie Schwarz MD  Primary Care Physician: Sharad Yun MD    HPI:     Kacey Ruth is a 17 yo female with h/o pulmonary atresia with intact inter-ventricular septum and an atrial septal defect s/p reconstruction of the right ventricular outflow tract and placement of a bioprosthetic pulmonary valve. She subsequently has required multiple valve replacements. Her most recent heart surgery involved placement of a bioprosthetic valve in the pulmonary position and placement of a bioprosthetic valve in the tricuspid position, in 2007. Recently, she had a heart catheterization for recurrent insufficiency and obstruction of the bioprosthetic valve that had been placed in the tricuspid position. 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 She leads a rather sedentary lifestyle, and is overweight.     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 At her recent clinic follow-up, she reported having had several short episodes of rapid HRs in the middle of the night whiched about 3 times per week, woke her up from sleep and frightened her. A recent transmission showed a run of probable atrial flutter at  ~ 170 bpm.  According to the patient, she recently had thyroid studies and they were all normal.     Due to symptomatic tachycardia (180-210 bpm) and hypertension, in addition to prolonged coagulation labs (INR 2.4), the patient was admitted for further management.         Procedure(s) (LRB):  INSERTION, IMPLANTABLE LOOP RECORDER (N/A)      Indwelling Lines/Drains at time of discharge:  Lines/Drains/Airways          None          Hospital Course:  Patient was admitted and home meds were continued except Metoprolol was discontinued and Sotalol 80mg BID was started. She was observed for 72 hours during which she remained on telemetry and pulse ox , got daily EKGs and had a loop recorder placed. She remained vitally stable except occasional asymptomatic bradycardia episodes that were short lived and resolved spontaneously and had no concerning rhythms caught on telemetry or daily EKGs.      She was discharged to home on 5/9/2019 on her home meds, Sotalol 80mg BID and Keflex TID x 4 days.  She was instructed to follow up with Dr. Gonzalez on 5/23/2019.    Consults:     Significant Diagnostic Studies: Labs:   Recent Lab Results       05/09/19  0215        Coumadin Monitoring INR 2.7  Comment:  Coumadin Therapy:  2.0 - 3.0 for INR for all indicators except mechanical heart valves  and antiphospholipid syndromes which should use 2.5 - 3.5.       Protime 25.9           Pending Diagnostic Studies:     Procedure Component Value Units Date/Time    EKG 12-lead pediatric [[id number]]     Order Status:  Sent Lab Status:  No result           Final Active Diagnoses:    Diagnosis Date Noted POA    PRINCIPAL PROBLEM:  Atrial flutter [I48.92] 05/06/2019 Yes    Status post placement of implantable loop recorder [Z95.818] 05/08/2019 No      Problems Resolved During this Admission:         Discharged Condition: good    Disposition: Home or Self Care    Follow Up:  Follow-up Information     Donovan Gonzalez MD. Go on 5/23/2019.    Specialty:  Pediatric Cardiology  Why:  at 10 AM  Contact information:  [address]  [address] [address]  [phone]                 Patient Instructions:      Diet Adult Regular     Notify your health care provider if you experience any of the following:  severe persistent headache     Activity as tolerated      Medications:  Reconciled Home Medications:      Medication List      START taking these medications    cephALEXin 500 MG capsule  Commonly known as:  KEFLEX  Take 1 capsule (500 mg total) by mouth every 8 (eight) hours. for 4 days     sotalol 80 MG tablet  Commonly known as:  BETAPACE  Take 1 tablet (80 mg total) by mouth 2 (two) times daily.        CHANGE how you take these medications    hydroCHLOROthiazide 25 MG tablet  Commonly known as:  HYDRODIURIL  Take 1 tablet (25 mg total) by mouth once daily.  What changed:  medication strength     warfarin 10 MG tablet  Commonly known as:  COUMADIN  Take 1 tablet (10 mg total) by mouth once daily.  What changed:    · medication strength  · how much to take        CONTINUE taking these medications    amLODIPine 2.5 MG tablet  Commonly known as:  NORVASC  Take 1 tablet (2.5 mg total) by mouth once daily.     coenzyme Q10 100 mg capsule  Commonly known as:  COQ-10  Take 1 capsule (100 mg total) by mouth 2 (two) times daily.     digoxin 125 mcg tablet  Commonly known as:  LANOXIN  Take 1 tablet (125 mcg total) by mouth once daily.        STOP taking these medications    acetaminophen 325 MG tablet  Commonly known as:  TYLENOL     enoxaparin 60 mg/0.6 mL Syrg  Commonly known as:  LOVENOX     metoprolol succinate 50 MG 24 hr tablet  Commonly known as:  TOPROL-XL            Marie Schwarz MD  Cardiology  [facility]

## 2019-05-09 NOTE — ANESTHESIA POSTPROCEDURE EVALUATION
Anesthesia Post Evaluation    Patient: Kacey Ruth    Procedure(s) Performed: Procedure(s) (LRB):  INSERTION, IMPLANTABLE LOOP RECORDER (N/A)    Final Anesthesia Type: general  Patient location during evaluation: PACU  Patient participation: Yes- Able to Participate  Level of consciousness: awake and alert  Post-procedure vital signs: reviewed and stable  Pain management: adequate  Airway patency: patent  PONV status at discharge: No PONV  Anesthetic complications: no      Cardiovascular status: blood pressure returned to baseline  Respiratory status: unassisted  Hydration status: euvolemic  Follow-up not needed.          Vitals Value Taken Time   /73 5/9/2019  8:42 AM   Temp 36.7 °C (98 °F) 5/9/2019  8:42 AM   Pulse 72 5/9/2019  8:42 AM   Resp 20 5/9/2019  8:42 AM   SpO2 99 % 5/9/2019  8:42 AM         Event Time     Out of Recovery 05/08/2019 16:28:00          Pain/Pinky Score: Presence of Pain: non-verbal indicators absent (5/9/2019  6:00 AM)  Pain Rating Prior to Med Admin: 4 (5/8/2019 11:10 PM)  Pain Rating Post Med Admin: 0 (5/9/2019 12:10 AM)

## 2019-05-09 NOTE — PROGRESS NOTES
[facility]  Pediatric Cardiology  Progress Note    Patient Name: Kacey Ruth  MRN: [id number]  Admission Date: 5/6/2019  Hospital Length of Stay: 3 days  Code Status: Full Code   Attending Physician: Pretty Sawyer MD   Primary Care Physician: Sharda Yun MD  Expected Discharge Date: 5/9/2019  Principal Problem:Atrial flutter    Subjective:     Interval History: No rhythm concerns overnight on telemetry. She did well after Loop yesterday.     Objective:     Vital Signs (Most Recent):  Temp: 98 °F (36.7 °C) (05/09/19 0842)  Pulse: 63 (05/09/19 1142)  Resp: 20 (05/09/19 0842)  BP: 125/73 (05/09/19 0842)  SpO2: 99 % (05/09/19 0842) Vital Signs (24h Range):  Temp:  [97.6 °F (36.4 °C)-98.3 °F (36.8 °C)] 98 °F (36.7 °C)  Pulse:  [61-88] 63  Resp:  [18-22] 20  SpO2:  [97 %-100 %] 99 %  BP: ()/(50-73) 125/73     Weight: 123.9 kg (273 lb 2.4 oz)  Body mass index is 44.09 kg/m².     SpO2: 99 %  O2 Device (Oxygen Therapy): room air    Intake/Output - Last 3 Shifts       05/07 0700 - 05/08 0659 05/08 0700 - 05/09 0659 05/09 0700 - 05/10 0659    P.O. 2060 330     IV Piggyback  50     Total Intake(mL/kg) 2060 (16.6) 380 (3.1)     Urine (mL/kg/hr)  650 (0.2)     Total Output  650     Net +2060 -270            Urine Occurrence 7 x 2 x 1 x          Lines/Drains/Airways     Peripheral Intravenous Line                 Peripheral IV - Single Lumen 05/06/19 1800 24 G Posterior;Right Hand 2 days         Peripheral IV - Single Lumen 05/08/19 1449 20 G Right Antecubital less than 1 day                Scheduled Medications:    amLODIPine  2.5 mg Oral Daily    ceFAZolin (ANCEF) IVPB  2 g Intravenous Once    coenzyme Q10  100 mg Oral BID    digoxin  125 mcg Oral Daily    hydroCHLOROthiazide  25 mg Oral Daily    sotalol  80 mg Oral BID       Continuous Medications:       PRN Medications: acetaminophen, ibuprofen, ibuprofen, promethazine (PHENERGAN) IVPB, sodium chloride 0.9%    Physical Exam  General:  Well-developed, well-nourished/obese. Awake/Alert and in NAD.   HEENT: Normocephalic. Atraumatic. EOMI. Nares/Oropharynx clear. MMM.   Neck: Supple.   Respiratory: Symmetrical chest wall rise. CTA bilaterally.   Cardiac: Regular rate and normal Rhythm. Normal S1 and physiologically split S2. No murmur, rub or gallop.   Abdomen: Soft. NTND. No juanita hepatosplenomegaly but obese abdomen makes it difficult to palpate. +BS.   Extremities: No cyanosis, clubbing or edema. Brisk capillary refill. Pulses 2+ bilaterally to upper and lower extremities.  Derm: No rashes or lesions noted.   MS: No focal motor weakness. Normal muscle tone.  Neuro: Intact.   Psych: Patient appropriate.     Significant Labs:     Lab Results   Component Value Date    INR 2.7 (H) 05/09/2019    INR 1.3 (H) 02/06/2019    INR 1.3 (H) 01/30/2019         Significant Imaging:     No new images.      Assessment and Plan:     Cardiac/Vascular  * Atrial flutter  Kacey is a 18-year-old AA female with pulmonary atresia with intact ventricular septum. She underwent reconstruction of the right ventricular outflow tract and placement of a bioprosthetic pulmonary valve in early childhood. She has had multiple valve replacements. Her most recent open heart surgery involved placement of a bioprosthetic valve in the pulmonary position and placement of a bioprosthetic valve in the tricuspid position in 2007. Recently, she had a heart catheterization for recurrent tricuspid valve insufficiency and stenosis. It entailed tricuspid valve balloon valvuloplasty with a Z-Med 25 x 5 cm balloon followed by placement of an Sotelo S3 26 mm valve in Feb 2017. The catheterization also showed that she had only mild pulmonary valve stenosis and insufficiency.  She was found by Dr. Gonzalez to have SVT vs. Atrial flutter and started on Metoprolol. Patient reportedly non-compliant and with complaints of tachycardia in clinic so admitted for transition to Sotalol.   Plan:  - Monitor  on telemetry  - Continue home Amlodipine, CoEnzyme Q10, Digoxin, HCTZ, and Warfarin  - Maintain Sotalol 80mg BID  - EKG today at 3 pm.   - Plan for one more dose of Ancef then total 5 days of antibiotics with Keflex.   - Discharge later this afternoon.   - F/u with Carlos's 5/23/19.        PAWAN Gomes  Pediatric Cardiology  [facility]

## 2019-05-09 NOTE — NURSING
VSS and afebrile.  Pt has exhibited no signs/symptoms of pain and/or discomfort.  Pain well controlled with motrin administered x1, tylenol administered x2.  Left chest incision dressing, CDI, no complications noted.  Adequate intake and output.  Tele in place, no significant alarms noted.  Medications administered as ordered.  PIVs, removed with no complications.  Discharge instructions given to mom and Pt, verbalized understanding.  Discussed follow up apt, medication administration, prescriptions, patient portal, 24/7 nurse care line, and signs/symptoms to watch for.  Safety maintained.

## 2019-05-10 ENCOUNTER — CONFERENCE (OUTPATIENT)
Dept: PEDIATRIC CARDIOLOGY | Facility: CLINIC | Age: 19
End: 2019-05-10

## 2019-05-10 NOTE — PLAN OF CARE
05/10/19 1717   Final Note   Assessment Type Final Discharge Note   Anticipated Discharge Disposition Home   Hospital Follow Up  Appt(s) scheduled? Yes   Discharge plans and expectations educations in teach back method with documentation complete? Yes   Right Care Referral Info   Post Acute Recommendation No Care     Future Appointments   Date Time Provider Department Center   5/23/2019 10:00 AM MD MELIN[facility]   6/10/2019 11:00 AM HOME MONITOR DEVICE CHECK, PEDIATRICS [facility]   7/11/2019 10:30 AM Ricardo Woodward MD [facility]   7/11/2019 10:30 AM COORDINATED DEVICE CHECK, PEDIATRIC [facility]

## 2019-05-10 NOTE — PROGRESS NOTES
Kacey Ruth underwent  LOOP implant on 5/08/2019 on [unfilled]. .Her case was reviewed in our [facility] on 05/10/2019. She should follow up with Dr. Gonzalez on 5/23/2019 for wound check.

## 2019-05-13 ENCOUNTER — OFFICE VISIT (OUTPATIENT)
Dept: CARDIOLOGY | Facility: CLINIC | Age: 19
End: 2019-05-13
Payer: MEDICAID

## 2019-05-13 VITALS
BODY MASS INDEX: 43.56 KG/M2 | DIASTOLIC BLOOD PRESSURE: 78 MMHG | WEIGHT: 271.06 LBS | SYSTOLIC BLOOD PRESSURE: 122 MMHG | HEIGHT: 66 IN | HEART RATE: 82 BPM | OXYGEN SATURATION: 98 %

## 2019-05-13 DIAGNOSIS — I47.19 ATRIAL TACHYCARDIA: Primary | ICD-10-CM

## 2019-05-13 DIAGNOSIS — Z98.890 HISTORY OF OPEN HEART SURGERY: ICD-10-CM

## 2019-05-13 DIAGNOSIS — Z95.3 HISTORY OF TRICUSPID VALVE REPLACEMENT WITH BIOPROSTHETIC VALVE: ICD-10-CM

## 2019-05-13 PROCEDURE — 93000 ELECTROCARDIOGRAM COMPLETE: CPT | Mod: S$GLB,,, | Performed by: PEDIATRICS

## 2019-05-13 PROCEDURE — 99213 OFFICE O/P EST LOW 20 MIN: CPT | Mod: S$GLB,,, | Performed by: PEDIATRICS

## 2019-05-13 PROCEDURE — 99213 PR OFFICE/OUTPT VISIT, EST, LEVL III, 20-29 MIN: ICD-10-PCS | Mod: S$GLB,,, | Performed by: PEDIATRICS

## 2019-05-13 PROCEDURE — 93000 PR ELECTROCARDIOGRAM, COMPLETE: ICD-10-PCS | Mod: S$GLB,,, | Performed by: PEDIATRICS

## 2019-05-13 NOTE — PROGRESS NOTES
"    HISTORY OF PRESENT ILLNESS:  (5/13/2019) This patient, Kacey Ruth, is now a 18 -year-old female, who 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 She underwent reconstruction of the right ventricular outflow tract and placement of a bioprosthetic pulmonary valve in early childhood. She subsequently has required multiple valve replacements. Her most recent heart surgery involved placement of a bioprosthetic valve in the pulmonary position and placement of a bioprosthetic valve in the tricuspid position, in 2007. Recently, she had a heart catheterization for recurrent insufficiency and obstruction of the bioprosthetic valve that had been placed in the tricuspid position.  The current procedure employed a trans-catheter approach. It entailed valvuloplasty with a Z-Med 25 x 5 cm balloon followed by placement of an Sotelo S3 26 mm bioprosthetic valve in the tricuspid position.. 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 She leads a rather sedentary lifestyle, and is overweight. She currently is on: Digoxin 0.125 mg once a day, Norvasc 2.5 mg once a day for BP. The Lasix was changed to HCTZ 25 mg once a day and Warfarin 10 mg qd.     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 She comes to clinic today for follow-up. She reports having had several short episodes of rapid HRs in the middle of the night. It happens about 3 times per week; they get her up and she gets very frighten. A recent transmission showed a run of probable atrial flutter at  ~ 170 bpm.  "She did recently have thyroid studies and they are normal".   At today's visit, there has been some improvement since increasing the Metoprolol to 50 mg in the am and 25 mg in the " pm..............  Kacey recently was admitted to [facility] for treatment of atrial tachycardia. Her Metoprolol was DCed and she was started on Sotalol 80 mg q 12 hrs.  She also had an implantable loop recorder placed.  Clinically she reports doing well on the Sotalol.  No lightheadedness, syncope, palpitations, or abnormally slow or rapid HRs.    INR today 1.9.     REVIEW OF SYSTEMS:  There are no visual disturbances. No HAs, lightheadedness, syncope or seizures. No swallowing disorder or PIETER. No difficulty with breathing, SOB or wheezing.  No asthma. No abdominal pain.  Bowel movements are normal.  No pelvic pain. Urination is normal.  No DM of thyroid disorder.  No lipid disorder. No arterial disease. No known intrinsic problem with the lungs, liver or kidneys. No bleeding disorder. No smoking or ETOH use.  There is a FH of strokes and Adult unset DM.         PHYSICAL EXAMINATION:   GENERAL:  The patient is overweight 18 -year-old female in no distress.  VITAL SIGNS:  Her weight is 123. kg (271 lbs) and her height is 1.676 meters (5' 6''). Heart rate is 82 beats per minute ( sinus).  Sat 98 %.   Blood pressure in the right arm is 122/78 mmHg in the RA..  Color and perfusion are good.  HEENT:  Eye movements are normal.  No bruits are noted over the head.  No jugular venous distention or pulsations.  Mucous membranes are moist and pink.  There is no adenopathy.  The neck is supple.  No carotid bruits. The thyroid is not enlarged. SKIN:  Is pink and well perfused. CHEST:  Loop recorder in the L upper chest.  Wound is clean and not reddened.  No evidence of infection. There is a well-healed midline scar.  Lungs are clear to auscultation bilaterally, although the breath sounds are somewhat decreased at the base. There are no wheezes or rhonchi. CARDIOVASCULAR:  Precordial activity is normal.  HR ~ 85 bpm.  The first heart sound is prominent and crisp.  The second heart sound is narrowly split and  eccentuated. There is a grade 1/6 systolic ejection murmur heard best up the left sternal border and across the base.  Short diastolic murmur is heard today.  ABDOMEN:  There is exogenous obesity.  Mild bruising at the mid-abdomen from Lovenox shots. The liver edge is precussed about 2-3 FB at the right costal margin.  It is nonpulsatile and nontender.  Bowel sounds appear to be normal.  EXTREMITIES:  Pulses are 2+ throughout.  Capillary refill is good.  There is no cyanosis or peripheral edema.  No bruising in the groins or exts.  No rashes or cyanosis.  No bruits in the groins.              ........................................................................................................................................        ECG (TODAY): Sinus rhythm at a ventricular rate of 73 bpm.  Atrial abnormality. RBBB (156 ms). T wave abnormality.  Prolonged QTc at 489 ms,  but she has a RBBB.  (Need to follow since on Sotalol).                ECHOCARDIOGRAM  (A PREVIOUS VISIT): She is in sinus rhythm.  An echocardiogram was performed.  2-D STUDY: There is no pericardial effusion.  No clots or vegetations. The bioprosthetic tricuspid valve is seen to be in good position. Annulus is 18 mm. The struts are easily seen.  The leaflets are mobile.  No evidence for thrombus formation.  The right atrium is enlarged measuring 5.4 x 4.8 cm or ~ 28.2 cm2 One can also see  the bioprosthetic tricuspid valve in a cross sectional view.  It appears circular and measures 2 cm x 2 cm. Again, no clots or vegetations are seen.  No right ventricular or left ventricular outflow tract obstruction.  The right ventricular circumferential area of shortening is 30%,  which is reduced.  Circumferential area of the RA is 23.4 cm2, which is dilated. M-MODE:   LV 4.4 cm, RV 3.8 cm, Ao 2.8 cm, LA 4.4 cm, Sep 1.1 cm, PW 1.1 cm, EF ~ 49 % (reduced). DOPPLER VELOCITY ANALYSIS:  There is no insufficiency of the recently-placed bioprosthetic tricuspid  "valve.  Mild turbulent antegrade flow is seen. Continuous wave Doppler analysis shows a peak pressure drop of 12-17 mmHg with a mean value of 7-10 mmHg.  Importantly, the leaflets are moving freely.  There is no mitral insufficiency.  Mild aortic insufficiency  (P1/2 567 ms).  Peak pressure drop across the aortic valve is 5 mmHg.  Peak pressure drop across the bioprosthetic pulmonary valve is 16 mmHg.                    .................................................................................................................................................               ASSESSMENT:  In summary, we have a 18-year-old AA female originally diagnosed to have pulmonary atresia with intact inter-ventricular septum, who is status-post multiple open heart operations involving both the pulmonary and tricuspid valves.  She recently in February 2017 had placement of an Sotelo S3 26 mm bioprosthetic tricuspid valve via the transvenous approach.  She tolerated the procedure well. Clinically, she has been stable. However, she continues to have poor exercise capacity, chronic fatigue and more recently developed a new rhythm disturbance, diagnosed to be atrial flutter.   She recently underwent ablation for an atrial rhythm disturbance.  However, "She continued to have short runs of rapid HRs mostly at night". [facility] was admitted to [facility] treatment. The Metoprolol was DCed and she was started on Sotalol 80 mg q 12 hrs.  Also, a loop recorder was placed under the skin in the upper chest. She has two more days of antibiotics for the loop recorder implantation.   She appears to be tolerating  the Sotalol.      PLAN:  Given our findings, our suggestions are as follows:  1.  She of course needs antibiotic prophylaxis for any invasive procedure, especially dental work.   2.  She should continue with the Sotalol 80 mg q 12 hrs, HCTZ 25 mg qd, and her other remaining meds.  She should continue with "Warfarin " "10 mg qd".       INR today was 1.9.   If there are any questions concerning the cardiac status of this patient, please feel free to contact us.  Thank you so much for allowing us to partake in the care of this patient. Donovan Gonzalez PhD, MD.   (Cell ).        "

## 2019-05-22 NOTE — PROGRESS NOTES
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 She underwent reconstruction of the right ventricular outflow tract and placement of a bioprosthetic pulmonary valve in early childhood. She subsequently has required multiple heart valve replacements. Her most recent heart surgery involved placement of a bioprosthetic valve in the pulmonary position and placement of a bioprosthetic valve in the tricuspid position, in 2007. Recently, she had a heart catheterization for recurrent insufficiency and obstruction of the bioprosthetic valve that had been placed in the tricuspid position.  The current procedure employed a trans-catheter approach. It entailed valvuloplasty with a Z-Med 25 x 5 cm balloon followed by placement of an Sotelo S3 26 mm bioprosthetic valve in the tricuspid position.. 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 She leads a rather sedentary lifestyle, and is overweight. She currently is on: Digoxin 0.125 mg once a day, Norvasc 2.5 mg once a day for BP. The Lasix was changed to HCTZ 25 mg once a day and Warfarin 10 mg qd.     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 She comes to clinic today for follow-up. She reports having had several short episodes of rapid HRs in the middle of the night. It happens about 3 times per week; they get her up and she gets very frighten. A recent transmission showed a run of probable atrial flutter at  ~ 170 bpm.  "She did recently have thyroid studies and they are normal".  Kacey recently was admitted " to [facility] for treatment of atrial tachycardia. Her Metoprolol was DCed and she was started on Sotalol 80 mg q 12 hrs.  She also had an implantable loop recorder placed.  Clinically, she reports doing better on the Sotalol.  No lightheadedness, syncope or abnormally slow or rapid HRs.                      ..................................... INR today 3.4 (slightly high).     REVIEW OF SYSTEMS:  There are no visual disturbances. No HAs, lightheadedness, syncope or seizures. No swallowing disorder or PIETER. No difficulty with breathing, SOB or wheezing.  No asthma. No abdominal pain.  Bowel movements are normal.  No pelvic pain. Urination is normal.  No DM of thyroid disorder.  No lipid disorder. No arterial disease. No known intrinsic problem with the lungs, liver or kidneys. No bleeding disorder. No smoking or ETOH use.  There is a FH of strokes and Adult unset DM.         PHYSICAL EXAMINATION:   GENERAL:  The patient is overweight 18 -year-old female in no distress.  VITAL SIGNS:  Her weight is 122.2 kg (267 lbs) and her height is 1.676 meters (5' 6''). Heart rate is 82 beats per minute ( sinus).  Sat 97 %.   Blood pressure in the right arm is 123/80 mmHg in the RA..  Color and perfusion are good.  HEENT:  Eye movements are normal.  No bruits are noted over the head.  No jugular venous distention or pulsations.  Mucous membranes are moist and pink.  There is no adenopathy.  The neck is supple.  No carotid bruits. The thyroid is not enlarged. SKIN:  Is pink and well perfused. CHEST:  Loop recorder in the L upper chest.  Wound is clean and not reddened.  No evidence of infection. There is a well-healed midline scar.  Lungs are clear to auscultation bilaterally, although the breath sounds are somewhat decreased at the base. There are no wheezes or rhonchi. CARDIOVASCULAR:  Precordial activity is normal.  HR ~ 85 bpm.  The first heart sound is prominent and crisp.  The second heart sound is narrowly split  and eccentuated. There is a grade 1/6 systolic ejection murmur heard best up the left sternal border and across the base.  Short diastolic murmur is heard today.  ABDOMEN:  There is exogenous obesity.  Mild bruising at the mid-abdomen from Lovenox shots. The liver edge is precussed about 2-3 FB at the right costal margin.  It is nonpulsatile and nontender.  Bowel sounds appear to be normal.  EXTREMITIES:  Pulses are 2+ throughout.  Capillary refill is good.  There is no cyanosis or peripheral edema.  No bruising in the groins or exts.  No rashes or cyanosis.  No bruits in the groins.              ........................................................................................................................................        ECG (TODAY): Sinus rhythm at a ventricular rate of 83 bpm.  Atrial abnormality.  Ist degree block.   RBBB (160 ms). T wave abnormality.  Prolonged QTc at 502 ms,  but she has a RBBB.  (Need to follow since on Sotalol).                ECHOCARDIOGRAM  (A PREVIOUS VISIT): She is in sinus rhythm.  An echocardiogram was performed.  2-D STUDY: There is no pericardial effusion.  No clots or vegetations. The bioprosthetic tricuspid valve is seen to be in good position. Annulus is 18 mm. The struts are easily seen.  The leaflets are mobile.  No evidence for thrombus formation.  The right atrium is enlarged measuring 5.4 x 4.8 cm or ~ 28.2 cm2 One can also see  the bioprosthetic tricuspid valve in a cross sectional view.  It appears circular and measures 2 cm x 2 cm. Again, no clots or vegetations are seen.  No right ventricular or left ventricular outflow tract obstruction.  The right ventricular circumferential area of shortening is 30%,  which is reduced.  Circumferential area of the RA is 23.4 cm2, which is dilated. M-MODE:   LV 4.4 cm, RV 3.8 cm, Ao 2.8 cm, LA 4.4 cm, Sep 1.1 cm, PW 1.1 cm, EF ~ 49 % (reduced). DOPPLER VELOCITY ANALYSIS:  There is no insufficiency of the recently-placed  "bioprosthetic tricuspid valve.  Mild turbulent antegrade flow is seen. Continuous wave Doppler analysis shows a peak pressure drop of 12-17 mmHg with a mean value of 7-10 mmHg.  Importantly, the leaflets are moving freely.  There is no mitral insufficiency.  Mild aortic insufficiency  (P1/2 567 ms).  Peak pressure drop across the aortic valve is 5 mmHg.  Peak pressure drop across the bioprosthetic pulmonary valve is 16 mmHg.                    .................................................................................................................................................               ASSESSMENT:  In summary, we have a 18-year-old AA female originally diagnosed to have pulmonary atresia with intact inter-ventricular septum, who is status-post multiple open heart operations involving both the pulmonary and tricuspid valves.  She recently in February 2017 had placement of an Sotelo S3 26 mm bioprosthetic tricuspid valve via the transvenous approach.  She tolerated the procedure well. Clinically, she has been stable. However, she continues to have poor exercise capacity, chronic fatigue and more recently developed a new rhythm disturbance, diagnosed to be atrial flutter.  She recently underwent ablation for an atrial rhythm disturbance.  However, "She continued to have short runs of rapid HRs mostly at night". She was admitted to [facility] for additional treatment. The Metoprolol was DCed and she was started on Sotalol 80 mg q 12 hrs.  Also, a loop recorder was placed under the skin in the upper chest.   She appears to be tolerating the Sotalo (Watch the QTc and AR interval)l.      PLAN:  Given our findings, our suggestions are as follows:  1).  She of course needs antibiotic prophylaxis for any invasive procedure, especially dental work.   2).  She should continue with the Sotalol 80 mg q 12 hrs, HCTZ 25 mg qd, and her other remaining meds.  She should blaine "Warfarin to 10 mg M, Tu, W, TH and 5 " "mg S, S".       INR today was 3.4. 3). RTC in 2 weeks. Consider our own event recorder.  If there are any questions concerning the cardiac status of this patient, please feel free to contact us.  Thank you so much for allowing us to partake in the care of this patient. Donovan Gonzalez PhD, MD.   (Cell ).                "

## 2019-05-23 ENCOUNTER — OFFICE VISIT (OUTPATIENT)
Dept: CARDIOLOGY | Facility: CLINIC | Age: 19
End: 2019-05-23
Payer: MEDICAID

## 2019-05-23 VITALS
BODY MASS INDEX: 42.96 KG/M2 | WEIGHT: 267.31 LBS | DIASTOLIC BLOOD PRESSURE: 80 MMHG | OXYGEN SATURATION: 97 % | HEART RATE: 82 BPM | HEIGHT: 66 IN | SYSTOLIC BLOOD PRESSURE: 123 MMHG

## 2019-05-23 DIAGNOSIS — I48.92 ATRIAL FLUTTER, UNSPECIFIED TYPE: Primary | ICD-10-CM

## 2019-05-23 DIAGNOSIS — Z98.890 HISTORY OF OPEN HEART SURGERY: ICD-10-CM

## 2019-05-23 DIAGNOSIS — Z95.3 HISTORY OF TRICUSPID VALVE REPLACEMENT WITH BIOPROSTHETIC VALVE: ICD-10-CM

## 2019-05-23 DIAGNOSIS — E66.01 MORBID OBESITY: ICD-10-CM

## 2019-05-23 PROCEDURE — 93000 ELECTROCARDIOGRAM COMPLETE: CPT | Mod: S$GLB,,, | Performed by: PEDIATRICS

## 2019-05-23 PROCEDURE — 99213 OFFICE O/P EST LOW 20 MIN: CPT | Mod: S$GLB,,, | Performed by: PEDIATRICS

## 2019-05-23 PROCEDURE — 99213 PR OFFICE/OUTPT VISIT, EST, LEVL III, 20-29 MIN: ICD-10-PCS | Mod: S$GLB,,, | Performed by: PEDIATRICS

## 2019-05-23 PROCEDURE — 93000 PR ELECTROCARDIOGRAM, COMPLETE: ICD-10-PCS | Mod: S$GLB,,, | Performed by: PEDIATRICS

## 2019-06-02 ENCOUNTER — HOSPITAL ENCOUNTER (INPATIENT)
Facility: HOSPITAL | Age: 19
LOS: 3 days | Discharge: HOME | DRG: 948 | End: 2019-06-05
Attending: EMERGENCY MEDICINE | Admitting: PEDIATRICS
Payer: MEDICAID

## 2019-06-02 DIAGNOSIS — I47.10 SVT (SUPRAVENTRICULAR TACHYCARDIA): ICD-10-CM

## 2019-06-02 DIAGNOSIS — Z95.818 STATUS POST PLACEMENT OF IMPLANTABLE LOOP RECORDER: ICD-10-CM

## 2019-06-02 DIAGNOSIS — Z95.2 S/P PULMONARY VALVE REPLACEMENT: ICD-10-CM

## 2019-06-02 DIAGNOSIS — Q25.5 PULMONARY ATRESIA WITH INTACT VENTRICULAR SEPTUM: ICD-10-CM

## 2019-06-02 DIAGNOSIS — Z95.4 S/P TRICUSPID VALVE REPLACEMENT: ICD-10-CM

## 2019-06-02 DIAGNOSIS — I48.92 ATRIAL FLUTTER, UNSPECIFIED TYPE: ICD-10-CM

## 2019-06-02 DIAGNOSIS — R10.84 DIFFUSE ABDOMINAL PAIN: ICD-10-CM

## 2019-06-02 DIAGNOSIS — Q24.9 CARDIAC ARRHYTHMIA DUE TO CONGENITAL HEART DISEASE: ICD-10-CM

## 2019-06-02 DIAGNOSIS — R10.84 GENERALIZED ABDOMINAL PAIN: ICD-10-CM

## 2019-06-02 DIAGNOSIS — R79.1 SUPRATHERAPEUTIC INR: Primary | ICD-10-CM

## 2019-06-02 DIAGNOSIS — Q22.0 PULMONARY ATRESIA: ICD-10-CM

## 2019-06-02 DIAGNOSIS — Z79.01 CHRONIC ANTICOAGULATION: ICD-10-CM

## 2019-06-02 DIAGNOSIS — D68.9 COAGULOPATHY: ICD-10-CM

## 2019-06-02 DIAGNOSIS — R16.2 HEPATOSPLENOMEGALY: ICD-10-CM

## 2019-06-02 DIAGNOSIS — I07.2 TRICUSPID STENOSIS AND INSUFFICIENCY, UNSPECIFIED ETIOLOGY: ICD-10-CM

## 2019-06-02 LAB
B-HCG UR QL: NEGATIVE
BACTERIA #/AREA URNS HPF: ABNORMAL /HPF
BASOPHILS # BLD AUTO: 0.02 K/UL (ref 0–0.2)
BASOPHILS NFR BLD: 0.2 % (ref 0–1.9)
BILIRUB UR QL STRIP: NEGATIVE
CLARITY UR: CLEAR
COLOR UR: YELLOW
CTP QC/QA: YES
DIFFERENTIAL METHOD: NORMAL
EOSINOPHIL # BLD AUTO: 0.1 K/UL (ref 0–0.5)
EOSINOPHIL NFR BLD: 1.2 % (ref 0–8)
ERYTHROCYTE [DISTWIDTH] IN BLOOD BY AUTOMATED COUNT: 13.2 % (ref 11.5–14.5)
GLUCOSE UR QL STRIP: NEGATIVE
HCT VFR BLD AUTO: 38.2 % (ref 37–48.5)
HGB BLD-MCNC: 12.7 G/DL (ref 12–16)
HGB UR QL STRIP: ABNORMAL
KETONES UR QL STRIP: NEGATIVE
LEUKOCYTE ESTERASE UR QL STRIP: ABNORMAL
LYMPHOCYTES # BLD AUTO: 1.6 K/UL (ref 1–4.8)
LYMPHOCYTES NFR BLD: 18.9 % (ref 18–48)
MCH RBC QN AUTO: 27.4 PG (ref 27–31)
MCHC RBC AUTO-ENTMCNC: 33.2 G/DL (ref 32–36)
MCV RBC AUTO: 82 FL (ref 82–98)
MICROSCOPIC COMMENT: ABNORMAL
MONOCYTES # BLD AUTO: 0.9 K/UL (ref 0.3–1)
MONOCYTES NFR BLD: 10.9 % (ref 4–15)
NEUTROPHILS # BLD AUTO: 5.6 K/UL (ref 1.8–7.7)
NEUTROPHILS NFR BLD: 68.6 % (ref 38–73)
NITRITE UR QL STRIP: NEGATIVE
PH UR STRIP: 8 [PH] (ref 5–8)
PLATELET # BLD AUTO: 296 K/UL (ref 150–350)
PMV BLD AUTO: 9.8 FL (ref 9.2–12.9)
PROT UR QL STRIP: NEGATIVE
RBC # BLD AUTO: 4.64 M/UL (ref 4–5.4)
RBC #/AREA URNS HPF: 6 /HPF (ref 0–4)
SP GR UR STRIP: 1.02 (ref 1–1.03)
TRICHOMONAS UR QL MICRO: ABNORMAL
URN SPEC COLLECT METH UR: ABNORMAL
UROBILINOGEN UR STRIP-ACNC: ABNORMAL EU/DL
WBC # BLD AUTO: 8.2 K/UL (ref 3.9–12.7)
WBC #/AREA URNS HPF: 6 /HPF (ref 0–5)

## 2019-06-02 PROCEDURE — 81000 URINALYSIS NONAUTO W/SCOPE: CPT

## 2019-06-02 PROCEDURE — 96361 HYDRATE IV INFUSION ADD-ON: CPT

## 2019-06-02 PROCEDURE — 96374 THER/PROPH/DIAG INJ IV PUSH: CPT

## 2019-06-02 PROCEDURE — 85610 PROTHROMBIN TIME: CPT

## 2019-06-02 PROCEDURE — 81025 URINE PREGNANCY TEST: CPT | Performed by: EMERGENCY MEDICINE

## 2019-06-02 PROCEDURE — 80053 COMPREHEN METABOLIC PANEL: CPT

## 2019-06-02 PROCEDURE — 99285 EMERGENCY DEPT VISIT HI MDM: CPT | Mod: 25

## 2019-06-02 PROCEDURE — 84484 ASSAY OF TROPONIN QUANT: CPT

## 2019-06-02 PROCEDURE — 12000002 HC ACUTE/MED SURGE SEMI-PRIVATE ROOM

## 2019-06-02 PROCEDURE — 83690 ASSAY OF LIPASE: CPT

## 2019-06-02 PROCEDURE — 85730 THROMBOPLASTIN TIME PARTIAL: CPT

## 2019-06-02 PROCEDURE — 96375 TX/PRO/DX INJ NEW DRUG ADDON: CPT

## 2019-06-02 PROCEDURE — 85025 COMPLETE CBC W/AUTO DIFF WBC: CPT

## 2019-06-02 RX ORDER — HYDROMORPHONE HYDROCHLORIDE 1 MG/ML
0.5 INJECTION, SOLUTION INTRAMUSCULAR; INTRAVENOUS; SUBCUTANEOUS
Status: COMPLETED | OUTPATIENT
Start: 2019-06-02 | End: 2019-06-03

## 2019-06-03 ENCOUNTER — CLINICAL SUPPORT (OUTPATIENT)
Dept: PEDIATRIC CARDIOLOGY | Facility: CLINIC | Age: 19
DRG: 948 | End: 2019-06-03
Attending: EMERGENCY MEDICINE
Payer: MEDICAID

## 2019-06-03 DIAGNOSIS — R00.2 PALPITATIONS: ICD-10-CM

## 2019-06-03 DIAGNOSIS — I47.10 SVT (SUPRAVENTRICULAR TACHYCARDIA): ICD-10-CM

## 2019-06-03 DIAGNOSIS — I48.92 ATRIAL FLUTTER, UNSPECIFIED TYPE: ICD-10-CM

## 2019-06-03 DIAGNOSIS — I47.10 SVT (SUPRAVENTRICULAR TACHYCARDIA): Primary | ICD-10-CM

## 2019-06-03 PROBLEM — R10.9 ABDOMINAL PAIN: Status: ACTIVE | Noted: 2019-06-03

## 2019-06-03 PROBLEM — K74.60 LIVER CIRRHOSIS: Status: ACTIVE | Noted: 2019-06-03

## 2019-06-03 PROBLEM — R79.1 SUPRATHERAPEUTIC INR: Status: ACTIVE | Noted: 2019-06-03

## 2019-06-03 PROBLEM — Z79.01 CHRONIC ANTICOAGULATION: Status: ACTIVE | Noted: 2019-06-03

## 2019-06-03 PROBLEM — R16.2 HEPATOSPLENOMEGALY: Status: ACTIVE | Noted: 2019-06-03

## 2019-06-03 LAB
AFP SERPL-MCNC: 2.8 NG/ML (ref 0–8.4)
ALBUMIN SERPL BCP-MCNC: 4 G/DL (ref 3.5–5.2)
ALP SERPL-CCNC: 76 U/L (ref 55–135)
ALT SERPL W/O P-5'-P-CCNC: 20 U/L (ref 10–44)
AMMONIA PLAS-SCNC: 46 UMOL/L (ref 10–50)
ANION GAP SERPL CALC-SCNC: 11 MMOL/L (ref 8–16)
APTT BLDCRRT: 76.7 SEC (ref 21–32)
APTT BLDCRRT: 90.6 SEC (ref 21–32)
AST SERPL-CCNC: 23 U/L (ref 10–40)
BILIRUB SERPL-MCNC: 0.9 MG/DL (ref 0.1–1)
BNP SERPL-MCNC: 83 PG/ML (ref 0–99)
BUN SERPL-MCNC: 9 MG/DL (ref 6–20)
CALCIUM SERPL-MCNC: 9.5 MG/DL (ref 8.7–10.5)
CHLORIDE SERPL-SCNC: 104 MMOL/L (ref 95–110)
CO2 SERPL-SCNC: 25 MMOL/L (ref 23–29)
CREAT SERPL-MCNC: 0.8 MG/DL (ref 0.5–1.4)
EST. GFR  (AFRICAN AMERICAN): >60 ML/MIN/1.73 M^2
EST. GFR  (NON AFRICAN AMERICAN): >60 ML/MIN/1.73 M^2
FIBRINOGEN PPP-MCNC: 453 MG/DL (ref 182–366)
GGT SERPL-CCNC: 83 U/L (ref 8–55)
GLUCOSE SERPL-MCNC: 105 MG/DL (ref 70–110)
HAV IGM SERPL QL IA: NEGATIVE
HBV CORE IGM SERPL QL IA: NEGATIVE
HBV SURFACE AG SERPL QL IA: NEGATIVE
HCV AB SERPL QL IA: NEGATIVE
INR PPP: 9 (ref 0.8–1.2)
INR PPP: 9.9 (ref 0.8–1.2)
LACTATE SERPL-SCNC: 0.6 MMOL/L (ref 0.5–2.2)
LIPASE SERPL-CCNC: 28 U/L (ref 4–60)
POTASSIUM SERPL-SCNC: 3.3 MMOL/L (ref 3.5–5.1)
PROT SERPL-MCNC: 8 G/DL (ref 6–8.4)
PROTHROMBIN TIME: 98.2 SEC (ref 9–12.5)
PROTHROMBIN TIME: >100 SEC (ref 9–12.5)
SODIUM SERPL-SCNC: 140 MMOL/L (ref 136–145)
TROPONIN I SERPL DL<=0.01 NG/ML-MCNC: <0.006 NG/ML (ref 0–0.03)

## 2019-06-03 PROCEDURE — 85384 FIBRINOGEN ACTIVITY: CPT

## 2019-06-03 PROCEDURE — 93291 INTERROG DEV EVAL SCRMS IP: CPT | Mod: 26,,, | Performed by: PEDIATRICS

## 2019-06-03 PROCEDURE — 93325 DOPPLER ECHO COLOR FLOW MAPG: CPT | Performed by: PEDIATRICS

## 2019-06-03 PROCEDURE — 83880 ASSAY OF NATRIURETIC PEPTIDE: CPT

## 2019-06-03 PROCEDURE — 93303 ECHO TRANSTHORACIC: CPT | Performed by: PEDIATRICS

## 2019-06-03 PROCEDURE — A9585 GADOBUTROL INJECTION: HCPCS | Performed by: PEDIATRICS

## 2019-06-03 PROCEDURE — 63600175 PHARM REV CODE 636 W HCPCS: Performed by: EMERGENCY MEDICINE

## 2019-06-03 PROCEDURE — 25500020 PHARM REV CODE 255: Performed by: EMERGENCY MEDICINE

## 2019-06-03 PROCEDURE — 80074 ACUTE HEPATITIS PANEL: CPT

## 2019-06-03 PROCEDURE — 63600175 PHARM REV CODE 636 W HCPCS: Performed by: STUDENT IN AN ORGANIZED HEALTH CARE EDUCATION/TRAINING PROGRAM

## 2019-06-03 PROCEDURE — 93320 DOPPLER ECHO COMPLETE: CPT | Performed by: PEDIATRICS

## 2019-06-03 PROCEDURE — 82140 ASSAY OF AMMONIA: CPT

## 2019-06-03 PROCEDURE — 99223 PR INITIAL HOSPITAL CARE,LEVL III: ICD-10-PCS | Mod: ,,, | Performed by: PEDIATRICS

## 2019-06-03 PROCEDURE — 82977 ASSAY OF GGT: CPT

## 2019-06-03 PROCEDURE — 11300000 HC PEDIATRIC PRIVATE ROOM

## 2019-06-03 PROCEDURE — 36415 COLL VENOUS BLD VENIPUNCTURE: CPT

## 2019-06-03 PROCEDURE — 25000003 PHARM REV CODE 250: Performed by: PHYSICIAN ASSISTANT

## 2019-06-03 PROCEDURE — 85730 THROMBOPLASTIN TIME PARTIAL: CPT

## 2019-06-03 PROCEDURE — 93010 ELECTROCARDIOGRAM REPORT: CPT | Mod: ,,, | Performed by: PEDIATRICS

## 2019-06-03 PROCEDURE — 82105 ALPHA-FETOPROTEIN SERUM: CPT

## 2019-06-03 PROCEDURE — 93005 ELECTROCARDIOGRAM TRACING: CPT

## 2019-06-03 PROCEDURE — 93010 EKG 12-LEAD: ICD-10-PCS | Mod: ,,, | Performed by: PEDIATRICS

## 2019-06-03 PROCEDURE — 25000003 PHARM REV CODE 250: Performed by: EMERGENCY MEDICINE

## 2019-06-03 PROCEDURE — 93291 CV LOOP RECORDER INTERROGATION PEDIATRICS (CUPID ONLY): ICD-10-PCS | Mod: 26,,, | Performed by: PEDIATRICS

## 2019-06-03 PROCEDURE — 25500020 PHARM REV CODE 255: Performed by: PEDIATRICS

## 2019-06-03 PROCEDURE — 99233 PR SUBSEQUENT HOSPITAL CARE,LEVL III: ICD-10-PCS | Mod: ,,, | Performed by: INTERNAL MEDICINE

## 2019-06-03 PROCEDURE — 25000003 PHARM REV CODE 250: Performed by: STUDENT IN AN ORGANIZED HEALTH CARE EDUCATION/TRAINING PROGRAM

## 2019-06-03 PROCEDURE — 85610 PROTHROMBIN TIME: CPT

## 2019-06-03 PROCEDURE — 99223 1ST HOSP IP/OBS HIGH 75: CPT | Mod: ,,, | Performed by: PEDIATRICS

## 2019-06-03 PROCEDURE — 99233 SBSQ HOSP IP/OBS HIGH 50: CPT | Mod: ,,, | Performed by: INTERNAL MEDICINE

## 2019-06-03 PROCEDURE — 83605 ASSAY OF LACTIC ACID: CPT

## 2019-06-03 PROCEDURE — 93291 INTERROG DEV EVAL SCRMS IP: CPT

## 2019-06-03 RX ORDER — SOTALOL HYDROCHLORIDE 80 MG/1
80 TABLET ORAL 2 TIMES DAILY
Status: DISCONTINUED | OUTPATIENT
Start: 2019-06-03 | End: 2019-06-05 | Stop reason: HOSPADM

## 2019-06-03 RX ORDER — GADOBUTROL 604.72 MG/ML
10 INJECTION INTRAVENOUS
Status: COMPLETED | OUTPATIENT
Start: 2019-06-03 | End: 2019-06-03

## 2019-06-03 RX ORDER — HYDROCHLOROTHIAZIDE 25 MG/1
25 TABLET ORAL DAILY
Status: DISCONTINUED | OUTPATIENT
Start: 2019-06-03 | End: 2019-06-05 | Stop reason: HOSPADM

## 2019-06-03 RX ORDER — DIGOXIN 125 MCG
125 TABLET ORAL DAILY
Status: DISCONTINUED | OUTPATIENT
Start: 2019-06-03 | End: 2019-06-05 | Stop reason: HOSPADM

## 2019-06-03 RX ORDER — EPINEPHRINE 0.22MG
100 AEROSOL WITH ADAPTER (ML) INHALATION 2 TIMES DAILY
Status: DISCONTINUED | OUTPATIENT
Start: 2019-06-03 | End: 2019-06-05 | Stop reason: HOSPADM

## 2019-06-03 RX ORDER — HYDROMORPHONE HYDROCHLORIDE 1 MG/ML
0.5 INJECTION, SOLUTION INTRAMUSCULAR; INTRAVENOUS; SUBCUTANEOUS
Status: COMPLETED | OUTPATIENT
Start: 2019-06-03 | End: 2019-06-03

## 2019-06-03 RX ORDER — MORPHINE SULFATE 2 MG/ML
2 INJECTION, SOLUTION INTRAMUSCULAR; INTRAVENOUS
Status: DISCONTINUED | OUTPATIENT
Start: 2019-06-03 | End: 2019-06-04

## 2019-06-03 RX ORDER — PHYTONADIONE 5 MG/1
10 TABLET ORAL ONCE
Status: DISCONTINUED | OUTPATIENT
Start: 2019-06-03 | End: 2019-06-03

## 2019-06-03 RX ORDER — METRONIDAZOLE 500 MG/1
2000 TABLET ORAL
Status: COMPLETED | OUTPATIENT
Start: 2019-06-03 | End: 2019-06-03

## 2019-06-03 RX ORDER — AMLODIPINE BESYLATE 2.5 MG/1
2.5 TABLET ORAL DAILY
Status: DISCONTINUED | OUTPATIENT
Start: 2019-06-03 | End: 2019-06-05 | Stop reason: HOSPADM

## 2019-06-03 RX ORDER — ONDANSETRON 2 MG/ML
8 INJECTION INTRAMUSCULAR; INTRAVENOUS
Status: COMPLETED | OUTPATIENT
Start: 2019-06-03 | End: 2019-06-03

## 2019-06-03 RX ORDER — SODIUM CHLORIDE 9 MG/ML
INJECTION, SOLUTION INTRAVENOUS CONTINUOUS
Status: DISCONTINUED | OUTPATIENT
Start: 2019-06-03 | End: 2019-06-03

## 2019-06-03 RX ADMIN — HYDROMORPHONE HYDROCHLORIDE 0.5 MG: 1 INJECTION, SOLUTION INTRAMUSCULAR; INTRAVENOUS; SUBCUTANEOUS at 03:06

## 2019-06-03 RX ADMIN — MORPHINE SULFATE 2 MG: 2 INJECTION, SOLUTION INTRAMUSCULAR; INTRAVENOUS at 11:06

## 2019-06-03 RX ADMIN — IOHEXOL 100 ML: 350 INJECTION, SOLUTION INTRAVENOUS at 01:06

## 2019-06-03 RX ADMIN — SOTALOL HYDROCHLORIDE 80 MG: 80 TABLET ORAL at 09:06

## 2019-06-03 RX ADMIN — MORPHINE SULFATE 2 MG: 2 INJECTION, SOLUTION INTRAMUSCULAR; INTRAVENOUS at 09:06

## 2019-06-03 RX ADMIN — SODIUM CHLORIDE 1000 ML: 0.9 INJECTION, SOLUTION INTRAVENOUS at 12:06

## 2019-06-03 RX ADMIN — MORPHINE SULFATE 2 MG: 2 INJECTION, SOLUTION INTRAMUSCULAR; INTRAVENOUS at 07:06

## 2019-06-03 RX ADMIN — HYDROMORPHONE HYDROCHLORIDE 0.5 MG: 1 INJECTION, SOLUTION INTRAMUSCULAR; INTRAVENOUS; SUBCUTANEOUS at 12:06

## 2019-06-03 RX ADMIN — GADOBUTROL 10 ML: 604.72 INJECTION INTRAVENOUS at 08:06

## 2019-06-03 RX ADMIN — METRONIDAZOLE 2000 MG: 500 TABLET ORAL at 02:06

## 2019-06-03 RX ADMIN — ONDANSETRON 8 MG: 2 INJECTION INTRAMUSCULAR; INTRAVENOUS at 04:06

## 2019-06-03 RX ADMIN — MORPHINE SULFATE 2 MG: 2 INJECTION, SOLUTION INTRAMUSCULAR; INTRAVENOUS at 01:06

## 2019-06-03 RX ADMIN — Medication 2.5 MG: at 05:06

## 2019-06-03 RX ADMIN — SODIUM CHLORIDE: 0.9 INJECTION, SOLUTION INTRAVENOUS at 03:06

## 2019-06-03 RX ADMIN — AMLODIPINE BESYLATE 2.5 MG: 2.5 TABLET ORAL at 09:06

## 2019-06-03 RX ADMIN — HYDROCHLOROTHIAZIDE 25 MG: 25 TABLET ORAL at 09:06

## 2019-06-03 RX ADMIN — Medication 100 MG: at 09:06

## 2019-06-03 RX ADMIN — DIGOXIN 125 MCG: 125 TABLET ORAL at 09:06

## 2019-06-03 RX ADMIN — HYDROMORPHONE HYDROCHLORIDE 0.5 MG: 1 INJECTION, SOLUTION INTRAMUSCULAR; INTRAVENOUS; SUBCUTANEOUS at 02:06

## 2019-06-03 NOTE — SUBJECTIVE & OBJECTIVE
Past Medical History:   Diagnosis Date    CHF (congestive heart failure)     Obesity     Pulmonary atresia with intact ventricular septum     SVT (supraventricular tachycardia)        Past Surgical History:   Procedure Laterality Date    ABLATION, SVT, ACCESSORY PATHWAY Bilateral 2/6/2019    Performed by Romeo Robles MD at [facility] EP LAB    MEMO PROCEDURE      bt shunt and transannular patch    CARDIAC VALVE REPLACEMENT      ECHOCARDIOGRAM,TRANSESOPHAGEAL N/A 2/6/2019    Performed by [facility] at [facility] EP LAB    INSERTION, IMPLANTABLE LOOP RECORDER N/A 5/8/2019    Performed by Ricardo Woodward MD at [facility] EP LAB    INSERTION, IMPLANTABLE LOOP RECORDER N/A 2/6/2019    Performed by Romeo Robles MD at [facility] EP LAB    PULMONARY VALVE REPLACEMENT  01/02/2007    25mm sharyn charles    REPLACEMENT-VALVE-PULMONARY N/A 2/16/2017    Performed by Zachary Berman Jr., MD at [facility] CATH LAB    TRANSESOPHAGEAL ECHOCARDIOGRAM (SAI) N/A 2/16/2017    Performed by Zachary Berman Jr., MD at [facility] CATH LAB    TRICUSPID VALVE REPLACEMENT  01/02/2007    25 mm sharyn-charles       Review of patient's allergies indicates:  No Known Allergies    Current Facility-Administered Medications on File Prior to Encounter   Medication    0.9%  NaCl infusion    sodium chloride 0.9% flush 5 mL     Current Outpatient Medications on File Prior to Encounter   Medication Sig    digoxin (LANOXIN) 125 mcg tablet Take 1 tablet (125 mcg total) by mouth once daily.    hydroCHLOROthiazide (HYDRODIURIL) 25 MG tablet Take 1 tablet (25 mg total) by mouth once daily.    sotalol (BETAPACE) 80 MG tablet Take 1 tablet (80 mg total) by mouth 2 (two) times daily.    warfarin (COUMADIN) 10 MG tablet Take 1 tablet (10 mg total) by mouth once daily.    amlodipine (NORVASC) 2.5 MG tablet Take 1 tablet (2.5 mg total) by mouth once daily.    coenzyme Q10 (COQ-10) 100 mg capsule Take 1 capsule (100 mg total) by mouth 2  (two) times daily.     Family History     None        Social History     Social History Narrative    Not on file     Review of Systems   Constitutional: Positive for activity change. Negative for fatigue and fever.   HENT: Negative for congestion, rhinorrhea and sore throat.    Eyes: Negative for pain, discharge, redness and itching.   Respiratory: Negative for apnea, cough, choking, chest tightness, shortness of breath, wheezing and stridor.    Cardiovascular: Negative for chest pain and leg swelling.   Gastrointestinal: Positive for abdominal pain. Negative for abdominal distention, constipation, diarrhea, nausea and vomiting.   Genitourinary: Positive for dysuria and flank pain. Negative for hematuria, menstrual problem and vaginal bleeding.   Skin: Negative for color change, pallor, rash and wound.   Neurological: Negative for dizziness, weakness, light-headedness and headaches.     Objective:     Vital Signs (Most Recent):  Temp: 98.6 °F (37 °C) (06/03/19 0732)  Pulse: 78 (06/03/19 0732)  Resp: (!) 24 (06/03/19 0732)  BP: 110/62 (06/03/19 0732)  SpO2: 95 % (06/03/19 0732) Vital Signs (24h Range):  Temp:  [97.6 °F (36.4 °C)-98.6 °F (37 °C)] 98.6 °F (37 °C)  Pulse:  [72-90] 78  Resp:  [12-28] 24  SpO2:  [95 %-98 %] 95 %  BP: (110-139)/() 110/62     Weight: 117.9 kg (260 lb)  Body mass index is 41.97 kg/m².    SpO2: 95 %  O2 Device (Oxygen Therapy): room air      Intake/Output Summary (Last 24 hours) at 6/3/2019 1056  Last data filed at 6/3/2019 0159  Gross per 24 hour   Intake 1000 ml   Output --   Net 1000 ml       Lines/Drains/Airways     Peripheral Intravenous Line                 Peripheral IV - Single Lumen 06/02/19 2241 22 G Right;Posterior Hand less than 1 day         Peripheral IV - Single Lumen 06/03/19 0019 20 G Right Antecubital less than 1 day                Physical Exam   Constitutional: She is oriented to person, place, and time. She appears well-developed and well-nourished. No distress.    Obese female, AA, resting in bed, appears tired, complaining of abdominal pain   HENT:   Head: Normocephalic and atraumatic.   Nose: Nose normal.   Mouth/Throat: Oropharynx is clear and moist. No oropharyngeal exudate.   Eyes: Pupils are equal, round, and reactive to light. Conjunctivae and EOM are normal. Right eye exhibits no discharge. Left eye exhibits no discharge. No scleral icterus.   Neck: Normal range of motion. Neck supple.   Cardiovascular: Normal rate, regular rhythm, normal heart sounds and intact distal pulses. Exam reveals no gallop and no friction rub.   No murmur heard.  Pulmonary/Chest: Effort normal and breath sounds normal. No stridor. No respiratory distress. She has no wheezes. She has no rales. She exhibits no tenderness.   Abdominal: Soft. Bowel sounds are normal. She exhibits no distension and no mass. There is tenderness. There is guarding. There is no rebound.   TTP diffusely + L flank, nontender R flank, +guarding, unable to assess if organomegaly due to pain, will reassess after pain meds given   Musculoskeletal: Normal range of motion. She exhibits no edema, tenderness or deformity.   Lymphadenopathy:     She has no cervical adenopathy.   Neurological: She is alert and oriented to person, place, and time. No cranial nerve deficit or sensory deficit. She exhibits normal muscle tone. Coordination normal.   Skin: Skin is warm and dry. Capillary refill takes less than 2 seconds. No rash noted. She is not diaphoretic. No erythema. No pallor.   Vitals reviewed.      Significant Labs:     Recent Results (from the past 24 hour(s))   CBC W/ AUTO DIFFERENTIAL    Collection Time: 06/02/19  9:42 PM   Result Value Ref Range    WBC 8.20 3.90 - 12.70 K/uL    RBC 4.64 4.00 - 5.40 M/uL    Hemoglobin 12.7 12.0 - 16.0 g/dL    Hematocrit 38.2 37.0 - 48.5 %    Mean Corpuscular Volume 82 82 - 98 fL    Mean Corpuscular Hemoglobin 27.4 27.0 - 31.0 pg    Mean Corpuscular Hemoglobin Conc 33.2 32.0 - 36.0 g/dL     RDW 13.2 11.5 - 14.5 %    Platelets 296 150 - 350 K/uL    MPV 9.8 9.2 - 12.9 fL    Gran # (ANC) 5.6 1.8 - 7.7 K/uL    Lymph # 1.6 1.0 - 4.8 K/uL    Mono # 0.9 0.3 - 1.0 K/uL    Eos # 0.1 0.0 - 0.5 K/uL    Baso # 0.02 0.00 - 0.20 K/uL    Gran% 68.6 38.0 - 73.0 %    Lymph% 18.9 18.0 - 48.0 %    Mono% 10.9 4.0 - 15.0 %    Eosinophil% 1.2 0.0 - 8.0 %    Basophil% 0.2 0.0 - 1.9 %    Differential Method Automated    Comp. Metabolic Panel    Collection Time: 06/02/19  9:42 PM   Result Value Ref Range    Sodium 140 136 - 145 mmol/L    Potassium 3.3 (L) 3.5 - 5.1 mmol/L    Chloride 104 95 - 110 mmol/L    CO2 25 23 - 29 mmol/L    Glucose 105 70 - 110 mg/dL    BUN, Bld 9 6 - 20 mg/dL    Creatinine 0.8 0.5 - 1.4 mg/dL    Calcium 9.5 8.7 - 10.5 mg/dL    Total Protein 8.0 6.0 - 8.4 g/dL    Albumin 4.0 3.5 - 5.2 g/dL    Total Bilirubin 0.9 0.1 - 1.0 mg/dL    Alkaline Phosphatase 76 55 - 135 U/L    AST 23 10 - 40 U/L    ALT 20 10 - 44 U/L    Anion Gap 11 8 - 16 mmol/L    eGFR if African American >60 >60 mL/min/1.73 m^2    eGFR if non African American >60 >60 mL/min/1.73 m^2   Lipase    Collection Time: 06/02/19  9:42 PM   Result Value Ref Range    Lipase 28 4 - 60 U/L   APTT    Collection Time: 06/02/19  9:42 PM   Result Value Ref Range    aPTT 90.6 (H) 21.0 - 32.0 sec   Protime-INR    Collection Time: 06/02/19  9:42 PM   Result Value Ref Range    Prothrombin Time 98.2 (H) 9.0 - 12.5 sec    INR 9.0 (HH) 0.8 - 1.2   Troponin I    Collection Time: 06/02/19  9:42 PM   Result Value Ref Range    Troponin I <0.006 0.000 - 0.026 ng/mL   Urinalysis, Reflex to Urine Culture Urine, Clean Catch    Collection Time: 06/02/19 10:20 PM   Result Value Ref Range    Specimen UA Urine, Clean Catch     Color, UA Yellow Yellow, Straw, Karolina    Appearance, UA Clear Clear    pH, UA 8.0 5.0 - 8.0    Specific Gravity, UA 1.020 1.005 - 1.030    Protein, UA Negative Negative    Glucose, UA Negative Negative    Ketones, UA Negative Negative    Bilirubin  (UA) Negative Negative    Occult Blood UA 1+ (A) Negative    Nitrite, UA Negative Negative    Urobilinogen, UA 2.0-3.0 (A) <2.0 EU/dL    Leukocytes, UA Trace (A) Negative   Urinalysis Microscopic    Collection Time: 06/02/19 10:20 PM   Result Value Ref Range    RBC, UA 6 (H) 0 - 4 /hpf    WBC, UA 6 (H) 0 - 5 /hpf    Bacteria Rare None-Occ /hpf    Trichomonas, UA Rare (A) None    Microscopic Comment SEE COMMENT    POCT urine pregnancy    Collection Time: 06/02/19 10:21 PM   Result Value Ref Range    POC Preg Test, Ur Negative Negative     Acceptable Yes    Lactic acid, plasma    Collection Time: 06/03/19 12:20 AM   Result Value Ref Range    Lactate (Lactic Acid) 0.6 0.5 - 2.2 mmol/L   Brain natriuretic peptide    Collection Time: 06/03/19  3:55 AM   Result Value Ref Range    BNP 83 0 - 99 pg/mL   Protime-INR    Collection Time: 06/03/19  9:07 AM   Result Value Ref Range    Prothrombin Time >100.0 (H) 9.0 - 12.5 sec    INR 9.9 (HH) 0.8 - 1.2   APTT    Collection Time: 06/03/19  9:07 AM   Result Value Ref Range    aPTT 76.7 (H) 21.0 - 32.0 sec   Fibrinogen    Collection Time: 06/03/19  9:07 AM   Result Value Ref Range    Fibrinogen 453 (H) 182 - 366 mg/dL   Ammonia    Collection Time: 06/03/19  9:07 AM   Result Value Ref Range    Ammonia 46 10 - 50 umol/L   Gamma GT    Collection Time: 06/03/19  9:07 AM   Result Value Ref Range    GGT 83 (H) 8 - 55 U/L         Significant Imaging:     Narrative     EXAMINATION:  CTA OF ABDOMEN PELVIS WITH    CLINICAL HISTORY:  Mesenteric ischemia acute;    TECHNIQUE:  1.25 mm enhanced axial images were obtained from the lung bases through the greater trochanters to evaluate for mesenteric ischemia.  Arterial and venous phase imaging is were provided.  One hundred mL of Omnipaque 350 was injected.  No 3D images were provided.    COMPARISON:  None.    FINDINGS:  The celiac axis, SMA, CATHIE are all patent.  Both renal arteries are patent.  There are no atherosclerotic plaques  detected.  There is no bowel wall thickening.  There is no abdominal ascites or mesenteric infiltration.    The liver is cirrhotic.  In arterial phase, there are rounded arterially enhancing lesions in the left lobe of the liver measuring no greater than 15 mm.  On the venous phase images, these lesions are no longer appreciated.  The liver is cirrhotic.  Focal fat remains at the falciform ligament.    The spleen is elongated.    The pancreas, kidneys, and adrenal glands are unremarkable. The gallbladder contains no calcified gallstones.    There is no definite evidence for abdominal adenopathy or ascites.  There is a tiny fat containing umbilical hernia.    There are no pelvic masses or adenopathy.  There is a 4 x 3 x 3 cm left ovarian cyst.  Small fluid is seen within the endometrium, which may be physiologic.    There is no free fluid in the pelvis.    At the lung bases, median sternotomy changes are present.  The heart is enlarged.  There is mild left basilar atelectasis.      Impression       No definite evidence for mesenteric ischemia.  Consider correlation with lactate.    Cirrhotic liver occluding splenomegaly.  Multiple arterially enhancing indeterminate left hepatic lesions.  Possibility of a meningioma is cannot be entirely excluded.  Consider MRI.    Left ovarian cyst.

## 2019-06-03 NOTE — HPI
Kacey is a 18 year old female with pulmonic atresia s/p R outflow tract reconstruction and multiple pulmonic and tricuspid valve replacements, last 2007, and atrial tachycardia s/p ablation and loop recorder placement, presented with abdominal pain x 2 weeks, worsening, without fever, vomiting, diarrhea, weight change, edema, or resp distress. Not associated with eating. ROS pos for dysuria. Denied abdominal trauma or previous abdominal procedures.     In ED, CT abd pos for hepatic cirrhosis and indeterminate L hepatic lesions. UA pos blood 1+, trace leuk, neg nitrites, WBC 6. Trace trichomonas, given Metronidazole. CBC, CMP, lipase, lactate, troponin wnl. PT 98.2, PTT 90.6, INR 9.0. On warfarin for arrhythmia prophylaxis. Admitted for workup of abdominal pain and management of elevated PT, PTT, INR.

## 2019-06-03 NOTE — ASSESSMENT & PLAN NOTE
Kacey Ruth is a 18 year old female with hx of:  - Pulmonary atresia, intact ventricular septum s/p repair and multiple valve replacement surgeries  - Atrial tachycardia s/p ablation and loop recorder placement. Presented on warfarin with significantly elevated INR.   - Severe abdominal pain poorly controlled on pain meds  - CT abdomen pos for cirrhosis, PT, PTT, INR elevated. MRI with questionable hepatic lesions.  - Trichomonas + s/p treatment  - Admitted for mgmt of abnormal coags and abdominal pain possibly 2/2 cirrhosis. Adult hepatology following. Working on transferring to Internal Medicine service for further eval and tx.     CNS:   - Pain control with Morphine and Oxycodone.   Resp:  - No acute concerns  - Repeat CXR PRN  - Goal saturations normal.   CVS:   - Digoxin 125 mcg daily  - HCTZ 25 mg daily  - Amlodipine 2.5 mg daily  - Coenzyme Q10 100 mg BID  - Sotalol 80 mg BID   - Warfarin stopped  - Awaiting Loop interrogation   FEN/GI:  - Adult hepatology consulted  - LFTs wnl  - Morphine 2 mg IV q2h PRN  Gyn:  - Will consult Gynecology given irregular cycles and Trich + status for further evaluation of abdominal pain.   Heme/ID:  - Off Coumadin. Will not restart.   - Follow daily coags.   - s/p Metronidazole x1 in ED. No further treatment required.   Renal:   - No present concerns.   Social:   - Parents at bedside  Access:   - PIV  Dispo:   - Ideally transfer to adult IM service for further eval and tx. They have been contacted. Awaiting official consult and transfer.

## 2019-06-03 NOTE — NURSING TRANSFER
Nursing Transfer Note      Nursing Transfer Note    Receiving Transfer Note    6/3/2019 6:07 AM  Received in transfer from ems to [id number]  Report received as documented in PER Handoff on Doc Flowsheet.  See Doc Flowsheet for VS's and complete assessment.  Continuous EKG monitoring in place Yes  Chart received with patient: Yes  What Caregiver / Guardian was Notified of Arrival:  - brenda called her mother  Patient and / or caregiver / guardian oriented to room and nurse call system.  Maria C RN  6/3/2019 6:07 AM

## 2019-06-03 NOTE — ASSESSMENT & PLAN NOTE
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, stable  - Digoxin 125 mcg daily  - HCTZ 25 mg daily  - Amlodipine 2.5 mg daily  - Coenzyme Q10 100 mg BID    Atrial tachycardia s/p ablation  - Sotalol 80 mg BID   - Hold warfarin due to elevated INR    FEN/GI:  Cirrhosis on CT abdomen  - Adult hepatology consulted  - LFTs wnl  - NPO pending hepatology recs  - Morphine 2 mg IV q2h PRN  - GTT, hepatitis panel, ammonia level pending    Renal: no issues    Hematology  Elevated PT, PTT, INR  - Hold Warfarin  - Vit K 10 mg  - Repeat PT, PTT, INR daily    Social: Mom at bedside    Access: PIV  Dispo: pending stabilized PT, PTT, INR and workup of abdominal pain and cirrhosis

## 2019-06-03 NOTE — PLAN OF CARE
Problem: Adult Inpatient Plan of Care  Goal: Plan of Care Review  Outcome: Ongoing (interventions implemented as appropriate)  VSS. Afebrile. Morphine x1 for severe pain after coming back from ultrasound. NPO for MRI. Taken about 30cc of water with medications throughout the day. Elevated INR and PTT, Cardiology aware. Bedside monitor in progress. Mom at bedside, very concern about test results. Emotional support provided.

## 2019-06-03 NOTE — PLAN OF CARE
06/03/19 1033   Discharge Assessment   Assessment Type Discharge Planning Assessment   Confirmed/corrected address and phone number on facesheet? Yes   Assessment information obtained from? Medical Record   Expected Length of Stay (days) 3   Communicated expected length of stay with patient/caregiver yes   Prior to hospitilization cognitive status: Alert/Oriented   Prior to hospitalization functional status: Independent   Current cognitive status: Alert/Oriented   Current Functional Status: Independent   Lives With parent(s)   Able to Return to Prior Arrangements yes   Is patient able to care for self after discharge? Yes   Who are your caregiver(s) and their phone number(s)?   (Addis (mother) [phone])   Patient's perception of discharge disposition admitted as an inpatient   Readmission Within the Last 30 Days no previous admission in last 30 days   Patient currently being followed by outpatient case management? No   Patient currently receives any other outside agency services? No   Equipment Currently Used at Home none   Do you have any problems affording any of your prescribed medications? No   Is the patient taking medications as prescribed? yes   Does the patient have transportation home? Yes   Transportation Anticipated family or friend will provide   Does the patient receive services at the [facility]? No   Discharge Plan A Home with family   Patient/Family in Agreement with Plan yes   Pt lives with her mother. + family transportation.

## 2019-06-03 NOTE — HPI
"This is a 17 yo F with congenital heart disease who presented to the ED last night with abdominal pain. She reports her abdominal pain began about two weeks ago and is generalized. Reports it is a 10 out of 10 in severity, squeezing in quality, and certain positions make the pain worse. Pain is not affected by food or bowel movements. Pain is worsened with urination. She denies any fevers, nausea, vomiting, diarrhea, recent trauma. 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 She underwent reconstruction of the right ventricular outflow tract and placement of a bioprosthetic pulmonary valve in early childhood. She subsequently has required multiple heart valve replacements. Her most recent heart surgery involved placement of a bioprosthetic valve in the pulmonary position and placement of a bioprosthetic valve in the tricuspid position, in 2007." She is currently on coumadin for mechanical valve. CTA obtained yesterday showed findings of "cirrhotic liver". Her liver tests are within normal limits and she has a normal platelet count as well as albumin. Her INR was noted to be 9.  "

## 2019-06-03 NOTE — CONSULTS
"[facility]  Hepatology  Consult Note    Patient Name: Kacey Ruth  MRN: [id number]  Admission Date: 6/2/2019  Hospital Length of Stay: 0 days  Attending Provider: Samira Ball MD   Primary Care Physician: Sharad Yun MD  Principal Problem:Supratherapeutic INR    Inpatient consult to Hepatology  Consult performed by: Delbert Juarez MD  Consult ordered by: May Salgado MD        Subjective:     Transplant status: No    HPI:  This is a 19 yo F with congenital heart disease who presented to the ED last night with abdominal pain. She reports her abdominal pain began about two weeks ago and is generalized. Reports it is a 10 out of 10 in severity, squeezing in quality, and certain positions make the pain worse. Pain is not affected by food or bowel movements. Pain is worsened with urination. She denies any fevers, nausea, vomiting, diarrhea, recent trauma. 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 She underwent reconstruction of the right ventricular outflow tract and placement of a bioprosthetic pulmonary valve in early childhood. She subsequently has required multiple heart valve replacements. Her most recent heart surgery involved placement of a bioprosthetic valve in the pulmonary position and placement of a bioprosthetic valve in the tricuspid position, in 2007." She is currently on coumadin for mechanical valve. CTA obtained yesterday showed findings of "cirrhotic liver". Her liver tests are within normal limits and she has a normal platelet count as well as albumin. Her INR was noted to be 9.    Review of Systems   Constitutional: Positive for activity change and appetite change. Negative for chills and fever.   HENT: Negative for sore throat and trouble swallowing.    Respiratory: Negative for cough and shortness of breath.    Cardiovascular: Negative for chest pain and " leg swelling.   Gastrointestinal: Positive for abdominal pain. Negative for abdominal distention, blood in stool, constipation, diarrhea, nausea and vomiting.   Genitourinary: Negative for decreased urine volume and difficulty urinating.   Musculoskeletal: Positive for back pain. Negative for arthralgias.   Skin: Negative for color change and pallor.   Neurological: Negative for dizziness and light-headedness.   Psychiatric/Behavioral: Negative for agitation and confusion.       Past Medical History:   Diagnosis Date    CHF (congestive heart failure)     Obesity     Pulmonary atresia with intact ventricular septum     SVT (supraventricular tachycardia)        Past Surgical History:   Procedure Laterality Date    ABLATION, SVT, ACCESSORY PATHWAY Bilateral 2/6/2019    Performed by [facility] at [facility] EP LAB    MEMO PROCEDURE      bt shunt and transannular patch    CARDIAC VALVE REPLACEMENT      ECHOCARDIOGRAM,TRANSESOPHAGEAL N/A 2/6/2019    Performed by [facility] Diagnostic Provider at [facility] EP LAB    INSERTION, IMPLANTABLE LOOP RECORDER N/A 5/8/2019    Performed by Ricardo Woodward MD at [facility] EP LAB    INSERTION, IMPLANTABLE LOOP RECORDER N/A 2/6/2019    Performed by Romeo [facility]les [facility] at [facility] EP LAB    PULMONARY VALVE REPLACEMENT  01/02/2007    25mm sharyn charles    REPLACEMENT-VALVE-PULMONARY N/A 2/16/2017    Performed by Zachary Berman Jr., MD at [facility] CATH LAB    TRANSESOPHAGEAL ECHOCARDIOGRAM (SAI) N/A 2/16/2017    Performed by Zachary [facility]erman Jr., MD at [facility] CATH LAB    TRICUSPID VALVE REPLACEMENT  01/02/2007    25 mm sharyn-charles       Family history of liver disease: No    Review of patient's allergies indicates:  No Known Allergies    Tobacco Use    Smoking status: Never Smoker   Substance and Sexual Activity    Alcohol use: No    Drug use: No    Sexual activity: Never       Medications Prior to Admission   Medication Sig Dispense Refill Last Dose    digoxin  (LANOXIN) 125 mcg tablet Take 1 tablet (125 mcg total) by mouth once daily. 31 tablet 6 6/2/2019    hydroCHLOROthiazide (HYDRODIURIL) 25 MG tablet Take 1 tablet (25 mg total) by mouth once daily. 30 tablet 1 6/2/2019    sotalol (BETAPACE) 80 MG tablet Take 1 tablet (80 mg total) by mouth 2 (two) times daily. 60 tablet 1 6/2/2019    warfarin (COUMADIN) 10 MG tablet Take 1 tablet (10 mg total) by mouth once daily. 30 tablet 1 6/2/2019    amlodipine (NORVASC) 2.5 MG tablet Take 1 tablet (2.5 mg total) by mouth once daily. 30 tablet 11 5/22/2019    coenzyme Q10 (COQ-10) 100 mg capsule Take 1 capsule (100 mg total) by mouth 2 (two) times daily. 62 capsule 6 Taking       Objective:     Vital Signs (Most Recent):  Temp: 98.6 °F (37 °C) (06/03/19 0732)  Pulse: 78 (06/03/19 0732)  Resp: (!) 24 (06/03/19 0732)  BP: 110/62 (06/03/19 0732)  SpO2: 95 % (06/03/19 0732) Vital Signs (24h Range):  Temp:  [97.6 °F (36.4 °C)-98.6 °F (37 °C)] 98.6 °F (37 °C)  Pulse:  [72-90] 78  Resp:  [12-28] 24  SpO2:  [95 %-98 %] 95 %  BP: (110-139)/() 110/62     Weight: 117.9 kg (260 lb) (06/02/19 2144)  Body mass index is 41.97 kg/m².    Physical Exam   Constitutional: She is oriented to person, place, and time. She appears well-developed and well-nourished.   HENT:   Mouth/Throat: Oropharynx is clear and moist.   Eyes: No scleral icterus.   Cardiovascular: Normal rate and regular rhythm.   Pulmonary/Chest: Effort normal and breath sounds normal.   Abdominal: Soft. Bowel sounds are normal. She exhibits no distension and no mass. There is no guarding.   Diffusely mildly tender to palpation   Musculoskeletal: She exhibits no edema or deformity.   Neurological: She is alert and oriented to person, place, and time.   Skin: Skin is warm and dry.   Psychiatric: She has a normal mood and affect.   Vitals reviewed.      MELD-Na score: 32 at 6/3/2019  9:07 AM  MELD score: 32 at 6/3/2019  9:07 AM  Calculated from:  Serum Creatinine: 0.8 mg/dL  "(Rounded to 1 mg/dL) at 6/2/2019  9:42 PM  Serum Sodium: 140 mmol/L (Rounded to 137 mmol/L) at 6/2/2019  9:42 PM  Total Bilirubin: 0.9 mg/dL (Rounded to 1 mg/dL) at 6/2/2019  9:42 PM  INR(ratio): 9.9 at 6/3/2019  9:07 AM  Age: 18 years    Significant Labs:  CBC:   Recent Labs   Lab 06/02/19  2142   WBC 8.20   RBC 4.64   HGB 12.7   HCT 38.2        CMP:   Recent Labs   Lab 06/02/19  2142      CALCIUM 9.5   ALBUMIN 4.0   PROT 8.0      K 3.3*   CO2 25      BUN 9   CREATININE 0.8   ALKPHOS 76   ALT 20   AST 23   BILITOT 0.9     Coagulation:   Recent Labs   Lab 06/03/19  0907   INR 9.9*   APTT 76.7*     Assessment/Plan:     Liver cirrhosis  17 yo F with congenital heart disease who presented to the ED with abdominal pain, found to have "cirrhotic liver" on CT imaging. Her labs are not consistent with cirrhosis given normal liver tests, normal platelet count, and normal albumin. INR is elevated in the setting of coumadin use. Recommend further evaluation with US elastography liver which will give us more information about the scarring in her liver, if any. From our standpoint, there is no contraindication to giving Vit K.    Recommendations:  - US elastography liver  - If that does show fibrosis/cirrhosis, will pursue further work-up of liver disease  - Ok to give Vitamin K  - Daily CBC, CMP, INR        Thank you for your consult. I will follow-up with patient. Please contact us if you have any additional questions.    Delbert Juarez MD  Hepatology  [facility]  "

## 2019-06-03 NOTE — ED TRIAGE NOTES
Pt presents with c/o abdominal pain onset 2 weeks ago. Pt states points to left lower abdomen and left flank to locate pain. Pt denies any urinary symptoms. Denies NVD, constipation, fevers. mom reports pt has had some chills. Pt reports hx of similar symptoms but that symptoms resolve without interventio. Pt currently wearing loop monitor. PMH afib with blood thinner use. Mom at bedside. Pt is well appearing, pt in NAD. Pt placed on cont BP, pulse oximeter and cardiac monitoring

## 2019-06-03 NOTE — ED NOTES
[facility] unit 376 at bedside to transport pt. Pt on room air, no fluids infusing, pt stable for transport.

## 2019-06-03 NOTE — SUBJECTIVE & OBJECTIVE
Review of Systems   Constitutional: Positive for activity change and appetite change. Negative for chills and fever.   HENT: Negative for sore throat and trouble swallowing.    Respiratory: Negative for cough and shortness of breath.    Cardiovascular: Negative for chest pain and leg swelling.   Gastrointestinal: Positive for abdominal pain. Negative for abdominal distention, blood in stool, constipation, diarrhea, nausea and vomiting.   Genitourinary: Negative for decreased urine volume and difficulty urinating.   Musculoskeletal: Positive for back pain. Negative for arthralgias.   Skin: Negative for color change and pallor.   Neurological: Negative for dizziness and light-headedness.   Psychiatric/Behavioral: Negative for agitation and confusion.       Past Medical History:   Diagnosis Date    CHF (congestive heart failure)     Obesity     Pulmonary atresia with intact ventricular septum     SVT (supraventricular tachycardia)        Past Surgical History:   Procedure Laterality Date    ABLATION, SVT, ACCESSORY PATHWAY Bilateral 2/6/2019    Performed by [facility] at [facility] EP LAB    MEMO PROCEDURE      bt shunt and transannular patch    CARDIAC VALVE REPLACEMENT      ECHOCARDIOGRAM,TRANSESOPHAGEAL N/A 2/6/2019    Performed by [facility] Diagnostic Provider at [facility] EP LAB    INSERTION, IMPLANTABLE LOOP RECORDER N/A 5/8/2019    Performed by Ricardo Woodward MD at [facility] EP LAB    INSERTION, IMPLANTABLE LOOP RECORDER N/A 2/6/2019    Performed by Romeo [facility]les MD at [facility] EP LAB    PULMONARY VALVE REPLACEMENT  01/02/2007    25mm sharyn charles    REPLACEMENT-VALVE-PULMONARY N/A 2/16/2017    Performed by Zachary Berman Jr., MD at [facility] CATH LAB    TRANSESOPHAGEAL ECHOCARDIOGRAM (SAI) N/A 2/16/2017    Performed by Zachary Berman Jr., MD at [facility] CATH LAB    TRICUSPID VALVE REPLACEMENT  01/02/2007    25 mm sharyn-charles       Family history of liver disease: No    Review of patient's  allergies indicates:  No Known Allergies    Tobacco Use    Smoking status: Never Smoker   Substance and Sexual Activity    Alcohol use: No    Drug use: No    Sexual activity: Never       Medications Prior to Admission   Medication Sig Dispense Refill Last Dose    digoxin (LANOXIN) 125 mcg tablet Take 1 tablet (125 mcg total) by mouth once daily. 31 tablet 6 6/2/2019    hydroCHLOROthiazide (HYDRODIURIL) 25 MG tablet Take 1 tablet (25 mg total) by mouth once daily. 30 tablet 1 6/2/2019    sotalol (BETAPACE) 80 MG tablet Take 1 tablet (80 mg total) by mouth 2 (two) times daily. 60 tablet 1 6/2/2019    warfarin (COUMADIN) 10 MG tablet Take 1 tablet (10 mg total) by mouth once daily. 30 tablet 1 6/2/2019    amlodipine (NORVASC) 2.5 MG tablet Take 1 tablet (2.5 mg total) by mouth once daily. 30 tablet 11 5/22/2019    coenzyme Q10 (COQ-10) 100 mg capsule Take 1 capsule (100 mg total) by mouth 2 (two) times daily. 62 capsule 6 Taking       Objective:     Vital Signs (Most Recent):  Temp: 98.6 °F (37 °C) (06/03/19 0732)  Pulse: 78 (06/03/19 0732)  Resp: (!) 24 (06/03/19 0732)  BP: 110/62 (06/03/19 0732)  SpO2: 95 % (06/03/19 0732) Vital Signs (24h Range):  Temp:  [97.6 °F (36.4 °C)-98.6 °F (37 °C)] 98.6 °F (37 °C)  Pulse:  [72-90] 78  Resp:  [12-28] 24  SpO2:  [95 %-98 %] 95 %  BP: (110-139)/() 110/62     Weight: 117.9 kg (260 lb) (06/02/19 2144)  Body mass index is 41.97 kg/m².    Physical Exam   Constitutional: She is oriented to person, place, and time. She appears well-developed and well-nourished.   HENT:   Mouth/Throat: Oropharynx is clear and moist.   Eyes: No scleral icterus.   Cardiovascular: Normal rate and regular rhythm.   Pulmonary/Chest: Effort normal and breath sounds normal.   Abdominal: Soft. Bowel sounds are normal. She exhibits no distension and no mass. There is no guarding.   Diffusely mildly tender to palpation   Musculoskeletal: She exhibits no edema or deformity.   Neurological: She is  alert and oriented to person, place, and time.   Skin: Skin is warm and dry.   Psychiatric: She has a normal mood and affect.   Vitals reviewed.      MELD-Na score: 32 at 6/3/2019  9:07 AM  MELD score: 32 at 6/3/2019  9:07 AM  Calculated from:  Serum Creatinine: 0.8 mg/dL (Rounded to 1 mg/dL) at 6/2/2019  9:42 PM  Serum Sodium: 140 mmol/L (Rounded to 137 mmol/L) at 6/2/2019  9:42 PM  Total Bilirubin: 0.9 mg/dL (Rounded to 1 mg/dL) at 6/2/2019  9:42 PM  INR(ratio): 9.9 at 6/3/2019  9:07 AM  Age: 18 years    Significant Labs:  CBC:   Recent Labs   Lab 06/02/19  2142   WBC 8.20   RBC 4.64   HGB 12.7   HCT 38.2        CMP:   Recent Labs   Lab 06/02/19  2142      CALCIUM 9.5   ALBUMIN 4.0   PROT 8.0      K 3.3*   CO2 25      BUN 9   CREATININE 0.8   ALKPHOS 76   ALT 20   AST 23   BILITOT 0.9     Coagulation:   Recent Labs   Lab 06/03/19  0907   INR 9.9*   APTT 76.7*

## 2019-06-03 NOTE — ED NOTES
NEURO: Pt AAO x 4. Behavior and speech appropriate to situation.   CARDIAC: Regular rhythm auscultated  RESPIRATORY: Respirations even and unlabored. Breath sounds clear to all lung fields.   MUSCULOSKELETAL: Active ROM noted to extremities  GASTRO: last BM last night. Abdomen rotund, mildly taut, Generalized abdominal tenderness.

## 2019-06-03 NOTE — ASSESSMENT & PLAN NOTE
"19 yo F with congenital heart disease who presented to the ED with abdominal pain, found to have "cirrhotic liver" on CT imaging. Her labs are not consistent with cirrhosis given normal liver tests, normal platelet count, and normal albumin. INR is elevated in the setting of coumadin use. Recommend further evaluation with US elastography liver which will give us more information about the scarring in her liver, if any. From our standpoint, there is no contraindication to giving Vit K.    Recommendations:  - US elastography liver  - If that does show fibrosis/cirrhosis, will pursue further work-up of liver disease  - Ok to give Vitamin K  - Daily CBC, CMP, INR  "

## 2019-06-03 NOTE — H&P
[facility]  Pediatric Cardiology  History and Physical     Patient Name: Kacey Ruth  MRN: [id number]  Admission Date: 6/2/2019  Code Status: Prior   Attending Provider: Samira Ball MD   Primary Cardiologist: Dr. 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 Abdominal pain squeezing in mid and lower abdomen, radiating to L flank, worsens with movement and with urination, improves with arching of back. Took motrin and placed warm compressese on abdomen without improvement. Pain acutely worsened last night, bent over, unable to stand, came to ED. No fevers, vomiting, diarrhea, cough, congestion, or rash. No hematuria. No extremity swelling. No sick contacts. No previous UTI or similar abdominal pain. LMP 2 weeks ago, irregular, uses 2 pads per day, no significant cramping with periods. Denies abnormal bruising or bleeding but did note taste of blood in mouth in am, no visible bleeding.     Follows with Dr. Gonzalez for peds cardiology. Recent hospitalization for atrial tachycardia 5/6-5/9. Loop recorder placed and Metoprolol changed to Sotalol. Vitals stabilized. Also on Amlodipine, Digoxin, HCTZ, and Warfarin as listed below. Last pulmonic and tricuspid valve replacement 2007.     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 Denies sexual activity. Feels safe at home.     ED course: Diffuse abdominal pain. CT abdomen positive for liver cirrhosis, multiple arterially enhancing lesions in left lobe of liver, L ovarian cyst. Neg for mesenteric ischemia. CBC, CMP reassuring including LFTs. Lipase wnl. PT 98.2, PTT 90.6, INR 9.0. UA pos for 1+ blood, trace leukocytes, neg nitrites, WBC 6, trichomonas trace. Metronidazole given. Dilaudid 0.5 mg x 3 with some pain relief. Bolus x 1.    Admitted to the pediatric cardiology service for management of elevated coags and abdominal pain.     Past Medical History:   Diagnosis Date    CHF (congestive heart failure)     Obesity     Pulmonary atresia with intact ventricular septum     SVT (supraventricular tachycardia)        Past Surgical History:   Procedure Laterality Date    ABLATION, SVT, ACCESSORY PATHWAY Bilateral 2/6/2019    Performed by Romeo Robles MD at [facility] EP LAB    MEMO PROCEDURE      bt shunt and transannular patch    CARDIAC VALVE REPLACEMENT      ECHOCARDIOGRAM,TRANSESOPHAGEAL N/A 2/6/2019    Performed by [facility] Diagnostic Provider at [facility] EP LAB    INSERTION, IMPLANTABLE LOOP RECORDER N/A 5/8/2019    Performed by Ricardo Woodward MD at [facility] EP LAB    INSERTION, IMPLANTABLE LOOP RECORDER N/A 2/6/2019    Performed by Romeo Robles MD at [facility] EP LAB    PULMONARY VALVE REPLACEMENT  01/02/2007    25mm sharyn charles    REPLACEMENT-VALVE-PULMONARY N/A 2/16/2017    Performed by Zachary Berman Jr., MD at [facility] CATH LAB    TRANSESOPHAGEAL ECHOCARDIOGRAM (SAI) N/A 2/16/2017    Performed by Zachary Berman Jr., MD at [facility] CATH LAB     TRICUSPID VALVE REPLACEMENT  01/02/2007    25 mm sharyn-charles       Review of patient's allergies indicates:  No Known Allergies    Current Facility-Administered Medications on File Prior to Encounter   Medication    0.9%  NaCl infusion    sodium chloride 0.9% flush 5 mL     Current Outpatient Medications on File Prior to Encounter   Medication Sig    digoxin (LANOXIN) 125 mcg tablet Take 1 tablet (125 mcg total) by mouth once daily.    hydroCHLOROthiazide (HYDRODIURIL) 25 MG tablet Take 1 tablet (25 mg total) by mouth once daily.    sotalol (BETAPACE) 80 MG tablet Take 1 tablet (80 mg total) by mouth 2 (two) times daily.    warfarin (COUMADIN) 10 MG tablet Take 1 tablet (10 mg total) by mouth once daily.    amlodipine (NORVASC) 2.5 MG tablet Take 1 tablet (2.5 mg total) by mouth once daily.    coenzyme Q10 (COQ-10) 100 mg capsule Take 1 capsule (100 mg total) by mouth 2 (two) times daily.     Family History     None        Social History     Social History Narrative    Not on file     Review of Systems   Constitutional: Positive for activity change. Negative for fatigue and fever.   HENT: Negative for congestion, rhinorrhea and sore throat.    Eyes: Negative for pain, discharge, redness and itching.   Respiratory: Negative for apnea, cough, choking, chest tightness, shortness of breath, wheezing and stridor.    Cardiovascular: Negative for chest pain and leg swelling.   Gastrointestinal: Positive for abdominal pain. Negative for abdominal distention, constipation, diarrhea, nausea and vomiting.   Genitourinary: Positive for dysuria and flank pain. Negative for hematuria, menstrual problem and vaginal bleeding.   Skin: Negative for color change, pallor, rash and wound.   Neurological: Negative for dizziness, weakness, light-headedness and headaches.     Objective:     Vital Signs (Most Recent):  Temp: 98.6 °F (37 °C) (06/03/19 0732)  Pulse: 78 (06/03/19 0732)  Resp: (!) 24 (06/03/19 0732)  BP: 110/62  (06/03/19 0732)  SpO2: 95 % (06/03/19 0732) Vital Signs (24h Range):  Temp:  [97.6 °F (36.4 °C)-98.6 °F (37 °C)] 98.6 °F (37 °C)  Pulse:  [72-90] 78  Resp:  [12-28] 24  SpO2:  [95 %-98 %] 95 %  BP: (110-139)/() 110/62     Weight: 117.9 kg (260 lb)  Body mass index is 41.97 kg/m².    SpO2: 95 %  O2 Device (Oxygen Therapy): room air      Intake/Output Summary (Last 24 hours) at 6/3/2019 1056  Last data filed at 6/3/2019 0159  Gross per 24 hour   Intake 1000 ml   Output --   Net 1000 ml       Lines/Drains/Airways     Peripheral Intravenous Line                 Peripheral IV - Single Lumen 06/02/19 2241 22 G Right;Posterior Hand less than 1 day         Peripheral IV - Single Lumen 06/03/19 0019 20 G Right Antecubital less than 1 day                Physical Exam   Constitutional: She is oriented to person, place, and time. She appears well-developed and well-nourished. No distress.   Obese female, AA, resting in bed, appears tired, complaining of abdominal pain   HENT:   Head: Normocephalic and atraumatic.   Nose: Nose normal.   Mouth/Throat: Oropharynx is clear and moist. No oropharyngeal exudate.   Eyes: Pupils are equal, round, and reactive to light. Conjunctivae and EOM are normal. Right eye exhibits no discharge. Left eye exhibits no discharge. No scleral icterus.   Neck: Normal range of motion. Neck supple.   Cardiovascular: Normal rate, regular rhythm, normal heart sounds and intact distal pulses. Exam reveals no gallop and no friction rub.   No murmur heard.  Pulmonary/Chest: Effort normal and breath sounds normal. No stridor. No respiratory distress. She has no wheezes. She has no rales. She exhibits no tenderness.   Abdominal: Soft. Bowel sounds are normal. She exhibits no distension and no mass. There is tenderness. There is guarding. There is no rebound.   TTP diffusely + L flank, nontender R flank, +guarding, unable to assess if organomegaly due to pain, will reassess after pain meds given    Musculoskeletal: Normal range of motion. She exhibits no edema, tenderness or deformity.   Lymphadenopathy:     She has no cervical adenopathy.   Neurological: She is alert and oriented to person, place, and time. No cranial nerve deficit or sensory deficit. She exhibits normal muscle tone. Coordination normal.   Skin: Skin is warm and dry. Capillary refill takes less than 2 seconds. No rash noted. She is not diaphoretic. No erythema. No pallor.   Vitals reviewed.      Significant Labs:     Recent Results (from the past 24 hour(s))   CBC W/ AUTO DIFFERENTIAL    Collection Time: 06/02/19  9:42 PM   Result Value Ref Range    WBC 8.20 3.90 - 12.70 K/uL    RBC 4.64 4.00 - 5.40 M/uL    Hemoglobin 12.7 12.0 - 16.0 g/dL    Hematocrit 38.2 37.0 - 48.5 %    Mean Corpuscular Volume 82 82 - 98 fL    Mean Corpuscular Hemoglobin 27.4 27.0 - 31.0 pg    Mean Corpuscular Hemoglobin Conc 33.2 32.0 - 36.0 g/dL    RDW 13.2 11.5 - 14.5 %    Platelets 296 150 - 350 K/uL    MPV 9.8 9.2 - 12.9 fL    Gran # (ANC) 5.6 1.8 - 7.7 K/uL    Lymph # 1.6 1.0 - 4.8 K/uL    Mono # 0.9 0.3 - 1.0 K/uL    Eos # 0.1 0.0 - 0.5 K/uL    Baso # 0.02 0.00 - 0.20 K/uL    Gran% 68.6 38.0 - 73.0 %    Lymph% 18.9 18.0 - 48.0 %    Mono% 10.9 4.0 - 15.0 %    Eosinophil% 1.2 0.0 - 8.0 %    Basophil% 0.2 0.0 - 1.9 %    Differential Method Automated    Comp. Metabolic Panel    Collection Time: 06/02/19  9:42 PM   Result Value Ref Range    Sodium 140 136 - 145 mmol/L    Potassium 3.3 (L) 3.5 - 5.1 mmol/L    Chloride 104 95 - 110 mmol/L    CO2 25 23 - 29 mmol/L    Glucose 105 70 - 110 mg/dL    BUN, Bld 9 6 - 20 mg/dL    Creatinine 0.8 0.5 - 1.4 mg/dL    Calcium 9.5 8.7 - 10.5 mg/dL    Total Protein 8.0 6.0 - 8.4 g/dL    Albumin 4.0 3.5 - 5.2 g/dL    Total Bilirubin 0.9 0.1 - 1.0 mg/dL    Alkaline Phosphatase 76 55 - 135 U/L    AST 23 10 - 40 U/L    ALT 20 10 - 44 U/L    Anion Gap 11 8 - 16 mmol/L    eGFR if African American >60 >60 mL/min/1.73 m^2    eGFR if non  African American >60 >60 mL/min/1.73 m^2   Lipase    Collection Time: 06/02/19  9:42 PM   Result Value Ref Range    Lipase 28 4 - 60 U/L   APTT    Collection Time: 06/02/19  9:42 PM   Result Value Ref Range    aPTT 90.6 (H) 21.0 - 32.0 sec   Protime-INR    Collection Time: 06/02/19  9:42 PM   Result Value Ref Range    Prothrombin Time 98.2 (H) 9.0 - 12.5 sec    INR 9.0 (HH) 0.8 - 1.2   Troponin I    Collection Time: 06/02/19  9:42 PM   Result Value Ref Range    Troponin I <0.006 0.000 - 0.026 ng/mL   Urinalysis, Reflex to Urine Culture Urine, Clean Catch    Collection Time: 06/02/19 10:20 PM   Result Value Ref Range    Specimen UA Urine, Clean Catch     Color, UA Yellow Yellow, Straw, Karolina    Appearance, UA Clear Clear    pH, UA 8.0 5.0 - 8.0    Specific Gravity, UA 1.020 1.005 - 1.030    Protein, UA Negative Negative    Glucose, UA Negative Negative    Ketones, UA Negative Negative    Bilirubin (UA) Negative Negative    Occult Blood UA 1+ (A) Negative    Nitrite, UA Negative Negative    Urobilinogen, UA 2.0-3.0 (A) <2.0 EU/dL    Leukocytes, UA Trace (A) Negative   Urinalysis Microscopic    Collection Time: 06/02/19 10:20 PM   Result Value Ref Range    RBC, UA 6 (H) 0 - 4 /hpf    WBC, UA 6 (H) 0 - 5 /hpf    Bacteria Rare None-Occ /hpf    Trichomonas, UA Rare (A) None    Microscopic Comment SEE COMMENT    POCT urine pregnancy    Collection Time: 06/02/19 10:21 PM   Result Value Ref Range    POC Preg Test, Ur Negative Negative     Acceptable Yes    Lactic acid, plasma    Collection Time: 06/03/19 12:20 AM   Result Value Ref Range    Lactate (Lactic Acid) 0.6 0.5 - 2.2 mmol/L   Brain natriuretic peptide    Collection Time: 06/03/19  3:55 AM   Result Value Ref Range    BNP 83 0 - 99 pg/mL   Protime-INR    Collection Time: 06/03/19  9:07 AM   Result Value Ref Range    Prothrombin Time >100.0 (H) 9.0 - 12.5 sec    INR 9.9 (HH) 0.8 - 1.2   APTT    Collection Time: 06/03/19  9:07 AM   Result Value Ref Range     aPTT 76.7 (H) 21.0 - 32.0 sec   Fibrinogen    Collection Time: 06/03/19  9:07 AM   Result Value Ref Range    Fibrinogen 453 (H) 182 - 366 mg/dL   Ammonia    Collection Time: 06/03/19  9:07 AM   Result Value Ref Range    Ammonia 46 10 - 50 umol/L   Gamma GT    Collection Time: 06/03/19  9:07 AM   Result Value Ref Range    GGT 83 (H) 8 - 55 U/L         Significant Imaging:     Narrative     EXAMINATION:  CTA OF ABDOMEN PELVIS WITH    CLINICAL HISTORY:  Mesenteric ischemia acute;    TECHNIQUE:  1.25 mm enhanced axial images were obtained from the lung bases through the greater trochanters to evaluate for mesenteric ischemia.  Arterial and venous phase imaging is were provided.  One hundred mL of Omnipaque 350 was injected.  No 3D images were provided.    COMPARISON:  None.    FINDINGS:  The celiac axis, SMA, CATHIE are all patent.  Both renal arteries are patent.  There are no atherosclerotic plaques detected.  There is no bowel wall thickening.  There is no abdominal ascites or mesenteric infiltration.    The liver is cirrhotic.  In arterial phase, there are rounded arterially enhancing lesions in the left lobe of the liver measuring no greater than 15 mm.  On the venous phase images, these lesions are no longer appreciated.  The liver is cirrhotic.  Focal fat remains at the falciform ligament.    The spleen is elongated.    The pancreas, kidneys, and adrenal glands are unremarkable. The gallbladder contains no calcified gallstones.    There is no definite evidence for abdominal adenopathy or ascites.  There is a tiny fat containing umbilical hernia.    There are no pelvic masses or adenopathy.  There is a 4 x 3 x 3 cm left ovarian cyst.  Small fluid is seen within the endometrium, which may be physiologic.    There is no free fluid in the pelvis.    At the lung bases, median sternotomy changes are present.  The heart is enlarged.  There is mild left basilar atelectasis.      Impression       No definite evidence for  mesenteric ischemia.  Consider correlation with lactate.    Cirrhotic liver occluding splenomegaly.  Multiple arterially enhancing indeterminate left hepatic lesions.  Possibility of a meningioma is cannot be entirely excluded.  Consider MRI.    Left ovarian cyst.           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 Admitted for mgmt of abnormal coags and abdominal pain of unclear etiology. Ddx includes hepatitis, liver capsule swelling, bleeding but no evidence on CT, PID, pancreatitis, cholecystitis. No evidence of acute heart failure but with chronically elevated RA pressures. Adult hepatology following.     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, stable  - Digoxin 125 mcg daily  - HCTZ 25 mg daily  - Amlodipine 2.5 mg daily  - Coenzyme Q10 100 mg BID  - Echo, EKG    Atrial tachycardia s/p ablation  - Sotalol 80 mg BID   - Hold warfarin due to elevated INR    FEN/GI:  Cirrhosis on CT abdomen  - Adult hepatology consulted  - LFTs wnl  - NPO pending hepatology recs  - Morphine 2 mg IV q2h PRN  - GTT, hepatitis panel, ammonia level pending    ID  Trichomonas infection  - s/p Metronidazole x1 in ED. No further treatment required.     Renal: no issues    Hematology  Elevated PT, PTT, INR  - Hold Warfarin  - Vit K 10 mg  - Repeat PT, PTT, INR daily    Social: Mom at bedside    Access: PIV  Dispo: pending stabilized PT, PTT, INR and workup of abdominal pain and cirrhosis          Gena Worrell MD  Pediatric Cardiology   [facility] MD  Pediatric Cardiologist  Director of Pediatric Heart Transplant and Heart Failure  [facility]  [address] [facility]     [place] [address]    Pager: [phone]      I have reviewed and concur with the resident's history, physical, assessment, and plan.  I have personally interviewed and examined the patient at bedside.  See below addendum for my evaluation and additional findings.

## 2019-06-03 NOTE — ED PROVIDER NOTES
Encounter Date: 6/2/2019    SCRIBE #1 NOTE: I, Roxanne Rust, am scribing for, and in the presence of, Dr. Vo.       History     Chief Complaint   Patient presents with    Abdominal Pain     X 2 weeks, denies N/V/D     Time seen by provider: 11:14 PM    This is a 18 y.o. female who presents with complaint of abdominal pain for two weeks. She reports chills, flank pain, and back pain. Pain is constant. Progression is gradually worsening. She denies any alleviating factors.  Pain is diffuse.  There is some radiation to the back.  She denies fever, nausea, vomiting, diarrhea, blood in stool, or dysuria. Mother reports decreased fluid intake.    The history is provided by the patient, a parent and medical records.     Review of patient's allergies indicates:  No Known Allergies  Past Medical History:   Diagnosis Date    CHF (congestive heart failure)     Obesity     Pulmonary atresia with intact ventricular septum     SVT (supraventricular tachycardia)      Past Surgical History:   Procedure Laterality Date    ABLATION, SVT, ACCESSORY PATHWAY Bilateral 2/6/2019    Performed by Romeo Robles MD at [facility] EP LAB    MEMO PROCEDURE      bt shunt and transannular patch    CARDIAC VALVE REPLACEMENT      ECHOCARDIOGRAM,TRANSESOPHAGEAL N/A 2/6/2019    Performed by [facility] Diagnostic Provider at [facility] EP LAB    INSERTION, IMPLANTABLE LOOP RECORDER N/A 5/8/2019    Performed by Ricardo Woodward MD at [facility] EP [facility]    INSERTION, IMPLANTABLE LOOP RECORDER N/A 2/6/2019    Performed by Romeo Robles MD at [facility] EP LAB    PULMONARY VALVE REPLACEMENT  01/02/2007    25mm sharyn charles    REPLACEMENT-VALVE-PULMONARY N/A 2/16/2017    Performed by Zachary Berman Jr., MD at [facility] CATH LAB    TRANSESOPHAGEAL ECHOCARDIOGRAM (SAI) N/A 2/16/2017    Performed by Zachary Berman Jr., MD at [facility] CATH LAB    TRICUSPID VALVE REPLACEMENT  01/02/2007    25 mm sharyn-charles     No family history on file.  Social History  "    Tobacco Use    Smoking status: Never Smoker   Substance Use Topics    Alcohol use: No    Drug use: No     Review of Systems  ROS: As per HPI and below:   General: Chills. No fever. No generalized weakness.   HENT: No sore throat. No rhinorrhea. No facial pain. No facial swelling.   Eyes: No visual changes. No eye pain.   Cardiovascular: No chest pain.   Respiratory: No dyspnea. No cough.   GI: Abdominal pain. No nausea. No vomiting. No diarrhea. No blood in stool.   : Flank pain. No dysuria. No hematuria.   Skin: No rashes.  Neuro: No focal weakness. No headache. No syncope.  Musculoskeletal: Back pain. No extremity pain. No swelling.    Psych: No acute changes.  All other systems negative.     Physical Exam     Initial Vitals [06/02/19 2144]   BP Pulse Resp Temp SpO2   123/82 86 18 98.5 °F (36.9 °C) 97 %      MAP       --         Physical Exam  Nursing note and vitals reviewed.  /61   Pulse 72   Temp 98.5 °F (36.9 °C) (Oral)   Resp 16   Ht 5' 6" (1.676 m)   Wt 117.9 kg (260 lb)   SpO2 95%   BMI 41.97 kg/m²   Constitutional: AAOx3. No distress. Morbidly obese. Appears uncomfortable.  Eyes: EOMI. No discharge. Anicteric.  HENT:   Mouth/Throat: Dry mucous membranes. Uvula midline.    Neck: Normal range of motion. Neck supple.  Cardiovascular: Distant sounds. Normal rate. Murmur heard. No gallop and no friction rub heard.   Pulmonary/Chest: Distant sounds. No respiratory distress. Effort normal. No wheezes, no rales, no rhonchi  Abdominal: Bowel sounds normal. Soft. No distension and no mass. Diffuse abdominal tenderness. Voluntary guarding (flinched dramatically when I went to palpate her abdomen). There is no rebound, no tenderness at McBurney's point and negative Chauhan's sign.   Musculoskeletal: Normal range of motion. No CVA tenderness.   Neurological: GCS 15. Alert and oriented to person, place, and time. No gross cranial nerve, light touch or strength deficit. Coordination normal.   Skin: " Skin is warm and dry.   Psychiatric: Behavior is normal. Judgment normal.    ED Course   Procedures  Labs Reviewed   URINALYSIS, REFLEX TO URINE CULTURE - Abnormal; Notable for the following components:       Result Value    Occult Blood UA 1+ (*)     Urobilinogen, UA 2.0-3.0 (*)     Leukocytes, UA Trace (*)     All other components within normal limits    Narrative:     Preferred Collection Type->Urine, Clean Catch   URINALYSIS MICROSCOPIC - Abnormal; Notable for the following components:    RBC, UA 6 (*)     WBC, UA 6 (*)     Trichomonas, UA Rare (*)     All other components within normal limits    Narrative:     Preferred Collection Type->Urine, Clean Catch   COMPREHENSIVE METABOLIC PANEL - Abnormal; Notable for the following components:    Potassium 3.3 (*)     All other components within normal limits   APTT - Abnormal; Notable for the following components:    aPTT 90.6 (*)     All other components within normal limits   PROTIME-INR - Abnormal; Notable for the following components:    Prothrombin Time 98.2 (*)     INR 9.0 (*)     All other components within normal limits    Narrative:     INR    critical result(s) called and verbal readback obtained from   JODI MCFADDEN, 06/03/2019 00:53   CBC W/ AUTO DIFFERENTIAL   LIPASE   LACTIC ACID, PLASMA   TROPONIN I   HEPATITIS PANEL, ACUTE   B-TYPE NATRIURETIC PEPTIDE   B-TYPE NATRIURETIC PEPTIDE   HEPATITIS PANEL, ACUTE   POCT URINE PREGNANCY          Imaging Results          X-Ray Chest AP Portable (Final result)  Result time 06/03/19 04:50:01    Final result by Joey Hauser MD (06/03/19 04:50:01)                 Impression:      No radiographic evidence of acute intrathoracic process.      Electronically signed by: Joey Hauser MD  Date:    06/03/2019  Time:    04:50             Narrative:    EXAMINATION:  XR CHEST AP PORTABLE    CLINICAL HISTORY:  abdominal pain;    TECHNIQUE:  Single frontal view of the chest was  performed.    COMPARISON:  None    FINDINGS:  There is soft tissue attenuation relating to patient body habitus.  Cardiac monitoring leads overlie the chest.  The cardiomediastinal silhouette appears enlarged.  There is postoperative change of prior median sternotomy and valve repair.  The lungs appear symmetrically expanded without evidence of confluent airspace consolidation.  No large pleural effusion or pneumothorax identified.  The visualized osseous structures appear intact.                                CTA Abdomen and Pelvis (Final result)  Result time 06/03/19 02:06:53   Procedure changed from CTA Abdomen     Final result by Asia Sheth MD (06/03/19 02:06:53)                 Impression:      No definite evidence for mesenteric ischemia.  Consider correlation with lactate.    Cirrhotic liver occluding splenomegaly.  Multiple arterially enhancing indeterminate left hepatic lesions.  Possibility of a meningioma is cannot be entirely excluded.  Consider MRI.    Left ovarian cyst.    This report was flagged in Epic as abnormal.      Electronically signed by: Asia Sheth  Date:    06/03/2019  Time:    02:06             Narrative:    EXAMINATION:  CTA OF ABDOMEN PELVIS WITH    CLINICAL HISTORY:  Mesenteric ischemia acute;    TECHNIQUE:  1.25 mm enhanced axial images were obtained from the lung bases through the greater trochanters to evaluate for mesenteric ischemia.  Arterial and venous phase imaging is were provided.  One hundred mL of Omnipaque 350 was injected.  No 3D images were provided.    COMPARISON:  None.    FINDINGS:  The celiac axis, SMA, CATHIE are all patent.  Both renal arteries are patent.  There are no atherosclerotic plaques detected.  There is no bowel wall thickening.  There is no abdominal ascites or mesenteric infiltration.    The liver is cirrhotic.  In arterial phase, there are rounded arterially enhancing lesions in the left lobe of the liver measuring no greater than 15 mm.  On  the venous phase images, these lesions are no longer appreciated.  The liver is cirrhotic.  Focal fat remains at the falciform ligament.    The spleen is elongated.    The pancreas, kidneys, and adrenal glands are unremarkable. The gallbladder contains no calcified gallstones.    There is no definite evidence for abdominal adenopathy or ascites.  There is a tiny fat containing umbilical hernia.    There are no pelvic masses or adenopathy.  There is a 4 x 3 x 3 cm left ovarian cyst.  Small fluid is seen within the endometrium, which may be physiologic.    There is no free fluid in the pelvis.    At the lung bases, median sternotomy changes are present.  The heart is enlarged.  There is mild left basilar atelectasis.                                 Medical Decision Making:   Clinical Tests:   Lab Tests: Ordered and Reviewed  Radiological Study: Ordered and Reviewed            Scribe Attestation:   Scribe #1: I performed the above scribed service and the documentation accurately describes the services I performed. I attest to the accuracy of the note.    Attending Attestation:           Physician Attestation for Scribe:  Physician Attestation Statement for Scribe #1: I, Dr. Vo, reviewed documentation, as scribed by Roxanne Rust in my presence, and it is both accurate and complete.         Attending ED Notes:   Patient is a 18-year-old female with history of congenital heart disease (ASD, pulmonary atresia status post multiple valve replacements, last valve replacement in 2007, follows with pediatric Cardiology at [facility]), on chronic anticoagulation with Coumadin for her mechanical valve, recent admission for SVT who presents with abdominal pain for last 2 weeks which is diffuse, at times radiates to her back, not associated with eating, not clearly improved or worsened by anything.  She denies any hematemesis, bright red blood per rectum.  On exam patient has diffuse abdominal tenderness, is morbidly obese,  has clear breath sounds, normal vitals.  The initial differential included mesenteric ischemia, dehydration, gross metabolic abnormality, coagulopathy, ascites, acute kidney injury.           ED Course as of Jun 03 0512   Mon Jun 03, 2019   0114 I independently reviewed and interpreted labs which are notable for INR of 9, mild hypokalemia, normal CBC, normal lactic acid, and also trichomonas.    [AC]   0253 I independently reviewed and interpreted CT abdomen and pelvis, notable for cirrhosis.    INR elevation may be explained by cirrhosis and decreased liver function even though her LFT appear normal. Patient follows closely with pediatric cardiology, so attempted to reach them via transfer center.    [AC]   0309 MPV: 9.8 [RC]   0311 Patient history, findings, results discussed with pediatric cardiologist, Dr. Akers, who accepts the patient. She feels Toradol and vitamin K are relatively contraindicated given new finding of cirrhosis. Patient will be sent to pediatric cardiology floor.    [AC]   0338 =====================================  Critical Care:  30 minutes total critical care time was personally spent by me, exclusive of procedures and separately billable time.   Critical care was necessary to treat or prevent imminent or life-threatening deterioration of the following conditions: coagulapathy.  =====================================    [AC]   0459 I independently reviewed and interpreted CXR, notable for cardiomegaly, no focal lung disease.    [AC]      ED Course User Index  [AC] Roxanne Rust  [RC] Robert Vo MD     Clinical Impression:     1. Supratherapeutic INR    2. SVT (supraventricular tachycardia)    3. Status post placement of implantable loop recorder    4. Atrial flutter, unspecified type    5. Pulmonary atresia with intact ventricular septum    6. S/P pulmonary valve replacement    7. S/P tricuspid valve replacement    8. Tricuspid stenosis and insufficiency, unspecified etiology    9.  Coagulopathy    10. Chronic anticoagulation    11. Hepatosplenomegaly    12. Diffuse abdominal pain                                 Robert Vo MD  06/03/19 0419       Robert Vo MD  06/03/19 0420       Robert Vo MD  06/03/19 0549

## 2019-06-04 ENCOUNTER — TELEPHONE (OUTPATIENT)
Dept: TRANSPLANT | Facility: CLINIC | Age: 19
End: 2019-06-04

## 2019-06-04 DIAGNOSIS — K74.69 OTHER CIRRHOSIS OF LIVER: Primary | ICD-10-CM

## 2019-06-04 LAB
A1AT SERPL-MCNC: 164 MG/DL (ref 100–190)
APTT BLDCRRT: 75.8 SEC (ref 21–32)
CERULOPLASMIN SERPL-MCNC: 36 MG/DL (ref 15–45)
FERRITIN SERPL-MCNC: 49 NG/ML (ref 20–300)
IGG SERPL-MCNC: 1300 MG/DL (ref 650–1600)
INR PPP: 6 (ref 0.8–1.2)
IRON SERPL-MCNC: 94 UG/DL (ref 30–160)
PROTHROMBIN TIME: 60.2 SEC (ref 9–12.5)
SATURATED IRON: 25 % (ref 20–50)
TOTAL IRON BINDING CAPACITY: 377 UG/DL (ref 250–450)
TRANSFERRIN SERPL-MCNC: 255 MG/DL (ref 200–375)

## 2019-06-04 PROCEDURE — 85730 THROMBOPLASTIN TIME PARTIAL: CPT

## 2019-06-04 PROCEDURE — 82784 ASSAY IGA/IGD/IGG/IGM EACH: CPT

## 2019-06-04 PROCEDURE — 83540 ASSAY OF IRON: CPT

## 2019-06-04 PROCEDURE — 25000003 PHARM REV CODE 250: Performed by: STUDENT IN AN ORGANIZED HEALTH CARE EDUCATION/TRAINING PROGRAM

## 2019-06-04 PROCEDURE — 82728 ASSAY OF FERRITIN: CPT

## 2019-06-04 PROCEDURE — 87491 CHLMYD TRACH DNA AMP PROBE: CPT

## 2019-06-04 PROCEDURE — 86235 NUCLEAR ANTIGEN ANTIBODY: CPT | Mod: 91

## 2019-06-04 PROCEDURE — 63600175 PHARM REV CODE 636 W HCPCS: Performed by: STUDENT IN AN ORGANIZED HEALTH CARE EDUCATION/TRAINING PROGRAM

## 2019-06-04 PROCEDURE — 86256 FLUORESCENT ANTIBODY TITER: CPT

## 2019-06-04 PROCEDURE — 99233 SBSQ HOSP IP/OBS HIGH 50: CPT | Mod: ,,, | Performed by: PEDIATRICS

## 2019-06-04 PROCEDURE — 36415 COLL VENOUS BLD VENIPUNCTURE: CPT

## 2019-06-04 PROCEDURE — 25000003 PHARM REV CODE 250: Performed by: PEDIATRICS

## 2019-06-04 PROCEDURE — 99233 PR SUBSEQUENT HOSPITAL CARE,LEVL III: ICD-10-PCS | Mod: ,,, | Performed by: PEDIATRICS

## 2019-06-04 PROCEDURE — 11300000 HC PEDIATRIC PRIVATE ROOM

## 2019-06-04 PROCEDURE — 82390 ASSAY OF CERULOPLASMIN: CPT

## 2019-06-04 PROCEDURE — 82103 ALPHA-1-ANTITRYPSIN TOTAL: CPT

## 2019-06-04 PROCEDURE — 85610 PROTHROMBIN TIME: CPT

## 2019-06-04 RX ORDER — DOCUSATE SODIUM 100 MG/1
100 CAPSULE, LIQUID FILLED ORAL 2 TIMES DAILY
Status: DISCONTINUED | OUTPATIENT
Start: 2019-06-04 | End: 2019-06-05 | Stop reason: HOSPADM

## 2019-06-04 RX ORDER — SODIUM CHLORIDE 9 MG/ML
INJECTION, SOLUTION INTRAVENOUS CONTINUOUS
Status: DISCONTINUED | OUTPATIENT
Start: 2019-06-04 | End: 2019-06-05 | Stop reason: HOSPADM

## 2019-06-04 RX ORDER — MORPHINE SULFATE 2 MG/ML
2 INJECTION, SOLUTION INTRAMUSCULAR; INTRAVENOUS ONCE
Status: DISCONTINUED | OUTPATIENT
Start: 2019-06-04 | End: 2019-06-05

## 2019-06-04 RX ORDER — ONDANSETRON 4 MG/1
4 TABLET, ORALLY DISINTEGRATING ORAL EVERY 8 HOURS PRN
Status: DISCONTINUED | OUTPATIENT
Start: 2019-06-04 | End: 2019-06-05 | Stop reason: HOSPADM

## 2019-06-04 RX ORDER — POLYETHYLENE GLYCOL 3350 17 G/17G
17 POWDER, FOR SOLUTION ORAL DAILY
Status: DISCONTINUED | OUTPATIENT
Start: 2019-06-04 | End: 2019-06-05 | Stop reason: HOSPADM

## 2019-06-04 RX ORDER — FAMOTIDINE 20 MG/1
20 TABLET, FILM COATED ORAL 2 TIMES DAILY
Status: DISCONTINUED | OUTPATIENT
Start: 2019-06-04 | End: 2019-06-05 | Stop reason: HOSPADM

## 2019-06-04 RX ORDER — DIAZEPAM 2 MG/1
2 TABLET ORAL EVERY 6 HOURS PRN
Status: DISCONTINUED | OUTPATIENT
Start: 2019-06-04 | End: 2019-06-05 | Stop reason: HOSPADM

## 2019-06-04 RX ORDER — OXYCODONE HYDROCHLORIDE 5 MG/1
5 TABLET ORAL EVERY 6 HOURS PRN
Status: DISCONTINUED | OUTPATIENT
Start: 2019-06-04 | End: 2019-06-05 | Stop reason: HOSPADM

## 2019-06-04 RX ORDER — POLYETHYLENE GLYCOL 3350 17 G/17G
17 POWDER, FOR SOLUTION ORAL DAILY PRN
Status: DISCONTINUED | OUTPATIENT
Start: 2019-06-04 | End: 2019-06-04

## 2019-06-04 RX ORDER — MORPHINE SULFATE 2 MG/ML
4 INJECTION, SOLUTION INTRAMUSCULAR; INTRAVENOUS
Status: DISCONTINUED | OUTPATIENT
Start: 2019-06-04 | End: 2019-06-04

## 2019-06-04 RX ORDER — PANTOPRAZOLE SODIUM 40 MG/1
40 TABLET, DELAYED RELEASE ORAL DAILY
Status: DISCONTINUED | OUTPATIENT
Start: 2019-06-04 | End: 2019-06-05 | Stop reason: HOSPADM

## 2019-06-04 RX ORDER — MORPHINE SULFATE 2 MG/ML
4 INJECTION, SOLUTION INTRAMUSCULAR; INTRAVENOUS
Status: DISCONTINUED | OUTPATIENT
Start: 2019-06-04 | End: 2019-06-05 | Stop reason: HOSPADM

## 2019-06-04 RX ADMIN — AMLODIPINE BESYLATE 2.5 MG: 2.5 TABLET ORAL at 09:06

## 2019-06-04 RX ADMIN — SODIUM CHLORIDE: 0.9 INJECTION, SOLUTION INTRAVENOUS at 09:06

## 2019-06-04 RX ADMIN — SOTALOL HYDROCHLORIDE 80 MG: 80 TABLET ORAL at 09:06

## 2019-06-04 RX ADMIN — Medication 100 MG: at 09:06

## 2019-06-04 RX ADMIN — MORPHINE SULFATE 4 MG: 2 INJECTION, SOLUTION INTRAMUSCULAR; INTRAVENOUS at 09:06

## 2019-06-04 RX ADMIN — ONDANSETRON 4 MG: 4 TABLET, ORALLY DISINTEGRATING ORAL at 11:06

## 2019-06-04 RX ADMIN — MORPHINE SULFATE 2 MG: 2 INJECTION, SOLUTION INTRAMUSCULAR; INTRAVENOUS at 04:06

## 2019-06-04 RX ADMIN — OXYCODONE HYDROCHLORIDE 5 MG: 5 TABLET ORAL at 11:06

## 2019-06-04 RX ADMIN — MORPHINE SULFATE 2 MG: 2 INJECTION, SOLUTION INTRAMUSCULAR; INTRAVENOUS at 06:06

## 2019-06-04 RX ADMIN — FAMOTIDINE 20 MG: 20 TABLET, FILM COATED ORAL at 09:06

## 2019-06-04 RX ADMIN — POLYETHYLENE GLYCOL 3350 17 G: 17 POWDER, FOR SOLUTION ORAL at 09:06

## 2019-06-04 RX ADMIN — HYDROCHLOROTHIAZIDE 25 MG: 25 TABLET ORAL at 09:06

## 2019-06-04 RX ADMIN — FAMOTIDINE 20 MG: 20 TABLET, FILM COATED ORAL at 11:06

## 2019-06-04 RX ADMIN — PANTOPRAZOLE SODIUM 40 MG: 40 TABLET, DELAYED RELEASE ORAL at 11:06

## 2019-06-04 RX ADMIN — DIGOXIN 125 MCG: 125 TABLET ORAL at 09:06

## 2019-06-04 RX ADMIN — DOCUSATE SODIUM 100 MG: 100 CAPSULE, LIQUID FILLED ORAL at 09:06

## 2019-06-04 RX ADMIN — ONDANSETRON 4 MG: 4 TABLET, ORALLY DISINTEGRATING ORAL at 05:06

## 2019-06-04 NOTE — NURSING TRANSFER
Nursing Transfer Note    Receiving Transfer Note    6/3/2019 8:50 PM  Received in transfer from MRI to [facility]  Report received as documented in PER Handoff on Doc Flowsheet.  See Doc Flowsheet for VS's and complete assessment.  Continuous EKG monitoring in place Yes  Chart received with patient: Yes  What Caregiver / Guardian was Notified of Arrival: Mother  Patient and / or caregiver / guardian oriented to room and nurse call system.  KORI Reeves  6/3/2019 8:50 PM

## 2019-06-04 NOTE — PLAN OF CARE
Problem: Adult Inpatient Plan of Care  Goal: Plan of Care Review  Outcome: Ongoing (interventions implemented as appropriate)  VSS. Afebrile. Received Morphine x1, Oxycodone x1 with good pain control noted. Up to bathroom to urinate x1. Ate 50% of breakfast and lunch. IVF at 50cc/hr. No bowel movement today, urinating in toilet. Plan for transfer to adult medicine tomorrow morning.

## 2019-06-04 NOTE — TELEPHONE ENCOUNTER
Epic message to Hepatology clinic staff requesting follow up appt for this patient in 2-3 weeks for follow up per Dr. Meza recommendations.

## 2019-06-04 NOTE — PROVIDER TRANSFER
Transfer Summary    Kacey is a 18 yr old female with pulmonary atresia s/p R outflow tract reconstruction and multiple pulmonic and tricuspid valve replacements, last 2007, and atrial tachycardia s/p ablation and loop recorder insertion, on warfarin, who presented with diffuse abdominal and L CVA pain x 2 weeks. Pain squeezing in nature, not associated with eating. No fevers, vomiting, or diarrhea. LMP 2 weeks ago, irregular, no sig cramping.     In ED, CT abdomen pos hepatic cirrhosis and multiple indeterminate left hepatic lesions. UA pos 1+ blood, trace leuk, neg nit, WBC 6, trace trichomonas. Given Metronidazole. PT 98.2, PTT 90.6, INR 9.0. CBC, CMP, lipase, lactate, troponin wnl. Admitted for further workup of abdominal pain and abnormal coags.     Hospital course:    Admitted to the ped cards unit. Warfarin discontinued - does not need to be restarted for arrythmia prophylaxis. Vit K given with improvement in INR to 6.0. Hematology consulted, no further Vitamin K needed. Expect INR to return to normal off warfarin.     Hepatology consulted. US Elastography pos for hepatic fibrosis and nodules concerning for hepatocellular carcinoma. MRI abdomen pos for nodular changes consistent with congestive hepatology and indeterminate arterial hyperenhancing foci. AFP tumor marker neg. Images reviewed by peds heme/onc, not concerning for cancer. Elevated GGT (83) and fibrinogen (453). Ammonia wnl. Iron, TIBC, Ferritin, Alpha-1-antitrypsin, cerulosplasmin, and IgG wnl. Anti-smooth muscle ab and antimitochondrial ab pending. Cause of hepatic fibrosis and abdominal pain still unclear. Continued severe abdominal pain, improved on Morphine 4 mg q4h PRN. Avoided tylenol due to concern for cirrhosis and motrin/toradol due to elevated INR.    Gynecology consulted, concern for PID due to continued severe pain. Urine GC/CT pending. Trichomonas infection on admission, treated in ED as above.     Poor PO intake and UOP. Started on  fluids at 50 ml/hr. Kept at half maintenance due to baseline elevated R heart pressures. Low concern for acute cardiac process. Echo and EKG stable. Continued home sotalol, digoxin, hydrochlorothiazide, amlodipine, famotidine, and coenzyme Q10. Started bowel regimen.

## 2019-06-04 NOTE — PLAN OF CARE
Hospital Medicine Consult Team - [facility] Med Team M    Requesting Physician for transfer to Hospital Medicine service /Team: Samira Ball MD/ Pediatric cardiology    Code Status: Prior     Accepting Physician: Maribeth Burt     Date of Request for transfer: 06/04/2019     Hospital Course: Patient is a 18 y.o. with past medical history of Pulmonary Atresia, s/p multiple MV repairs, afib on coumadin who was admitted to the hospital on 6/2/2019 to the pediatric cardiology serviceservice for abdomoinal pain.  She was found to have an INR 9 and cirrhosis of the liver.  Hepatology was consulted and thus far work up is negative for cause of cirrhosis and splenomegaaly.  Her coumadin was held and INR is improving.  High MELD is mainly due to uncontrolled anticoagulation therapy.      Medicine has been requested to take over the care of this patient by the primary service for continued inpatient treatment and management and evaluation of cirrhosis.  She may need a liver biopsy.     To Do List:   Monitor INR and hold coumadin  F/u serology and hepatology recs  Continue cardiac meds for rate control.   Dr. Dickerson on IML notified in case needs liver transplant eval  Wean opioids to off.  Evaluate pain syndrome and switch to multi-modal therapy. She does not take opioids at home.       Anticipated Discharge Disposition: Home or Self Care    Medicine will accept patient for transfer to a hospital medicine service but patient not to be transferred to medicine team until tomorrow, 06/05/19 at 7:00 am. I spoke with primary service who is requesting transfer and informed them they are responsible for patient's care until tomorrow morning. Patient to be assigned to a medicine team by nocturnist tonight and to be temporarily placed on IMT list for reassignment in the morning.       Thank you and please call if you have any further questions.      Maribeth Burt M.D  Attending Staff Physician   Hospital Medicine  Pager:  [phone]  [facility]: [phone]

## 2019-06-04 NOTE — PLAN OF CARE
Problem: Adult Inpatient Plan of Care  Goal: Patient-Specific Goal (Individualization)  Outcome: Ongoing (interventions implemented as appropriate)  Patient stable overnight. VS stable, afebrile. No distress. Continuous tele and pulse ox in place, no alarms. O2 sats maintained greater than 92% on room air. C/o abdominal pain through night.  Morphine administered x2 with moderate relief noted. Heat packs applied. Patient c/o than pain became worse this morning and pain radiating to back.  MRI completed this shift. Labs drawn this AM. Mother at bedside this shift, plan of care reviewed, verbalized understanding and questions answered. Safety maintained, will continue to monitor.

## 2019-06-04 NOTE — SUBJECTIVE & OBJECTIVE
Interval History: Complaints of more pain overnight. INR improving.   MRI results:  1. Slight nodular contour of the liver with heterogeneous parenchymal enhancement, findings that may relate to sequela of evolving congestive hepatopathy in this patient with a history of pulmonary atresia status post pulmonic and tricuspid valve replacements.  2. Two indeterminate arterial hyperenhancing foci within the left and right hepatic lobes.  Remaining hyperenhancing foci identified on recent CTA cannot be confirmed on this exam.  Attention at follow-up recommended.    Objective:     Vital Signs (Most Recent):  Temp: 98.9 °F (37.2 °C) (06/04/19 0925)  Pulse: 82 (06/04/19 0925)  Resp: 20 (06/04/19 0925)  BP: (!) 117/56 (06/04/19 0925)  SpO2: 97 % (06/04/19 0925) Vital Signs (24h Range):  Temp:  [98.1 °F (36.7 °C)-98.9 °F (37.2 °C)] 98.9 °F (37.2 °C)  Pulse:  [68-95] 82  Resp:  [12-35] 20  SpO2:  [92 %-100 %] 97 %  BP: (104-132)/(56-71) 117/56     Weight: 117.9 kg (260 lb)  Body mass index is 41.97 kg/m².     SpO2: 97 %  O2 Device (Oxygen Therapy): room air    Intake/Output - Last 3 Shifts       06/02 0700 - 06/03 0659 06/03 0700 - 06/04 0659 06/04 0700 - 06/05 0659    P.O.  150     IV Piggyback 1000      Total Intake(mL/kg) 1000 (8.5) 150 (1.3)     Net +1000 +150            Urine Occurrence 1 x 2 x     Stool Occurrence  0 x     Emesis Occurrence  0 x           Lines/Drains/Airways     Peripheral Intravenous Line                 Peripheral IV - Single Lumen 06/03/19 0019 20 G Right Antecubital 1 day         Peripheral IV - Single Lumen 06/04/19 0931 22 G Right Wrist less than 1 day                Scheduled Medications:    amLODIPine  2.5 mg Oral Daily    coenzyme Q10  100 mg Oral BID    digoxin  125 mcg Oral Daily    docusate sodium  100 mg Oral BID    hydroCHLOROthiazide  25 mg Oral Daily    morphine  2 mg Intravenous Once    polyethylene glycol  17 g Oral Daily    sotalol  80 mg Oral BID       Continuous Medications:     sodium chloride 0.9% 100 mL/hr at 06/04/19 0932       PRN Medications: morphine, oxyCODONE    Physical Exam  Constitutional: She is oriented to person, place, and time. She appears well-developed and well-nourished. No distress.   Obese female, AA, resting in bed, appears tired, complaining of abdominal pain   HENT:   Head: Normocephalic and atraumatic.   Nose: Nose normal.   Mouth/Throat: Oropharynx is clear and moist. No oropharyngeal exudate.   Eyes: Conjunctivae and EOM are normal. Right eye exhibits no discharge. Left eye exhibits no discharge. No scleral icterus.   Neck: Normal range of motion. Neck supple.   Cardiovascular: Normal rate, regular rhythm, normal heart sounds and intact distal pulses. Exam reveals no gallop and no friction rub.   No murmur heard.  Pulmonary/Chest: Effort normal and breath sounds normal. No stridor. No respiratory distress. She has no wheezes. She has no rales. She exhibits no tenderness.   Abdominal: Soft. Bowel sounds are normal. She exhibits no distension and no mass. There is tenderness. There is guarding. There is no rebound.   TTP diffusely + L flank, nontender R flank, +guarding, unable to assess if organomegaly due to pain, will reassess after pain meds given   Musculoskeletal: Normal range of motion. She exhibits no edema, tenderness or deformity.   Lymphadenopathy:     She has no cervical adenopathy.   Neurological: She is alert and oriented to person, place, and time. No cranial nerve deficit or sensory deficit. She exhibits normal muscle tone. Coordination normal.   Skin: Skin is warm and dry. Capillary refill takes less than 2 seconds. No rash noted. She is not diaphoretic. No erythema. No pallor.     Significant Labs:     Lab Results   Component Value Date    INR 6.0 () 06/04/2019    INR 9.9 () 06/03/2019    INR 9.0 () 06/02/2019         Significant Imaging:     US:  Findings are consistent with mild to moderate (F2-F3) liver fibrosis.    MRI:  1. Slight  nodular contour of the liver with heterogeneous parenchymal enhancement, findings that may relate to sequela of evolving congestive hepatopathy in this patient with a history of pulmonary atresia status post pulmonic and tricuspid valve replacements.  2. Two indeterminate arterial hyperenhancing foci within the left and right hepatic lobes.  Remaining hyperenhancing foci identified on recent CTA cannot be confirmed on this exam.  Attention at follow-up recommended.    Echocardiogram 6/3/19:  History of pulmonary atresia, intact ventricular septum  - s/p RVOT reconstruction and pulmonary valve replacement  - s/p tricuspid valve replacement  - s/p Sotelo Valve (26 mm) in the tricuspid position.  The study was technically limited with all images being suboptimal in quality.  The atrial septum is not well seen.  Sotelo valve in the tricuspid position. The valve is well seated.  There is a mean gradient of ~7-8 mm Hg across the Sotelo valve and no  regurgitation.  Trivial mitral valve insufficiency.  Prosthetic pulmonary valve.  There is a peak velocity of 1.9 m/sec, peak gradient of 15mmHg. There is no  significant pulmonary insufficiency.  In limited views, the proximal branch PAs appear normal in size.  There is at least moderate right atrial enlargement.  Normal left atrial size.  Right ventricle appears relatively normal in size.  In limited images, there is at least mildly depressed right ventricular systolic  function.  Normal left ventricle structure and size.  There is paradoxical septal motion with normal movement of left ventricular free  wall suggesting low normal left ventricular systolic function  No pericardial effusion.  Very difficult study, consider cardiac MRI.

## 2019-06-04 NOTE — CONSULTS
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 Pt reports epigastric and left flank pain that acutely worsened on 6/2 prompting visit to the ED. Describes pain as squeezing that worsens with movement and urination. Pt tried motrin without relief. Pt reports mild nausea.    ED course: Diffuse abdominal pain. CT abdomen positive for liver cirrhosis, multiple arterially enhancing lesions in left lobe of liver, L ovarian cyst. Neg for mesenteric ischemia. CBC, CMP reassuring including LFTs. Lipase wnl. PT 98.2, PTT 90.6, INR 9.0. UA pos for 1+ blood, trace leukocytes, neg nitrites, WBC 6, trichomonas trace. Metronidazole given. Dilaudid 0.5 mg x 3 with some pain relief. Bolus x 1. Pt was subsequently admitted for management of abnormal coags and abdominal pain of unknown etiology.     Gyn consulted for suspicion of PID secondary to +trichomonas on UA. Pt reports that she is no longer experiencing abdominal pain. Denies N/V, fever, chills. Pt is adamant that she has never engaged in sexual activity. Mother on phone during interview also states that the patient is not sexually active. Discussed with patient with mother's call on silent. Echoes that she is not sexually active and does not understand how she would have this infection without engaging in intercourse.     Gyn history  LMP: 5/20, reports irregular cycles, normally every other month lasting 4 days, normal flow    Ob history  G0     Review of patient's allergies indicates:  No Known Allergies  Past Medical History:   Diagnosis Date    CHF (congestive heart failure)     Obesity     Pulmonary atresia with intact ventricular septum     SVT  (supraventricular tachycardia)      Past Surgical History:   Procedure Laterality Date    ABLATION, SVT, ACCESSORY PATHWAY Bilateral 2/6/2019    Performed by Romeo Robles MD at [facility]    MEMO PROCEDURE      bt shunt and transannular patch    CARDIAC VALVE REPLACEMENT      ECHOCARDIOGRAM,TRANSESOPHAGEAL N/A 2/6/2019    Performed by [facility] Provider at [facility]    INSERTION, IMPLANTABLE LOOP RECORDER N/A 5/8/2019    Performed by Ricardo Woodward MD at [facility]    INSERTION, IMPLANTABLE LOOP RECORDER N/A 2/6/2019    Performed by Romeo Robles MD at [facility]    PULMONARY VALVE REPLACEMENT  01/02/2007    25mm sharyn charles    REPLACEMENT-VALVE-PULMONARY N/A 2/16/2017    Performed by Zachary Berman Jr., MD at [facility] C[facility]    TRANSESOPHAGEAL ECHOCARDIOGRAM (SAI) N/A 2/16/2017    Performed by Zachary Berman Jr., MD at [facility]    TRICUSPID VALVE REPLACEMENT  01/02/2007    25 mm sharyn-charles     OB History    None       No family history on file.  Social History     Socioeconomic History    Marital status: Single     Spouse name: Not on file    Number of children: Not on file    Years of education: Not on file    Highest education level: Not on file   Occupational History    Not on file   Social Needs    Financial resource strain: Not on file    Food insecurity:     Worry: Not on file     Inability: Not on file    Transportation needs:     Medical: Not on file     Non-medical: Not on file   Tobacco Use    Smoking status: Never Smoker   Substance and Sexual Activity    Alcohol use: No    Drug use: No    Sexual activity: Never   Lifestyle    Physical activity:     Days per week: Not on file     Minutes per session: Not on file    Stress: Not on file   Relationships    Social connections:     Talks on phone: Not on file     Gets together: Not on file     Attends Jew service: Not on file     Active member of club or organization: Not on file      Attends meetings of clubs or organizations: Not on file     Relationship status: Not on file   Other Topics Concern    Not on file   Social History Narrative    Not on file     Current Facility-Administered Medications   Medication    0.9%  NaCl infusion    amLODIPine tablet 2.5 mg    coenzyme Q10 100 mg    diazePAM tablet 2 mg    digoxin tablet 125 mcg    docusate sodium capsule 100 mg    famotidine tablet 20 mg    hydroCHLOROthiazide tablet 25 mg    morphine injection 2 mg    morphine injection 4 mg    ondansetron disintegrating tablet 4 mg    oxyCODONE immediate release tablet 5 mg    pantoprazole EC tablet 40 mg    polyethylene glycol packet 17 g    sotalol tablet 80 mg     Facility-Administered Medications Ordered in Other Encounters   Medication    0.9%  NaCl infusion    sodium chloride 0.9% flush 5 mL       Review of Systems:  Constitutional: no significant weight change, fever, fatigue  Eyes:  No vision changes  Cardiovascular: No chest pain  Respiratory: No shortness of breath or cough  Gastrointestinal: No diarrhea, bloody stool, nausea/vomiting, constipation  Genitourinary: No blood in urine, painful urination, urgency of urination, frequency of urination  Skin/Breast: No painful breasts, nipple discharge, masses  Neurological: No headache  Endocrine: No hot flushes  Psychiatric: No depression or anxiety     OBJECTIVE:     Vital Signs  Temp:  [98.2 °F (36.8 °C)-98.9 °F (37.2 °C)] 98.3 °F (36.8 °C)  Pulse:  [72-95] 89  Resp:  [12-35] 20  SpO2:  [92 %-100 %] 97 %  BP: (104-136)/(56-77) 136/77    Physical Exam:  Gen: A&Ox3, NAD, obese  CV: RRR  Pulm: LCTAB  Abd: Soft, non-distended, non-tender to palpation without rebound or guarding  Ext: PPP, no peripheral edema  Pelvic: deferred per patient's request    Laboratory  Recent Results (from the past 24 hour(s))   APTT    Collection Time: 06/04/19  4:42 AM   Result Value Ref Range    aPTT 75.8 (H) 21.0 - 32.0 sec   Protime-INR     Collection Time: 06/04/19  4:42 AM   Result Value Ref Range    Prothrombin Time 60.2 (H) 9.0 - 12.5 sec    INR 6.0 (HH) 0.8 - 1.2   IgG    Collection Time: 06/04/19  1:48 PM   Result Value Ref Range    IgG - Serum 1300 650 - 1600 mg/dL   Ceruloplasmin    Collection Time: 06/04/19  1:48 PM   Result Value Ref Range    Ceruloplasmin 36.0 15.0 - 45.0 mg/dL   Alpha-1-antitrypsin    Collection Time: 06/04/19  1:48 PM   Result Value Ref Range    A-1 Antitrypsin, Ser 164 100 - 190 mg/dL   Ferritin    Collection Time: 06/04/19  1:48 PM   Result Value Ref Range    Ferritin 49 20.0 - 300.0 ng/mL   Iron and TIBC    Collection Time: 06/04/19  1:48 PM   Result Value Ref Range    Iron 94 30 - 160 ug/dL    Transferrin 255 200 - 375 mg/dL    TIBC 377 250 - 450 ug/dL    Saturated Iron 25 20 - 50 %       Diagnostic Results:  Imaging Results          X-Ray Chest AP Portable (Final result)  Result time 06/03/19 04:50:01    Final result by Joey Hauser MD (06/03/19 04:50:01)                 Impression:      No radiographic evidence of acute intrathoracic process.      Electronically signed by: Joey Hauser MD  Date:    06/03/2019  Time:    04:50             Narrative:    EXAMINATION:  XR CHEST AP PORTABLE    CLINICAL HISTORY:  abdominal pain;    TECHNIQUE:  Single frontal view of the chest was performed.    COMPARISON:  None    FINDINGS:  There is soft tissue attenuation relating to patient body habitus.  Cardiac monitoring leads overlie the chest.  The cardiomediastinal silhouette appears enlarged.  There is postoperative change of prior median sternotomy and valve repair.  The lungs appear symmetrically expanded without evidence of confluent airspace consolidation.  No large pleural effusion or pneumothorax identified.  The visualized osseous structures appear intact.                               CTA Abdomen and Pelvis (Edited Result - FINAL)  Result time 06/03/19 20:23:59   Procedure changed from CTA Abdomen     Addendum 1  of 1 by Asia Sheth MD (06/03/19 20:23:59)      In the impression, please change the word from a meningioma to hemangiomas.  Changes cirrhotic liver occluding splenomegaly to cirrhotic ladder including splenomegaly.      Electronically signed by: Asia Sheth  Date:    06/03/2019  Time:    20:23               Final result by Asia Sheth MD (06/03/19 02:06:53)                 Impression:      No definite evidence for mesenteric ischemia.  Consider correlation with lactate.    Cirrhotic liver occluding splenomegaly.  Multiple arterially enhancing indeterminate left hepatic lesions.  Possibility of a meningioma is cannot be entirely excluded.  Consider MRI.    Left ovarian cyst.    This report was flagged in Epic as abnormal.      Electronically signed by: Asia Sheth  Date:    06/03/2019  Time:    02:06             Narrative:    EXAMINATION:  CTA OF ABDOMEN PELVIS WITH    CLINICAL HISTORY:  Mesenteric ischemia acute;    TECHNIQUE:  1.25 mm enhanced axial images were obtained from the lung bases through the greater trochanters to evaluate for mesenteric ischemia.  Arterial and venous phase imaging is were provided.  One hundred mL of Omnipaque 350 was injected.  No 3D images were provided.    COMPARISON:  None.    FINDINGS:  The celiac axis, SMA, CATHIE are all patent.  Both renal arteries are patent.  There are no atherosclerotic plaques detected.  There is no bowel wall thickening.  There is no abdominal ascites or mesenteric infiltration.    The liver is cirrhotic.  In arterial phase, there are rounded arterially enhancing lesions in the left lobe of the liver measuring no greater than 15 mm.  On the venous phase images, these lesions are no longer appreciated.  The liver is cirrhotic.  Focal fat remains at the falciform ligament.    The spleen is elongated.    The pancreas, kidneys, and adrenal glands are unremarkable. The gallbladder contains no calcified gallstones.    There is no definite  evidence for abdominal adenopathy or ascites.  There is a tiny fat containing umbilical hernia.    There are no pelvic masses or adenopathy.  There is a 4 x 3 x 3 cm left ovarian cyst.  Small fluid is seen within the endometrium, which may be physiologic.    There is no free fluid in the pelvis.    At the lung bases, median sternotomy changes are present.  The heart is enlarged.  There is mild left basilar atelectasis.                                  ASSESSMENT/PLAN:     Active Hospital Problems    Diagnosis  POA    *Supratherapeutic INR [R79.1]  Yes    Hepatosplenomegaly [R16.2]  Yes    Chronic anticoagulation [Z79.01]  Not Applicable    Liver cirrhosis [K74.60]  Yes    Abdominal pain [R10.9]  Unknown      Resolved Hospital Problems   No resolved problems to display.       Kacey Ruth is a 18 y.o. G0 with congenital heart disease who presents with abdominal pain    1. PID  - +trich on UA, s/p metronidazole  - pt with no complaints of lower abdominal or pelvic pain  - pt adamantly denies sexual activity  - WBC 8.2 on admission  - GC/CT pending  - pt declined pelvic exam  - very low suspicion for PID given clinical picture, normal WBC, history provided by pt    Thank you for the consult. I will sign off. Please contact the GYN team with any further questions.    Sandra Mercado MD   OBGYN, PGY-1    GYN pager [phone]

## 2019-06-04 NOTE — PROGRESS NOTES
[facility]  Pediatric Cardiology  Progress Note    Patient Name: Kacey Ruth  MRN: [id number]  Admission Date: 6/2/2019  Hospital Length of Stay: 1 days  Code Status: Prior   Attending Physician: Samira Ball MD   Primary Care Physician: Sharad Yun MD  Expected Discharge Date: 6/5/2019  Principal Problem:Supratherapeutic INR    Subjective:     Interval History: Complaints of more pain overnight. INR improving.   MRI results:  1. Slight nodular contour of the liver with heterogeneous parenchymal enhancement, findings that may relate to sequela of evolving congestive hepatopathy in this patient with a history of pulmonary atresia status post pulmonic and tricuspid valve replacements.  2. Two indeterminate arterial hyperenhancing foci within the left and right hepatic lobes.  Remaining hyperenhancing foci identified on recent CTA cannot be confirmed on this exam.  Attention at follow-up recommended.    Objective:     Vital Signs (Most Recent):  Temp: 98.9 °F (37.2 °C) (06/04/19 0925)  Pulse: 82 (06/04/19 0925)  Resp: 20 (06/04/19 0925)  BP: (!) 117/56 (06/04/19 0925)  SpO2: 97 % (06/04/19 0925) Vital Signs (24h Range):  Temp:  [98.1 °F (36.7 °C)-98.9 °F (37.2 °C)] 98.9 °F (37.2 °C)  Pulse:  [68-95] 82  Resp:  [12-35] 20  SpO2:  [92 %-100 %] 97 %  BP: (104-132)/(56-71) 117/56     Weight: 117.9 kg (260 lb)  Body mass index is 41.97 kg/m².     SpO2: 97 %  O2 Device (Oxygen Therapy): room air    Intake/Output - Last 3 Shifts       06/02 0700 - 06/03 0659 06/03 0700 - 06/04 0659 06/04 0700 - 06/05 0659    P.O.  150     IV Piggyback 1000      Total Intake(mL/kg) 1000 (8.5) 150 (1.3)     Net +1000 +150            Urine Occurrence 1 x 2 x     Stool Occurrence  0 x     Emesis Occurrence  0 x           Lines/Drains/Airways     Peripheral Intravenous Line                 Peripheral IV - Single Lumen 06/03/19 0019 20 G Right Antecubital 1 day         Peripheral IV - Single Lumen 06/04/19 0931 22  G Right Wrist less than 1 day                Scheduled Medications:    amLODIPine  2.5 mg Oral Daily    coenzyme Q10  100 mg Oral BID    digoxin  125 mcg Oral Daily    docusate sodium  100 mg Oral BID    hydroCHLOROthiazide  25 mg Oral Daily    morphine  2 mg Intravenous Once    polyethylene glycol  17 g Oral Daily    sotalol  80 mg Oral BID       Continuous Medications:    sodium chloride 0.9% 100 mL/hr at 06/04/19 0932       PRN Medications: morphine, oxyCODONE    Physical Exam  Constitutional: She is oriented to person, place, and time. She appears well-developed and well-nourished. No distress.   Obese female, AA, resting in bed, appears tired, complaining of abdominal pain   HENT:   Head: Normocephalic and atraumatic.   Nose: Nose normal.   Mouth/Throat: Oropharynx is clear and moist. No oropharyngeal exudate.   Eyes: Conjunctivae and EOM are normal. Right eye exhibits no discharge. Left eye exhibits no discharge. No scleral icterus.   Neck: Normal range of motion. Neck supple.   Cardiovascular: Normal rate, regular rhythm, normal heart sounds and intact distal pulses. Exam reveals no gallop and no friction rub.   No murmur heard.  Pulmonary/Chest: Effort normal and breath sounds normal. No stridor. No respiratory distress. She has no wheezes. She has no rales. She exhibits no tenderness.   Abdominal: Soft. Bowel sounds are normal. She exhibits no distension and no mass. There is tenderness. There is guarding. There is no rebound.   TTP diffusely + L flank, nontender R flank, +guarding, unable to assess if organomegaly due to pain, will reassess after pain meds given   Musculoskeletal: Normal range of motion. She exhibits no edema, tenderness or deformity.   Lymphadenopathy:     She has no cervical adenopathy.   Neurological: She is alert and oriented to person, place, and time. No cranial nerve deficit or sensory deficit. She exhibits normal muscle tone. Coordination normal.   Skin: Skin is warm and  dry. Capillary refill takes less than 2 seconds. No rash noted. She is not diaphoretic. No erythema. No pallor.     Significant Labs:     Lab Results   Component Value Date    INR 6.0 () 06/04/2019    INR 9.9 () 06/03/2019    INR 9.0 () 06/02/2019         Significant Imaging:     US:  Findings are consistent with mild to moderate (F2-F3) liver fibrosis.    MRI:  1. Slight nodular contour of the liver with heterogeneous parenchymal enhancement, findings that may relate to sequela of evolving congestive hepatopathy in this patient with a history of pulmonary atresia status post pulmonic and tricuspid valve replacements.  2. Two indeterminate arterial hyperenhancing foci within the left and right hepatic lobes.  Remaining hyperenhancing foci identified on recent CTA cannot be confirmed on this exam.  Attention at follow-up recommended.    Echocardiogram 6/3/19:  History of pulmonary atresia, intact ventricular septum  - s/p RVOT reconstruction and pulmonary valve replacement  - s/p tricuspid valve replacement  - s/p Sotelo Valve (26 mm) in the tricuspid position.  The study was technically limited with all images being suboptimal in quality.  The atrial septum is not well seen.  Sotelo valve in the tricuspid position. The valve is well seated.  There is a mean gradient of ~7-8 mm Hg across the Sotelo valve and no  regurgitation.  Trivial mitral valve insufficiency.  Prosthetic pulmonary valve.  There is a peak velocity of 1.9 m/sec, peak gradient of 15mmHg. There is no  significant pulmonary insufficiency.  In limited views, the proximal branch PAs appear normal in size.  There is at least moderate right atrial enlargement.  Normal left atrial size.  Right ventricle appears relatively normal in size.  In limited images, there is at least mildly depressed right ventricular systolic  function.  Normal left ventricle structure and size.  There is paradoxical septal motion with normal movement of left ventricular  free  wall suggesting low normal left ventricular systolic function  No pericardial effusion.  Very difficult study, consider cardiac MRI.      Assessment and Plan:     GI  Abdominal pain  Kacey Ruth is a 18 year old female with hx of:  - Pulmonary atresia, intact ventricular septum s/p repair and multiple valve replacement surgeries  - Atrial tachycardia s/p ablation and loop recorder placement. Presented on warfarin with significantly elevated INR.   - Severe abdominal pain poorly controlled on pain meds  - CT abdomen pos for cirrhosis, PT, PTT, INR elevated. MRI with questionable hepatic lesions.  - Trichomonas + s/p treatment  - Admitted for mgmt of abnormal coags and abdominal pain possibly 2/2 cirrhosis. Adult hepatology following. Working on transferring to Internal Medicine service for further eval and tx.     CNS:   - Pain control with Morphine and Oxycodone.   Resp:  - No acute concerns  - Repeat CXR PRN  - Goal saturations normal.   CVS:   - Digoxin 125 mcg daily  - HCTZ 25 mg daily  - Amlodipine 2.5 mg daily  - Coenzyme Q10 100 mg BID  - Sotalol 80 mg BID   - Warfarin stopped  - Awaiting Loop interrogation   FEN/GI:  - Adult hepatology consulted  - LFTs wnl  - Morphine 2 mg IV q2h PRN  Gyn:  - Will consult Gynecology given irregular cycles and Trich + status for further evaluation of abdominal pain.   Heme/ID:  - Off Coumadin. Will not restart.   - Follow daily coags.   - s/p Metronidazole x1 in ED. No further treatment required.   Renal:   - No present concerns.   Social:   - Parents at bedside  Access:   - PIV  Dispo:   - Ideally transfer to adult IM service for further eval and tx. They have been contacted. Awaiting official consult and transfer.           PAWAN Gomes  Pediatric Cardiology  [facility]

## 2019-06-05 ENCOUNTER — TELEPHONE (OUTPATIENT)
Dept: HEPATOLOGY | Facility: CLINIC | Age: 19
End: 2019-06-05

## 2019-06-05 VITALS
RESPIRATION RATE: 20 BRPM | DIASTOLIC BLOOD PRESSURE: 53 MMHG | SYSTOLIC BLOOD PRESSURE: 115 MMHG | BODY MASS INDEX: 43.22 KG/M2 | TEMPERATURE: 99 F | OXYGEN SATURATION: 95 % | HEART RATE: 75 BPM | HEIGHT: 66 IN | WEIGHT: 268.94 LBS

## 2019-06-05 LAB
APTT BLDCRRT: 46.1 SEC (ref 21–32)
APTT BLDCRRT: 46.1 SEC (ref 21–32)
C TRACH DNA SPEC QL NAA+PROBE: NOT DETECTED
INR PPP: 2.9 (ref 0.8–1.2)
INR PPP: 2.9 (ref 0.8–1.2)
MITOCHONDRIA AB TITR SER IF: NORMAL {TITER}
N GONORRHOEA DNA SPEC QL NAA+PROBE: NOT DETECTED
PROTHROMBIN TIME: 28.1 SEC (ref 9–12.5)
PROTHROMBIN TIME: 28.1 SEC (ref 9–12.5)
SMOOTH MUSCLE AB TITR SER IF: NORMAL {TITER}

## 2019-06-05 PROCEDURE — 99232 PR SUBSEQUENT HOSPITAL CARE,LEVL II: ICD-10-PCS | Mod: ,,, | Performed by: PEDIATRICS

## 2019-06-05 PROCEDURE — 25000003 PHARM REV CODE 250: Performed by: PEDIATRICS

## 2019-06-05 PROCEDURE — 99232 PR SUBSEQUENT HOSPITAL CARE,LEVL II: ICD-10-PCS | Mod: ,,, | Performed by: INTERNAL MEDICINE

## 2019-06-05 PROCEDURE — 25000003 PHARM REV CODE 250: Performed by: STUDENT IN AN ORGANIZED HEALTH CARE EDUCATION/TRAINING PROGRAM

## 2019-06-05 PROCEDURE — 36415 COLL VENOUS BLD VENIPUNCTURE: CPT

## 2019-06-05 PROCEDURE — 99232 SBSQ HOSP IP/OBS MODERATE 35: CPT | Mod: ,,, | Performed by: INTERNAL MEDICINE

## 2019-06-05 PROCEDURE — 99232 SBSQ HOSP IP/OBS MODERATE 35: CPT | Mod: ,,, | Performed by: PEDIATRICS

## 2019-06-05 PROCEDURE — 85730 THROMBOPLASTIN TIME PARTIAL: CPT

## 2019-06-05 PROCEDURE — 85610 PROTHROMBIN TIME: CPT

## 2019-06-05 RX ORDER — FAMOTIDINE 20 MG/1
20 TABLET, FILM COATED ORAL 2 TIMES DAILY
Qty: 60 TABLET | Refills: 2 | Status: SHIPPED | OUTPATIENT
Start: 2019-06-05 | End: 2023-01-14

## 2019-06-05 RX ADMIN — POLYETHYLENE GLYCOL 3350 17 G: 17 POWDER, FOR SOLUTION ORAL at 09:06

## 2019-06-05 RX ADMIN — Medication 100 MG: at 09:06

## 2019-06-05 RX ADMIN — DOCUSATE SODIUM 100 MG: 100 CAPSULE, LIQUID FILLED ORAL at 09:06

## 2019-06-05 RX ADMIN — FAMOTIDINE 20 MG: 20 TABLET, FILM COATED ORAL at 09:06

## 2019-06-05 RX ADMIN — PANTOPRAZOLE SODIUM 40 MG: 40 TABLET, DELAYED RELEASE ORAL at 09:06

## 2019-06-05 RX ADMIN — AMLODIPINE BESYLATE 2.5 MG: 2.5 TABLET ORAL at 09:06

## 2019-06-05 RX ADMIN — HYDROCHLOROTHIAZIDE 25 MG: 25 TABLET ORAL at 09:06

## 2019-06-05 RX ADMIN — DIGOXIN 125 MCG: 125 TABLET ORAL at 09:06

## 2019-06-05 RX ADMIN — SOTALOL HYDROCHLORIDE 80 MG: 80 TABLET ORAL at 09:06

## 2019-06-05 NOTE — CONSULTS
[facility]  Pediatric Hematology/Oncology  Consult Note      Late entry for date of service 6/3/19.    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  Hematology consulted for further management.  Her mother reports that her coumadin dose was recently increased from 5mg daily to 10mg Mo-Th, 5mg Fr-Sun ~ 2 weeks ago.  She has not had repeat bloodwork since that time.  She denies any change in her diet, reports that they tend to avoid foods high in Vit K.  No bleeding or excessive bruising reported.  LMP 2 weeks ago, reportedly normal.  No recent illnesses or fevers, with the exception of her abdominal pain.          Medications:  Continuous Infusions:   sodium chloride 0.9% 50 mL/hr at 06/05/19 0032     Scheduled Meds:   amLODIPine  2.5 mg Oral Daily    coenzyme Q10  100 mg Oral BID    digoxin  125 mcg Oral Daily    docusate sodium  100 mg Oral BID    famotidine  20 mg Oral BID    hydroCHLOROthiazide  25 mg Oral Daily    pantoprazole  40 mg Oral Daily    polyethylene glycol  17 g Oral Daily    sotalol  80 mg Oral BID     PRN Meds:diazePAM, morphine, ondansetron, oxyCODONE     Review of patient's allergies indicates:  No Known Allergies     Past Medical History:   Diagnosis Date    CHF (congestive heart failure)     Obesity      Pulmonary atresia with intact ventricular septum     SVT (supraventricular tachycardia)      Past Surgical History:   Procedure Laterality Date    ABLATION, SVT, ACCESSORY PATHWAY Bilateral 2/6/2019    Performed by Romeo Robles MD at [facility] EP LAB    MEMO PROCEDURE      bt shunt and transannular patch    CARDIAC VALVE REPLACEMENT      ECHOCARDIOGRAM,TRANSESOPHAGEAL N/A 2/6/2019    Performed by [facility] Diagnostic Provider at [facility] EP LAB    INSERTION, IMPLANTABLE LOOP RECORDER N/A 5/8/2019    Performed by Ricardo Woodward MD at [facility] EP LAB    INSERTION, IMPLANTABLE LOOP RECORDER N/A 2/6/2019    Performed by Romeo Robles MD at [facility] EP LAB    PULMONARY VALVE REPLACEMENT  01/02/2007    25mm sharyn charles    REPLACEMENT-VALVE-PULMONARY N/A 2/16/2017    Performed by Zachary Berman Jr., MD at [facility] CATH LAB    TRANSESOPHAGEAL ECHOCARDIOGRAM (SAI) N/A 2/16/2017    Performed by Zachary Berman Jr., MD at [facility] CATH LAB    TRICUSPID VALVE REPLACEMENT  01/02/2007    25 mm sharyn-charles     Family History     None        Tobacco Use    Smoking status: Never Smoker   Substance and Sexual Activity    Alcohol use: No    Drug use: No    Sexual activity: Never       Review of Systems   Constitutional: Negative.  Negative for activity change, appetite change, fatigue, fever and unexpected weight change.   HENT: Negative.  Negative for nosebleeds.    Eyes: Negative.    Respiratory: Negative.  Negative for cough.    Cardiovascular: Negative.    Gastrointestinal: Positive for abdominal pain and nausea. Negative for blood in stool, constipation, diarrhea and vomiting.   Endocrine: Negative.    Genitourinary: Negative.  Negative for dysuria and hematuria.   Musculoskeletal: Negative.  Negative for arthralgias and myalgias.   Skin: Negative.  Negative for rash.   Allergic/Immunologic: Negative.    Neurological: Negative.  Negative for headaches.   Hematological: Negative.  Negative for adenopathy. Does  not bruise/bleed easily.   Psychiatric/Behavioral: Negative.  Negative for dysphoric mood.   All other systems reviewed and are negative.    Objective:     Vital Signs (Most Recent):  Temp: 98.8 °F (37.1 °C) (06/05/19 0946)  Pulse: 75 (06/05/19 0946)  Resp: 20 (06/05/19 0946)  BP: (!) 115/53 (06/05/19 0946)  SpO2: 95 % (06/05/19 0946) Vital Signs (24h Range):  Temp:  [98 °F (36.7 °C)-98.8 °F (37.1 °C)] 98.8 °F (37.1 °C)  Pulse:  [72-89] 75  Resp:  [18-27] 20  SpO2:  [93 %-99 %] 95 %  BP: (107-136)/(53-77) 115/53     Weight: 122 kg (268 lb 15.4 oz)  Body mass index is 43.41 kg/m².  Body surface area is 2.38 meters squared.      Intake/Output Summary (Last 24 hours) at 6/5/2019 1139  Last data filed at 6/5/2019 1111  Gross per 24 hour   Intake 1850 ml   Output 90 ml   Net 1760 ml       Physical Exam   Constitutional: She is oriented to person, place, and time. She appears well-developed and well-nourished. No distress.   HENT:   Head: Normocephalic and atraumatic.   Right Ear: External ear normal.   Left Ear: External ear normal.   Nose: Nose normal.   Mouth/Throat: Oropharynx is clear and moist and mucous membranes are normal. Normal dentition. No oropharyngeal exudate.   Eyes: Pupils are equal, round, and reactive to light. Conjunctivae and EOM are normal. No scleral icterus.   Neck: Normal range of motion. Neck supple. No thyromegaly present.   Cardiovascular: Normal rate, regular rhythm, normal heart sounds and intact distal pulses. Exam reveals no gallop and no friction rub.   No murmur heard.  Pulmonary/Chest: Effort normal and breath sounds normal.   Abdominal: Soft. Bowel sounds are normal. She exhibits no distension and no mass. There is no tenderness.   Musculoskeletal: Normal range of motion. She exhibits no edema.   Lymphadenopathy:     She has no cervical adenopathy.     She has no axillary adenopathy.        Right: No inguinal adenopathy present.        Left: No inguinal adenopathy present.    Neurological: She is alert and oriented to person, place, and time. She exhibits normal muscle tone.   Skin: Skin is warm and dry. No rash noted.   Psychiatric: She has a normal mood and affect.   Nursing note and vitals reviewed.      Labs:   Results for JULITA GARCIA (MRN [id number]) as of 6/5/2019 15:19   6/2/2019 21:42 6/3/2019 09:07   WBC 8.20    RBC 4.64    Hemoglobin 12.7    Hematocrit 38.2    MCV 82    MCH 27.4    MCHC 33.2    RDW 13.2    Platelets 296    MPV 9.8    Gran% 68.6    Gran # (ANC) 5.6    Lymph% 18.9    Lymph # 1.6    Mono% 10.9    Mono # 0.9    Eosinophil% 1.2    Eos # 0.1    Basophil% 0.2    Baso # 0.02    Differential Method Automated    Protime 98.2 (H) >100.0 (H)   Coumadin Monitoring INR 9.0 (HH) 9.9 (HH)       Diagnostic Results:  Liver CT, US and MRI reviewed    Assessment/Plan:     Active Diagnoses:    Diagnosis Date Noted POA    PRINCIPAL PROBLEM:  Supratherapeutic INR [R79.1] 06/03/2019 Yes    Hepatosplenomegaly [R16.2] 06/03/2019 Yes    Chronic anticoagulation [Z79.01] 06/03/2019 Not Applicable    Liver cirrhosis [K74.60] 06/03/2019 Yes    Abdominal pain [R10.9] 06/03/2019 Unknown      Problems Resolved During this Admission:     Surpatherapeutic INR  -Suspect this is due to recent coumadin dosing change along with decreased PO intake given recent abdominal pain.  Low suspicion for intrinsic liver disease given normal LFTs, albumin, plts, etc.  Would hold coumadin.  Given her prosthetic valve and not currently bleeding, would not recommend vitamin K at this time.  Discussed with Cardiology that they may transition her off of coumadin to an alternative anti-coagulant.  If this is done, may give vitamin K (2.5mg PO) to more rapidly normalize INR.      Liver cirrhosis and hepatic nodules  -Low suspicion for malignancy after reviewing imaging.  Evaluation of etiology of cirrhosis per Hepatology.      Joey Linder MD  Pediatric Hematology/Oncology  [facility]

## 2019-06-05 NOTE — TELEPHONE ENCOUNTER
----- Message from Christy Cho MA sent at 6/5/2019 12:57 PM CDT -----      ----- Message -----  From: Jo Ann Velazquez  Sent: 6/5/2019  12:21 PM  To: Derrick Farris Staff    Calling to find out when will liver biopsy be scheduled    Pt contact [phone]

## 2019-06-05 NOTE — PROGRESS NOTES
"[facility]  Hepatology  Progress Note    Patient Name: Kacey Ruth  MRN: [id number]  Admission Date: 6/2/2019  Hospital Length of Stay: 2 days  Attending Provider: No att. providers found   Primary Care Physician: Sharad Yun MD  Principal Problem:Supratherapeutic INR    Subjective:     Transplant status: No    HPI: This is a 19 yo F with congenital heart disease who presented to the ED last night with abdominal pain. She reports her abdominal pain began about two weeks ago and is generalized. Reports it is a 10 out of 10 in severity, squeezing in quality, and certain positions make the pain worse. Pain is not affected by food or bowel movements. Pain is worsened with urination. She denies any fevers, nausea, vomiting, diarrhea, recent trauma. 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 She underwent reconstruction of the right ventricular outflow tract and placement of a bioprosthetic pulmonary valve in early childhood. She subsequently has required multiple heart valve replacements. Her most recent heart surgery involved placement of a bioprosthetic valve in the pulmonary position and placement of a bioprosthetic valve in the tricuspid position, in 2007." She is currently on coumadin for mechanical valve. CTA obtained yesterday showed findings of "cirrhotic liver". Her liver tests are within normal limits and she has a normal platelet count as well as albumin. Her INR was noted to be 9.    Interval History: Patient reports improvement in her abdominal pain. Her liver tests remain normal. INR has come down to 2.9. Initial liver work-up unremarkable.     Current Facility-Administered Medications   Medication    0.9%  NaCl infusion    amLODIPine tablet 2.5 mg    coenzyme Q10 100 mg    diazePAM tablet 2 mg    digoxin tablet 125 mcg    docusate sodium capsule 100 mg    famotidine tablet 20 mg "    hydroCHLOROthiazide tablet 25 mg    morphine injection 4 mg    ondansetron disintegrating tablet 4 mg    oxyCODONE immediate release tablet 5 mg    pantoprazole EC tablet 40 mg    polyethylene glycol packet 17 g    sotalol tablet 80 mg     Current Outpatient Medications   Medication Sig    digoxin (LANOXIN) 125 mcg tablet Take 1 tablet (125 mcg total) by mouth once daily.    hydroCHLOROthiazide (HYDRODIURIL) 25 MG tablet Take 1 tablet (25 mg total) by mouth once daily.    sotalol (BETAPACE) 80 MG tablet Take 1 tablet (80 mg total) by mouth 2 (two) times daily.    amlodipine (NORVASC) 2.5 MG tablet Take 1 tablet (2.5 mg total) by mouth once daily.    coenzyme Q10 (COQ-10) 100 mg capsule Take 1 capsule (100 mg total) by mouth 2 (two) times daily.    famotidine (PEPCID) 20 MG tablet Take 1 tablet (20 mg total) by mouth 2 (two) times daily.     Facility-Administered Medications Ordered in Other Encounters   Medication    0.9%  NaCl infusion    sodium chloride 0.9% flush 5 mL       Objective:     Vital Signs (Most Recent):  Temp: 98.8 °F (37.1 °C) (06/05/19 0946)  Pulse: 75 (06/05/19 0946)  Resp: 20 (06/05/19 0946)  BP: (!) 115/53 (06/05/19 0946)  SpO2: 95 % (06/05/19 0946) Vital Signs (24h Range):  Temp:  [98 °F (36.7 °C)-98.8 °F (37.1 °C)] 98.8 °F (37.1 °C)  Pulse:  [72-89] 75  Resp:  [18-27] 20  SpO2:  [93 %-99 %] 95 %  BP: (107-136)/(53-77) 115/53     Weight: 122 kg (268 lb 15.4 oz) (06/04/19 2024)  Body mass index is 43.41 kg/m².    Physical Exam   Constitutional: She is oriented to person, place, and time. She appears well-developed and well-nourished.   Eyes: No scleral icterus.   Cardiovascular: Normal rate and regular rhythm.   Pulmonary/Chest: Effort normal and breath sounds normal.   Abdominal: Soft. Bowel sounds are normal. She exhibits no distension. There is no tenderness.   Musculoskeletal: She exhibits no edema or deformity.   Neurological: She is alert and oriented to person, place, and  "time.   Skin: Skin is warm and dry.   Psychiatric: She has a normal mood and affect.   Vitals reviewed.      MELD-Na score: 26 at 6/4/2019  4:42 AM  MELD score: 26 at 6/4/2019  4:42 AM  Calculated from:  Serum Creatinine: 0.8 mg/dL (Rounded to 1 mg/dL) at 6/2/2019  9:42 PM  Serum Sodium: 140 mmol/L (Rounded to 137 mmol/L) at 6/2/2019  9:42 PM  Total Bilirubin: 0.9 mg/dL (Rounded to 1 mg/dL) at 6/2/2019  9:42 PM  INR(ratio): 6.0 at 6/4/2019  4:42 AM  Age: 18 years    Significant Labs:  CBC:   Recent Labs   Lab 06/02/19  2142   WBC 8.20   RBC 4.64   HGB 12.7   HCT 38.2        CMP:   Recent Labs   Lab 06/02/19  2142      CALCIUM 9.5   ALBUMIN 4.0   PROT 8.0      K 3.3*   CO2 25      BUN 9   CREATININE 0.8   ALKPHOS 76   ALT 20   AST 23   BILITOT 0.9     Coagulation:   Recent Labs   Lab 06/05/19  0447   INR 2.9*  2.9*   APTT 46.1*  46.1*         Assessment/Plan:     Liver cirrhosis  17 yo F with congenital heart disease who presented to the ED with abdominal pain, found to have "cirrhotic liver" on CT imaging. Her labs are not consistent with cirrhosis given normal liver tests, normal platelet count, and normal albumin. INR is elevated in the setting of coumadin use. US elastography showing F2-F3 fibrosis. Initial work-up unremarkable, however will likely need liver biopsy as an outpatient.    Recommendations:  - Pain control  - Will f/u in clinic with Dr. Meza  - Likely needs outpatient liver biopsy        Thank you for your consult. I will follow-up with patient. Please contact us if you have any additional questions.    Delbert Juarez MD  Hepatology  [facility]  "

## 2019-06-05 NOTE — HOSPITAL COURSE
Elevated INR, PT, PTT  Admitted to the ped cards unit. Warfarin discontinued - does not need to be restarted for arrythmia prophylaxis. Vit K given. INR to 2.9, PT, PTT downtrending. Hematology consulted, no further Vitamin K needed. Expect INR to return to normal off warfarin. Elevated warfarin likely due to increased dose 2 weeks ago.    Abdominal pain with unclear source. Differential includes cirrhosis, gastritis, constipation, PID  CT and US elastography pos hepatic cirrhosis and indeterminate L hepatic lesions. MRI abdomen pos for nodular changes consistent with congestive hepatology and indeterminate arterial hyperenhancing foci. Hepatology and Heme/onc following. Hepatology reassured by normal liver function. Lesions not concerning for hepatic carcinoma as per heme/onc. Further workup: Elevated GGT (83) and fibrinogen (453). Ammonia wnl. Iron, TIBC, Ferritin, Alpha-1-antitrypsin, cerulosplasmin, and IgG wnl. Anti-smooth muscle ab and antimitochondrial ab pending. AFP tumor marker neg. Cause of pain and fibrosis unclear. Pain controlled with Morphine 2-4 mg q2h PRN. Avoided tylenol and motrin due to concern for hepatic cirrhosis and elevated INR. Pain resolved by discharge. Will follow up with hepatology outpatient.     Gynecology consulted due to concern for PID. Trichomonas positive on admission, treated with Metronidazole in ED. Patient fervently denies sexual activity. Refused vaginal exam. Urine GC/CT pending. Low suspicion for PID as per gynecology.    Initial poor PO intake and UOP. Started on fluids at 1/2 M, weaned off as PO intake and UOP improved. Famotidine and Miralax started. Zofran PRN for nausea.     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 EKG and echo stable. Unlikely acute cardiac process causing abdominal pain.       Discharged with follow up with PCP, peds cardiology, and hepatology. Prescription for famotidine given. Instructed to return to ED or call provider if worsening abdominal pain or inability to tolerate PO intake. Mom and patient verbalized understanding.

## 2019-06-05 NOTE — TELEPHONE ENCOUNTER
Scheduled hospital follow up for 07/01/19 @1000 spoke with patients mother patient is inpatient status @ present

## 2019-06-05 NOTE — PLAN OF CARE
Problem: Adult Inpatient Plan of Care  Goal: Plan of Care Review  Outcome: Ongoing (interventions implemented as appropriate)  Abdominal pain resoved.  Will follow up with adult GI, liver biopsy scheduled.  Will follow up with ob/gyn and priomary physician.  Afebrile.  Has remained on telemetry monitor.  No arrhythmias.  Tolerating regular diet with no complaints, no nausea or vomiting.  No changes in meds other than stopping coumadin.  Discharge instructions given to patient and her mother.  To home with family, in wheelchair.

## 2019-06-05 NOTE — PLAN OF CARE
VSS, afebrile. Pt up on phone/watching TV through most of night. Denies any c/o pain, no PRNs needed.  NS infusing to R wrist PIV @ 50ml/hr. Tolerated dinner well. Drinking well. Up to use restroom multiple times throughout night, no BMs.Labs drawn. POC reviewed with pt and family, to go to adult floor tomorrow. Verbalized understanding. Safety maintained, will cont to monitor.

## 2019-06-05 NOTE — ASSESSMENT & PLAN NOTE
"19 yo F with congenital heart disease who presented to the ED with abdominal pain, found to have "cirrhotic liver" on CT imaging. Her labs are not consistent with cirrhosis given normal liver tests, normal platelet count, and normal albumin. INR is elevated in the setting of coumadin use. US elastography showing F2-F3 fibrosis. Initial work-up unremarkable, however will likely need liver biopsy as an outpatient.    Recommendations:  - Pain control  - Will f/u in clinic with Dr. Meza  - Likely needs outpatient liver biopsy  "

## 2019-06-05 NOTE — TELEPHONE ENCOUNTER
Call returned to Addis, mother of the patient.  She stated I ran into Dr Meza in the hospital and she answered my question.  She said she would discuss everything with me in the Office Visit on 7/1/19.

## 2019-06-05 NOTE — DISCHARGE SUMMARY
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 Not associated with eating. ROS pos for dysuria. Denied abdominal trauma or previous abdominal procedures.     In ED, CT abd pos for hepatic cirrhosis and indeterminate L hepatic lesions. UA pos blood 1+, trace leuk, neg nitrites, WBC 6. Trace trichomonas, given Metronidazole. CBC, CMP, lipase, lactate, troponin wnl. PT 98.2, PTT 90.6, INR 9.0. On warfarin for arrhythmia prophylaxis. Admitted for workup of abdominal pain and management of elevated PT, PTT, INR.          * No surgery found *     Indwelling Lines/Drains at time of discharge:  Lines/Drains/Airways          None          Hospital Course:  Elevated INR, PT, PTT  Admitted to the ped cards unit. Warfarin discontinued - does not need to be restarted for arrythmia prophylaxis. Vit K given. INR to 2.9, PT, PTT downtrending. Hematology consulted, no further Vitamin K needed. Expect INR to return to normal off warfarin. Elevated warfarin likely due to increased dose 2 weeks ago.    Abdominal pain with unclear source. Differential includes cirrhosis, gastritis, constipation, PID  CT and US elastography pos hepatic cirrhosis and indeterminate L hepatic lesions. MRI abdomen pos for nodular changes consistent with congestive hepatology and indeterminate arterial  hyperenhancing foci. Hepatology and Heme/onc following. Hepatology reassured by normal liver function. Lesions not concerning for hepatic carcinoma as per heme/onc. Further workup: Elevated GGT (83) and fibrinogen (453). Ammonia wnl. Iron, TIBC, Ferritin, Alpha-1-antitrypsin, cerulosplasmin, and IgG wnl. Anti-smooth muscle ab and antimitochondrial ab pending. AFP tumor marker neg. Cause of pain and fibrosis unclear. Pain controlled with Morphine 2-4 mg q2h PRN. Avoided tylenol and motrin due to concern for hepatic cirrhosis and elevated INR. Pain resolved by discharge. Will follow up with hepatology outpatient.     Gynecology consulted due to concern for PID. Trichomonas positive on admission, treated with Metronidazole in ED. Patient fervently denies sexual activity. Refused vaginal exam. Urine GC/CT pending. Low suspicion for PID as per gynecology.    Initial poor PO intake and UOP. Started on fluids at 1/2 M, weaned off as PO intake and UOP improved. Famotidine and Miralax started. Zofran PRN for nausea.     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 EKG and echo stable. Unlikely acute cardiac process causing abdominal pain.      Discharged with follow up with PCP, peds cardiology, and hepatology. Prescription for famotidine given. Instructed to return to ED or call provider if worsening abdominal pain or inability to tolerate PO intake. Mom and patient verbalized understanding.       Consults:   Consults (From admission, onward)        Status Ordering Provider     Inpatient consult to Gynecology  Once     Provider:  (Not yet assigned)    Completed JEFF FLETCHER     Inpatient consult to Hepatology  Once     Provider:  (Not yet assigned)    Completed GERMAN BRAVO     [facility]  Once      Provider:  (Not yet assigned)    MARLYN Hayes     Inpatient consult to Pediatric Hematology/Oncology  Once     Provider:  Joey Linder MD    Acknowledged JEFF FLETCHER          Significant Diagnostic Studies:     Recent Results (from the past 96 hour(s))   CBC W/ AUTO DIFFERENTIAL    Collection Time: 06/02/19  9:42 PM   Result Value Ref Range    WBC 8.20 3.90 - 12.70 K/uL    RBC 4.64 4.00 - 5.40 M/uL    Hemoglobin 12.7 12.0 - 16.0 g/dL    Hematocrit 38.2 37.0 - 48.5 %    Mean Corpuscular Volume 82 82 - 98 fL    Mean Corpuscular Hemoglobin 27.4 27.0 - 31.0 pg    Mean Corpuscular Hemoglobin Conc 33.2 32.0 - 36.0 g/dL    RDW 13.2 11.5 - 14.5 %    Platelets 296 150 - 350 K/uL    MPV 9.8 9.2 - 12.9 fL    Gran # (ANC) 5.6 1.8 - 7.7 K/uL    Lymph # 1.6 1.0 - 4.8 K/uL    Mono # 0.9 0.3 - 1.0 K/uL    Eos # 0.1 0.0 - 0.5 K/uL    Baso # 0.02 0.00 - 0.20 K/uL    Gran% 68.6 38.0 - 73.0 %    Lymph% 18.9 18.0 - 48.0 %    Mono% 10.9 4.0 - 15.0 %    Eosinophil% 1.2 0.0 - 8.0 %    Basophil% 0.2 0.0 - 1.9 %    Differential Method Automated    Comp. Metabolic Panel    Collection Time: 06/02/19  9:42 PM   Result Value Ref Range    Sodium 140 136 - 145 mmol/L    Potassium 3.3 (L) 3.5 - 5.1 mmol/L    Chloride 104 95 - 110 mmol/L    CO2 25 23 - 29 mmol/L    Glucose 105 70 - 110 mg/dL    BUN, Bld 9 6 - 20 mg/dL    Creatinine 0.8 0.5 - 1.4 mg/dL    Calcium 9.5 8.7 - 10.5 mg/dL    Total Protein 8.0 6.0 - 8.4 g/dL    Albumin 4.0 3.5 - 5.2 g/dL    Total Bilirubin 0.9 0.1 - 1.0 mg/dL    Alkaline Phosphatase 76 55 - 135 U/L    AST 23 10 - 40 U/L    ALT 20 10 - 44 U/L    Anion Gap 11 8 - 16 mmol/L    eGFR if African American >60 >60 mL/min/1.73 m^2    eGFR if non African American >60 >60 mL/min/1.73 m^2   Lipase    Collection Time: 06/02/19  9:42 PM   Result Value Ref Range    Lipase 28 4 - 60 U/L   APTT    Collection Time: 06/02/19  9:42 PM   Result Value Ref Range    aPTT 90.6 (H) 21.0 - 32.0 sec   Protime-INR     Collection Time: 06/02/19  9:42 PM   Result Value Ref Range    Prothrombin Time 98.2 (H) 9.0 - 12.5 sec    INR 9.0 (HH) 0.8 - 1.2   Troponin I    Collection Time: 06/02/19  9:42 PM   Result Value Ref Range    Troponin I <0.006 0.000 - 0.026 ng/mL   Urinalysis, Reflex to Urine Culture Urine, Clean Catch    Collection Time: 06/02/19 10:20 PM   Result Value Ref Range    Specimen UA Urine, Clean Catch     Color, UA Yellow Yellow, Straw, Karolina    Appearance, UA Clear Clear    pH, UA 8.0 5.0 - 8.0    Specific Gravity, UA 1.020 1.005 - 1.030    Protein, UA Negative Negative    Glucose, UA Negative Negative    Ketones, UA Negative Negative    Bilirubin (UA) Negative Negative    Occult Blood UA 1+ (A) Negative    Nitrite, UA Negative Negative    Urobilinogen, UA 2.0-3.0 (A) <2.0 EU/dL    Leukocytes, UA Trace (A) Negative   Urinalysis Microscopic    Collection Time: 06/02/19 10:20 PM   Result Value Ref Range    RBC, UA 6 (H) 0 - 4 /hpf    WBC, UA 6 (H) 0 - 5 /hpf    Bacteria Rare None-Occ /hpf    Trichomonas, UA Rare (A) None    Microscopic Comment SEE COMMENT    POCT urine pregnancy    Collection Time: 06/02/19 10:21 PM   Result Value Ref Range    POC Preg Test, Ur Negative Negative     Acceptable Yes    Lactic acid, plasma    Collection Time: 06/03/19 12:20 AM   Result Value Ref Range    Lactate (Lactic Acid) 0.6 0.5 - 2.2 mmol/L   Hepatitis panel, acute    Collection Time: 06/03/19  3:22 AM   Result Value Ref Range    Hepatitis B Surface Ag Negative     Hep B C IgM Negative     Hep A IgM Negative     Hepatitis C Ab Negative    Brain natriuretic peptide    Collection Time: 06/03/19  3:55 AM   Result Value Ref Range    BNP 83 0 - 99 pg/mL   Protime-INR    Collection Time: 06/03/19  9:07 AM   Result Value Ref Range    Prothrombin Time >100.0 (H) 9.0 - 12.5 sec    INR 9.9 (HH) 0.8 - 1.2   APTT    Collection Time: 06/03/19  9:07 AM   Result Value Ref Range    aPTT 76.7 (H) 21.0 - 32.0 sec   Fibrinogen     Collection Time: 06/03/19  9:07 AM   Result Value Ref Range    Fibrinogen 453 (H) 182 - 366 mg/dL   Ammonia    Collection Time: 06/03/19  9:07 AM   Result Value Ref Range    Ammonia 46 10 - 50 umol/L   Gamma GT    Collection Time: 06/03/19  9:07 AM   Result Value Ref Range    GGT 83 (H) 8 - 55 U/L   AFP tumor marker    Collection Time: 06/03/19  2:07 PM   Result Value Ref Range    AFP 2.8 0.0 - 8.4 ng/mL   APTT    Collection Time: 06/04/19  4:42 AM   Result Value Ref Range    aPTT 75.8 (H) 21.0 - 32.0 sec   Protime-INR    Collection Time: 06/04/19  4:42 AM   Result Value Ref Range    Prothrombin Time 60.2 (H) 9.0 - 12.5 sec    INR 6.0 (HH) 0.8 - 1.2   IgG    Collection Time: 06/04/19  1:48 PM   Result Value Ref Range    IgG - Serum 1300 650 - 1600 mg/dL   Ceruloplasmin    Collection Time: 06/04/19  1:48 PM   Result Value Ref Range    Ceruloplasmin 36.0 15.0 - 45.0 mg/dL   Alpha-1-antitrypsin    Collection Time: 06/04/19  1:48 PM   Result Value Ref Range    A-1 Antitrypsin, Ser 164 100 - 190 mg/dL   Ferritin    Collection Time: 06/04/19  1:48 PM   Result Value Ref Range    Ferritin 49 20.0 - 300.0 ng/mL   Iron and TIBC    Collection Time: 06/04/19  1:48 PM   Result Value Ref Range    Iron 94 30 - 160 ug/dL    Transferrin 255 200 - 375 mg/dL    TIBC 377 250 - 450 ug/dL    Saturated Iron 25 20 - 50 %   APTT    Collection Time: 06/05/19  4:47 AM   Result Value Ref Range    aPTT 46.1 (H) 21.0 - 32.0 sec   Protime-INR    Collection Time: 06/05/19  4:47 AM   Result Value Ref Range    Prothrombin Time 28.1 (H) 9.0 - 12.5 sec    INR 2.9 (H) 0.8 - 1.2   Protime-INR    Collection Time: 06/05/19  4:47 AM   Result Value Ref Range    Prothrombin Time 28.1 (H) 9.0 - 12.5 sec    INR 2.9 (H) 0.8 - 1.2   APTT    Collection Time: 06/05/19  4:47 AM   Result Value Ref Range    aPTT 46.1 (H) 21.0 - 32.0 sec     Narrative     EXAMINATION:  CTA OF ABDOMEN PELVIS WITH    CLINICAL HISTORY:  Mesenteric ischemia acute;    TECHNIQUE:  1.25 mm  enhanced axial images were obtained from the lung bases through the greater trochanters to evaluate for mesenteric ischemia.  Arterial and venous phase imaging is were provided.  One hundred mL of Omnipaque 350 was injected.  No 3D images were provided.    COMPARISON:  None.    FINDINGS:  The celiac axis, SMA, CATHIE are all patent.  Both renal arteries are patent.  There are no atherosclerotic plaques detected.  There is no bowel wall thickening.  There is no abdominal ascites or mesenteric infiltration.    The liver is cirrhotic.  In arterial phase, there are rounded arterially enhancing lesions in the left lobe of the liver measuring no greater than 15 mm.  On the venous phase images, these lesions are no longer appreciated.  The liver is cirrhotic.  Focal fat remains at the falciform ligament.    The spleen is elongated.    The pancreas, kidneys, and adrenal glands are unremarkable. The gallbladder contains no calcified gallstones.    There is no definite evidence for abdominal adenopathy or ascites.  There is a tiny fat containing umbilical hernia.    There are no pelvic masses or adenopathy.  There is a 4 x 3 x 3 cm left ovarian cyst.  Small fluid is seen within the endometrium, which may be physiologic.    There is no free fluid in the pelvis.    At the lung bases, median sternotomy changes are present.  The heart is enlarged.  There is mild left basilar atelectasis.      Impression       No definite evidence for mesenteric ischemia.  Consider correlation with lactate.    Cirrhotic liver occluding splenomegaly.  Multiple arterially enhancing indeterminate left hepatic lesions.  Possibility of a meningioma is cannot be entirely excluded.  Consider MRI.    Left ovarian cyst.       Narrative     EXAMINATION:  XR CHEST AP PORTABLE    CLINICAL HISTORY:  abdominal pain;    TECHNIQUE:  Single frontal view of the chest was performed.    COMPARISON:  None    FINDINGS:  There is soft tissue attenuation relating to patient  body habitus.  Cardiac monitoring leads overlie the chest.  The cardiomediastinal silhouette appears enlarged.  There is postoperative change of prior median sternotomy and valve repair.  The lungs appear symmetrically expanded without evidence of confluent airspace consolidation.  No large pleural effusion or pneumothorax identified.  The visualized osseous structures appear intact.      Impression       No radiographic evidence of acute intrathoracic process.     Narrative     EXAMINATION:  US ELASTOGRAPHY LIVER    CLINICAL HISTORY:  rule out fibrosis/cirrhosis;    TECHNIQUE:  Quantitative Ultrasound Assessment of the Liver (QUAL) elastography was performed on the [facility].    COMPARISON:  CT abdomen/pelvis 06/03/2019    FINDINGS:  Median elastography: 1.8 m/sec, 9.9 kPa.    IQR/median: 3%, indicating an acceptable range    Hepato-renal index is 1.34, indicating borderline 5% steatosis.      Impression       Findings are consistent with mild to moderate (F2-F3) liver fibrosis.     Narrative     EXAMINATION:  MRI ABDOMEN W WO CONTRAST    CLINICAL HISTORY:  Liver lesion, >1cm, liver disease with risk of hcc;Triple phase MRI;    TECHNIQUE:  Routine MRI evaluation of the liver performed with and without the use of 10 cc of Gadavist IV contrast.    COMPARISON:  CTA 06/03/2019.    FINDINGS:  The liver is normal in size and demonstrates a slightly nodular contour.  There are at least 2 arterial hyperenhancing foci (one within segment 2, series 8, image 18 and one within the inferior aspect of segments 4/5, series 8, image 45) that do not demonstrate corresponding washout or pseudocapsule and are overall indeterminate.  Remaining hepatic parenchyma demonstrates slight heterogeneous enhancement with a few reticular regions of peripheral hypoenhancement, possibly related to evolving congestive hepatopathy in this patient with a history of pulmonary atresia status post valvular pulmonary and tricuspid valve replacement.   No intrahepatic bile duct dilatation.  Portal vasculature is patent.    Gallbladder is normal.  Common bile duct is normal in caliber.    Stomach, duodenum, spleen, pancreas and adrenal glands are normal.    Kidneys are normal in size and location.  No cortical enhancing lesions.  No hydronephrosis or proximal hydroureter.    The small bowel is normal in caliber.  The colon demonstrates no significant abnormalities.    The aorta tapers normally.    No pleural or pericardial effusions.  No ascites.    There is an increased number of slightly prominent mesenteric lymph nodes, nonspecific.    There is mild subcutaneous body wall edema.    No marrow signal abnormality to suggest an infiltrative process.      Impression       1. Slight nodular contour of the liver with heterogeneous parenchymal enhancement, findings that may relate to sequela of evolving congestive hepatopathy in this patient with a history of pulmonary atresia status post pulmonic and tricuspid valve replacements.  2. Two indeterminate arterial hyperenhancing foci within the left and right hepatic lobes.  Remaining hyperenhancing foci identified on recent CTA cannot be confirmed on this exam.  Attention at follow-up recommended.      Electronically signed by: Seng Lux MD  Date: 06/04/2019  Time: 06:41             Last Resulted: 06/04/19 06:41 Order Details View Encounter Lab and Collection Details Routing Result History            External Result Report     External Result Report   Narrative     EXAMINATION:  MRI ABDOMEN W WO CONTRAST    CLINICAL HISTORY:  Liver lesion, >1cm, liver disease with risk of hcc;Triple phase MRI;    TECHNIQUE:  Routine MRI evaluation of the liver performed with and without the use of 10 cc of Gadavist IV contrast.    COMPARISON:  CTA 06/03/2019.    FINDINGS:  The liver is normal in size and demonstrates a slightly nodular contour.  There are at least 2 arterial hyperenhancing foci (one within segment 2, series 8,  image 18 and one within the inferior aspect of segments 4/5, series 8, image 45) that do not demonstrate corresponding washout or pseudocapsule and are overall indeterminate.  Remaining hepatic parenchyma demonstrates slight heterogeneous enhancement with a few reticular regions of peripheral hypoenhancement, possibly related to evolving congestive hepatopathy in this patient with a history of pulmonary atresia status post valvular pulmonary and tricuspid valve replacement.  No intrahepatic bile duct dilatation.  Portal vasculature is patent.    Gallbladder is normal.  Common bile duct is normal in caliber.    Stomach, duodenum, spleen, pancreas and adrenal glands are normal.    Kidneys are normal in size and location.  No cortical enhancing lesions.  No hydronephrosis or proximal hydroureter.    The small bowel is normal in caliber.  The colon demonstrates no significant abnormalities.    The aorta tapers normally.    No pleural or pericardial effusions.  No ascites.    There is an increased number of slightly prominent mesenteric lymph nodes, nonspecific.    There is mild subcutaneous body wall edema.    No marrow signal abnormality to suggest an infiltrative process.   Impression       1. Slight nodular contour of the liver with heterogeneous parenchymal enhancement, findings that may relate to sequela of evolving congestive hepatopathy in this patient with a history of pulmonary atresia status post pulmonic and tricuspid valve replacements.  2. Two indeterminate arterial hyperenhancing foci within the left and right hepatic lobes.  Remaining hyperenhancing foci identified on recent CTA cannot be confirmed on this exam.  Attention at follow-up recommended.              Pending Diagnostic Studies:     Procedure Component Value Units Date/Time    Anti-smooth muscle antibody [[id number]] Collected:  06/04/19 1343    Order Status:  Sent Lab Status:  In process Updated:  06/04/19 7447    Specimen:  Blood      Antimitochondrial antibody [[id number]] Collected:  06/04/19 1348    Order Status:  Sent Lab Status:  In process Updated:  06/04/19 1352    Specimen:  Blood        Urine GC/CT pending    Final Active Diagnoses:    Diagnosis Date Noted POA    PRINCIPAL PROBLEM:  Supratherapeutic INR [R79.1] 06/03/2019 Yes    Hepatosplenomegaly [R16.2] 06/03/2019 Yes    Chronic anticoagulation [Z79.01] 06/03/2019 Not Applicable    Liver cirrhosis [K74.60] 06/03/2019 Yes    Abdominal pain [R10.9] 06/03/2019 Unknown      Problems Resolved During this Admission:     No new Assessment & Plan notes have been filed under this hospital service since the last note was generated.  Service: Pediatric Cardiology      Discharged Condition: good    Disposition: Home or Self Care    Follow Up:  Follow-up Information     Donovan Gonzalez MD. Schedule an appointment as soon as possible for a visit in 1 week.    Specialty:  Pediatric Cardiology  Why:  Hospital follow up for abdominal pain and elevated INR  Contact information:  [address] [facility] [address]  [facility] [address]  [phone]             Sharad Yun MD. Schedule an appointment as soon as possible for a visit in 1 week.    Specialty:  Pediatrics  Why:  Hospital follow up for abdominal pain and elevated INR  Contact information:  [address] [facility]  [facility] [address]  [phone]             Delbert Juarez MD. Schedule an appointment as soon as possible for a visit in 2 weeks.    Specialty:  Gastroenterology  Why:  Hospital follow up for abdominal pain and elevated INR, imaging positive for possible hepatic cirrhosis (CT, US, MRI)  Contact information:  [address]  [facility] [facility] [address]  [phone]                 Patient Instructions:      Notify your health care provider if you experience any of the following:  temperature >100.4     Notify your health care provider if you experience any of the following:  persistent nausea and  vomiting or diarrhea     Notify your health care provider if you experience any of the following:  severe uncontrolled pain     Notify your health care provider if you experience any of the following:  redness, tenderness, or signs of infection (pain, swelling, redness, odor or green/yellow discharge around incision site)     Activity as tolerated     Medications:  Reconciled Home Medications:      Medication List      START taking these medications    famotidine 20 MG tablet  Commonly known as:  PEPCID  Take 1 tablet (20 mg total) by mouth 2 (two) times daily.        CONTINUE taking these medications    amLODIPine 2.5 MG tablet  Commonly known as:  NORVASC  Take 1 tablet (2.5 mg total) by mouth once daily.     coenzyme Q10 100 mg capsule  Commonly known as:  COQ-10  Take 1 capsule (100 mg total) by mouth 2 (two) times daily.     digoxin 125 mcg tablet  Commonly known as:  LANOXIN  Take 1 tablet (125 mcg total) by mouth once daily.     hydroCHLOROthiazide 25 MG tablet  Commonly known as:  HYDRODIURIL  Take 1 tablet (25 mg total) by mouth once daily.     sotalol 80 MG tablet  Commonly known as:  BETAPACE  Take 1 tablet (80 mg total) by mouth 2 (two) times daily.        STOP taking these medications    warfarin 10 MG tablet  Commonly known as:  COUMADIN            Gena Worrell MD  Cardiology  [facility]

## 2019-06-05 NOTE — SUBJECTIVE & OBJECTIVE
Interval History: Patient reports improvement in her abdominal pain. Her liver tests remain normal. INR has come down to 2.9. Initial liver work-up unremarkable.     Current Facility-Administered Medications   Medication    0.9%  NaCl infusion    amLODIPine tablet 2.5 mg    coenzyme Q10 100 mg    diazePAM tablet 2 mg    digoxin tablet 125 mcg    docusate sodium capsule 100 mg    famotidine tablet 20 mg    hydroCHLOROthiazide tablet 25 mg    morphine injection 4 mg    ondansetron disintegrating tablet 4 mg    oxyCODONE immediate release tablet 5 mg    pantoprazole EC tablet 40 mg    polyethylene glycol packet 17 g    sotalol tablet 80 mg     Current Outpatient Medications   Medication Sig    digoxin (LANOXIN) 125 mcg tablet Take 1 tablet (125 mcg total) by mouth once daily.    hydroCHLOROthiazide (HYDRODIURIL) 25 MG tablet Take 1 tablet (25 mg total) by mouth once daily.    sotalol (BETAPACE) 80 MG tablet Take 1 tablet (80 mg total) by mouth 2 (two) times daily.    amlodipine (NORVASC) 2.5 MG tablet Take 1 tablet (2.5 mg total) by mouth once daily.    coenzyme Q10 (COQ-10) 100 mg capsule Take 1 capsule (100 mg total) by mouth 2 (two) times daily.    famotidine (PEPCID) 20 MG tablet Take 1 tablet (20 mg total) by mouth 2 (two) times daily.     Facility-Administered Medications Ordered in Other Encounters   Medication    0.9%  NaCl infusion    sodium chloride 0.9% flush 5 mL       Objective:     Vital Signs (Most Recent):  Temp: 98.8 °F (37.1 °C) (06/05/19 0946)  Pulse: 75 (06/05/19 0946)  Resp: 20 (06/05/19 0946)  BP: (!) 115/53 (06/05/19 0946)  SpO2: 95 % (06/05/19 0946) Vital Signs (24h Range):  Temp:  [98 °F (36.7 °C)-98.8 °F (37.1 °C)] 98.8 °F (37.1 °C)  Pulse:  [72-89] 75  Resp:  [18-27] 20  SpO2:  [93 %-99 %] 95 %  BP: (107-136)/(53-77) 115/53     Weight: 122 kg (268 lb 15.4 oz) (06/04/19 2024)  Body mass index is 43.41 kg/m².    Physical Exam   Constitutional: She is oriented to person,  place, and time. She appears well-developed and well-nourished.   Eyes: No scleral icterus.   Cardiovascular: Normal rate and regular rhythm.   Pulmonary/Chest: Effort normal and breath sounds normal.   Abdominal: Soft. Bowel sounds are normal. She exhibits no distension. There is no tenderness.   Musculoskeletal: She exhibits no edema or deformity.   Neurological: She is alert and oriented to person, place, and time.   Skin: Skin is warm and dry.   Psychiatric: She has a normal mood and affect.   Vitals reviewed.      MELD-Na score: 26 at 6/4/2019  4:42 AM  MELD score: 26 at 6/4/2019  4:42 AM  Calculated from:  Serum Creatinine: 0.8 mg/dL (Rounded to 1 mg/dL) at 6/2/2019  9:42 PM  Serum Sodium: 140 mmol/L (Rounded to 137 mmol/L) at 6/2/2019  9:42 PM  Total Bilirubin: 0.9 mg/dL (Rounded to 1 mg/dL) at 6/2/2019  9:42 PM  INR(ratio): 6.0 at 6/4/2019  4:42 AM  Age: 18 years    Significant Labs:  CBC:   Recent Labs   Lab 06/02/19  2142   WBC 8.20   RBC 4.64   HGB 12.7   HCT 38.2        CMP:   Recent Labs   Lab 06/02/19  2142      CALCIUM 9.5   ALBUMIN 4.0   PROT 8.0      K 3.3*   CO2 25      BUN 9   CREATININE 0.8   ALKPHOS 76   ALT 20   AST 23   BILITOT 0.9     Coagulation:   Recent Labs   Lab 06/05/19  0447   INR 2.9*  2.9*   APTT 46.1*  46.1*

## 2019-06-06 NOTE — PLAN OF CARE
06/06/19 1638   Final Note   Assessment Type Final Discharge Note   Anticipated Discharge Disposition Home   Discharge plans and expectations educations in teach back method with documentation complete? Yes     Future Appointments   Date Time Provider Department Center   6/10/2019 11:00 AM HOME MONITOR DEVICE CHECK, PEDIATRICS [facility] PEDCARD [facility]   7/1/2019 10:00 AM Kaleigh Meza MD [facility] HEPAT [facility]   7/11/2019 10:30 AM Ricardo Woodward MD [facility] PED CAR [facility]   7/11/2019 10:30 AM COORDINATED DEVICE CHECK, PEDIATRIC [facility] PED CAR [facility]

## 2019-06-11 ENCOUNTER — OFFICE VISIT (OUTPATIENT)
Dept: CARDIOLOGY | Facility: CLINIC | Age: 19
End: 2019-06-11
Payer: MEDICAID

## 2019-06-11 VITALS
BODY MASS INDEX: 42.54 KG/M2 | HEART RATE: 105 BPM | WEIGHT: 264.69 LBS | DIASTOLIC BLOOD PRESSURE: 83 MMHG | OXYGEN SATURATION: 98 % | HEIGHT: 66 IN | SYSTOLIC BLOOD PRESSURE: 135 MMHG

## 2019-06-11 DIAGNOSIS — I48.92 ATRIAL FLUTTER, UNSPECIFIED TYPE: Primary | ICD-10-CM

## 2019-06-11 DIAGNOSIS — Z95.3 HISTORY OF TRICUSPID VALVE REPLACEMENT WITH BIOPROSTHETIC VALVE: ICD-10-CM

## 2019-06-11 PROCEDURE — 99213 PR OFFICE/OUTPT VISIT, EST, LEVL III, 20-29 MIN: ICD-10-PCS | Mod: 25,S$GLB,, | Performed by: PEDIATRICS

## 2019-06-11 PROCEDURE — 93000 PR ELECTROCARDIOGRAM, COMPLETE: ICD-10-PCS | Mod: S$GLB,,, | Performed by: PEDIATRICS

## 2019-06-11 PROCEDURE — 99213 OFFICE O/P EST LOW 20 MIN: CPT | Mod: 25,S$GLB,, | Performed by: PEDIATRICS

## 2019-06-11 PROCEDURE — 93000 ELECTROCARDIOGRAM COMPLETE: CPT | Mod: S$GLB,,, | Performed by: PEDIATRICS

## 2019-06-11 NOTE — PROGRESS NOTES
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 She underwent reconstruction of the right ventricular outflow tract and placement of a bioprosthetic pulmonary valve in early childhood. She subsequently has required multiple heart valve replacements. Her most recent heart surgery involved placement of a bioprosthetic valve in the pulmonary position and placement of a bioprosthetic valve in the tricuspid position, in 2007. Recently, she had a heart catheterization for recurrent insufficiency and obstruction of the bioprosthetic valve that had been placed in the tricuspid position.  The current procedure employed a trans-catheter approach. It entailed valvuloplasty with a Z-Med 25 x 5 cm balloon followed by placement of an Sotelo S3 26 mm bioprosthetic valve in the tricuspid position.. 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 She leads a rather sedentary lifestyle, and is overweight. She currently is on: Digoxin 0.125 mg once a day, Norvasc 2.5 mg once a day for BP. The Lasix was changed to HCTZ 25 mg once a day and Warfarin 10 mg qd (it was DCed at current admission).     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 She comes to clinic today for follow-up. She reports having had several short episodes of rapid HRs in the middle of the night. It happens about 3 times per week; they get her up and she gets very frighten. A recent transmission showed a run of probable atrial flutter at  ~ 170 bpm.  "She did recently have thyroid studies and they are normal".  Kacey recently was admitted to [facility] for treatment of atrial tachycardia. Her Metoprolol was DCed and she was started on Sotalol 80 mg q 12 hrs.  She also had an implantable loop recorder placed.  Clinically, she reports doing better on the Sotalol.  No lightheadedness, syncope or abnormally slow or rapid HRs.                      ..................................... INR today 1.1 (on no Warfarin)).      She recently was admitted to [facility] for severe acute abdominal pain.  Also found to have an INR of 9.  The W/U was negative, except for abnormal Liver ultrasound, which showed echogenic spots, enlargement and congestion. ? related to R- sided cardiac status.  Biopsy on June 21ist.         REVIEW OF SYSTEMS:  There are no visual disturbances. No HAs, lightheadedness, syncope or seizures. No swallowing disorder or PIETER. No difficulty with breathing, SOB or wheezing.  No asthma. No abdominal pain.  Bowel movements are normal.  No pelvic pain. Urination is normal.  No DM of thyroid disorder.  No lipid disorder. No arterial disease. No known intrinsic problem with the lungs, liver or kidneys. No bleeding disorder. No smoking or ETOH use.  There is a FH of strokes and Adult unset DM.         PHYSICAL EXAMINATION:   GENERAL:  The patient is overweight 18 -year-old female in no distress.  VITAL SIGNS:  Her weight is 120. kg (265 lbs) and her height is 1.676 meters (5' 6''). Heart rate is 98 beats per minute (sinus).  Sat 98 %.   Blood pressure in the right arm is 135/83 mmHg in the RA..  Color and perfusion are good.  HEENT:  Eye movements are normal.  No bruits are noted over the head.  No jugular venous distention or pulsations.  Mucous membranes are moist and pink.  There is no adenopathy.  The neck is supple.  No carotid bruits. The thyroid is not enlarged. SKIN:  Is pink and well perfused. CHEST:  Loop recorder in the L upper chest.  Wound is clean and not reddened.  No evidence of infection. There is a well-healed midline scar.  Lungs  are clear to auscultation bilaterally, although the breath sounds are somewhat decreased at the base. There are no wheezes or rhonchi. CARDIOVASCULAR:  Precordial activity is normal.  HR ~ 90 bpm.  The first heart sound is prominent and crisp.  The second heart sound is narrowly split and eccentuated. There is a grade 1/6 systolic ejection murmur heard best up the left sternal border and across the base.  Short diastolic murmur is heard today.  ABDOMEN:  There is exogenous obesity.  Mild bruising at the mid-abdomen from Lovenox shots. The liver edge is precussed about 2-3 FB at the right costal margin.  It is nonpulsatile and nontender.  Bowel sounds appear to be normal.  EXTREMITIES:  Pulses are 2+ throughout.  Capillary refill is good.  There is no cyanosis or peripheral edema.  No bruising in the groins or exts.  No rashes or cyanosis.  No bruits in the groins.              ........................................................................................................................................        ECG (TODAY): Sinus rhythm at a ventricular rate of 82 bpm.  Atrial abnormality.  Ist degree block.   RBBB (156 ms). T wave abnormality.  Prolonged QTc at 493 ms,  but she has a RBBB.  (Need to follow since on Sotalol).                ECHOCARDIOGRAM  (A PREVIOUS VISIT): She is in sinus rhythm.  An echocardiogram was performed.  2-D STUDY: There is no pericardial effusion.  No clots or vegetations. The bioprosthetic tricuspid valve is seen to be in good position. Annulus is 18 mm. The struts are easily seen.  The leaflets are mobile.  No evidence for thrombus formation.  The right atrium is enlarged measuring 5.4 x 4.8 cm or ~ 28.2 cm2 One can also see  the bioprosthetic tricuspid valve in a cross sectional view.  It appears circular and measures 2 cm x 2 cm. Again, no clots or vegetations are seen.  No right ventricular or left ventricular outflow tract obstruction.  The right ventricular  "circumferential area of shortening is 30%,  which is reduced.  Circumferential area of the RA is 23.4 cm2, which is dilated. M-MODE:   LV 4.4 cm, RV 3.8 cm, Ao 2.8 cm, LA 4.4 cm, Sep 1.1 cm, PW 1.1 cm, EF ~ 49 % (reduced). DOPPLER VELOCITY ANALYSIS:  There is no insufficiency of the recently-placed bioprosthetic tricuspid valve.  Mild turbulent antegrade flow is seen. Continuous wave Doppler analysis shows a peak pressure drop of 12-17 mmHg with a mean value of 7-10 mmHg.  Importantly, the leaflets are moving freely.  There is no mitral insufficiency.  Mild aortic insufficiency  (P1/2 567 ms).  Peak pressure drop across the aortic valve is 5 mmHg.  Peak pressure drop across the bioprosthetic pulmonary valve is 16 mmHg.                    .................................................................................................................................................               ASSESSMENT:  In summary, we have a 18-year-old AA female originally diagnosed to have pulmonary atresia with intact inter-ventricular septum, who is status-post multiple open heart operations involving both the pulmonary and tricuspid valves.  She recently in February 2017 had placement of an Sotelo S3 26 mm bioprosthetic tricuspid valve via the transvenous approach.  She tolerated the procedure well. Clinically, she has been stable. However, she continues to have poor exercise capacity, chronic fatigue and more recently developed a new rhythm disturbance, diagnosed to be atrial flutter.  She recently underwent ablation for an atrial rhythm disturbance.  However, "She continued to have short runs of rapid HRs mostly at night". She was admitted to [facility] for additional treatment. The Metoprolol was DCed and she was started on Sotalol 80 mg q 12 hrs.  Also, a loop recorder was placed under the skin in the upper chest.   She appears to be tolerating the Sotalol (Watch the QTc and KS interval)l.      PLAN:  Given our " findings, our suggestions are as follows:  1).  She of course needs antibiotic prophylaxis for any invasive procedure, especially dental work.   2).  She should continue with the Sotalol 80 mg q 12 hrs, HCTZ 25 mg qd, and her other remaining meds.  She was started on Pepcid 20 mg qd. 3) Liver biopsy June 21.  4) RTC in 2 weeks.  If there are any questions concerning the cardiac status of this patient, please feel free to contact us.  Thank you so much for allowing us to partake in the care of this patient. Donovan Gonzalez PhD, MD.

## 2019-06-13 ENCOUNTER — HOSPITAL ENCOUNTER (EMERGENCY)
Facility: OTHER | Age: 19
Discharge: HOME | End: 2019-06-13
Attending: EMERGENCY MEDICINE
Payer: MEDICAID

## 2019-06-13 VITALS
WEIGHT: 260 LBS | DIASTOLIC BLOOD PRESSURE: 76 MMHG | RESPIRATION RATE: 17 BRPM | BODY MASS INDEX: 41.78 KG/M2 | HEART RATE: 86 BPM | TEMPERATURE: 99 F | OXYGEN SATURATION: 95 % | SYSTOLIC BLOOD PRESSURE: 137 MMHG | HEIGHT: 66 IN

## 2019-06-13 DIAGNOSIS — M25.579 ANKLE PAIN: ICD-10-CM

## 2019-06-13 DIAGNOSIS — M79.673 FOOT PAIN: ICD-10-CM

## 2019-06-13 DIAGNOSIS — S93.402A SPRAIN OF LEFT ANKLE, UNSPECIFIED LIGAMENT, INITIAL ENCOUNTER: Primary | ICD-10-CM

## 2019-06-13 LAB
B-HCG UR QL: NEGATIVE
CTP QC/QA: YES

## 2019-06-13 PROCEDURE — 99283 EMERGENCY DEPT VISIT LOW MDM: CPT | Mod: 25

## 2019-06-13 PROCEDURE — 29515 APPLICATION SHORT LEG SPLINT: CPT | Mod: LT

## 2019-06-13 PROCEDURE — 25000003 PHARM REV CODE 250: Performed by: PHYSICIAN ASSISTANT

## 2019-06-13 PROCEDURE — 81025 URINE PREGNANCY TEST: CPT | Performed by: EMERGENCY MEDICINE

## 2019-06-13 RX ORDER — HYDROCODONE BITARTRATE AND ACETAMINOPHEN 5; 325 MG/1; MG/1
1 TABLET ORAL
Status: COMPLETED | OUTPATIENT
Start: 2019-06-13 | End: 2019-06-13

## 2019-06-13 RX ADMIN — HYDROCODONE BITARTRATE AND ACETAMINOPHEN 1 TABLET: 5; 325 TABLET ORAL at 09:06

## 2019-06-14 NOTE — ED PROVIDER NOTES
Encounter Date: 6/13/2019       History     Chief Complaint   Patient presents with    Foot Pain     pt with left foot and ankle pain after twisitng it yesterday while walking.     Patient is a 18-year-old female with history of CHF, pulmonary atresia, and SVT who presents to the emergency department with ankle injury. Patient states yesterday she fell while walking on the sidewalk.  She states she twisted her left foot.  She states she did not hit head or lose consciousness.  She states she has had pain and swelling to the left ankle.  She reports pain that is worse with movement and bearing weight.  She reports bruising and swelling. She states she has not taken any medication.  She rates her pain 8/10.    The history is provided by the patient.     Review of patient's allergies indicates:  No Known Allergies  Past Medical History:   Diagnosis Date    CHF (congestive heart failure)     Obesity     Pulmonary atresia with intact ventricular septum     SVT (supraventricular tachycardia)      Past Surgical History:   Procedure Laterality Date    ABLATION, SVT, ACCESSORY PATHWAY Bilateral 2/6/2019    Performed by Romeo Robles MD at [facility] EP LAB    MEMO PROCEDURE      bt shunt and transannular patch    CARDIAC VALVE REPLACEMENT      ECHOCARDIOGRAM,TRANSESOPHAGEAL N/A 2/6/2019    Performed by [facility] Diagnostic Provider at [facility] EP LAB    INSERTION, IMPLANTABLE LOOP RECORDER N/A 5/8/2019    Performed by Ricardo Woodward MD at [facility] EP LAB    INSERTION, IMPLANTABLE LOOP RECORDER N/A 2/6/2019    Performed by Romeo Robles MD at [facility] EP LAB    PULMONARY VALVE REPLACEMENT  01/02/2007    25mm sharyn charles    REPLACEMENT-VALVE-PULMONARY N/A 2/16/2017    Performed by Zachary Berman Jr., MD at [facility] CATH LAB    TRANSESOPHAGEAL ECHOCARDIOGRAM (SAI) N/A 2/16/2017    Performed by Zachary Berman Jr., MD at [facility] CATH LAB    TRICUSPID VALVE REPLACEMENT  01/02/2007    25 mm sharyn-charles      History reviewed. No pertinent family history.  Social History     Tobacco Use    Smoking status: Never Smoker   Substance Use Topics    Alcohol use: No    Drug use: No     Review of Systems   Constitutional: Negative for activity change, appetite change, chills, fatigue and fever.   HENT: Negative for congestion, ear discharge, ear pain, nosebleeds, postnasal drip, rhinorrhea, sore throat and trouble swallowing.    Respiratory: Negative for cough and shortness of breath.    Cardiovascular: Negative for chest pain.   Gastrointestinal: Negative for abdominal pain, blood in stool, constipation, diarrhea, nausea and vomiting.   Genitourinary: Negative for dysuria, flank pain and hematuria.   Musculoskeletal: Negative for back pain, neck pain and neck stiffness.        Ankle and foot pain   Skin: Negative for rash and wound.   Neurological: Negative for dizziness, weakness, light-headedness and headaches.       Physical Exam     Initial Vitals [06/13/19 1851]   BP Pulse Resp Temp SpO2   132/87 83 18 98.1 °F (36.7 °C) 97 %      MAP       --         Physical Exam    Nursing note and vitals reviewed.  Constitutional: She appears well-developed and well-nourished. She is not diaphoretic.  Non-toxic appearance. No distress.   HENT:   Head: Normocephalic.   Right Ear: Hearing and external ear normal.   Left Ear: Hearing and external ear normal.   Nose: Nose normal.   Mouth/Throat: Uvula is midline, oropharynx is clear and moist and mucous membranes are normal. No oropharyngeal exudate.   Eyes: Conjunctivae are normal. Pupils are equal, round, and reactive to light.   Neck: Normal range of motion.   Cardiovascular: Normal rate and regular rhythm.   Murmur heard.  Pulmonary/Chest: Breath sounds normal.   Abdominal: Soft. Bowel sounds are normal. There is no tenderness.   Musculoskeletal:        Left ankle: She exhibits decreased range of motion and swelling. She exhibits no ecchymosis, no deformity and normal pulse.  Tenderness. Lateral malleolus and medial malleolus tenderness found. No head of 5th metatarsal and no proximal fibula tenderness found.        Left foot: There is decreased range of motion, tenderness, bony tenderness and swelling. There is normal capillary refill.   Lymphadenopathy:     She has no cervical adenopathy.   Neurological: She is alert and oriented to person, place, and time.   Skin: Skin is warm and dry. Capillary refill takes less than 2 seconds.   Psychiatric: She has a normal mood and affect.         ED Course   Procedures  Labs Reviewed   POCT URINE PREGNANCY - Abnormal; Notable for the following components:       Result Value    POC Preg Test, Ur Negative (*)     All other components within normal limits          Imaging Results          X-Ray Foot Complete Left (Final result)  Result time 06/13/19 21:46:23    Final result by Diallo Vega MD (06/13/19 21:46:23)                 Impression:      1. No acute displaced fracture or dislocation of the foot.      Electronically signed by: Diallo Vega MD  Date:    06/13/2019  Time:    21:46             Narrative:    EXAMINATION:  XR FOOT COMPLETE 3 VIEW LEFT    CLINICAL HISTORY:  .  Pain in unspecified foot    TECHNIQUE:  AP, lateral and oblique views of the left foot were performed.    COMPARISON:  None    FINDINGS:  Three views.    No acute displaced fracture or dislocation of the foot.  No radiopaque foreign body.  There is edema about the ankle.                               X-Ray Ankle Complete Left (Final result)  Result time 06/13/19 21:45:53    Final result by Diallo Vega MD (06/13/19 21:45:53)                 Impression:      1. No acute displaced fracture or dislocation of the ankle noting nonspecific edema.      Electronically signed by: Diallo Vega MD  Date:    06/13/2019  Time:    21:45             Narrative:    EXAMINATION:  XR ANKLE COMPLETE 3 VIEW LEFT    CLINICAL HISTORY:  Pain in unspecified ankle and joints of  unspecified foot    TECHNIQUE:  AP, lateral and oblique views of the left ankle were performed.    COMPARISON:  None    FINDINGS:  Three views.    There is edema about the ankle.  The ankle mortise is intact.  No acute displaced fracture or dislocation of the ankle.                              X-Rays:   Independently Interpreted Readings:   Other Readings:  No acute osseous injury    Medical Decision Making:   Initial Assessment:   Urgent evaluation of a 18-year-old female with history of SVT, pulmonary atresia, and CHF who presents to the emergency department with ankle injury. Patient is afebrile, nontoxic appearing, and hemodynamically stable. On exam, there is bony tenderness of the left ankle and foot.  There is swelling. No significant deformity.  Neurovascularly intact.  X-ray reveals no acute osseous injury. Patient is placed in Ace wrap and air splint.  She is given crutches.  She is given rice instructions.  She is advised to follow up with Ortho return to the emergency department with any worsening symptoms or concerns.                      Clinical Impression:       ICD-10-CM ICD-9-CM   1. Sprain of left ankle, unspecified ligament, initial encounter S93.402A 845.00   2. Foot pain M79.673 729.5   3. Ankle pain M25.579 719.47                                Nannette Jaime PA-C  06/14/19 0011

## 2019-06-14 NOTE — ED TRIAGE NOTES
Patient presents to ER states she twisted her L ankle yesterday and has been in pain since. States pain 10/10 at present.  No sob noted denies chest pain.

## 2019-06-24 ENCOUNTER — DOCUMENTATION ONLY (OUTPATIENT)
Dept: TRANSPLANT | Facility: CLINIC | Age: 19
End: 2019-06-24

## 2019-06-24 NOTE — NURSING
Pt records reviewed.   Pt will be referred to Hepatology.  Other cirrhosis of liver  Initial referral received  from the [facility].   Referring Provider/diagnosis        Referral letter sent to patient.

## 2019-06-24 NOTE — LETTER
June 24, 2019    Kacey Ruth  [address] [address]      Dear Kacey Ruth:    Your doctor has referred you to the [facility]. We are sending this letter to remind you to make an appointment with us to complete the referral process.     Please call us at [phone] to schedule an appointment. We look forward to seeing you soon.     If you received a call and have been scheduled, please disregard this letter.       Sincerely,        [facility]   [facility] [place] [address]  [phone]

## 2019-07-01 ENCOUNTER — CLINICAL SUPPORT (OUTPATIENT)
Dept: CARDIOLOGY | Facility: CLINIC | Age: 19
End: 2019-07-01
Payer: MEDICAID

## 2019-07-01 ENCOUNTER — OFFICE VISIT (OUTPATIENT)
Dept: CARDIOLOGY | Facility: CLINIC | Age: 19
End: 2019-07-01
Payer: MEDICAID

## 2019-07-01 ENCOUNTER — TELEPHONE (OUTPATIENT)
Dept: HEPATOLOGY | Facility: CLINIC | Age: 19
End: 2019-07-01

## 2019-07-01 ENCOUNTER — OFFICE VISIT (OUTPATIENT)
Dept: HEPATOLOGY | Facility: CLINIC | Age: 19
End: 2019-07-01
Payer: MEDICAID

## 2019-07-01 VITALS
HEART RATE: 79 BPM | BODY MASS INDEX: 42.25 KG/M2 | HEIGHT: 66 IN | DIASTOLIC BLOOD PRESSURE: 77 MMHG | WEIGHT: 262.88 LBS | SYSTOLIC BLOOD PRESSURE: 128 MMHG | OXYGEN SATURATION: 96 %

## 2019-07-01 VITALS
DIASTOLIC BLOOD PRESSURE: 73 MMHG | SYSTOLIC BLOOD PRESSURE: 141 MMHG | WEIGHT: 263 LBS | BODY MASS INDEX: 43.82 KG/M2 | HEART RATE: 72 BPM | HEIGHT: 65 IN | OXYGEN SATURATION: 95 %

## 2019-07-01 DIAGNOSIS — Z98.890 HISTORY OF OPEN HEART SURGERY: ICD-10-CM

## 2019-07-01 DIAGNOSIS — R79.89 ABNORMAL LIVER FUNCTION TESTS: Primary | ICD-10-CM

## 2019-07-01 DIAGNOSIS — I48.92 ATRIAL FLUTTER, UNSPECIFIED TYPE: Primary | ICD-10-CM

## 2019-07-01 DIAGNOSIS — E66.01 MORBID OBESITY: ICD-10-CM

## 2019-07-01 DIAGNOSIS — Z95.3 HISTORY OF PULMONARY VALVE REPLACEMENT WITH BIOPROSTHETIC VALVE: ICD-10-CM

## 2019-07-01 DIAGNOSIS — I48.92 ATRIAL FLUTTER: ICD-10-CM

## 2019-07-01 PROCEDURE — 99999 PR PBB SHADOW E&M-EST. PATIENT-LVL III: CPT | Mod: PBBFAC,,, | Performed by: INTERNAL MEDICINE

## 2019-07-01 PROCEDURE — 93320 PR DOPPLER ECHO HEART,COMPLETE: ICD-10-PCS | Mod: TC,S$GLB,, | Performed by: PEDIATRICS

## 2019-07-01 PROCEDURE — 99214 PR OFFICE/OUTPT VISIT, EST, LEVL IV, 30-39 MIN: ICD-10-PCS | Mod: 25,S$GLB,, | Performed by: PEDIATRICS

## 2019-07-01 PROCEDURE — 93325 DOPPLER ECHO COLOR FLOW MAPG: CPT | Mod: TC,S$GLB,, | Performed by: PEDIATRICS

## 2019-07-01 PROCEDURE — 99215 PR OFFICE/OUTPT VISIT, EST, LEVL V, 40-54 MIN: ICD-10-PCS | Mod: S$PBB,,, | Performed by: INTERNAL MEDICINE

## 2019-07-01 PROCEDURE — 99214 OFFICE O/P EST MOD 30 MIN: CPT | Mod: 25,S$GLB,, | Performed by: PEDIATRICS

## 2019-07-01 PROCEDURE — 99215 OFFICE O/P EST HI 40 MIN: CPT | Mod: S$PBB,,, | Performed by: INTERNAL MEDICINE

## 2019-07-01 PROCEDURE — 93325 PR DOPPLER COLOR FLOW VELOCITY MAP: ICD-10-PCS | Mod: TC,S$GLB,, | Performed by: PEDIATRICS

## 2019-07-01 PROCEDURE — 93303 PR ECHO XTHORACIC,CONG A2M,COMPLETE: ICD-10-PCS | Mod: TC,S$GLB,, | Performed by: PEDIATRICS

## 2019-07-01 PROCEDURE — 93320 DOPPLER ECHO COMPLETE: CPT | Mod: TC,S$GLB,, | Performed by: PEDIATRICS

## 2019-07-01 PROCEDURE — 93000 PR ELECTROCARDIOGRAM, COMPLETE: ICD-10-PCS | Mod: S$GLB,,, | Performed by: PEDIATRICS

## 2019-07-01 PROCEDURE — 93303 ECHO TRANSTHORACIC: CPT | Mod: TC,S$GLB,, | Performed by: PEDIATRICS

## 2019-07-01 PROCEDURE — 99999 PR PBB SHADOW E&M-EST. PATIENT-LVL III: ICD-10-PCS | Mod: PBBFAC,,, | Performed by: INTERNAL MEDICINE

## 2019-07-01 PROCEDURE — 93000 ELECTROCARDIOGRAM COMPLETE: CPT | Mod: S$GLB,,, | Performed by: PEDIATRICS

## 2019-07-01 PROCEDURE — 99213 OFFICE O/P EST LOW 20 MIN: CPT | Mod: PBBFAC | Performed by: INTERNAL MEDICINE

## 2019-07-01 NOTE — PATIENT INSTRUCTIONS
Please schedule labs today.    Order for transjugular liver biopsy.  This will be performed over the next 1-2 weeks.    Return to clinic in 4-6 weeks.

## 2019-07-01 NOTE — TELEPHONE ENCOUNTER
----- Message from Kaleigh Meza MD sent at 7/1/2019  3:40 PM CDT -----  Please mail results.  Normal blood counts, liver tests and INR.  We will proceed with liver biopsy as discussed.

## 2019-07-01 NOTE — PROGRESS NOTES
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 She underwent reconstruction of the right ventricular outflow tract and placement of a bioprosthetic pulmonary valve in early childhood. She subsequently has required multiple heart valve replacements. Her most recent heart surgery involved placement of a bioprosthetic valve in the pulmonary position and placement of a bioprosthetic valve in the tricuspid position, in 2007. Recently, she had a heart catheterization for recurrent insufficiency and obstruction of the bioprosthetic valve that had been placed in the tricuspid position.  The current procedure employed a trans-catheter approach. It entailed valvuloplasty with a Z-Med 25 x 5 cm balloon followed by placement of an Sotelo S3 26 mm bioprosthetic valve in the tricuspid position.. 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 She leads a rather sedentary lifestyle, and is overweight. She currently is on: Digoxin 0.125 mg once a day, Norvasc 2.5 mg once a day for BP. The Lasix was changed to HCTZ 25 mg once a day.  Warfarin was DCed.     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 She comes to clinic today for follow-up. She reports having had several short episodes of rapid HRs in the middle of the night. It happens about 3 times per week; they get her up and she gets very frighten. A recent transmission showed a run of probable atrial flutter at  ~ 170 bpm.  "She did recently have thyroid studies and they are normal".  Kacey recently was admitted to [facility] for treatment of atrial tachycardia. Her " Metoprolol was DCed and she was started on Sotalol 80 mg q 12 hrs.  She also had an implantable loop recorder placed.  Clinically, she reports doing better on the Sotalol.  No lightheadedness, syncope or abnormally slow or rapid HRs.                        She was again admitted to [facility] for severe abdominal pain.  Also, she found to have an INR of 9.  Her W/U was negative, except for abnormal Liver ultrasound, which showed echogenic spots, enlargement and congestion. ? related to R- sided cardiac status.  Liver biopsy showed cirrosus.  Her Wafarin has been DCed.        REVIEW OF SYSTEMS:  There are no visual disturbances. No HAs, lightheadedness, syncope or seizures. No swallowing disorder or PIETER. No difficulty with breathing, SOB or wheezing.  No asthma. No abdominal pain.  Bowel movements are normal.  No pelvic pain. Urination is normal.  No DM of thyroid disorder.  No lipid disorder. No arterial disease. No known intrinsic problem with the lungs, liver or kidneys. No bleeding disorder. No smoking or ETOH use.  There is a FH of strokes and Adult unset DM.         PHYSICAL EXAMINATION:   GENERAL:  The patient is overweight 18 -year-old female in no distress.  VITAL SIGNS:  Her weight is 119.3 kg (263 lbs) and her height is 1.676 meters (5' 6''). Heart rate is 79 beats per minute (sinus).  Sat 96 %.   Blood pressure in the right arm is 128/77 mmHg in the RA..  Color and perfusion are good.  HEENT:  Eye movements are normal.  No bruits are noted over the head.  No jugular venous distention or pulsations.  Mucous membranes are moist and pink.  There is no adenopathy.  The neck is supple.  No carotid bruits. The thyroid is not enlarged. SKIN:  Is pink and well perfused. CHEST:  Loop recorder in the L upper chest.  Wound is clean and not reddened.  No evidence of infection. There is a well-healed midline scar.  Lungs are clear to auscultation bilaterally, although the breath sounds are somewhat decreased  at the base. There are no wheezes or rhonchi. CARDIOVASCULAR:  Precordial activity is normal.  HR ~ 80 bpm.  The first heart sound is prominent and crisp.  The second heart sound is narrowly split and eccentuated. There is a grade 1/6 systolic ejection murmur heard best up the left sternal border and across the base.  Short diastolic murmur is heard today.  ABDOMEN:  There is exogenous obesity.  Mild bruising at the mid-abdomen from Lovenox shots. The liver edge is precussed about 2-3 FB at the right costal margin.  It is nonpulsatile and nontender.  Bowel sounds appear to be normal.  EXTREMITIES:  Pulses are 2+ throughout.  Capillary refill is good.  There is no cyanosis or peripheral edema.  No bruising in the groins or exts.  No rashes or cyanosis.  No bruits in the groins.              ........................................................................................................................................        ECG (TODAY): Sinus rhythm at a ventricular rate of 70 bpm.  Atrial abnormality.  RBBB (158 ms). T wave abnormality.  Prolonged QTc at 494 ms,  but she has a RBBB.  (Need to follow since on Sotalol).                ECHOCARDIOGRAM:   An echocardiogram was performed.  2-D STUDY: There is no pericardial effusion.  No clots or vegetations. The bioprosthetic tricuspid valve is seen to be in good position. Annulus is 22 mm. The struts are easily seen.  The leaflets are mobile. No evidence for thrombus formation.  The right atrium is enlarged measuring 5.0 x 4.3 cm or ~ 17.3 cm2 One can also see the bioprosthetic tricuspid valve in a cross sectional view.  It appears circular and measures 2 cm x 2 cm. Again, no clots or vegetations are seen.  No right ventricular or left ventricular outflow tract obstruction.  The right ventricular circumferential area of shortening is ~ 25 %,  which is reduced.  Circumferential area of the RA is 17.3 cm2, which is dilated.     M-MODE:   LV 5.2 cm, RV 3.5 cm, Ao  "3.0 cm, LA 3.9 cm, Sep 1.1 cm, PW 1.0 cm, EF ~ 51 % (slightly reduced).     DOPPLER VELOCITY ANALYSIS:  There is no insufficiency of the recently-placed bioprosthetic tricuspid valve.  Mild turbulent antegrade flow is seen. Continuous wave Doppler analysis shows a peak pressure drop of 13 mmHg with a mean value of 7 mmHg.  Importantly, the leaflets are moving freely.  There is no mitral insufficiency.  Mild aortic insufficiency  (P1/2 765 ms).  Peak pressure drop across the aortic valve is 5 mmHg. Peak pressure drop across the bioprosthetic pulmonary valve is 16 mmHg.                    .................................................................................................................................................               ASSESSMENT:  In summary, we have a 18-year-old AA female originally diagnosed to have pulmonary atresia with intact inter-ventricular septum, who is status-post multiple open heart operations involving both the pulmonary and tricuspid valves.  She recently in February 2017 had placement of an Sotelo S3 26 mm bioprosthetic tricuspid valve via the transvenous approach.  She tolerated the procedure well. Clinically, she has been stable. However, she continues to have poor exercise capacity, chronic fatigue and more recently developed a new rhythm disturbance, diagnosed to be atrial flutter.  She underwent ablation for the atrial rhythm disturbance.  However, "She continued to have short runs of rapid HRs mostly at night". Thus, she was admitted to [facility] for additional treatment. The Metoprolol was DCed and she was started on Sotalol 80 mg q 12 hrs.  Also, a loop recorder was placed under the skin in the upper chest.   She appears to be tolerating the Sotalol well (Watch the QTc and [place] interval)l.      PLAN:  Given our findings, our suggestions are as follows:  1).  She of course needs antibiotic prophylaxis for any invasive procedure, especially dental work.   2).  She " should continue with the Sotalol 80 mg q 12 hrs, HCTZ 25 mg qd, and her other remaining meds.  She was started on Pepcid 20 mg qd.   If there are any questions concerning the cardiac status of this patient, please feel free to contact us.  Thank you so much for allowing us to partake in the care of this patient. Donovan Gonzalez PhD, MD.       REMAINING CONCERNS:     1. Residual right heart failure with impaired right ventricular contractile function.    2. Serious atrial rhythm disturbances, likely from high right atrial pressure.    3. Bioprosthetic tricuspid and pulmonary valves, which will need to be replaced.    4. Multiple open heart surgeries, and additional cardiac caths and electrical studies.    5. 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     6. Obesity due to physical limitations.    7.  Possibility of an underlying bowel obstruction.    8. Would be very concerned about a pregnancy in this patient, given her heart.     9.  Significant physical impairment and ability to work.

## 2019-07-01 NOTE — PROGRESS NOTES
Hepatology Follow-up Note    Chief complaint: abnormal imaging    HPI:  Kacey Ruth is a 18 y.o. female that presents to hepatology clinic for consultation of possible cirrhosis based on imaging.  She is accompanied by her mother.    The patient was initially seen during hospitalization from 6/2-6/5/2019 where she was admitted for INR > 10 and clinically significant abdominal pain.  Abdominal imaging concerning for cirrhosis and hepatology consulted.   Patient has no known history of chronic liver disease.  Known history of congenital heart disease  Liver tests have been normal  During hospitalization, INR markedly elevated but patient on coumadin at that time.  Subsequently coumadin has been discontinued.  No clinical symptoms of chronic liver disease.    Since discharge, ongoing intermittent epigastric pain.  Feels that it is precipitated by not eating and resolves spontaneously after 10 minutes.   Continuing to take acid suppression.    Symptom is mild.    Patient Active Problem List   Diagnosis    Pulmonary atresia with intact ventricular septum    S/P pulmonary valve replacement    S/P tricuspid valve replacement    Obesity    Tricuspid valve stenosis and regurgitation    LV dysfunction    SVT (supraventricular tachycardia)    Adult congenital heart disease    Palpitations    Atrial flutter    Status post placement of implantable loop recorder    Hepatosplenomegaly    Chronic anticoagulation    Supratherapeutic INR    Liver cirrhosis    Abdominal pain       Past Medical History:   Diagnosis Date    CHF (congestive heart failure)     Obesity     Pulmonary atresia with intact ventricular septum     SVT (supraventricular tachycardia)        Past Surgical History:   Procedure Laterality Date    ABLATION, SVT, ACCESSORY PATHWAY Bilateral 2/6/2019    Performed by [facility]les [facility] at [facility] EP LAB    MEMO PROCEDURE      bt shunt and transannular patch    CARDIAC VALVE REPLACEMENT       ECHOCARDIOGRAM,TRANSESOPHAGEAL N/A 2/6/2019    Performed by [facility] Provider at [facility] EP LAB    INSERTION, IMPLANTABLE LOOP RECORDER N/A 5/8/2019    Performed by Ricardo Woodward MD at [facility] EP LAB    INSERTION, IMPLANTABLE LOOP RECORDER N/A 2/6/2019    Performed by Romeo [facility]les MD at [facility] EP LAB    PULMONARY VALVE REPLACEMENT  01/02/2007    25mm sharyn charles    REPLACEMENT-VALVE-PULMONARY N/A 2/16/2017    Performed by Zachary Berman Jr., MD at [facility] CATH LAB    TRANSESOPHAGEAL ECHOCARDIOGRAM (SAI) N/A 2/16/2017    Performed by Zachary Berman Jr., MD at [facility] CATH LAB    TRICUSPID VALVE REPLACEMENT  01/02/2007    25 mm sharyn-charles       Family history:  No family history of chronic liver disease    Social History     Socioeconomic History    Marital status: Single     Spouse name: Not on file    Number of children: Not on file    Years of education: Not on file    Highest education level: Not on file   Occupational History    Not on file   Social Needs    Financial resource strain: Not on file    Food insecurity:     Worry: Not on file     Inability: Not on file    Transportation needs:     Medical: Not on file     Non-medical: Not on file   Tobacco Use    Smoking status: Never Smoker   Substance and Sexual Activity    Alcohol use: No    Drug use: No    Sexual activity: Never   Lifestyle    Physical activity:     Days per week: Not on file     Minutes per session: Not on file    Stress: Not on file   Relationships    Social connections:     Talks on phone: Not on file     Gets together: Not on file     Attends Worship service: Not on file     Active member of club or organization: Not on file     Attends meetings of clubs or organizations: Not on file     Relationship status: Not on file   Other Topics Concern    Not on file   Social History Narrative    Not on file       Current Outpatient Medications   Medication Sig Dispense Refill    amlodipine (NORVASC) 2.5  "MG tablet Take 1 tablet (2.5 mg total) by mouth once daily. 30 tablet 11    coenzyme Q10 (COQ-10) 100 mg capsule Take 1 capsule (100 mg total) by mouth 2 (two) times daily. 62 capsule 6    digoxin (LANOXIN) 125 mcg tablet Take 1 tablet (125 mcg total) by mouth once daily. 31 tablet 6    famotidine (PEPCID) 20 MG tablet Take 1 tablet (20 mg total) by mouth 2 (two) times daily. 60 tablet 2    hydroCHLOROthiazide (HYDRODIURIL) 25 MG tablet Take 1 tablet (25 mg total) by mouth once daily. 30 tablet 1    sotalol (BETAPACE) 80 MG tablet Take 1 tablet (80 mg total) by mouth 2 (two) times daily. 60 tablet 1     No current facility-administered medications for this visit.      Facility-Administered Medications Ordered in Other Visits   Medication Dose Route Frequency Provider Last Rate Last Dose    0.9%  NaCl infusion   Intravenous Continuous Slime Salcido NP 0 mL/hr at 02/06/19 1521      sodium chloride 0.9% flush 5 mL  5 mL Intravenous PRN Slime Salcido NP           Review of patient's allergies indicates:  No Known Allergies    Review of Systems   Constitutional: Negative for chills, fever, malaise/fatigue and weight loss.   Eyes: Negative.    Respiratory: Negative for cough and shortness of breath.    Cardiovascular: Negative for chest pain and leg swelling.   Gastrointestinal: Positive for abdominal pain. Negative for heartburn, nausea and vomiting.   Musculoskeletal: Negative for joint pain and myalgias.   Skin: Negative for itching and rash.   Neurological: Negative for dizziness, focal weakness and headaches.   Endo/Heme/Allergies: Does not bruise/bleed easily.   Psychiatric/Behavioral: Negative for depression.       Vitals:    07/01/19 1201   BP: (!) 141/73   Pulse: 72   SpO2: 95%   Weight: 119.3 kg (263 lb 0.1 oz)   Height: 5' 5" (1.651 m)       Physical Exam   Constitutional: She is oriented to person, place, and time. She appears well-developed and well-nourished. No distress.   HENT: "   Head: Normocephalic and atraumatic.   Mouth/Throat: Oropharynx is clear and moist. No oropharyngeal exudate.   Eyes: Pupils are equal, round, and reactive to light. EOM are normal. No scleral icterus.   Neck: Normal range of motion. Neck supple. No thyromegaly present.   Cardiovascular: Normal rate, regular rhythm and normal heart sounds. Exam reveals no gallop and no friction rub.   No murmur heard.  Pulmonary/Chest: Effort normal. No respiratory distress. She has no wheezes. She has no rales.   Abdominal: Soft. Bowel sounds are normal. She exhibits no distension. There is no tenderness.   Musculoskeletal: Normal range of motion. She exhibits no edema.   Lymphadenopathy:     She has no cervical adenopathy.   Neurological: She is alert and oriented to person, place, and time. No cranial nerve deficit.   Skin: Skin is warm and dry. No rash noted.   Psychiatric: She has a normal mood and affect. Her behavior is normal.   Vitals reviewed.      Lab Results   Component Value Date    ALT 20 06/02/2019    AST 23 06/02/2019    GGT 83 (H) 06/03/2019    ALKPHOS 76 06/02/2019    BILITOT 0.9 06/02/2019       Lab Results   Component Value Date    WBC 8.20 06/02/2019    HGB 12.7 06/02/2019    HCT 38.2 06/02/2019    MCV 82 06/02/2019     06/02/2019       Lab Results   Component Value Date     06/02/2019    K 3.3 (L) 06/02/2019     06/02/2019    CO2 25 06/02/2019    BUN 9 06/02/2019    CREATININE 0.8 06/02/2019    CALCIUM 9.5 06/02/2019    ANIONGAP 11 06/02/2019    ESTGFRAFRICA >60 06/02/2019    EGFRNONAA >60 06/02/2019       Imaging: EMR and external imaging available reviewed     Assessment:  18 y.o. female presenting with cross sectional imaging suggestive of nodular contour and possible cirrhosis.     Plan:  *  Serologic work-up performed inpatient, no obvious cause for chronic liver disease  *  Will proceed with transjugular liver biopsy for diagnostic purposes and fibrosis staging.  Patient no longer  taking anticoagulation  *  Indeterminate arterial enhancing lesions seen on CT and MRI.  Will require 6 month surveillance  *  Labs today in preparation for procedure    Return to clinic in 4-6 weeks to discuss results

## 2019-07-01 NOTE — TELEPHONE ENCOUNTER
----- Message from Kaleigh Meza MD sent at 7/1/2019  3:36 PM CDT -----  Please schedule transjugular liver biopsy ASAP.  Labs obtained today.    CB

## 2019-07-02 ENCOUNTER — TELEPHONE (OUTPATIENT)
Dept: HEPATOLOGY | Facility: CLINIC | Age: 19
End: 2019-07-02

## 2019-07-02 DIAGNOSIS — R79.89 ABNORMAL LIVER FUNCTION TESTS: Primary | ICD-10-CM

## 2019-07-02 DIAGNOSIS — R74.8 ELEVATED LIVER ENZYMES: Primary | ICD-10-CM

## 2019-07-02 RX ORDER — ASPIRIN 81 MG/1
81 TABLET ORAL DAILY
COMMUNITY
End: 2019-09-13 | Stop reason: SDUPTHER

## 2019-07-02 NOTE — TELEPHONE ENCOUNTER
Received call from Tracie in IR. Patient approved for the TJ Liver Biopsy for Fri 7/5/19 at 8 am.  Call placed to the patient's mother Addis. Addis accepted the appt.  Pre and Post Procedure teaching reinforced.  Verbalized understanding and agreed.  RTC visit is scheduled for Wed 8/7/19 at 10 am.

## 2019-07-02 NOTE — TELEPHONE ENCOUNTER
Call placed to the home of the patient to discuss the TJ Liver Biopsy.  Spoke with the patients mother, Addis. Addis aware of Biopsy.   Medications reviewed. The patient has a new Rx for Aspirin 81 mg to take. Addis stated Kacey is not on coumadin, lovenox, co q 10.  Pre and Post Procedure teaching.   IR referral sent to IR for TJ Liver Biopsy with Pressure Measurements ASAP.  RTC visit is scheduled for Wed 8/7/19 at 10 am.

## 2019-07-03 ENCOUNTER — TELEPHONE (OUTPATIENT)
Dept: INTERVENTIONAL RADIOLOGY/VASCULAR | Facility: HOSPITAL | Age: 19
End: 2019-07-03

## 2019-07-03 DIAGNOSIS — R79.89 ABNORMAL LIVER FUNCTION TESTS: Primary | ICD-10-CM

## 2019-07-05 ENCOUNTER — HOSPITAL ENCOUNTER (OUTPATIENT)
Facility: HOSPITAL | Age: 19
Discharge: HOME | End: 2019-07-05
Attending: RADIOLOGY | Admitting: RADIOLOGY
Payer: MEDICAID

## 2019-07-05 ENCOUNTER — DOCUMENTATION ONLY (OUTPATIENT)
Dept: ELECTROPHYSIOLOGY | Facility: CLINIC | Age: 19
End: 2019-07-05

## 2019-07-05 VITALS
SYSTOLIC BLOOD PRESSURE: 134 MMHG | TEMPERATURE: 98 F | HEART RATE: 67 BPM | DIASTOLIC BLOOD PRESSURE: 61 MMHG | HEIGHT: 66 IN | OXYGEN SATURATION: 97 % | RESPIRATION RATE: 16 BRPM | WEIGHT: 220 LBS | BODY MASS INDEX: 35.36 KG/M2

## 2019-07-05 DIAGNOSIS — R16.0 LIVER MASS: ICD-10-CM

## 2019-07-05 DIAGNOSIS — R79.89 ABNORMAL LIVER FUNCTION TESTS: ICD-10-CM

## 2019-07-05 LAB
B-HCG UR QL: NEGATIVE
CTP QC/QA: YES

## 2019-07-05 PROCEDURE — 25000003 PHARM REV CODE 250: Performed by: RADIOLOGY

## 2019-07-05 PROCEDURE — 63600175 PHARM REV CODE 636 W HCPCS: Performed by: RADIOLOGY

## 2019-07-05 PROCEDURE — 88307 TISSUE EXAM BY PATHOLOGIST: CPT | Performed by: PATHOLOGY

## 2019-07-05 PROCEDURE — 88313 TISSUE SPECIMEN TO PATHOLOGY - SURGERY: ICD-10-PCS | Mod: 26,,, | Performed by: PATHOLOGY

## 2019-07-05 PROCEDURE — 88307 TISSUE EXAM BY PATHOLOGIST: CPT | Mod: 26,,, | Performed by: PATHOLOGY

## 2019-07-05 PROCEDURE — 88313 SPECIAL STAINS GROUP 2: CPT | Mod: 26,,, | Performed by: PATHOLOGY

## 2019-07-05 PROCEDURE — 25000003 PHARM REV CODE 250: Performed by: NURSE PRACTITIONER

## 2019-07-05 PROCEDURE — 88307 TISSUE SPECIMEN TO PATHOLOGY - SURGERY: ICD-10-PCS | Mod: 26,,, | Performed by: PATHOLOGY

## 2019-07-05 RX ORDER — MIDAZOLAM HYDROCHLORIDE 1 MG/ML
1 INJECTION INTRAMUSCULAR; INTRAVENOUS
Status: DISCONTINUED | OUTPATIENT
Start: 2019-07-05 | End: 2019-07-05 | Stop reason: HOSPADM

## 2019-07-05 RX ORDER — LIDOCAINE HYDROCHLORIDE 10 MG/ML
1 INJECTION, SOLUTION EPIDURAL; INFILTRATION; INTRACAUDAL; PERINEURAL ONCE
Status: COMPLETED | OUTPATIENT
Start: 2019-07-05 | End: 2019-07-05

## 2019-07-05 RX ORDER — MIDAZOLAM HYDROCHLORIDE 1 MG/ML
INJECTION INTRAMUSCULAR; INTRAVENOUS CODE/TRAUMA/SEDATION MEDICATION
Status: COMPLETED | OUTPATIENT
Start: 2019-07-05 | End: 2019-07-05

## 2019-07-05 RX ORDER — SODIUM CHLORIDE 9 MG/ML
500 INJECTION, SOLUTION INTRAVENOUS ONCE
Status: COMPLETED | OUTPATIENT
Start: 2019-07-05 | End: 2019-07-05

## 2019-07-05 RX ORDER — FENTANYL CITRATE 50 UG/ML
INJECTION, SOLUTION INTRAMUSCULAR; INTRAVENOUS CODE/TRAUMA/SEDATION MEDICATION
Status: COMPLETED | OUTPATIENT
Start: 2019-07-05 | End: 2019-07-05

## 2019-07-05 RX ORDER — FENTANYL CITRATE 50 UG/ML
50 INJECTION, SOLUTION INTRAMUSCULAR; INTRAVENOUS
Status: DISCONTINUED | OUTPATIENT
Start: 2019-07-05 | End: 2019-07-05 | Stop reason: HOSPADM

## 2019-07-05 RX ADMIN — FENTANYL CITRATE 100 MCG: 50 INJECTION, SOLUTION INTRAMUSCULAR; INTRAVENOUS at 09:07

## 2019-07-05 RX ADMIN — MIDAZOLAM HYDROCHLORIDE 2 MG: 1 INJECTION, SOLUTION INTRAMUSCULAR; INTRAVENOUS at 09:07

## 2019-07-05 RX ADMIN — SODIUM CHLORIDE 500 ML: 0.9 INJECTION, SOLUTION INTRAVENOUS at 08:07

## 2019-07-05 RX ADMIN — LIDOCAINE HYDROCHLORIDE 10 MG: 10 INJECTION, SOLUTION EPIDURAL; INFILTRATION; INTRACAUDAL; PERINEURAL at 08:07

## 2019-07-05 NOTE — PROCEDURES
Radiology Post-Procedure Note    Pre Op Diagnosis: pulmonary atresia, CHF, elevated LFT's    Post Op Diagnosis: same    Procedure: Transjugular liver biposy    Procedure performed by: Joe Montanez MD    Written Informed Consent Obtained: Yes    Specimen Removed: YES 6 cores    Estimated Blood Loss: Minimal    Findings: Local anesthesia and moderate sedation were used.    A right-sided transjugular approach was used to performed hepatic venography, pressure measurements and liver biopsy.  Hepatic wedge pressure was 23 mm hg, free hepatic vein pressure 22 mm hg, right atrial pressure 20 mm hg indicating a transhepatic gradient of 1 mm hg.  6 random specimens of the right hepatic lobe were obtained and sent to pathology for further evaluation.    Hemostasis of the right internal jugular vein was achieved using manual pressure and there was no hematoma.    The patient tolerated the procedure well and there were no complications.  Please see Imaging report for further details.    Joe Montanez MD  Staff Radiologist  Department of Radiology  Pager: [phone]

## 2019-07-05 NOTE — SEDATION DOCUMENTATION
Pt tolerates procedure without distress.  Report to Clari RN ROCU.  Right IJ bandaid CDI. Tissue to pathology.

## 2019-07-05 NOTE — SEDATION DOCUMENTATION
Received to IR [address].  Pt is awake, alert and oriented X3.  Discusssed procedure and plan of care with pt.  Pt asks appropriate questions and vebalirzes agreement to proceed.

## 2019-07-05 NOTE — PROGRESS NOTES
Pressures measured by Dr Montanez as follow  Free hepatic vein=22mmHg  Wedge pressure=23mmHg distal IVC=21mmHg Hepatic portion, vena cava=21mmHg  Right atrium=20mmHg

## 2019-07-05 NOTE — PROGRESS NOTES
Pt having surgical procedure. [facility] called to see if there are any reprogramming recommendations for procedure. Pt has a loop recorder. No reprogramming needed.

## 2019-07-05 NOTE — H&P
Radiology History & Physical      SUBJECTIVE:     Chief Complaint: liver dysfunction    History of Present Illness:  Kacey Ruth is a 18 y.o. female who presents for transjugular liver bx. Pt has hx of congenital heart disease s/p multiple surgeries and caths, as well as loop recorder placement. Recently admitted with INR of 10 and some abdominal symptoms with imaging suggestive of cirrhosis. Xjug requested for tissue as well as pressure measurements to aid in dx.    Past Medical History:   Diagnosis Date    CHF (congestive heart failure)     Obesity     Pulmonary atresia with intact ventricular septum     SVT (supraventricular tachycardia)        Past Surgical History:   Procedure Laterality Date    ABLATION, SVT, ACCESSORY PATHWAY Bilateral 2/6/2019    Performed by Romeo Robles MD at [facility] EP LAB    MEMO PROCEDURE      bt shunt and transannular patch    CARDIAC VALVE REPLACEMENT      ECHOCARDIOGRAM,TRANSESOPHAGEAL N/A 2/6/2019    Performed by [facility] Diagnostic Provider at [facility] EP LAB    INSERTION, IMPLANTABLE LOOP RECORDER N/A 5/8/2019    Performed by Ricardo Woodward MD at [facility] EP LAB    INSERTION, IMPLANTABLE LOOP RECORDER N/A 2/6/2019    Performed by Romeo Robles MD at [facility] EP LAB    PULMONARY VALVE REPLACEMENT  01/02/2007    25mm sharyn charles    REPLACEMENT-VALVE-PULMONARY N/A 2/16/2017    Performed by Zachary Berman Jr., MD at [facility] CATH LAB    TRANSESOPHAGEAL ECHOCARDIOGRAM (SAI) N/A 2/16/2017    Performed by Zachary Berman Jr., MD at [facility] CATH LAB    TRICUSPID VALVE REPLACEMENT  01/02/2007    25 mm sharyn-charles       Home Meds:   Prior to Admission medications    Medication Sig Start Date End Date Taking? Authorizing Provider   amlodipine (NORVASC) 2.5 MG tablet Take 1 tablet (2.5 mg total) by mouth once daily. 8/31/17 7/5/19 Yes Marisela Jamil MD   digoxin (LANOXIN) 125 mcg tablet Take 1 tablet (125 mcg total) by mouth once daily. 8/31/17  Yes Marisela PARIS  MD Omero   famotidine (PEPCID) 20 MG tablet Take 1 tablet (20 mg total) by mouth 2 (two) times daily. 6/5/19 6/4/20 Yes Gena Worrell MD   hydroCHLOROthiazide (HYDRODIURIL) 25 MG tablet Take 1 tablet (25 mg total) by mouth once daily. 5/9/19  Yes Pretty Sawyer MD   sotalol (BETAPACE) 80 MG tablet Take 1 tablet (80 mg total) by mouth 2 (two) times daily. 5/9/19 5/8/20 Yes Pretty Sawyer MD   aspirin (ECOTRIN) 81 MG EC tablet Take 81 mg by mouth once daily.     Historical Provider, MD   coenzyme Q10 (COQ-10) 100 mg capsule Take 1 capsule (100 mg total) by mouth 2 (two) times daily. 8/31/17 7/5/19  Marisela Jamil MD       Anticoagulants/Antiplatelets: none    Allergies: Review of patient's allergies indicates:  No Known Allergies    Sedation History:  no adverse reactions    Review of Systems:   As documented in primary provider H&P.      OBJECTIVE:     Vital Signs (Most Recent)  Temp: 98.3 °F (36.8 °C) (07/05/19 0841)  Pulse: 75 (07/05/19 0841)  Resp: 18 (07/05/19 0841)  BP: 129/60 (07/05/19 0841)  SpO2: 100 % (07/05/19 0841)    Physical Exam:  ASA: 3  Mallampati: 2      Laboratory  Lab Results   Component Value Date    INR 1.1 07/01/2019       Lab Results   Component Value Date    WBC 8.99 07/01/2019    HGB 13.9 07/01/2019    HCT 42.8 07/01/2019    MCV 85 07/01/2019     07/01/2019      Lab Results   Component Value Date     (H) 07/01/2019     07/01/2019    K 3.7 07/01/2019     07/01/2019    CO2 24 07/01/2019    BUN 12 07/01/2019    CREATININE 0.8 07/01/2019    CALCIUM 10.2 07/01/2019    ALT 19 07/01/2019    AST 24 07/01/2019    ALBUMIN 4.1 07/01/2019    BILITOT 1.0 07/01/2019       ASSESSMENT/PLAN:     Sedation Plan: moderate  Patient will undergo transjugular liver bx    Hakan Molina MD  Department of Radiology  Pager: [phone]

## 2019-07-05 NOTE — PLAN OF CARE
Prepared patient for surgery. Patient was a difficult IV stick. Mom at the bedside.    Pacemaker clinic was notified that patient has a loop recorder in place. Bharti Allred stated that no intervention was necessary for patient's device.    Patient still needs update to H&P and procedure consent.

## 2019-07-05 NOTE — DISCHARGE SUMMARY
Radiology Discharge Summary      Hospital Course: No complications    Admit Date: 7/5/2019  Discharge Date: 07/05/2019     Instructions Given to Patient: Yes  Diet: Resume prior diet  Activity: activity as tolerated and no driving for today    Description of Condition on Discharge: Stable  Vital Signs (Most Recent): Temp: 98.2 °F (36.8 °C) (07/05/19 1030)  Pulse: 67 (07/05/19 1224)  Resp: 16 (07/05/19 1224)  BP: 134/61 (07/05/19 1224)  SpO2: 97 % (07/05/19 1224)    Discharge Disposition: Home    Discharge Diagnosis: Hx of pulmonary atresia, now with elevated LFT's     Follow-up: with referring MD Joe Montanez MD  Staff Radiologist  Department of Radiology  Pager: [phone]

## 2019-07-08 ENCOUNTER — OFFICE VISIT (OUTPATIENT)
Dept: CARDIOLOGY | Facility: CLINIC | Age: 19
End: 2019-07-08
Payer: MEDICAID

## 2019-07-08 VITALS
BODY MASS INDEX: 42.84 KG/M2 | WEIGHT: 266.56 LBS | HEIGHT: 66 IN | OXYGEN SATURATION: 97 % | DIASTOLIC BLOOD PRESSURE: 84 MMHG | HEART RATE: 75 BPM | SYSTOLIC BLOOD PRESSURE: 105 MMHG

## 2019-07-08 DIAGNOSIS — Z95.3 HISTORY OF PULMONARY VALVE REPLACEMENT WITH BIOPROSTHETIC VALVE: ICD-10-CM

## 2019-07-08 DIAGNOSIS — I48.92 ATRIAL FLUTTER, UNSPECIFIED TYPE: ICD-10-CM

## 2019-07-08 DIAGNOSIS — R10.12 LEFT UPPER QUADRANT PAIN: Primary | ICD-10-CM

## 2019-07-08 DIAGNOSIS — Z95.3 HISTORY OF TRICUSPID VALVE REPLACEMENT WITH BIOPROSTHETIC VALVE: ICD-10-CM

## 2019-07-08 PROCEDURE — 93000 ELECTROCARDIOGRAM COMPLETE: CPT | Mod: S$GLB,,, | Performed by: PEDIATRICS

## 2019-07-08 PROCEDURE — 93000 PR ELECTROCARDIOGRAM, COMPLETE: ICD-10-PCS | Mod: S$GLB,,, | Performed by: PEDIATRICS

## 2019-07-08 PROCEDURE — 99213 OFFICE O/P EST LOW 20 MIN: CPT | Mod: S$GLB,,, | Performed by: PEDIATRICS

## 2019-07-08 PROCEDURE — 99213 PR OFFICE/OUTPT VISIT, EST, LEVL III, 20-29 MIN: ICD-10-PCS | Mod: S$GLB,,, | Performed by: PEDIATRICS

## 2019-07-08 NOTE — PROGRESS NOTES
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 She underwent reconstruction of the right ventricular outflow tract and placement of a bioprosthetic pulmonary valve in early childhood. She subsequently has required multiple heart valve replacements. Her most recent heart surgery involved placement of a bioprosthetic valve in the pulmonary position and placement of a bioprosthetic valve in the tricuspid position, in 2007. Recently, she had a heart catheterization for recurrent insufficiency and obstruction of the bioprosthetic valve that had been placed in the tricuspid position.  The current procedure employed a trans-catheter approach. It entailed valvuloplasty with a Z-Med 25 x 5 cm balloon followed by placement of an Sotelo S3 26 mm bioprosthetic valve in the tricuspid position.. 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 She leads a rather sedentary lifestyle, and is overweight. She currently is on: Digoxin 0.125 mg once a day, Norvasc 2.5 mg once a day for BP. The Lasix was changed to HCTZ 25 mg once a day.  Warfarin was DCed.     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 She comes to clinic today for follow-up. She reports having had several short episodes of rapid HRs in the middle of the night. It happens about 3 times per week; they get her up and she gets very frighten. A recent transmission showed a run of probable atrial flutter at  ~ 170 bpm.  "She did recently have thyroid studies and they are normal".  Kacey recently was admitted to [facility] for treatment of atrial tachycardia. " "Her Metoprolol was DCed and she was started on Sotalol 80 mg q 12 hrs.  She also had an implantable loop recorder placed.  Clinically, she reports doing better on the Sotalol.  No lightheadedness, syncope or abnormally slow or rapid HRs.                She was again admitted to [facility] for "severe abdominal pain",   Her W/U was negative, except for abnormal liver ultrasound, which showed echogenic spots, enlargement and congestion. ? related to R- sided cardiac status.  Liver biopsy is pending.  Her Wafarin has been DCed.    Today, Kacey continues to have upper abdominal pain, mostly on the anterior/lateran left side, with no radiation to the back.  It worsens with mild palpation.  She has also coughed up a small amount  of dark brown blood.  According to mom, this is the way her previous episode of severe abdominal pain started.         REVIEW OF SYSTEMS:  There are no visual disturbances. No HAs, lightheadedness, syncope or seizures. No swallowing disorder or PIETER. No difficulty with breathing, SOB or wheezing.  No asthma. No abdominal pain.  Bowel movements are normal.  No pelvic pain. Urination is normal.  No DM of thyroid disorder.  No lipid disorder. No arterial disease. No known intrinsic problem with the lungs, liver or kidneys. No bleeding disorder. No smoking or ETOH use.  There is a FH of strokes and Adult unset DM.         PHYSICAL EXAMINATION:   GENERAL:  The patient is overweight 18 -year-old female in no distress.  VITAL SIGNS:  Her weight is 120.9 kg (266 1/2 lbs) and her height is 1.676 meters (5' 6''). Heart rate is 75 beats per minute (sinus).  Sat 97 %.   Blood pressure in the right arm is 105/84 mmHg in the RA..  Color and perfusion are good.  HEENT:  Eye movements are normal.  No bruits are noted over the head.  No jugular venous distention or pulsations.  Mucous membranes are moist and pink.  Throat is clear with no evidence of blood. There is no adenopathy.  The neck is supple. " Right catheter biopsy site is clean and no bruit.  No carotid bruits. The thyroid is not enlarged. SKIN:  Is pink and well perfused. CHEST:  Loop recorder in the L upper chest. Wound is clean and not reddened.  No evidence of infection. There is a well-healed midline scar.  Lungs are clear to auscultation bilaterally, although the breath sounds are somewhat decreased at the base. There are no wheezes or rhonchi. CARDIOVASCULAR:  Precordial activity is normal.  HR ~ 80 bpm.  The first heart sound is prominent and crisp.  The second heart sound is narrowly split and eccentuated. There is a grade 1/6 systolic ejection murmur heard best up the left sternal border and across the base.  Short diastolic murmur is heard today.  ABDOMEN:  There is exogenous obesity.  The liver edge is precussed about 3 FBs at the right costal margin.  It is nonpulsatile and nontender. Pain on mild palpation over the left anterior/lateral abdomen, no bruising.   Bowel sounds appear to be normal.  EXTREMITIES:  Pulses are 2+ throughout.  Capillary refill is good. There is no cyanosis or peripheral edema.  No bruising in the groins or exts.  No rashes or cyanosis.  No bruits in the groins.              ........................................................................................................................................        ECG (TODAY): Sinus rhythm at a ventricular rate of 73 bpm.  Atrial abnormality.  RBBB (156 ms). T wave abnormality.  Prolonged QTc at 491 ms,  but she has a RBBB.  (Need to follow since on Sotalol).                ECHOCARDIOGRAM (Previous study):   An echocardiogram was performed.  2-D STUDY: There is no pericardial effusion.  No clots or vegetations. The bioprosthetic tricuspid valve is seen to be in good position. Annulus is 22 mm. The struts are easily seen.  The leaflets are mobile. No evidence for thrombus formation.  The right atrium is enlarged measuring 5.0 x 4.3 cm or ~ 17.3 cm2 One can also  "see the bioprosthetic tricuspid valve in a cross sectional view.  It appears circular and measures 2 cm x 2 cm. Again, no clots or vegetations are seen.  No right ventricular or left ventricular outflow tract obstruction.  The right ventricular circumferential area of shortening is 30%,  which is reduced.  Circumferential area of the RA is 17.3 cm2, which is slightly dilated.      M-MODE:   LV 5.2 cm, RV 3.5 cm, Ao 3.0 cm, LA 3.9 cm, Sep 1.1 cm, PW 1.0 cm, EF ~ 51 % (slightly reduced).      DOPPLER VELOCITY ANALYSIS:  There is no insufficiency of the recently-placed bioprosthetic tricuspid valve.  Mild turbulent antegrade flow is seen. Continuous wave Doppler analysis shows a peak pressure drop of 13 mmHg with a mean value of 7 mmHg.  Importantly, the leaflets are moving freely.  There is no mitral insufficiency.  Mild aortic insufficiency  (P1/2 765 ms).  Peak pressure drop across the aortic valve is 5 mmHg.  Peak pressure drop across the bioprosthetic pulmonary valve is 16 mmHg.                    .................................................................................................................................................               ASSESSMENT:  In summary, we have a 18-year-old AA female originally diagnosed to have pulmonary atresia with intact inter-ventricular septum, who is status-post multiple open heart operations involving both the pulmonary and tricuspid valves.  She recently in February 2017 had placement of an Sotelo S3 26 mm bioprosthetic tricuspid valve via the transvenous approach.  She tolerated the procedure well. Clinically, she has been stable. However, she continues to have poor exercise capacity, chronic fatigue and more recently developed a new rhythm disturbance, diagnosed to be atrial flutter.  She underwent ablation for the atrial rhythm disturbance.  However, "She continued to have short runs of rapid HRs mostly at night". Thus, she was admitted to [facility] for " additional treatment. The Metoprolol was DCed and she was started on Sotalol 80 mg q 12 hrs.  Also, a loop recorder was placed under the skin in the upper chest.   She appears to be tolerating the Sotalol well (Watch the QTc and VT interval)l.   More recently, Kacey underwent transvenous biopsy of the liver for chirosis.     PLAN:  Given our findings, our suggestions are as follows:  1).  She of course needs antibiotic prophylaxis for any invasive procedure, especially dental work.   2).  She should continue with the Sotalol 80 mg q 12 hrs, HCTZ 25 mg qd, and her other remaining meds.  She was started on Pepcid 20 mg qd. 3). Liver biopsy results pending. 4).  Suspect Kacey needs a GI consult, because of chronic abdominal pain. .  If there are any questions concerning the cardiac status of this patient, please feel free to contact us.  Thank you so much for allowing us to partake in the care of this patient. Donovan Gonzalez PhD, MD.

## 2019-07-09 ENCOUNTER — TELEPHONE (OUTPATIENT)
Dept: TRANSPLANT | Facility: CLINIC | Age: 19
End: 2019-07-09

## 2019-07-09 NOTE — TELEPHONE ENCOUNTER
Called patient's mother.  Informed there is evidence of steatosis but no fibrosis.  Imaging suggesting cirrhosis is not confirmed and there is no evidence of chronic, progressive liver disease at this time.  They will keep upcoming appointment to discuss further and address indeterminate lesions which have been previously seen on cross sectional imaging

## 2019-07-15 ENCOUNTER — TELEPHONE (OUTPATIENT)
Dept: PEDIATRIC CARDIOLOGY | Facility: CLINIC | Age: 19
End: 2019-07-15

## 2019-07-15 NOTE — TELEPHONE ENCOUNTER
Spoke to patients mom , mom informed that I need patient to send remote transmission on loop. Mom says she will send transmission.

## 2019-08-03 NOTE — PROGRESS NOTES
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 She underwent reconstruction of the right ventricular outflow tract and placement of a bioprosthetic pulmonary valve in early childhood. She subsequently has required multiple heart valve replacements. Her most recent heart surgery involved placement of a bioprosthetic valve in the pulmonary position and placement of a bioprosthetic valve in the tricuspid position, in 2007. Recently, she had a heart catheterization for recurrent insufficiency and obstruction of the bioprosthetic tricuspid valve.  The current procedure entailed a trans-catheter approach. It involved a valvuloplasty with a Z-Med 25 x 5 cm balloon followed by placement of an Sotelo S3 26 mm bioprosthetic valve in the tricuspid position.. 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 She leads a rather sedentary lifestyle, and is overweight. She currently is on: Digoxin 0.125 mg once a day, Norvasc 2.5 mg once a day for BP control. The Lasix was changed to HCTZ 25 mg once a day.  Warfarin was DCed.     Kacey underwent an EPS with transcatheter ablation for both typical and atypical reentry atrial tachycardia, at [facility], by Dr Robles and Dr Woodward. She comes to clinic today for follow-up. She reports having had several short episodes of rapid HRs in the middle of the night. It happens about 3 times per week; they get her up and she gets very frighten. A recent transmission showed a run of probable atrial flutter at  ~ 170 bpm.  "She did have thyroid function studies and they were found to be normal".     Kacey was admitted to [facility] for treatment of atrial tachycardia. Her Metoprolol was DCed and " "she was started on Sotalol 80 mg q 12 hrs. She also had an implantable loop recorder placed.  Clinically, she reports doing better on the Sotalol.  No lightheadedness, syncope, seizures or abnormally slow or rapid HRs. She was subsequently admitted to [facility] for "severe abdominal pain",   Her W/U was negative, except for an abnormal liver ultrasound, which showed echogenic spots, enlargement and congestion. ? related to R- sided cardiac status.  Liver biopsy indicated cirrosis.        Currently, Kacey has complaints of HAs since starting the Sotalol, and also reports two episodes of rapid HRs lasting about ~ 1 min each. No near-syncope.   There was associated SOB and CP. The events occurred at ~ 5 pm. Otherwise, the Sotalol has helped with the previous nighttime rhythm problems.        REVIEW OF SYSTEMS:  There are no visual disturbances. No HAs, lightheadedness, syncope or seizures. No swallowing disorder or PIETER. No difficulty breathing, SOB or wheezing.  No asthma.  Bowel movements appear to be normal.  No pelvic pain. Urination is normal.  No blood in the stool or urine. No DM of thyroid disorder.  No lipid disorder. No arterial disease. No known intrinsic problem with the lungs, liver or kidneys. No bleeding condition. No smoking or ETOH use.  There is a FH of strokes and adult unset DM.         PHYSICAL EXAMINATION:   GENERAL:  The patient is an overweight 18 -year-old female in no distress.  VITAL SIGNS:  Her weight is 117.8 kg (260 lbs) and her height is 1.676 meters (5' 6''). Heart rate is 78 beats per minute (sinus).  Sat 98 %.   Blood pressure in the right arm is 116/72 mmHg in the RA..  Color and perfusion are good.  HEENT:  Eye movements are normal.  No bruits are noted over the head.  No jugular venous distention or pulsations.  Mucous membranes are moist and pink.  Throat is clear with no evidence of blood. There is no adenopathy.  The neck is supple. Right catheter biopsy site is clean " and no bruit.  No carotid bruits. The thyroid is not enlarged. SKIN:  Is pink and well perfused. CHEST:  Loop recorder is in the L upper chest. Small scar, which is clean and not reddened. No evidence of infection. There is a well-healed midline scar.  Lungs are clear to auscultation bilaterally, although the breath sounds are somewhat decreased at the base. There are no wheezes or rhonchi. CARDIOVASCULAR:  Precordial activity is normal.  HR ~ 75 bpm.  The first heart sound is prominent and crisp.  The second heart sound is narrowly split and eccentuated. There is a grade 1/6 systolic ejection murmur heard best up the left sternal border and across the base.  Short diastolic murmur is heard today.  ABDOMEN:  There is exogenous obesity.  The liver edge is precussed about 4 FBs at the right costal margin.  It is nonpulsatile and nontender. Pain on mild palpation over the left anterior/lateral abdomen, no bruising.   Bowel sounds appear to be normal.  EXTREMITIES:  Pulses are 2+ throughout.  Capillary refill is good. There is no cyanosis or peripheral edema.  No bruising in the groins or exts.  No rashes or cyanosis.  No bruits in the groins.              ........................................................................................................................................        ECG (Today): Sinus rhythm at a ventricular rate of 72 bpm.  Atrial abnormality.  RBBB (160 ms). T wave abnormality.  Prolonged QTc at 501 ms,  but she has a RBBB.  (Need to follow since on Sotalol).                ECHOCARDIOGRAM (Previous study):   An echocardiogram was performed.  2-D STUDY: There is no pericardial effusion.  No clots or vegetations. The bioprosthetic tricuspid valve is seen to be in good position. Annulus is 22 mm. The struts are easily seen.  The leaflets are mobile. No evidence for thrombus formation.  The right atrium is enlarged measuring 5.0 x 4.3 cm or ~ 17.3 cm2 One can also see the bioprosthetic  "tricuspid valve in a cross sectional view.  It appears circular and measures 2 cm x 2 cm. Again, no clots or vegetations are seen.  No right ventricular or left ventricular outflow tract obstruction.  The right ventricular circumferential area of shortening is 30%,  which is reduced.  Circumferential area of the RA is 17.3 cm2, which is dilated.      M-MODE:   LV 5.2 cm, RV 3.5 cm, Ao 3.0 cm, LA 3.9 cm, Sep 1.1 cm, PW 1.0 cm, EF ~ 51 % (slightly reduced).      DOPPLER VELOCITY ANALYSIS:  There is no insufficiency of the recently-placed bioprosthetic tricuspid valve.  Mild turbulent antegrade flow is seen. Continuous wave Doppler analysis shows a peak pressure drop of 13 mmHg with a mean value of 7 mmHg.  Importantly, the leaflets are moving freely.  There is no mitral insufficiency.  Mild aortic insufficiency  (P1/2 765 ms).  Peak pressure drop across the aortic valve is 5 mmHg.  Peak pressure drop across the bioprosthetic pulmonary valve is 16 mmHg.                    .................................................................................................................................................               ASSESSMENT:  In summary, we have a 18-year-old AA female originally diagnosed to have pulmonary atresia with intact inter-ventricular septum, who is status-post multiple open heart operations involving both the pulmonary and tricuspid valves.  She recently in February 2017 had placement of an Sotelo S3 26 mm bioprosthetic tricuspid valve via the transvenous approach.  She tolerated the procedure well. Clinically, she has been stable. However, she continues to have poor exercise capacity, chronic fatigue and more recently developed a new rhythm disturbance, diagnosed to be atrial flutter.  She underwent ablation for this atrial rhythm disturbance.  "She was also started on Sotalol 80 mg q 12 hrs.  Also, a loop recorder was placed under the skin in the upper chest.   She appears to be tolerating " the Sotalol well (Watch the QTc and WI interval)l.   More recently, Kacey underwent transvenous biopsy of the liver, which showed chirosis.     PLAN:  Given our findings, our suggestions are as follows:  1).  She of course needs antibiotic prophylaxis for any invasive procedure, especially dental work.   2).  She should continue with the Sotalol 80 mg q 12 hrs, HCTZ 25 mg qd, and her other remaining meds.  She was started on Pepcid 20 mg qd. 3).  Suspect Kacey needs a GI consult, because of chronic abdominal pain. 4) Will need download of the loop recorder and next EP clinic.  If there are any questions concerning the cardiac status of this patient, please feel free to contact us.  Thank you so much for allowing us to partake in the care of this patient. Donovan Gonzalez PhD, MD.

## 2019-08-05 ENCOUNTER — OFFICE VISIT (OUTPATIENT)
Dept: CARDIOLOGY | Facility: CLINIC | Age: 19
End: 2019-08-05
Payer: MEDICAID

## 2019-08-05 VITALS
OXYGEN SATURATION: 98 % | HEART RATE: 78 BPM | BODY MASS INDEX: 41.73 KG/M2 | WEIGHT: 259.69 LBS | SYSTOLIC BLOOD PRESSURE: 116 MMHG | DIASTOLIC BLOOD PRESSURE: 72 MMHG | HEIGHT: 66 IN

## 2019-08-05 DIAGNOSIS — Z98.890 HISTORY OF OPEN HEART SURGERY: ICD-10-CM

## 2019-08-05 DIAGNOSIS — E66.01 MORBID OBESITY: ICD-10-CM

## 2019-08-05 DIAGNOSIS — Z95.3 HISTORY OF TRICUSPID VALVE REPLACEMENT WITH BIOPROSTHETIC VALVE: ICD-10-CM

## 2019-08-05 DIAGNOSIS — I48.92 ATRIAL FLUTTER, UNSPECIFIED TYPE: Primary | ICD-10-CM

## 2019-08-05 PROCEDURE — 99213 PR OFFICE/OUTPT VISIT, EST, LEVL III, 20-29 MIN: ICD-10-PCS | Mod: 25,S$GLB,, | Performed by: PEDIATRICS

## 2019-08-05 PROCEDURE — 93000 PR ELECTROCARDIOGRAM, COMPLETE: ICD-10-PCS | Mod: S$GLB,,, | Performed by: PEDIATRICS

## 2019-08-05 PROCEDURE — 93000 ELECTROCARDIOGRAM COMPLETE: CPT | Mod: S$GLB,,, | Performed by: PEDIATRICS

## 2019-08-05 PROCEDURE — 99213 OFFICE O/P EST LOW 20 MIN: CPT | Mod: 25,S$GLB,, | Performed by: PEDIATRICS

## 2019-08-07 NOTE — PROGRESS NOTES
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 She underwent reconstruction of the right ventricular outflow tract and placement of a bioprosthetic pulmonary valve in early childhood. She subsequently has required multiple heart valve replacements. Her most recent heart surgery involved placement of a bioprosthetic valve in the pulmonary position and placement of a bioprosthetic valve in the tricuspid position, in 2007. Recently, she had a heart catheterization for recurrent insufficiency and obstruction of the bioprosthetic tricuspid valve.  The current procedure entailed a trans-catheter approach. It involved a valvuloplasty with a Z-Med 25 x 5 cm balloon followed by placement of an Sotelo S3 26 mm bioprosthetic valve in the tricuspid position.. 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 She leads a rather sedentary lifestyle, and is overweight. She currently is on: Digoxin 0.125 mg once a day, Norvasc 2.5 mg once a day for BP control. The Lasix was changed to HCTZ 25 mg once a day.  Warfarin was DCed.     Kacey underwent an EPS with transcatheter ablation for both typical and atypical reentry atrial tachycardia, at [facility], by Dr Robles and Dr Woodward. She comes to clinic today for follow-up. She reports having had several short episodes of rapid HRs in the middle of the night. It happens about 3 times per week; they get her up and she gets very frighten. A recent transmission showed a run of probable atrial flutter at  ~ 170 bpm.  "She did have thyroid function studies and they were found to be normal".      Kacey was admitted to [facility] for treatment of atrial tachycardia. Her Metoprolol was DCed and " "she was started on Sotalol 80 mg q 12 hrs. She also had an implantable loop recorder placed.  Clinically, she reports doing better on the Sotalol.  No lightheadedness, syncope, seizures or abnormally slow or rapid HRs. She was subsequently admitted to [facility] for "severe abdominal pain",   Her W/U was negative, except for an abnormal liver ultrasound, which showed echogenic spots, enlargement and congestion. ? related to R- sided cardiac status.  Liver biopsy indicated cirrosis.          Currently, Kacey has complaints of HAs since starting the Sotalol.  She also reports having two episodes of rapid HRs lasting about ~ 1 min each, associated with SOB and CP. The events occurred at ~ 5 pm. The Sotalol has helped with her previous nighttime rhythm problems.        REVIEW OF SYSTEMS:  There are no visual disturbances. No HAs, lightheadedness, syncope or seizures. No swallowing disorder or PIETER. No difficulty breathing, SOB or wheezing.  No asthma.  Bowel movements appear to be normal.  No pelvic pain. Urination is normal.  No blood in the stool or urine. No DM of thyroid disorder.  No lipid disorder. No arterial disease. No known intrinsic problem with the lungs, liver or kidneys. No bleeding condition. No smoking or ETOH use.  There is a FH of strokes and adult unset DM.         PHYSICAL EXAMINATION:   GENERAL:  The patient is an overweight 18 -year-old female in no distress.  VITAL SIGNS:  Her weight is 117.8 kg (260 lbs) and her height is 1.676 meters (5' 6''). Heart rate is 78 beats per minute (sinus).  Sat 98 %.   Blood pressure in the right arm is 116/72 mmHg in the RA..  Color and perfusion are good.  HEENT:  Eye movements are normal.  No bruits are noted over the head.  No jugular venous distention or pulsations.  Mucous membranes are moist and pink.  Throat is clear with no evidence of blood. There is no adenopathy.  The neck is supple. Right catheter biopsy site is clean and no bruit.  No carotid " bruits. The thyroid is not enlarged. SKIN:  Is pink and well perfused. CHEST:  Loop recorder is in the L upper chest. Small scar, which is clean and not reddened. No evidence of infection. There is a well-healed midline scar.  Lungs are clear to auscultation bilaterally, although the breath sounds are somewhat decreased at the base. There are no wheezes or rhonchi. CARDIOVASCULAR:  Precordial activity is normal.  HR ~ 75 bpm.  The first heart sound is prominent and crisp.  The second heart sound is narrowly split and eccentuated. There is a grade 1/6 systolic ejection murmur heard best up the left sternal border and across the base.  Short diastolic murmur is heard today.  ABDOMEN:  There is exogenous obesity.  The liver edge is precussed about 4 FBs at the right costal margin.  It is nonpulsatile and nontender. Pain on mild palpation over the left anterior/lateral abdomen, no bruising.   Bowel sounds appear to be normal.  EXTREMITIES:  Pulses are 2+ throughout.  Capillary refill is good. There is no cyanosis or peripheral edema.  No bruising in the groins or exts.  No rashes or cyanosis.  No bruits in the groins.              ........................................................................................................................................        ECG (Today): Sinus rhythm at a ventricular rate of 68 bpm.  Atrial abnormality. NE at upper limits at 208 ms.  RBBB (160 ms). T wave abnormality.  Prolonged QTc at 497 ms,  but she has a RBBB.  (Need to follow since on Sotalol).  Download of the loop recorder showed a few triplets of atrial beats.   No sustained atrial or ventricular ectopy.                ECHOCARDIOGRAM (Previous study):   An echocardiogram was performed.  2-D STUDY: There is no pericardial effusion.  No clots or vegetations. The bioprosthetic tricuspid valve is seen to be in good position. Annulus is 22 mm. The struts are easily seen.  The leaflets are mobile. No evidence for  "thrombus formation.  The right atrium is enlarged measuring 5.0 x 4.3 cm or ~ 17.3 cm2 One can also see the bioprosthetic tricuspid valve in a cross sectional view.  It appears circular and measures 2 cm x 2 cm. Again, no clots or vegetations are seen.  No right ventricular or left ventricular outflow tract obstruction.  The right ventricular circumferential area of shortening is 30%,  which is reduced.  Circumferential area of the RA is 17.3 cm2, which is dilated.      M-MODE:   LV 5.2 cm, RV 3.5 cm, Ao 3.0 cm, LA 3.9 cm, Sep 1.1 cm, PW 1.0 cm, EF ~ 51 % (slightly reduced).      DOPPLER VELOCITY ANALYSIS:  There is no insufficiency of the recently-placed bioprosthetic tricuspid valve.  Mild turbulent antegrade flow is seen. Continuous wave Doppler analysis shows a peak pressure drop of 13 mmHg with a mean value of 7 mmHg.  Importantly, the leaflets are moving freely.  There is no mitral insufficiency.  Mild aortic insufficiency  (P1/2 765 ms).  Peak pressure drop across the aortic valve is 5 mmHg. Peak pressure drop across the bioprosthetic pulmonary valve is 16 mmHg.                    .................................................................................................................................................               ASSESSMENT:  In summary, we have a 18-year-old AA female originally diagnosed to have pulmonary atresia with intact inter-ventricular septum, who is status-post multiple open heart operations involving both the pulmonary and tricuspid valves.  In February 2017 she had placement of an Sotelo S3 26 mm bioprosthetic tricuspid valve via the transvenous approach.  She tolerated the procedure well. Clinically, she has been stable. However, she continues to have poor exercise capacity, chronic fatigue and more recently developed a new rhythm disturbance, diagnosed to be atrial flutter.  She underwent ablation for this atrial rhythm disturbance.  "She was then started on Sotalol 80 " mg q 12 hrs.  Also, a loop recorder was placed under the skin in the upper chest.   She appears to be tolerating the Sotalol well (Watch the QTc and SC interval)l.   More recently, Kacey underwent transvenous biopsy of the liver, which showed chirosis.     PLAN:  Given our findings, our suggestions are as follows:  1).  She of course needs antibiotic prophylaxis for any invasive procedure, especially dental work.   2).  She should continue with the Sotalol, but the dose was increased to 120 mg q 12 hrs, HCTZ 25 mg qd, and her other remaining meds.  She was started on Pepcid 20 mg qd. 3).  Suspect Kacey needs a GI consult, because of chronic abdominal pain.    4) RTC in 1 mo.  If there are any questions concerning the cardiac status of this patient, please feel free to contact us.  Thank you so much for allowing us to partake in the care of this patient. Donovan Gonzalez PhD, MD.

## 2019-08-08 ENCOUNTER — OFFICE VISIT (OUTPATIENT)
Dept: PEDIATRIC CARDIOLOGY | Facility: CLINIC | Age: 19
End: 2019-08-08
Attending: PEDIATRICS
Payer: MEDICAID

## 2019-08-08 ENCOUNTER — OFFICE VISIT (OUTPATIENT)
Dept: CARDIOLOGY | Facility: CLINIC | Age: 19
End: 2019-08-08
Payer: MEDICAID

## 2019-08-08 VITALS
HEART RATE: 75 BPM | WEIGHT: 261.25 LBS | OXYGEN SATURATION: 98 % | SYSTOLIC BLOOD PRESSURE: 126 MMHG | HEIGHT: 66 IN | BODY MASS INDEX: 41.99 KG/M2 | DIASTOLIC BLOOD PRESSURE: 82 MMHG

## 2019-08-08 VITALS
BODY MASS INDEX: 41.99 KG/M2 | OXYGEN SATURATION: 98 % | HEART RATE: 67 BPM | WEIGHT: 261.25 LBS | DIASTOLIC BLOOD PRESSURE: 82 MMHG | SYSTOLIC BLOOD PRESSURE: 126 MMHG | HEIGHT: 66 IN

## 2019-08-08 DIAGNOSIS — I47.10 SVT (SUPRAVENTRICULAR TACHYCARDIA): ICD-10-CM

## 2019-08-08 DIAGNOSIS — Q24.9 ADULT CONGENITAL HEART DISEASE: ICD-10-CM

## 2019-08-08 DIAGNOSIS — Z95.3 HISTORY OF TRICUSPID VALVE REPLACEMENT WITH BIOPROSTHETIC VALVE: ICD-10-CM

## 2019-08-08 DIAGNOSIS — Z95.818 STATUS POST PLACEMENT OF IMPLANTABLE LOOP RECORDER: ICD-10-CM

## 2019-08-08 DIAGNOSIS — Z98.890 HISTORY OF OPEN HEART SURGERY: ICD-10-CM

## 2019-08-08 DIAGNOSIS — Z95.2 S/P PULMONARY VALVE REPLACEMENT: Primary | ICD-10-CM

## 2019-08-08 DIAGNOSIS — I48.92 ATRIAL FLUTTER, UNSPECIFIED TYPE: ICD-10-CM

## 2019-08-08 DIAGNOSIS — R00.2 PALPITATIONS: ICD-10-CM

## 2019-08-08 DIAGNOSIS — I47.19 ATRIAL TACHYCARDIA: ICD-10-CM

## 2019-08-08 DIAGNOSIS — I47.19 ATRIAL TACHYCARDIA, PAROXYSMAL: Primary | ICD-10-CM

## 2019-08-08 DIAGNOSIS — Z95.4 S/P TRICUSPID VALVE REPLACEMENT: ICD-10-CM

## 2019-08-08 DIAGNOSIS — Z95.3 HISTORY OF PULMONARY VALVE REPLACEMENT WITH BIOPROSTHETIC VALVE: ICD-10-CM

## 2019-08-08 DIAGNOSIS — E66.3 OVERWEIGHT: ICD-10-CM

## 2019-08-08 PROCEDURE — 93291 INTERROG DEV EVAL SCRMS IP: CPT | Mod: S$GLB,,, | Performed by: PEDIATRICS

## 2019-08-08 PROCEDURE — 93291 CV LOOP RECORDER INTERROGATION PEDIATRICS (CUPID ONLY): ICD-10-PCS | Mod: S$GLB,,, | Performed by: PEDIATRICS

## 2019-08-08 PROCEDURE — 99215 PR OFFICE/OUTPT VISIT, EST, LEVL V, 40-54 MIN: ICD-10-PCS | Mod: S$GLB,,, | Performed by: PEDIATRICS

## 2019-08-08 PROCEDURE — 93000 PR ELECTROCARDIOGRAM, COMPLETE: ICD-10-PCS | Mod: S$GLB,,, | Performed by: PEDIATRICS

## 2019-08-08 PROCEDURE — 99213 OFFICE O/P EST LOW 20 MIN: CPT | Mod: 25,S$GLB,, | Performed by: PEDIATRICS

## 2019-08-08 PROCEDURE — 93000 ELECTROCARDIOGRAM COMPLETE: CPT | Mod: S$GLB,,, | Performed by: PEDIATRICS

## 2019-08-08 PROCEDURE — 99213 PR OFFICE/OUTPT VISIT, EST, LEVL III, 20-29 MIN: ICD-10-PCS | Mod: 25,S$GLB,, | Performed by: PEDIATRICS

## 2019-08-08 PROCEDURE — 99215 OFFICE O/P EST HI 40 MIN: CPT | Mod: S$GLB,,, | Performed by: PEDIATRICS

## 2019-08-08 RX ORDER — SOTALOL HYDROCHLORIDE 120 MG/1
120 TABLET ORAL 2 TIMES DAILY
Qty: 60 TABLET | Refills: 11 | Status: SHIPPED | OUTPATIENT
Start: 2019-08-08 | End: 2023-06-14

## 2019-08-08 NOTE — PROGRESS NOTES
Thank you for referring your patient Kacey Ruth to the electrophysiology clinic for consultation. The patient is accompanied by her mother. Please review my findings below.    CHIEF COMPLAINT: EP evaluation of atrial arrhythmia vs. SVT    HISTORY OF PRESENT ILLNESS:   17 y/o female with history of pulmonary atresia intact ventricular septum and ASD.  She underwent reconstruction of the right ventricular outflow tract and placement of a bioprosthetic pulmonary valve in early childhood. She has had multiple valve replacements. Her most recent open heart surgery involved placement of a bioprosthetic valve in the pulmonary position and placement of a bioprosthetic valve in the tricuspid position in 2007. Recently, she had a heart catheterization for recurrent tricuspid valve insufficiency and stenosis.  The procedure at this time employed a trans-catheter approach. It entailed tricuspid valve balloon valvuloplasty with a Z-Med 25 x 5 cm balloon followed by placement of an Sotelo S3 26 mm valve in Feb 2017. The catheterization also showed that she had only mild pulmonary valve stenosis and insufficiency.  She was recently found by Dr. Gonzalez to have SVT vs. Atrial flutter and started on Metoprolol.  Kacey was seen by me in December 2018.  She was referred to EP lab for EP study and possible ablation.     Kacey underwent EP study and ablation with Dr. Robles and myself in Feburary 2019.  She returns today for scheduled follow up.  She complains of intermittent palpitations.  Her event monitor shows brief episodes of nonsustained atrial tachycardia intermittently.  She has a Holter pending.  She has episodes at rest with heart rates in 150's.  She had slowing and then termination with breath holding.  Dr. Gonzalez increased beta blocker.    Kacey underwent admission to initiate Sotalol and had loop implanted on 5/8/19.  She returns today for scheduled follow up.  She was having stress/anxiety related episodes  still of symptomatic sinus tachycardia.  She was most recently concerned about heart skipping beats when laying down but no episodes of pause or jacklyn and she did not push the loop symptom button for those episodes.  She has not been exercising.      REVIEW OF SYSTEMS:     GENERAL: No fever, chills, fatigability or weight loss.  SKIN: No rashes, itching or changes in color or texture of skin.  CHEST: Denies SR, cyanosis, wheezing, cough and sputum production.  CARDIOVASCULAR: see HPI  ABDOMEN: Appetite fine. No weight loss. Denies diarrhea, abdominal pain, or vomiting.  PERIPHERAL VASCULAR: No claudication or cyanosis.  MUSCULOSKELETAL: No joint stiffness or swelling.   NEUROLOGIC: No history of seizures,  alteration of gait or coordination.    PAST MEDICAL HISTORY:   Past Medical History:   Diagnosis Date    CHF (congestive heart failure)     Obesity     Pulmonary atresia with intact ventricular septum     SVT (supraventricular tachycardia)        FAMILY HISTORY:   History reviewed. No pertinent family history.      SOCIAL HISTORY:   Social History     Socioeconomic History    Marital status: Single     Spouse name: Not on file    Number of children: Not on file    Years of education: Not on file    Highest education level: Not on file   Occupational History    Not on file   Social Needs    Financial resource strain: Not on file    Food insecurity:     Worry: Not on file     Inability: Not on file    Transportation needs:     Medical: Not on file     Non-medical: Not on file   Tobacco Use    Smoking status: Never Smoker   Substance and Sexual Activity    Alcohol use: No    Drug use: No    Sexual activity: Never   Lifestyle    Physical activity:     Days per week: Not on file     Minutes per session: Not on file    Stress: Not on file   Relationships    Social connections:     Talks on phone: Not on file     Gets together: Not on file     Attends Voodoo service: Not on file     Active member  "of club or organization: Not on file     Attends meetings of clubs or organizations: Not on file     Relationship status: Not on file   Other Topics Concern    Not on file   Social History Narrative    Not on file       ALLERGIES:  Review of patient's allergies indicates:  No Known Allergies    MEDICATIONS:    Current Outpatient Medications:     amlodipine (NORVASC) 2.5 MG tablet, Take 1 tablet (2.5 mg total) by mouth once daily., Disp: 30 tablet, Rfl: 11    aspirin (ECOTRIN) 81 MG EC tablet, Take 81 mg by mouth once daily. , Disp: , Rfl:     digoxin (LANOXIN) 125 mcg tablet, Take 1 tablet (125 mcg total) by mouth once daily., Disp: 31 tablet, Rfl: 6    famotidine (PEPCID) 20 MG tablet, Take 1 tablet (20 mg total) by mouth 2 (two) times daily., Disp: 60 tablet, Rfl: 2    hydroCHLOROthiazide (HYDRODIURIL) 25 MG tablet, Take 1 tablet (25 mg total) by mouth once daily., Disp: 30 tablet, Rfl: 1    sotalol (BETAPACE) 120 MG Tab, Take 1 tablet (120 mg total) by mouth 2 (two) times daily., Disp: 60 tablet, Rfl: 11  No current facility-administered medications for this visit.     Facility-Administered Medications Ordered in Other Visits:     0.9%  NaCl infusion, , Intravenous, Continuous, Slime Salcido NP, Last Rate: 0 mL/hr at 02/06/19 1521    sodium chloride 0.9% flush 5 mL, 5 mL, Intravenous, PRN, Slime Salcido NP      PHYSICAL EXAM:   Vitals:    08/08/19 1306   BP: 126/82   Pulse: 67   SpO2: 98%   Weight: 118.5 kg (261 lb 3.9 oz)   Height: 5' 6" (1.676 m)         GENERAL: Awake, well-developed well-nourished, no apparent distress  HEENT: mucous membranes moist and pink, normocephalic atraumatic, no cranial or carotid bruits, sclera anicteric  NECK: no jugular venous distention, no lymphadenopathy  CHEST: Good air movement, clear to auscultation bilaterally  CARDIOVASCULAR: Quiet precordium, regular rate and rhythm, S1S2, no murmurs rubs or gallops  ABDOMEN: Soft, nontender nondistended, no " hepatomegaly, no aortic bruits  EXTREMITIES: Warm well perfused, 2+ radial/pedal pulses, capillary refill 2 seconds, no clubbing, cyanosis, or edema  NEURO: Alert and oriented, cooperative with exam, face symmetric, moves all extremities well    STUDIES:  ECG: Normal Sinus rhythm, right bundle branch block    Loop: symptom episodes correlate with PAC's and up to 3 beat runs of atrial tachycardia.      ASSESSMENT:  19 y/o female with pulmonary atresia intact septum with biventricular repair with charles valve in tricuspid position who has likely IART now controlled on beta blocker but feels tired with beta blocker.  She is s/p atrial flutter ablation now with recurrent palpitations.    PLAN:   Increase fluid intake  Increase Sotalol to 120mg po BID  Continue Digoxin  Continue aspirin  Continue monthly loop transmissions  F/u in 4 months in EP clinic.  Keep f/u with Dr. Gonzalez in between.  Call for further palpitations, chest pain, syncope, or any other questions or concerns.  Light aerobic exercise for 1 hour per day 5 days per week.      Time Spent: 40 (min) with over 50% in direct patient and family consultation regarding evaluation and management of SVT vs. IART with congenital heart disease..      The patient's doctor will be notified via EPIC    I hope this brings you up-to-date on Kacey Ruth  Please contact me with any questions or concerns.    Ricardo Woodward MD  Pediatric and Adult Congenital Electrophysiologist  Pediatric Cardiologist

## 2019-08-22 ENCOUNTER — OFFICE VISIT (OUTPATIENT)
Dept: CARDIOLOGY | Facility: CLINIC | Age: 19
End: 2019-08-22
Payer: MEDICAID

## 2019-08-22 ENCOUNTER — CLINICAL SUPPORT (OUTPATIENT)
Dept: CARDIOLOGY | Facility: CLINIC | Age: 19
End: 2019-08-22
Payer: MEDICAID

## 2019-08-22 VITALS
BODY MASS INDEX: 42.11 KG/M2 | DIASTOLIC BLOOD PRESSURE: 63 MMHG | HEIGHT: 66 IN | SYSTOLIC BLOOD PRESSURE: 132 MMHG | HEART RATE: 71 BPM | OXYGEN SATURATION: 97 % | WEIGHT: 262 LBS

## 2019-08-22 DIAGNOSIS — Z95.3 HISTORY OF PULMONARY VALVE REPLACEMENT WITH BIOPROSTHETIC VALVE: ICD-10-CM

## 2019-08-22 DIAGNOSIS — I48.92 ATRIAL FLUTTER, UNSPECIFIED TYPE: Primary | ICD-10-CM

## 2019-08-22 DIAGNOSIS — E66.3 OVERWEIGHT: ICD-10-CM

## 2019-08-22 DIAGNOSIS — Z95.3 HISTORY OF TRICUSPID VALVE REPLACEMENT WITH BIOPROSTHETIC VALVE: ICD-10-CM

## 2019-08-22 DIAGNOSIS — I48.92 ATRIAL FLUTTER: ICD-10-CM

## 2019-08-22 PROCEDURE — 93320 PR DOPPLER ECHO HEART,COMPLETE: ICD-10-PCS | Mod: S$GLB,,, | Performed by: PEDIATRICS

## 2019-08-22 PROCEDURE — 93303 PR ECHO XTHORACIC,CONG A2M,COMPLETE: ICD-10-PCS | Mod: S$GLB,,, | Performed by: PEDIATRICS

## 2019-08-22 PROCEDURE — 93325 PR DOPPLER COLOR FLOW VELOCITY MAP: ICD-10-PCS | Mod: S$GLB,,, | Performed by: PEDIATRICS

## 2019-08-22 PROCEDURE — 99214 PR OFFICE/OUTPT VISIT, EST, LEVL IV, 30-39 MIN: ICD-10-PCS | Mod: 25,S$GLB,, | Performed by: PEDIATRICS

## 2019-08-22 PROCEDURE — 93303 ECHO TRANSTHORACIC: CPT | Mod: S$GLB,,, | Performed by: PEDIATRICS

## 2019-08-22 PROCEDURE — 93320 DOPPLER ECHO COMPLETE: CPT | Mod: S$GLB,,, | Performed by: PEDIATRICS

## 2019-08-22 PROCEDURE — 93325 DOPPLER ECHO COLOR FLOW MAPG: CPT | Mod: S$GLB,,, | Performed by: PEDIATRICS

## 2019-08-22 PROCEDURE — 93000 ELECTROCARDIOGRAM COMPLETE: CPT | Mod: S$GLB,,, | Performed by: PEDIATRICS

## 2019-08-22 PROCEDURE — 99214 OFFICE O/P EST MOD 30 MIN: CPT | Mod: 25,S$GLB,, | Performed by: PEDIATRICS

## 2019-08-22 PROCEDURE — 93000 PR ELECTROCARDIOGRAM, COMPLETE: ICD-10-PCS | Mod: S$GLB,,, | Performed by: PEDIATRICS

## 2019-08-22 NOTE — PROGRESS NOTES
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 She underwent reconstruction of the right ventricular outflow tract and placement of a bioprosthetic pulmonary valve in early childhood. She subsequently has required multiple heart valve replacements. Her most recent heart surgery involved placement of a bioprosthetic valve in the pulmonary position and placement of a bioprosthetic valve in the tricuspid position, in 2007. Recently, she had a heart catheterization for recurrent insufficiency and obstruction of the bioprosthetic tricuspid valve.  The current procedure entailed a trans-catheter approach. It involved a valvuloplasty with a Z-Med 25 x 5 cm balloon followed by placement of an Sotelo S3 26 mm bioprosthetic valve in the tricuspid position.. 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 She leads a rather sedentary lifestyle, and is overweight. She was on:  Digoxin 0.125 mg qd, Norvasc 2.5 mg once a day for BP control. The Lasix was changed to HCTZ 25 mg once a day.  Sotalol was started (see below).  Warfarin was DCed.     Kacey underwent an EPS with transcatheter ablation for both typical and atypical reentry atrial tachycardia, at [facility], by Dr Robles and Dr Woodward. She comes to clinic today for follow-up. She reports having had several short episodes of rapid HRs in the middle of the night. It happens about 3 times per week; they get her up and she gets very frighten. A recent transmission showed a run of probable atrial flutter at  ~ 170 bpm.  "She did have thyroid function studies and they were found to be normal".      Kacey was later admitted to [facility] for additional treatment of atrial " "tachycardia. Her Metoprolol was DCed and she was started on Sotalol 80 mg q 12 hrs. She also had an implantable loop recorder placed.  Clinically, she reports doing better on the Sotalol.  No lightheadedness, syncope, seizures or abnormally slow or rapid HRs. She was subsequently admitted to [facility] for "severe abdominal pain",   Her W/U was negative, except for an abnormal liver ultrasound, which showed echogenic spots, enlargement and congestion. ? related to R- sided cardiac status.  Liver biopsy indicated no significant cirrosis.          Currently, Kacey has complaints of HAs since being on the Sotalol.  She also reports still having short episodes of rapid HRs while lying down. She feels the heart pounding in her back.         REVIEW OF SYSTEMS:  There are no visual disturbances. No HAs, lightheadedness, syncope or seizures. No swallowing disorder or PIETER. No difficulty breathing, SOB or wheezing.  No asthma.  Bowel movements appear to be normal.  No pelvic pain. Urination is normal.  No blood in the stool or urine. No DM of thyroid disorder.  No lipid disorder. No arterial disease. No known intrinsic problem with the lungs, liver or kidneys. No bleeding condition. No smoking or ETOH use.  There is a FH of strokes and adult unset DM.         PHYSICAL EXAMINATION:   GENERAL:  The patient is an overweight 18 -year-old female in no distress.  VITAL SIGNS:  Her weight is 118.9 kg (262 lbs) and her height is 1.676 meters (5' 6''). Heart rate is 71 beats per minute (sinus).  Sat 97 %.   Blood pressure in the right arm is 132/63 mmHg in the RA..  Color and perfusion are good.  HEENT:  Eye movements are normal.  No bruits are noted over the head.  No jugular venous distention or pulsations.  Mucous membranes are moist and pink.  Throat is clear with no evidence of blood. There is no adenopathy.  The neck is supple. Right catheter biopsy site is clean and no bruit.  No carotid bruits. The thyroid is not " enlarged. SKIN:  Is pink and well perfused. CHEST:  Loop recorder is in the L upper chest. Small scar, which is clean and not reddened. No evidence of infection. There is a well-healed midline scar.  Lungs are clear to auscultation bilaterally, although the breath sounds are somewhat decreased at the base. There are no wheezes or rhonchi. CARDIOVASCULAR:  Precordial activity is normal.  HR ~ 70 bpm.  The first heart sound is prominent and crisp.  The second heart sound is narrowly split and eccentuated. There is a grade 1/6 systolic ejection murmur heard best up the left sternal border and across the base.  Short diastolic murmur is heard today. ABDOMEN:  There is exogenous obesity.  The liver edge is precussed about 4 FBs at the right costal margin.  It is nonpulsatile and nontender. Pain on mild palpation over the left anterior/lateral abdomen, no bruising.   Bowel sounds appear to be normal. EXTREMITIES:  Pulses are 2+ throughout.  Capillary refill is good. There is no cyanosis or peripheral edema.  No bruising in the groins or exts.  No rashes or cyanosis.  No bruits in the groins.              ........................................................................................................................................        ECG (Today): Sinus rhythm at a ventricular rate of 68 bpm.  Atrial abnormality. ME at 160 ms.  RBBB (160 ms). T wave abnormality.  Prolonged QTc at 495 ms,  but she has a RBBB.  (Need to follow since on Sotalol).                 ECHOCARDIOGRAM:   An echocardiogram was performed.  2-D STUDY: There is no pericardial effusion.  No clots or vegetations. The bioprosthetic tricuspid valve is seen to be in good position. Annulus is 20-22 mm. The struts are easily seen.  The leaflets are mobile. No evidence for thrombus formation.  The right atrium is enlarged measuring 5.0 x 4.5 cm or area 17 - 18 cm2 (dilated).  One can also see the bioprosthetic tricuspid valve in a cross sectional  "view.  It appears circular and measures 2 cm x 2 cm. Again, no clots or vegetations are seen.  No right ventricular or left ventricular outflow tract obstruction.  The right ventricular circumferential area of shortening is  ~30%,  which is reduced.       M-MODE:   LV 5.3 cm, RV 3.3 cm, Ao 2.9 cm, LA 4.6 cm (enlarged), Sep 1.2 cm, PW 1.2 cm, EF ~ 59 % (slightly reduced).      DOPPLER VELOCITY ANALYSIS:  There is trivial insufficiency of the recently-placed bioprosthetic tricuspid valve.  Mild turbulent antegrade flow is seen. Continuous wave Doppler analysis across the valve shows a peak pressure drop of 17 mmHg with a mean value of 9 mmHg. Importantly, the leaflets are moving freely.  There is no mitral insufficiency.  Mild aortic insufficiency  (P1/2 513 ms).  Peak pressure drop across the aortic valve is 8 mmHg. Peak pressure drop across the bioprosthetic pulmonary valve is 20 mmHg. Mild PI. PVs to the LA.                    .................................................................................................................................................               ASSESSMENT:  In summary, we have a 18-year-old AA female originally diagnosed to have pulmonary atresia with intact inter-ventricular septum, who is status-post multiple open heart operations involving both the pulmonary and tricuspid valves.  In February 2017 she had placement of an Sotelo S3 26 mm bioprosthetic tricuspid valve via the transvenous approach.  She tolerated the procedure well. Clinically, she has been stable. However, she continues to have poor exercise capacity, chronic fatigue and more recently developed a new rhythm disturbance, diagnosed to be atrial flutter.  She underwent ablation for this atrial rhythm disturbance.  "She was then started on Sotalol 80 mg q 12 hrs.  Also, a loop recorder was placed under the skin in the upper chest.   The Sotalol has been increased to 120 mg q 12 hrs.  She appears to be " tolerating the Sotalol reasonably well (Watch the QTc and MD interval)l.   More recently, Kacey underwent transvenous biopsy of the liver, which showed mild chirosis.   I AM VERY CONCERNED ABOUT HER PROGNOSIS BECAUSE OF THE SERIOUS NATURE OF HER HEART DISEASE.     PLAN:  Given our findings, our suggestions are as follows:  1).  She of course needs antibiotic prophylaxis for any invasive procedure, especially dental work.   2).  She should continue with the Sotalol.   The dose was increased to 120 mg q 12 hrs, HCTZ 25 mg qd, and her other remaining meds.  She was started on Pepcid 20 mg qd. 3). Suspect Kacey needs a GI consult, because of chronic at times severe abdominal pain. 4) RTC in 2 weeks to download the loop recorder.  If there are any questions concerning the cardiac status of this patient, please feel free to contact us. Thank you so much for allowing us to partake in the care of this patient. Donovan Gonzalez PhD, MD.

## 2019-08-27 ENCOUNTER — OFFICE VISIT (OUTPATIENT)
Dept: CARDIOLOGY | Facility: CLINIC | Age: 19
End: 2019-08-27
Payer: MEDICAID

## 2019-08-27 VITALS
BODY MASS INDEX: 41.85 KG/M2 | WEIGHT: 260.38 LBS | HEART RATE: 73 BPM | HEIGHT: 66 IN | DIASTOLIC BLOOD PRESSURE: 73 MMHG | SYSTOLIC BLOOD PRESSURE: 116 MMHG | OXYGEN SATURATION: 97 %

## 2019-08-27 DIAGNOSIS — I48.92 ATRIAL FLUTTER, UNSPECIFIED TYPE: Primary | ICD-10-CM

## 2019-08-27 DIAGNOSIS — Z95.3 HISTORY OF PULMONARY VALVE REPLACEMENT WITH BIOPROSTHETIC VALVE: ICD-10-CM

## 2019-08-27 DIAGNOSIS — Z95.3 HISTORY OF TRICUSPID VALVE REPLACEMENT WITH BIOPROSTHETIC VALVE: ICD-10-CM

## 2019-08-27 DIAGNOSIS — E66.3 OVERWEIGHT: ICD-10-CM

## 2019-08-27 PROCEDURE — 93272 PR EXT ECG,PT DEMAND EVENT, SYMPT MEMORY LOOP, PHYS REVIEW&INTERP: ICD-10-PCS | Mod: S$GLB,,, | Performed by: PEDIATRICS

## 2019-08-27 PROCEDURE — 93000 PR ELECTROCARDIOGRAM, COMPLETE: ICD-10-PCS | Mod: 59,S$GLB,, | Performed by: PEDIATRICS

## 2019-08-27 PROCEDURE — 93272 ECG/REVIEW INTERPRET ONLY: CPT | Mod: S$GLB,,, | Performed by: PEDIATRICS

## 2019-08-27 PROCEDURE — 99213 OFFICE O/P EST LOW 20 MIN: CPT | Mod: 25,S$GLB,, | Performed by: PEDIATRICS

## 2019-08-27 PROCEDURE — 99213 PR OFFICE/OUTPT VISIT, EST, LEVL III, 20-29 MIN: ICD-10-PCS | Mod: 25,S$GLB,, | Performed by: PEDIATRICS

## 2019-08-27 PROCEDURE — 93000 ELECTROCARDIOGRAM COMPLETE: CPT | Mod: 59,S$GLB,, | Performed by: PEDIATRICS

## 2019-08-27 NOTE — PROGRESS NOTES
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 She underwent reconstruction of the right ventricular outflow tract and placement of a bioprosthetic pulmonary valve in early childhood. She subsequently has required multiple heart valve replacements. Her most recent heart surgery involved placement of a bioprosthetic valve in the pulmonary position and placement of a bioprosthetic valve in the tricuspid position, in 2007. Recently, she had a heart catheterization for recurrent insufficiency and obstruction of the bioprosthetic tricuspid valve.  The current procedure entailed a trans-catheter approach. It involved a valvuloplasty with a Z-Med 25 x 5 cm balloon followed by placement of an Sotelo S3 26 mm bioprosthetic valve in the tricuspid position.. 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 She leads a rather sedentary lifestyle, and is overweight. She is on:  Digoxin 0.125 mg qd, Norvasc 2.5 mg once a day for BP control. The Lasix was changed to HCTZ 25 mg once a day.  Sotalol was started (see below).  Warfarin was DCed.     Kacey underwent an EPS with transcatheter ablation for both typical and atypical reentry atrial tachycardia, at [facility], by Dr Robles and Dr Woodward. Post ablation, a  transmission showed a run of probable atrial flutter at  ~ 170 bpm. Kacey was admitted to [facility] for additional treatment of atrial tachycardia. Her Metoprolol was DCed and she was started on Sotalol 80 mg q 12 hrs. She also had an implantable loop recorder placed.  Currently, Kacey has complaints of HAs since being on the Sotalol.  She also reports still having short episodes of rapid HRs while lying down. She feels  her  heart pounding in the back.   Download of loop recorder today.       REVIEW OF SYSTEMS:  There are no visual disturbances. No HAs, lightheadedness, syncope or seizures. No swallowing disorder or PIETER. No difficulty breathing, SOB or wheezing.  No asthma.  Bowel movements appear to be normal.  No pelvic pain. Urination is normal.  No blood in the stool or urine. No DM of thyroid disorder.  No lipid disorder. No arterial disease. No known intrinsic problem with the lungs, liver or kidneys. No bleeding condition. No smoking or ETOH use.  There is a FH of strokes and adult unset DM.         PHYSICAL EXAMINATION:   GENERAL:  The patient is an overweight 18 -year-old female in no distress.  VITAL SIGNS:  Her weight is 118.1 kg (260 lbs) and her height is 1.676 meters (5' 6''). Heart rate is 73 beats per minute (sinus).  Sat 97 %.   Blood pressure in the right arm is 116/73 mmHg in the RA..  Color and perfusion are good.  HEENT:  Eye movements are normal.  No bruits are noted over the head.  No jugular venous distention or pulsations.  Mucous membranes are moist and pink.  Throat is clear with no evidence of blood. There is no adenopathy.  The neck is supple. No carotid bruits. The thyroid is not enlarged. SKIN:  Is pink and well perfused. CHEST:  Loop recorder is in the L upper chest with a small scar. There is a well-healed midline scar.  Lungs are clear to auscultation bilaterally, although the breath sounds are somewhat decreased at the base. There are no wheezes or rhonchi. CARDIOVASCULAR:  Precordial activity is normal.  HR ~ 70 bpm.  The first heart sound is prominent and crisp.  The second heart sound is narrowly split and eccentuated. There is a grade 1/6 systolic ejection murmur heard best up the left sternal border and across the base.  No diastolic murmur is heard today.   ABDOMEN:  There is exogenous obesity. The liver edge is precussed about 4 FBs at the right costal margin.  It is nonpulsatile and  nontender.  Bowel sounds appear to be normal. EXTREMITIES:  Pulses are 2+ throughout. Capillary refill is good. There is no cyanosis or peripheral edema.  No bruising in the groins or exts.  No rashes or cyanosis.  No bruits in the groins.              ........................................................................................................................................        ECG (Today): Sinus rhythm at a ventricular rate of 72 bpm.  Atrial abnormality. TX at 160 ms.  RBBB (156 ms). T wave abnormality.  Prolonged QTc at 488 ms,  but she has a RBBB.  (Need to follow since on Sotalol).                 ECHOCARDIOGRAM (Previous visit):   An echocardiogram was performed.  2-D STUDY: There is no pericardial effusion.  No clots or vegetations. The bioprosthetic tricuspid valve is seen to be in good position. Annulus is 20-22 mm. The struts are easily seen.  The leaflets are mobile. No evidence for thrombus formation.  The right atrium is enlarged measuring 5.0 x 4.5 cm or area 17 - 18 cm2 (dilated).  One can also see the bioprosthetic tricuspid valve in a cross sectional view.  It appears circular and measures 2 cm x 2 cm. Again, no clots or vegetations are seen.  No right ventricular or left ventricular outflow tract obstruction.  The right ventricular circumferential area of shortening is  ~30%,  which is reduced.       M-MODE:   LV 5.3 cm, RV 3.3 cm, Ao 2.9 cm, LA 4.6 cm (enlarged), Sep 1.2 cm, PW 1.2 cm, EF ~ 59 % (slightly reduced).      DOPPLER VELOCITY ANALYSIS:  There is trivial insufficiency of the recently-placed bioprosthetic tricuspid valve.  Mild turbulent antegrade flow is seen. Continuous wave Doppler analysis across the valve shows a peak pressure drop of 17 mmHg with a mean value of 9 mmHg. Importantly, the leaflets are moving freely.  There is no mitral insufficiency.  Mild aortic insufficiency  (P1/2 513 ms).  Peak pressure drop across the aortic valve is 8 mmHg. Peak pressure drop  "across the bioprosthetic pulmonary valve is 20 mmHg. Mild PI. PVs to the LA.        DOWNLOAD OF LOOP RECORDER:  No atrial flutter or fib. Rare atrial couplet. No sustained atrial ectopy. No ventricular ectopy.           .................................................................................................................................................               ASSESSMENT:  In summary, we have a 18-year-old AA female originally diagnosed to have pulmonary atresia with intact inter-ventricular septum, who is status-post multiple open heart operations involving both the pulmonary and tricuspid valves.  In February 2017 she had placement of an Sotelo S3 26 mm bioprosthetic tricuspid valve via the transvenous approach.  She tolerated the procedure well.  She recently developed atrial flutter, for which  she underwent an ablation.  "She was then started on Sotalol 80 mg q 12 hrs.  Also, a loop recorder was placed under the skin in the upper chest.   The Sotalol has recently been increased to 120 mg q 12 hrs.  She appears to be tolerating the Sotalol, except for head aches.      PLAN:  Given our findings, our suggestions are as follows:  1).  She of course needs antibiotic prophylaxis for any invasive procedure, especially dental work.   2).  She should continue with the Sotalol at 120 mg q 12 hrs.   She should continue with her remaining meds.  She was started on Pepcid 20 mg qd. 3). Suspect Kacey needs a GI consult, because of chronic abdominal pain. 4) RTC in 4 weeks.  If there are any questions concerning the cardiac status of this patient, please feel free to contact us. Thank you so much for allowing us to partake in the care of this patient. Donovan Gonzalez PhD, MD.                      "

## 2019-09-11 NOTE — PROGRESS NOTES
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 She underwent reconstruction of the right ventricular outflow tract and placement of a bioprosthetic pulmonary valve in early childhood. She subsequently has required multiple heart valve replacements. Her most recent heart surgery involved placement of a bioprosthetic valve in the pulmonary position and placement of a bioprosthetic valve in the tricuspid position, in 2007. Recently, she had a heart catheterization for recurrent insufficiency and obstruction of the bioprosthetic tricuspid valve.  The current procedure entailed a trans-catheter approach. It involved a valvuloplasty with a Z-Med 25 x 5 cm balloon followed by placement of an Sotelo S3 26 mm bioprosthetic valve in the tricuspid position.. 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 She leads a rather sedentary lifestyle, and is overweight. She is on: Norvasc 2.5 mg once a day for BP control. The Lasix was changed to HCTZ 25 mg once a day.  Now on Sotalol 80 mg twice a day for residual atrial flutter.  Warfarin was DCed.  She is taking ASA 81 mg qd.     In the recent past, Kacey underwent an EPS with transcatheter ablation for both typical and atypical reentry atrial tachycardia, at [facility], by Dr Robles and Dr Woodward. Post ablation, a transmission showed a run of probable atrial flutter at  ~ 170 bpm. Kacey was admitted to [facility] for additional treatment of the atrial tachycardia. Her Metoprolol was DCed and, at that time, she was started on Sotalol 80 mg q 12 hrs. She also had an implantable loop recorder placed. Kacey has had complaints of HAs since being on the Sotalol.     She is here  today for follow-up and down load of the loop recorder.    REVIEW OF SYSTEMS:  There are no visual disturbances or hearing problems. No HAs, lightheadedness, syncope or seizures. No swallowing disorder or PIETER. No difficulty breathing, SOB or wheezing.  No asthma.  Bowel movements appear to be normal.  No pelvic pain. Urination is normal.  No blood in the stool or urine. No DM of thyroid disorder.  No lipid disorder. No arterial disease. No known intrinsic problem with the lungs, liver or kidneys. No intrinsic bleeding condition. No smoking or ETOH use.  Not sexually active. There is a FH of strokes and adult onset DM.         PHYSICAL EXAMINATION:   GENERAL:  The patient is an overweight 18 -year-old female in no distress.  VITAL SIGNS:  Her weight is 117.5 kg (259 lbs) and her height is 1.676 meters (5' 6''). Heart rate is 76 beats per minute (sinus).  Sat 98 %.   Blood pressure in the right arm is 127/71 mmHg in the RA..  Color and perfusion are good.  HEENT:  Eye movements are normal.  No bruits are noted over the head.  No jugular venous distention or pulsations.  Mucous membranes are moist and pink.  Throat is clear with no evidence of blood. There is no adenopathy.  The neck is supple. No carotid bruits. The thyroid is not enlarged. SKIN:  Is pink and well perfused. CHEST:  Loop recorder is in the L upper chest with a small scar. There is a well-healed midline scar.  Lungs are clear to auscultation bilaterally, although the breath sounds are somewhat decreased at the base. There are no wheezes or rhonchi. CARDIOVASCULAR:  Precordial activity is normal.  HR ~ 70 bpm.  The first heart sound is prominent and crisp.  The second heart sound is narrowly split and eccentuated. There is a grade 1/6 systolic ejection murmur heard best up the left sternal border and across the base.  No diastolic murmur is heard today.   ABDOMEN:  There is exogenous obesity. The liver edge is precussed about 4 FBs at the right costal  margin.  It is nonpulsatile and nontender.  Bowel sounds appear to be normal. EXTREMITIES:  Pulses are 2+ throughout. Capillary refill is good. There is no cyanosis or peripheral edema.  No bruising in the groins or exts.  No rashes or cyanosis.  No bruits in the groins.              ........................................................................................................................................        ECG (Today): Sinus rhythm at a ventricular rate of 70 bpm.  Atrial abnormality. SC at 208 ms.  RBBB (156 ms). T wave abnormality.  Prolonged QTc at 496 ms,  but she also has a RBBB.  (Need to follow QT since on Sotalol).     DOWNLOAD of LOOP RECORDER:   N                ECHOCARDIOGRAM (Previous visit):   An echocardiogram was performed.  2-D STUDY: There is no pericardial effusion.  No clots or vegetations. The bioprosthetic tricuspid valve is seen to be in good position. Annulus is 20-22 mm. The struts are easily seen.  The leaflets are mobile. No evidence for thrombus formation.  The right atrium is enlarged measuring 5.0 x 4.5 cm or area 17 - 18 cm2 (dilated).  One can also see the bioprosthetic tricuspid valve in a cross sectional view.  It appears circular and measures 2 cm x 2 cm. Again, no clots or vegetations are seen.  No right ventricular or left ventricular outflow tract obstruction.  The right ventricular circumferential area of shortening is  ~30%,  which is reduced.       M-MODE:   LV 5.3 cm, RV 3.3 cm, Ao 2.9 cm, LA 4.6 cm (enlarged), Sep 1.2 cm, PW 1.2 cm, EF ~ 59 % (slightly reduced).      DOPPLER VELOCITY ANALYSIS:  There is trivial insufficiency of the recently-placed bioprosthetic tricuspid valve.  Mild turbulent antegrade flow is seen. Continuous wave Doppler analysis across the valve shows a peak pressure drop of 17 mmHg with a mean value of 9 mmHg. Importantly, the leaflets are moving freely.  There is no mitral insufficiency.  Mild aortic insufficiency  (P1/2 513 ms).  " Peak pressure drop across the aortic valve is 8 mmHg. Peak pressure drop across the bioprosthetic pulmonary valve is 20 mmHg. Mild PI. PVs to the LA.        DOWNLOAD OF LOOP RECORDER (Today):  No atrial flutter or fib. Rare atrial couplet. One atrial triplet.  No sustained atrial ectopy. Single PVC. No ventricular ectopy. We decreased th upper rate to 130 bpm.           .................................................................................................................................................               ASSESSMENT:  In summary, we have a 19-year-old AA female originally diagnosed to have pulmonary atresia with intact inter-ventricular septum, who is status-post multiple open heart operations involving both the pulmonary and tricuspid valves.  In February 2017 she had placement of an Sotelo S3 26 mm bioprosthetic tricuspid valve via the transvenous approach.  She tolerated the procedure well.  She recently developed atrial flutter, for which  she underwent an ablation.  "She was then started on Sotalol 80 mg q 12 hrs.  Also, a loop recorder was placed under the skin in the upper chest.   The "Sotalol has recently been increased to 120 mg q 12 hrs".  She appears to be tolerating the Sotalol, except for a few headaches.              PLAN:  Given our findings, our suggestions are as follows:  1). She of course needs antibiotic prophylaxis for any invasive procedure, especially dental work.   2).  She should continue with the Sotalol at 120 mg q 12 hrs.  ? May need a low-dose beta blocker.  She should continue with her remaining medications. 3) We had decreased the upper rate to 130 bpm on the loop recorder. It was 150 bpm. 4) She has been started on Pepcid 20 mg qd. 5). Suspect Kacey needs a GI consult, because of chronic abdominal pain. 6) RTC in 2-3 mos.  If there are any questions concerning the cardiac status of this patient, please feel free to contact us. Thank you so much for allowing " us to partake in the care of this patient. Donovan Gonzalez PhD, MD.                                  ...............................................................................................................................                    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 She underwent reconstruction of the right ventricular outflow tract and placement of a bioprosthetic pulmonary valve in early childhood. She subsequently has required multiple heart valve replacements. Her most recent heart surgery involved placement of a bioprosthetic valve in the pulmonary position and placement of a bioprosthetic valve in the tricuspid position, in 2007. Recently, she had a heart catheterization for recurrent insufficiency and obstruction of the bioprosthetic tricuspid valve.  The current procedure entailed a trans-catheter approach. It involved a valvuloplasty with a Z-Med 25 x 5 cm balloon followed by placement of an Sotelo S3 26 mm bioprosthetic valve in the tricuspid position.. 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 She leads a rather sedentary lifestyle, and is overweight. She is on:  Digoxin 0.125 mg qd, Norvasc 2.5 mg once a day for BP control. The Lasix was changed to HCTZ 25 mg once a day.  Sotalol was started (see below).  Warfarin was DCed.  She is taking ASA 81 mg qd.     Kacey underwent an EPS with transcatheter ablation for both typical and atypical reentry atrial tachycardia, at [facility], by Dr Robles and Dr Woodward. Post ablation, a  transmission showed a run of probable atrial flutter at  ~ 170 bpm. Kacey was admitted to [facility] for additional treatment of the atrial tachycardia. Her  Metoprolol was DCed and she was started on Sotalol 80 mg q 12 hrs. She also had an implantable loop recorder placed.  Currently, Kacey has complaints of HAs since being on the Sotalol.  She also reports still having short episodes of rapid HRs while lying down. She feels her  heart pounding in the back.  Mom reports there have been several episodes of rapid heart rates, which she has recorded on her cell phone.  Download of the loop recorder today.       REVIEW OF SYSTEMS:  There are no visual disturbances. No HAs, lightheadedness, syncope or seizures. No swallowing disorder or PIETER. No difficulty breathing, SOB or wheezing.  No asthma.  Bowel movements appear to be normal.  No pelvic pain. Urination is normal.  No blood in the stool or urine. No DM of thyroid disorder.  No lipid disorder. No arterial disease. No known intrinsic problem with the lungs, liver or kidneys. No intrinsic bleeding condition. No smoking or ETOH use.  Not sexually active. There is a FH of strokes and adult onset DM.         PHYSICAL EXAMINATION:   GENERAL:  The patient is an overweight 18 -year-old female in no distress.  VITAL SIGNS:  Her weight is 117.5 kg (259 lbs) and her height is 1.676 meters (5' 6''). Heart rate is 76 beats per minute (sinus).  Sat 98 %.   Blood pressure in the right arm is 127/71 mmHg in the RA..  Color and perfusion are good.  HEENT:  Eye movements are normal.  No bruits are noted over the head.  No jugular venous distention or pulsations.  Mucous membranes are moist and pink.  Throat is clear with no evidence of blood. There is no adenopathy.  The neck is supple. No carotid bruits. The thyroid is not enlarged. SKIN:  Is pink and well perfused. CHEST:  Loop recorder is in the L upper chest with a small scar. There is a well-healed midline scar.  Lungs are clear to auscultation bilaterally, although the breath sounds are somewhat decreased at the base. There are no wheezes or rhonchi. CARDIOVASCULAR:  Precordial  activity is normal.  HR ~ 70 bpm.  The first heart sound is prominent and crisp.  The second heart sound is narrowly split and eccentuated. There is a grade 1/6 systolic ejection murmur heard best up the left sternal border and across the base.  No diastolic murmur is heard today.   ABDOMEN:  There is exogenous obesity. The liver edge is precussed about 4 FBs at the right costal margin.  It is nonpulsatile and nontender.  Bowel sounds appear to be normal. EXTREMITIES:  Pulses are 2+ throughout. Capillary refill is good. There is no cyanosis or peripheral edema.  No bruising in the groins or exts.  No rashes or cyanosis.  No bruits in the groins.              ........................................................................................................................................        ECG (Today): Sinus rhythm at a ventricular rate of 70 bpm.  Atrial abnormality. VT at 208 ms.  RBBB (156 ms). T wave abnormality.  Prolonged QTc at 496 ms,  but she also has a RBBB.  (Need to follow QT since on Sotalol).                 ECHOCARDIOGRAM (Previous visit):   An echocardiogram was performed.  2-D STUDY: There is no pericardial effusion.  No clots or vegetations. The bioprosthetic tricuspid valve is seen to be in good position. Annulus is 20-22 mm. The struts are easily seen.  The leaflets are mobile. No evidence for thrombus formation.  The right atrium is enlarged measuring 5.0 x 4.5 cm or area 17 - 18 cm2 (dilated).  One can also see the bioprosthetic tricuspid valve in a cross sectional view.  It appears circular and measures 2 cm x 2 cm. Again, no clots or vegetations are seen.  No right ventricular or left ventricular outflow tract obstruction.  The right ventricular circumferential area of shortening is  ~30%,  which is reduced.       M-MODE:   LV 5.3 cm, RV 3.3 cm, Ao 2.9 cm, LA 4.6 cm (enlarged), Sep 1.2 cm, PW 1.2 cm, EF ~ 59 % (slightly reduced).      DOPPLER VELOCITY ANALYSIS:  There is  "trivial insufficiency of the recently-placed bioprosthetic tricuspid valve.  Mild turbulent antegrade flow is seen. Continuous wave Doppler analysis across the valve shows a peak pressure drop of 17 mmHg with a mean value of 9 mmHg. Importantly, the leaflets are moving freely.  There is no mitral insufficiency.  Mild aortic insufficiency  (P1/2 513 ms).  Peak pressure drop across the aortic valve is 8 mmHg. Peak pressure drop across the bioprosthetic pulmonary valve is 20 mmHg. Mild PI. PVs to the LA.        DOWNLOAD OF LOOP RECORDER (Today):  No atrial flutter or fib. Rare atrial couplet. One atrial triplet.  No sustained atrial ectopy. Single PVC. No ventricular ectopy. We decreased th upper rate to 130 bpm.           .................................................................................................................................................               ASSESSMENT:  In summary, we have a 18-year-old AA female originally diagnosed to have pulmonary atresia with intact inter-ventricular septum, who is status-post multiple open heart operations involving both the pulmonary and tricuspid valves.  In February 2017 she had placement of an Sotelo S3 26 mm bioprosthetic tricuspid valve via the transvenous approach.  She tolerated the procedure well.  She recently developed atrial flutter, for which  she underwent an ablation.  "She was then started on Sotalol 80 mg q 12 hrs.  Also, a loop recorder was placed under the skin in the upper chest.   The Sotalol has recently been increased to 120 mg q 12 hrs.  She appears to be tolerating the Sotalol, except for a few head aches.              PLAN:  Given our findings, our suggestions are as follows:  1). She of course needs antibiotic prophylaxis for any invasive procedure, especially dental work.   2).  She should continue with the Sotalol at 120 mg q 12 hrs.  ? May need a low-dose beta blocker.  She should continue with her remaining medications, " although we can stop the Digoxin.  3) Holter for 48 hrs. 4) Decrease upper rate to 130 bpm on the loop recorder. It was 150 bpm. 5) She has been started on Pepcid 20 mg qd. 6). Suspect Kacey needs a GI consult, because of chronic abdominal pain. 7) RTC in 4 weeks.  If there are any questions concerning the cardiac status of this patient, please feel free to contact us. Thank you so much for allowing us to partake in the care of this patient. Donovan Gonzalez PhD, MD.                    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 She underwent reconstruction of the right ventricular outflow tract and placement of a bioprosthetic pulmonary valve in early childhood. She subsequently has required multiple heart valve replacements. Her most recent heart surgery involved placement of a bioprosthetic valve in the pulmonary position and placement of a bioprosthetic valve in the tricuspid position, in 2007. Recently, she had a heart catheterization for recurrent insufficiency and obstruction of the bioprosthetic tricuspid valve.  The current procedure entailed a trans-catheter approach. It involved a valvuloplasty with a Z-Med 25 x 5 cm balloon followed by placement of an Sotelo S3 26 mm bioprosthetic valve in the tricuspid position.. 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 She leads a rather sedentary lifestyle, and is overweight. She is on:  Digoxin 0.125 mg qd, Norvasc 2.5 mg once a day for BP control. The Lasix was changed to HCTZ 25 mg once a day.  Sotalol was started (see below).  Warfarin was DCed.  She is taking ASA 81 mg qd.     Kacey underwent an EPS with transcatheter ablation for both typical and atypical reentry atrial tachycardia, at  [facility], by Dr Robles and Dr Woodward. Post ablation, a  transmission showed a run of probable atrial flutter at  ~ 170 bpm. Kacey was admitted to [facility] for additional treatment of the atrial tachycardia. Her Metoprolol was DCed and she was started on Sotalol 80 mg q 12 hrs. She also had an implantable loop recorder placed.  Currently, Kacey has complaints of HAs since being on the Sotalol.  She also reports still having short episodes of rapid HRs while lying down. She feels her  heart pounding in the back.  Mom reports there have been several episodes of rapid heart rates, which she has recorded on her cell phone.  Download of the loop recorder today.       REVIEW OF SYSTEMS:  There are no visual disturbances. No HAs, lightheadedness, syncope or seizures. No swallowing disorder or PIETER. No difficulty breathing, SOB or wheezing.  No asthma.  Bowel movements appear to be normal.  No pelvic pain. Urination is normal.  No blood in the stool or urine. No DM of thyroid disorder.  No lipid disorder. No arterial disease. No known intrinsic problem with the lungs, liver or kidneys. No intrinsic bleeding condition. No smoking or ETOH use.  Not sexually active. There is a FH of strokes and adult onset DM.         PHYSICAL EXAMINATION:   GENERAL:  The patient is an overweight 18 -year-old female in no distress.  VITAL SIGNS:  Her weight is 117.5 kg (259 lbs) and her height is 1.676 meters (5' 6''). Heart rate is 76 beats per minute (sinus).  Sat 98 %.   Blood pressure in the right arm is 127/71 mmHg in the RA..  Color and perfusion are good.  HEENT:  Eye movements are normal.  No bruits are noted over the head.  No jugular venous distention or pulsations.  Mucous membranes are moist and pink.  Throat is clear with no evidence of blood. There is no adenopathy.  The neck is supple. No carotid bruits. The thyroid is not enlarged. SKIN:  Is pink and well perfused. CHEST:  Loop recorder is in the  L upper chest with a small scar. There is a well-healed midline scar.  Lungs are clear to auscultation bilaterally, although the breath sounds are somewhat decreased at the base. There are no wheezes or rhonchi. CARDIOVASCULAR:  Precordial activity is normal.  HR ~ 70 bpm.  The first heart sound is prominent and crisp.  The second heart sound is narrowly split and eccentuated. There is a grade 1/6 systolic ejection murmur heard best up the left sternal border and across the base.  No diastolic murmur is heard today.   ABDOMEN:  There is exogenous obesity. The liver edge is precussed about 4 FBs at the right costal margin.  It is nonpulsatile and nontender.  Bowel sounds appear to be normal. EXTREMITIES:  Pulses are 2+ throughout. Capillary refill is good. There is no cyanosis or peripheral edema.  No bruising in the groins or exts.  No rashes or cyanosis.  No bruits in the groins.              ........................................................................................................................................        ECG (Today): Sinus rhythm at a ventricular rate of 70 bpm.  Atrial abnormality. DC at 208 ms.  RBBB (156 ms). T wave abnormality.  Prolonged QTc at 496 ms,  but she also has a RBBB.  (Need to follow QT since on Sotalol).                 ECHOCARDIOGRAM (Previous visit):   An echocardiogram was performed.  2-D STUDY: There is no pericardial effusion.  No clots or vegetations. The bioprosthetic tricuspid valve is seen to be in good position. Annulus is 20-22 mm. The struts are easily seen.  The leaflets are mobile. No evidence for thrombus formation.  The right atrium is enlarged measuring 5.0 x 4.5 cm or area 17 - 18 cm2 (dilated).  One can also see the bioprosthetic tricuspid valve in a cross sectional view.  It appears circular and measures 2 cm x 2 cm. Again, no clots or vegetations are seen.  No right ventricular or left ventricular outflow tract obstruction.  The right ventricular  "circumferential area of shortening is  ~30%,  which is reduced.       M-MODE:   LV 5.3 cm, RV 3.3 cm, Ao 2.9 cm, LA 4.6 cm (enlarged), Sep 1.2 cm, PW 1.2 cm, EF ~ 59 % (slightly reduced).      DOPPLER VELOCITY ANALYSIS:  There is trivial insufficiency of the recently-placed bioprosthetic tricuspid valve.  Mild turbulent antegrade flow is seen. Continuous wave Doppler analysis across the valve shows a peak pressure drop of 17 mmHg with a mean value of 9 mmHg. Importantly, the leaflets are moving freely.  There is no mitral insufficiency.  Mild aortic insufficiency  (P1/2 513 ms).  Peak pressure drop across the aortic valve is 8 mmHg. Peak pressure drop across the bioprosthetic pulmonary valve is 20 mmHg. Mild PI. PVs to the LA.        DOWNLOAD OF LOOP RECORDER (Today):  No atrial flutter or fib. Rare atrial couplet. One atrial triplet.  No sustained atrial ectopy. Single PVC. No ventricular ectopy. We decreased th upper rate to 130 bpm.           .................................................................................................................................................               ASSESSMENT:  In summary, we have a 18-year-old AA female originally diagnosed to have pulmonary atresia with intact inter-ventricular septum, who is status-post multiple open heart operations involving both the pulmonary and tricuspid valves.  In February 2017 she had placement of an Sotelo S3 26 mm bioprosthetic tricuspid valve via the transvenous approach.  She tolerated the procedure well.  She recently developed atrial flutter, for which  she underwent an ablation.  "She was then started on Sotalol 80 mg q 12 hrs.  Also, a loop recorder was placed under the skin in the upper chest.   The Sotalol has recently been increased to 120 mg q 12 hrs.  She appears to be tolerating the Sotalol, except for a few head aches.              PLAN:  Given our findings, our suggestions are as follows:  1). She of course needs " antibiotic prophylaxis for any invasive procedure, especially dental work.   2).  She should continue with the Sotalol at 120 mg q 12 hrs.  ? May need a low-dose beta blocker.  She should continue with her remaining medications, although we can stop the Digoxin.  3) Holter for 48 hrs. 4) Decrease upper rate to 130 bpm on the loop recorder. It was 150 bpm. 5) She has been started on Pepcid 20 mg qd. 6). Suspect Kacey needs a GI consult, because of chronic abdominal pain. 7) RTC in 4 weeks.  If there are any questions concerning the cardiac status of this patient, please feel free to contact us. Thank you so much for allowing us to partake in the care of this patient. Donovan Gonzalez PhD, MD.                          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 She underwent reconstruction of the right ventricular outflow tract and placement of a bioprosthetic pulmonary valve in early childhood. She subsequently has required multiple heart valve replacements. Her most recent heart surgery involved placement of a bioprosthetic valve in the pulmonary position and placement of a bioprosthetic valve in the tricuspid position, in 2007. Recently, she had a heart catheterization for recurrent insufficiency and obstruction of the bioprosthetic tricuspid valve.  The current procedure entailed a trans-catheter approach. It involved a valvuloplasty with a Z-Med 25 x 5 cm balloon followed by placement of an Sotelo S3 26 mm bioprosthetic valve in the tricuspid position.. 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 She leads a rather sedentary lifestyle, and is overweight. She is on:  Digoxin 0.125 mg qd, Norvasc 2.5 mg once a day for BP control. The Lasix was  changed to HCTZ 25 mg once a day.  Sotalol was started (see below).  Warfarin was DCed.  She is taking ASA 81 mg qd.     Kacey underwent an EPS with transcatheter ablation for both typical and atypical reentry atrial tachycardia, at [facility], by Dr Robles and Dr Woodward. Post ablation, a  transmission showed a run of probable atrial flutter at  ~ 170 bpm. Kacey was admitted to [facility] for additional treatment of the atrial tachycardia. Her Metoprolol was DCed and she was started on Sotalol 80 mg q 12 hrs. She also had an implantable loop recorder placed.  Currently, Kacey has complaints of HAs since being on the Sotalol.  She also reports still having short episodes of rapid HRs while lying down. She feels her  heart pounding in the back.  Mom reports there have been several episodes of rapid heart rates, which she has recorded on her cell phone.  Download of the loop recorder today.       REVIEW OF SYSTEMS:  There are no visual disturbances. No HAs, lightheadedness, syncope or seizures. No swallowing disorder or PIETER. No difficulty breathing, SOB or wheezing.  No asthma.  Bowel movements appear to be normal.  No pelvic pain. Urination is normal.  No blood in the stool or urine. No DM of thyroid disorder.  No lipid disorder. No arterial disease. No known intrinsic problem with the lungs, liver or kidneys. No intrinsic bleeding condition. No smoking or ETOH use.  Not sexually active. There is a FH of strokes and adult onset DM.         PHYSICAL EXAMINATION:   GENERAL:  The patient is an overweight 18 -year-old female in no distress.  VITAL SIGNS:  Her weight is 117.5 kg (259 lbs) and her height is 1.676 meters (5' 6''). Heart rate is 76 beats per minute (sinus).  Sat 98 %.   Blood pressure in the right arm is 127/71 mmHg in the RA..  Color and perfusion are good.  HEENT:  Eye movements are normal.  No bruits are noted over the head.  No jugular venous distention or pulsations.   Mucous membranes are moist and pink.  Throat is clear with no evidence of blood. There is no adenopathy.  The neck is supple. No carotid bruits. The thyroid is not enlarged. SKIN:  Is pink and well perfused. CHEST:  Loop recorder is in the L upper chest with a small scar. There is a well-healed midline scar.  Lungs are clear to auscultation bilaterally, although the breath sounds are somewhat decreased at the base. There are no wheezes or rhonchi. CARDIOVASCULAR:  Precordial activity is normal.  HR ~ 70 bpm.  The first heart sound is prominent and crisp.  The second heart sound is narrowly split and eccentuated. There is a grade 1/6 systolic ejection murmur heard best up the left sternal border and across the base.  No diastolic murmur is heard today.   ABDOMEN:  There is exogenous obesity. The liver edge is precussed about 4 FBs at the right costal margin.  It is nonpulsatile and nontender.  Bowel sounds appear to be normal. EXTREMITIES:  Pulses are 2+ throughout. Capillary refill is good. There is no cyanosis or peripheral edema.  No bruising in the groins or exts.  No rashes or cyanosis.  No bruits in the groins.              ........................................................................................................................................        ECG (Today): Sinus rhythm at a ventricular rate of 70 bpm.  Atrial abnormality. NV at 208 ms.  RBBB (156 ms). T wave abnormality.  Prolonged QTc at 496 ms,  but she also has a RBBB.  (Need to follow QT since on Sotalol).                 ECHOCARDIOGRAM (Previous visit):   An echocardiogram was performed.  2-D STUDY: There is no pericardial effusion.  No clots or vegetations. The bioprosthetic tricuspid valve is seen to be in good position. Annulus is 20-22 mm. The struts are easily seen.  The leaflets are mobile. No evidence for thrombus formation.  The right atrium is enlarged measuring 5.0 x 4.5 cm or area 17 - 18 cm2 (dilated).  One can also  "see the bioprosthetic tricuspid valve in a cross sectional view.  It appears circular and measures 2 cm x 2 cm. Again, no clots or vegetations are seen.  No right ventricular or left ventricular outflow tract obstruction.  The right ventricular circumferential area of shortening is  ~30%,  which is reduced.       M-MODE:   LV 5.3 cm, RV 3.3 cm, Ao 2.9 cm, LA 4.6 cm (enlarged), Sep 1.2 cm, PW 1.2 cm, EF ~ 59 % (slightly reduced).      DOPPLER VELOCITY ANALYSIS:  There is trivial insufficiency of the recently-placed bioprosthetic tricuspid valve.  Mild turbulent antegrade flow is seen. Continuous wave Doppler analysis across the valve shows a peak pressure drop of 17 mmHg with a mean value of 9 mmHg. Importantly, the leaflets are moving freely.  There is no mitral insufficiency.  Mild aortic insufficiency  (P1/2 513 ms).  Peak pressure drop across the aortic valve is 8 mmHg. Peak pressure drop across the bioprosthetic pulmonary valve is 20 mmHg. Mild PI. PVs to the LA.        DOWNLOAD OF LOOP RECORDER (Today):  No atrial flutter or fib. Rare atrial couplet. One atrial triplet.  No sustained atrial ectopy. Single PVC. No ventricular ectopy. We decreased th upper rate to 130 bpm.           .................................................................................................................................................               ASSESSMENT:  In summary, we have a 18-year-old AA female originally diagnosed to have pulmonary atresia with intact inter-ventricular septum, who is status-post multiple open heart operations involving both the pulmonary and tricuspid valves.  In February 2017 she had placement of an Sotelo S3 26 mm bioprosthetic tricuspid valve via the transvenous approach.  She tolerated the procedure well.  She recently developed atrial flutter, for which  she underwent an ablation.  "She was then started on Sotalol 80 mg q 12 hrs.  Also, a loop recorder was placed under the skin in the " upper chest.   The Sotalol has recently been increased to 120 mg q 12 hrs.  She appears to be tolerating the Sotalol, except for a few head aches.            PLAN:  Given our findings, our suggestions are as follows:  1). She of course needs antibiotic prophylaxis for any invasive procedure, especially dental work.   2).  She should continue with the Sotalol at 120 mg q 12 hrs.  ? May need a low-dose beta blocker.  She should continue with her remaining medications, although we can stop the Digoxin.  3) Holter for 48 hrs. 4) Decrease upper rate to 130 bpm on the loop recorder. It was 150 bpm. 5) She has been started on Pepcid 20 mg qd. 6). Suspect Kacey needs a GI consult, because of chronic abdominal pain. 7) RTC in 4 weeks.  If there are any questions concerning the cardiac status of this patient, please feel free to contact us. Thank you so much for allowing us to partake in the care of this patient. Donovan Gonzalez PhD, MD.

## 2019-09-12 ENCOUNTER — OFFICE VISIT (OUTPATIENT)
Dept: CARDIOLOGY | Facility: CLINIC | Age: 19
End: 2019-09-12
Payer: MEDICAID

## 2019-09-12 VITALS
DIASTOLIC BLOOD PRESSURE: 71 MMHG | HEART RATE: 76 BPM | WEIGHT: 259.13 LBS | BODY MASS INDEX: 41.65 KG/M2 | SYSTOLIC BLOOD PRESSURE: 127 MMHG | OXYGEN SATURATION: 98 % | HEIGHT: 66 IN

## 2019-09-12 DIAGNOSIS — Z98.890 HISTORY OF OPEN HEART SURGERY: ICD-10-CM

## 2019-09-12 DIAGNOSIS — I48.92 ATRIAL FLUTTER, UNSPECIFIED TYPE: Primary | ICD-10-CM

## 2019-09-12 DIAGNOSIS — Z95.3 HISTORY OF TRICUSPID VALVE REPLACEMENT WITH BIOPROSTHETIC VALVE: ICD-10-CM

## 2019-09-12 PROCEDURE — 99214 OFFICE O/P EST MOD 30 MIN: CPT | Mod: S$GLB,,, | Performed by: PEDIATRICS

## 2019-09-12 PROCEDURE — 99214 PR OFFICE/OUTPT VISIT, EST, LEVL IV, 30-39 MIN: ICD-10-PCS | Mod: S$GLB,,, | Performed by: PEDIATRICS

## 2019-09-16 ENCOUNTER — CLINICAL SUPPORT (OUTPATIENT)
Dept: PEDIATRIC CARDIOLOGY | Facility: CLINIC | Age: 19
End: 2019-09-16
Attending: PEDIATRICS
Payer: MEDICAID

## 2019-09-16 DIAGNOSIS — I48.92 ATRIAL FLUTTER, UNSPECIFIED TYPE: ICD-10-CM

## 2019-09-16 DIAGNOSIS — I47.10 SVT (SUPRAVENTRICULAR TACHYCARDIA): ICD-10-CM

## 2019-09-16 PROCEDURE — 93298 REM INTERROG DEV EVAL SCRMS: CPT | Mod: ,,, | Performed by: PEDIATRICS

## 2019-09-16 PROCEDURE — 93299 CV LOOP RECORDER REMOTE PEDIATRICS (CUPID ONLY): CPT | Mod: PBBFAC | Performed by: PEDIATRICS

## 2019-09-16 PROCEDURE — 93298 CV LOOP RECORDER REMOTE PEDIATRICS (CUPID ONLY): ICD-10-PCS | Mod: ,,, | Performed by: PEDIATRICS

## 2019-09-18 ENCOUNTER — TELEPHONE (OUTPATIENT)
Dept: PEDIATRIC CARDIOLOGY | Facility: CLINIC | Age: 19
End: 2019-09-18

## 2019-09-23 NOTE — PROGRESS NOTES
NOTE TO THE [facility]:      To Whom it Concerns,      I am writing on behalf of the [facility].  Mrs Ruth is the mother of Kacey Ruth.  Kacey is 18 years old and has a severe congenital heart condition.  I have been following her since birth.  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  She has required multiple open heart surgeries. Most recently, she underwent placement of a bioprosthetic (biological) valve in the right-side of the heart.  She subsequently developed a serious rhythm disturbance requiring a cardiac catheterization and ablation of an abnormal electrical circuit within the receiving chamber of the heart.  She also has chronic, and at times debilitating, abdominal pain likely related to her heart condition. She is on multiple medications and requires constant medical care by her mother.  I need to see Kacey in clinic about once every two weeks. Therefore, given this difficult situation, her mother is not able to work at this time.     At their last clinic visit a few days ago, in talking to mom, I realized she does not have sufficient funds to pay for her water and light bills.  This family, which consists only of mom and her daughter, cannot live under this condition.  Kacey is too fragle a patient.  They can barely remain out of the hospital. Thus, I am respectfully requesting you to consider their dire situation and allow this wonderful family time to attempt to pay their utility bill, or allow them a ti period until Kacey's condition can stablize.  Thank you for considering this matter. I deeply appreciate whatever you can do for this family.     Sincerely,  Donovan Gonzalez PhD,  MD.                                                                                                                                                                                                                                                                                                                                                                                                                                                                                                                                                                                                                                                                                                                                                                                                                                                                                                                                                                                                                                                                              HISTORY OF PRESENT ILLNESS (9/24 /2019) ADULTS WITH CONGENITAL HEART DISEASE CLINIC:    This patient, Kacey Ruth, is now a 18-year-old female, who 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 She underwent reconstruction of the right ventricular outflow tract and placement of a bioprosthetic pulmonary valve in early childhood. She subsequently has required multiple heart valve replacements. Her most recent heart surgery involved placement of a bioprosthetic valve in the pulmonary position and placement of a bioprosthetic valve in the tricuspid position, in 2007. Recently, she had a heart catheterization for recurrent insufficiency and obstruction of the bioprosthetic tricuspid valve.  The current procedure entailed a trans-catheter approach. It involved a valvuloplasty with a Z-Med 25 x 5 cm balloon followed by placement of an Sotelo S3  26 mm bioprosthetic valve in the tricuspid position.. 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 She leads a rather sedentary lifestyle, and is overweight. She is currently on:  Norvasc 2.5 mg once a day for BP control. The Lasix was changed to HCTZ 25 mg once a day.  Sotalol 120 mg q 12hrs.  Warfarin was DCed. She is taking ASA 81 mg qd.    ...........................................     Kacey underwent an EPS with transcatheter ablation for both typical and atypical reentry atrial tachycardia, at [facility], by Dr Robles and Dr Woodward. Post ablation, a  transmission showed a run of probable atrial flutter at  ~ 170 bpm. Kacey was admitted to [facility] for additional treatment of the atrial tachycardia. Her Metoprolol was DCed and she was started on Sotalol 80 mg q 12 hrs. She also had an implantable loop recorder placed.  Currently, Kacey has complaints of HAs since being on the Sotalol.  She also reports still having short episodes of rapid HRs while lying down at night. She feels her  heart pounding in the back.  Mom reports there have been several episodes of rapid heart rates, which she has recorded on her cell phone.  Download of the loop recorder showed episodes of atrial tachycardia.  The Sotalol 120 mg q 12 hrs.  Clinically doing better.   She does complain of stinging at the loop recorder site.      ..............................................................................              REVIEW OF SYSTEMS:  There are no visual disturbances. No HBP. No HAs, lightheadedness, syncope or seizures. No swallowing disorder or PIETER. No difficulty breathing, SOB or wheezing.  No asthma.  She does have a history of abdominal pain, which required hospitalization.  Bowel movements appear to be normal.  No pelvic pain. Urination is normal.  No blood in the stool or urine. No DM of thyroid disorder.  No lipid  disorder. No arterial disease. No known intrinsic problem with the lungs, liver or kidneys. No intrinsic bleeding condition. No smoking or ETOH use.  Not sexually active. There is a FH of strokes and adult onset DM.         PHYSICAL EXAMINATION:   GENERAL:  The patient is an overweight 18 -year-old female in no distress.  VITAL SIGNS:  Her weight is 119.2 kg (263 lbs) and her height is 1.676 meters (5' 6''). Heart rate is 100 beats per minute (sinus).  Sat 97 %.   Blood pressure in the right arm is 123/76 mmHg in the RA..  Color and perfusion are good.  HEENT:  Eye movements are normal.  No bruits are noted over the head.  No jugular venous distention or pulsations.  Mucous membranes are moist and pink.  Throat is clear with no evidence of blood. There is no adenopathy.  The neck is supple. No carotid bruits. The thyroid is not enlarged. SKIN:  Is pink and well perfused. CHEST:  Loop recorder is in the L upper chest with a small scar. There is a well-healed midline scar.  Lungs are clear to auscultation bilaterally, although the breath sounds are somewhat decreased at the base. There are no wheezes or rhonchi. CARDIOVASCULAR:  Precordial activity is normal.  HR ~ 80 bpm.  The first heart sound is prominent and crisp.  The second heart sound is narrowly split and eccentuated. There is a grade 1/6 systolic ejection murmur heard best up the left sternal border and across the base.  No diastolic murmur is heard today.   ABDOMEN:  There is exogenous obesity. The liver edge is precussed about 4 FBs at the right costal margin.  It is nonpulsatile and nontender.  Bowel sounds appear to be normal. EXTREMITIES:  Pulses are 2+ throughout. Capillary refill is good. There is no cyanosis or peripheral edema.  No bruising in the groins or exts.  No rashes or cyanosis.  No bruits in the  groins.              ........................................................................................................................................        ECG (Today): Sinus rhythm at a ventricular rate of 79 bpm.  Atrial abnormality. CT at 208 ms.  RBBB (160 ms). T wave abnormality.  Prolonged QTc at 511 ms,  but she also has a RBBB.  (Need to follow QT since on Sotalol).  PVC seen.                ECHOCARDIOGRAM (Previous visit):   An echocardiogram was performed.  2-D STUDY: There is no pericardial effusion.  No clots or vegetations. The bioprosthetic tricuspid valve is seen to be in good position. Annulus is 20-22 mm. The struts are easily seen.  The leaflets are mobile. No evidence for thrombus formation.  The right atrium is enlarged measuring 5.0 x 4.5 cm or area 17 - 18 cm2 (dilated).  One can also see the bioprosthetic tricuspid valve in a cross sectional view.  It appears circular and measures 2 cm x 2 cm. Again, no clots or vegetations are seen.  No right ventricular or left ventricular outflow tract obstruction.  The right ventricular circumferential area of shortening is  ~30%,  which is reduced.       M-MODE:   LV 5.3 cm, RV 3.3 cm, Ao 2.9 cm, LA 4.6 cm (enlarged), Sep 1.2 cm, PW 1.2 cm, EF ~ 59 %.      DOPPLER VELOCITY ANALYSIS:  There is trivial insufficiency of the recently-placed bioprosthetic tricuspid valve.  Mild turbulent antegrade flow is seen. Continuous wave Doppler analysis across the valve shows a peak pressure drop of 17 mmHg with a mean value of 9 mmHg. Importantly, the leaflets are moving freely.  There is no mitral insufficiency.  Mild aortic insufficiency  (P1/2 513 ms).  Peak pressure drop across the aortic valve is 8 mmHg. Peak pressure drop across the bioprosthetic pulmonary valve is 20 mmHg. Mild PI. PVs to the LA.        DOWNLOAD OF LOOP RECORDER (Previous visit):  No atrial flutter or fib. Rare atrial couplet. One atrial triplet.  No sustained atrial ectopy. Single PVC.  "No ventricular ectopy. We decreased the upper rate to 130 bpm.           .................................................................................................................................................               ASSESSMENT:  In summary, we have a 18-year-old AA female originally diagnosed to have pulmonary atresia with intact inter-ventricular septum, who is status-post multiple open heart operations involving both the pulmonary and tricuspid valves.  In February 2017 she had placement of an Sotelo S3 26 mm bioprosthetic tricuspid valve via the transvenous approach.  She tolerated the procedure well. She recently developed atrial flutter, for which she underwent an ablation.  "She was then started on Sotalol 80 mg q 12 hrs.  Also, a loop recorder was placed under the skin in the upper chest.   The Sotalol was increased to 120 mg q 12 hrs.  She appears to be tolerating the Sotalol, except for a few head aches.  Rhythm seems to be under much better control.             PLAN:  Given our findings, our suggestions are as follows:  1). She of course needs antibiotic prophylaxis for any invasive procedure, especially dental work.   2).  She should continue with the Sotalol at 120 mg q 12 hrs.  ? May need a low-dose beta blocker.  She should continue with her remaining medications. 3) Holter for 48 hrs was down loaded today.  Will report results to mom.    4). Suspect Kacey needs a GI consult, because of chronic abdominal pain. 5) RTC in 4 weeks.  If there are any questions concerning the cardiac status of this patient, please feel free to contact us. Thank you so much for allowing us to partake in the care of this patient. Donovan Gonzalez PhD, MD.                  "

## 2019-09-24 ENCOUNTER — OFFICE VISIT (OUTPATIENT)
Dept: CARDIOLOGY | Facility: CLINIC | Age: 19
End: 2019-09-24
Payer: MEDICAID

## 2019-09-24 VITALS
WEIGHT: 262.81 LBS | HEIGHT: 66 IN | OXYGEN SATURATION: 97 % | DIASTOLIC BLOOD PRESSURE: 76 MMHG | BODY MASS INDEX: 42.24 KG/M2 | SYSTOLIC BLOOD PRESSURE: 123 MMHG | HEART RATE: 100 BPM

## 2019-09-24 DIAGNOSIS — Z98.890 H/O HEART SURGERY: ICD-10-CM

## 2019-09-24 DIAGNOSIS — I48.92 ATRIAL FLUTTER, UNSPECIFIED TYPE: ICD-10-CM

## 2019-09-24 DIAGNOSIS — Z95.3 HISTORY OF PULMONARY VALVE REPLACEMENT WITH BIOPROSTHETIC VALVE: ICD-10-CM

## 2019-09-24 DIAGNOSIS — R00.2 PALPITATIONS: Primary | ICD-10-CM

## 2019-09-24 DIAGNOSIS — Z95.3 HISTORY OF TRICUSPID VALVE REPLACEMENT WITH BIOPROSTHETIC VALVE: ICD-10-CM

## 2019-09-24 PROCEDURE — 93010 EKG 12-LEAD: ICD-10-PCS | Mod: S$GLB,,, | Performed by: INTERNAL MEDICINE

## 2019-09-24 PROCEDURE — 99213 PR OFFICE/OUTPT VISIT, EST, LEVL III, 20-29 MIN: ICD-10-PCS | Mod: 25,S$GLB,, | Performed by: PEDIATRICS

## 2019-09-24 PROCEDURE — 99213 OFFICE O/P EST LOW 20 MIN: CPT | Mod: 25,S$GLB,, | Performed by: PEDIATRICS

## 2019-09-24 PROCEDURE — 93005 ELECTROCARDIOGRAM TRACING: CPT | Mod: S$GLB,,, | Performed by: PEDIATRICS

## 2019-09-24 PROCEDURE — 93000 PR ELECTROCARDIOGRAM, COMPLETE: ICD-10-PCS | Mod: S$GLB,,, | Performed by: PEDIATRICS

## 2019-09-24 PROCEDURE — 93010 ELECTROCARDIOGRAM REPORT: CPT | Mod: S$GLB,,, | Performed by: INTERNAL MEDICINE

## 2019-09-24 PROCEDURE — 93000 ELECTROCARDIOGRAM COMPLETE: CPT | Mod: S$GLB,,, | Performed by: PEDIATRICS

## 2019-09-24 PROCEDURE — 93005 EKG 12-LEAD: ICD-10-PCS | Mod: S$GLB,,, | Performed by: PEDIATRICS

## 2019-09-25 RX ORDER — ASPIRIN 81 MG/1
TABLET ORAL
Qty: 30 TABLET | Refills: 0 | Status: SHIPPED | OUTPATIENT
Start: 2019-09-25

## 2019-09-27 ENCOUNTER — HOSPITAL ENCOUNTER (EMERGENCY)
Facility: OTHER | Age: 19
Discharge: HOME | End: 2019-09-28
Attending: EMERGENCY MEDICINE
Payer: MEDICAID

## 2019-09-27 DIAGNOSIS — K74.60 HEPATIC CIRRHOSIS, UNSPECIFIED HEPATIC CIRRHOSIS TYPE, UNSPECIFIED WHETHER ASCITES PRESENT: ICD-10-CM

## 2019-09-27 DIAGNOSIS — R16.2 HEPATOSPLENOMEGALY: ICD-10-CM

## 2019-09-27 DIAGNOSIS — N83.202 OVARIAN CYST, LEFT: ICD-10-CM

## 2019-09-27 DIAGNOSIS — R10.12 RECURRENT LEFT UPPER QUADRANT ABDOMINAL PAIN: Primary | ICD-10-CM

## 2019-09-27 LAB
B-HCG UR QL: NEGATIVE
BILIRUB UR QL STRIP: NEGATIVE
CLARITY UR: CLEAR
COLOR UR: YELLOW
CTP QC/QA: YES
GLUCOSE UR QL STRIP: NEGATIVE
HGB UR QL STRIP: NEGATIVE
KETONES UR QL STRIP: NEGATIVE
LEUKOCYTE ESTERASE UR QL STRIP: NEGATIVE
NITRITE UR QL STRIP: NEGATIVE
PH UR STRIP: 6 [PH] (ref 5–8)
PROT UR QL STRIP: NEGATIVE
SP GR UR STRIP: 1.02 (ref 1–1.03)
URN SPEC COLLECT METH UR: NORMAL
UROBILINOGEN UR STRIP-ACNC: 1 EU/DL

## 2019-09-27 PROCEDURE — 81025 URINE PREGNANCY TEST: CPT | Performed by: EMERGENCY MEDICINE

## 2019-09-27 PROCEDURE — 96374 THER/PROPH/DIAG INJ IV PUSH: CPT

## 2019-09-27 PROCEDURE — 96375 TX/PRO/DX INJ NEW DRUG ADDON: CPT

## 2019-09-27 PROCEDURE — 96361 HYDRATE IV INFUSION ADD-ON: CPT

## 2019-09-27 PROCEDURE — 25000003 PHARM REV CODE 250: Performed by: EMERGENCY MEDICINE

## 2019-09-27 PROCEDURE — 81003 URINALYSIS AUTO W/O SCOPE: CPT

## 2019-09-27 PROCEDURE — 99284 EMERGENCY DEPT VISIT MOD MDM: CPT | Mod: 25

## 2019-09-27 RX ORDER — METOCLOPRAMIDE HYDROCHLORIDE 5 MG/ML
10 INJECTION INTRAMUSCULAR; INTRAVENOUS
Status: COMPLETED | OUTPATIENT
Start: 2019-09-27 | End: 2019-09-28

## 2019-09-27 RX ORDER — HYDROMORPHONE HYDROCHLORIDE 1 MG/ML
1 INJECTION, SOLUTION INTRAMUSCULAR; INTRAVENOUS; SUBCUTANEOUS
Status: COMPLETED | OUTPATIENT
Start: 2019-09-27 | End: 2019-09-28

## 2019-09-27 RX ADMIN — ALUMINUM HYDROXIDE, MAGNESIUM HYDROXIDE, SIMETHICONE 50 ML: 400; 400; 40 SUSPENSION ORAL at 10:09

## 2019-09-28 VITALS
OXYGEN SATURATION: 96 % | HEART RATE: 78 BPM | SYSTOLIC BLOOD PRESSURE: 110 MMHG | WEIGHT: 240 LBS | HEIGHT: 66 IN | TEMPERATURE: 99 F | BODY MASS INDEX: 38.57 KG/M2 | DIASTOLIC BLOOD PRESSURE: 70 MMHG | RESPIRATION RATE: 15 BRPM

## 2019-09-28 LAB
ALBUMIN SERPL BCP-MCNC: 3.7 G/DL (ref 3.5–5.2)
ALP SERPL-CCNC: 59 U/L (ref 55–135)
ALT SERPL W/O P-5'-P-CCNC: 17 U/L (ref 10–44)
ANION GAP SERPL CALC-SCNC: 10 MMOL/L (ref 8–16)
APTT BLDCRRT: 32 SEC (ref 21–32)
AST SERPL-CCNC: 19 U/L (ref 10–40)
BASOPHILS # BLD AUTO: 0.03 K/UL (ref 0–0.2)
BASOPHILS NFR BLD: 0.3 % (ref 0–1.9)
BILIRUB SERPL-MCNC: 0.8 MG/DL (ref 0.1–1)
BUN SERPL-MCNC: 7 MG/DL (ref 6–20)
CALCIUM SERPL-MCNC: 8.9 MG/DL (ref 8.7–10.5)
CHLORIDE SERPL-SCNC: 105 MMOL/L (ref 95–110)
CO2 SERPL-SCNC: 23 MMOL/L (ref 23–29)
CREAT SERPL-MCNC: 0.7 MG/DL (ref 0.5–1.4)
DIFFERENTIAL METHOD: ABNORMAL
EOSINOPHIL # BLD AUTO: 0.1 K/UL (ref 0–0.5)
EOSINOPHIL NFR BLD: 1 % (ref 0–8)
ERYTHROCYTE [DISTWIDTH] IN BLOOD BY AUTOMATED COUNT: 12.9 % (ref 11.5–14.5)
EST. GFR  (AFRICAN AMERICAN): >60 ML/MIN/1.73 M^2
EST. GFR  (NON AFRICAN AMERICAN): >60 ML/MIN/1.73 M^2
GLUCOSE SERPL-MCNC: 115 MG/DL (ref 70–110)
HCT VFR BLD AUTO: 35.5 % (ref 37–48.5)
HGB BLD-MCNC: 12 G/DL (ref 12–16)
IMM GRANULOCYTES # BLD AUTO: 0.03 K/UL (ref 0–0.04)
IMM GRANULOCYTES NFR BLD AUTO: 0.3 % (ref 0–0.5)
INR PPP: 1 (ref 0.8–1.2)
LIPASE SERPL-CCNC: 19 U/L (ref 4–60)
LYMPHOCYTES # BLD AUTO: 2.4 K/UL (ref 1–4.8)
LYMPHOCYTES NFR BLD: 27.4 % (ref 18–48)
MAGNESIUM SERPL-MCNC: 1.8 MG/DL (ref 1.6–2.6)
MCH RBC QN AUTO: 27.7 PG (ref 27–31)
MCHC RBC AUTO-ENTMCNC: 33.8 G/DL (ref 32–36)
MCV RBC AUTO: 82 FL (ref 82–98)
MONOCYTES # BLD AUTO: 0.6 K/UL (ref 0.3–1)
MONOCYTES NFR BLD: 7.1 % (ref 4–15)
NEUTROPHILS # BLD AUTO: 5.7 K/UL (ref 1.8–7.7)
NEUTROPHILS NFR BLD: 63.9 % (ref 38–73)
NRBC BLD-RTO: 0 /100 WBC
PLATELET # BLD AUTO: 286 K/UL (ref 150–350)
PMV BLD AUTO: 9.4 FL (ref 9.2–12.9)
POTASSIUM SERPL-SCNC: 2.9 MMOL/L (ref 3.5–5.1)
PROT SERPL-MCNC: 7.1 G/DL (ref 6–8.4)
PROTHROMBIN TIME: 11.4 SEC (ref 9–12.5)
RBC # BLD AUTO: 4.33 M/UL (ref 4–5.4)
SODIUM SERPL-SCNC: 138 MMOL/L (ref 136–145)
TROPONIN I SERPL DL<=0.01 NG/ML-MCNC: <0.006 NG/ML (ref 0–0.03)
WBC # BLD AUTO: 8.89 K/UL (ref 3.9–12.7)

## 2019-09-28 PROCEDURE — 83735 ASSAY OF MAGNESIUM: CPT

## 2019-09-28 PROCEDURE — 85610 PROTHROMBIN TIME: CPT

## 2019-09-28 PROCEDURE — 25000003 PHARM REV CODE 250: Performed by: EMERGENCY MEDICINE

## 2019-09-28 PROCEDURE — 93010 ELECTROCARDIOGRAM REPORT: CPT | Mod: ,,, | Performed by: INTERNAL MEDICINE

## 2019-09-28 PROCEDURE — 83690 ASSAY OF LIPASE: CPT

## 2019-09-28 PROCEDURE — 25500020 PHARM REV CODE 255: Performed by: EMERGENCY MEDICINE

## 2019-09-28 PROCEDURE — 85730 THROMBOPLASTIN TIME PARTIAL: CPT

## 2019-09-28 PROCEDURE — 93005 ELECTROCARDIOGRAM TRACING: CPT

## 2019-09-28 PROCEDURE — 36415 COLL VENOUS BLD VENIPUNCTURE: CPT

## 2019-09-28 PROCEDURE — 63600175 PHARM REV CODE 636 W HCPCS: Performed by: EMERGENCY MEDICINE

## 2019-09-28 PROCEDURE — 84484 ASSAY OF TROPONIN QUANT: CPT

## 2019-09-28 PROCEDURE — 93010 EKG 12-LEAD: ICD-10-PCS | Mod: ,,, | Performed by: INTERNAL MEDICINE

## 2019-09-28 PROCEDURE — 85025 COMPLETE CBC W/AUTO DIFF WBC: CPT

## 2019-09-28 PROCEDURE — 80053 COMPREHEN METABOLIC PANEL: CPT

## 2019-09-28 RX ORDER — OMEPRAZOLE 20 MG/1
40 CAPSULE, DELAYED RELEASE ORAL DAILY
Qty: 60 CAPSULE | Refills: 0 | Status: SHIPPED | OUTPATIENT
Start: 2019-09-28 | End: 2023-06-14

## 2019-09-28 RX ORDER — HYDROCODONE BITARTRATE AND ACETAMINOPHEN 5; 325 MG/1; MG/1
1 TABLET ORAL
Status: COMPLETED | OUTPATIENT
Start: 2019-09-28 | End: 2019-09-28

## 2019-09-28 RX ORDER — POTASSIUM CHLORIDE 7.45 MG/ML
10 INJECTION INTRAVENOUS
Status: DISCONTINUED | OUTPATIENT
Start: 2019-09-28 | End: 2019-09-28 | Stop reason: HOSPADM

## 2019-09-28 RX ORDER — DICYCLOMINE HYDROCHLORIDE 20 MG/1
20 TABLET ORAL 3 TIMES DAILY
Qty: 30 TABLET | Refills: 0 | Status: SHIPPED | OUTPATIENT
Start: 2019-09-28 | End: 2019-10-28

## 2019-09-28 RX ADMIN — POTASSIUM CHLORIDE 10 MEQ: 10 INJECTION, SOLUTION INTRAVENOUS at 02:09

## 2019-09-28 RX ADMIN — IOHEXOL 100 ML: 350 INJECTION, SOLUTION INTRAVENOUS at 01:09

## 2019-09-28 RX ADMIN — METOCLOPRAMIDE 10 MG: 5 INJECTION, SOLUTION INTRAMUSCULAR; INTRAVENOUS at 12:09

## 2019-09-28 RX ADMIN — SODIUM CHLORIDE 1000 ML: 9 INJECTION, SOLUTION INTRAVENOUS at 12:09

## 2019-09-28 RX ADMIN — HYDROCODONE BITARTRATE AND ACETAMINOPHEN 1 TABLET: 5; 325 TABLET ORAL at 02:09

## 2019-09-28 RX ADMIN — HYDROMORPHONE HYDROCHLORIDE 1 MG: 1 INJECTION, SOLUTION INTRAMUSCULAR; INTRAVENOUS; SUBCUTANEOUS at 12:09

## 2019-09-28 NOTE — DISCHARGE INSTRUCTIONS
Call your primary care doctor to make the first available appointment.     Keep all your medical appointments.     Take your regular medication as prescribed. Contact your primary care provider before running out of medication, or for any problems obtaining them.    Do not drive or operate heavy machinery while taking opioid or muscle relaxing medications. Examples include norco, percocet, xanax, valium, flexeril.     Overuse or long term use of pain and sedating medication may lead to addiction, dependence, organ failure, family and work problems, legal problems, accidental overdose and death.    If you do not have health insurance, you probably qualify for heavily discounted rates:  Call 1-701.234.7257 ([facility] hotline) or go to [email or web address].gov    Your evaluation in the ED does not suggest any emergent or life threatening medical condition requiring admission or immediate intervention beyond that provided in the ED.   However, the signs of a serious problem sometimes take more time to appear.   RETURN TO THE ER if any of the following occur:    Weakness, dizziness, fainting, or loss of consciousness   Fever of 100.4ºF (38ºC) or higher  Any worse symptoms  Any new or concerning symptoms

## 2019-09-28 NOTE — ED TRIAGE NOTES
Pt presents to ed with c/o LUQ pain x's 2days. Pt denies any n/v/d. Pt states last time having anything to eat or drink was about an hour ago.

## 2019-09-28 NOTE — ED NOTES
Pt received a warm blanket. Pt reports no pain at this time, AAOx4, respiratory pattern even and non labored, denies SOB. Will continue to monitor.

## 2019-09-28 NOTE — ED NOTES
Pt resting quietly. All needs addressed. Visible rise and fall of chest. No acute distress noted at this time. Bed in low and locked position. Side rails raised x 2. Call light within reach. Will continue to monitor.

## 2019-09-28 NOTE — ED PROVIDER NOTES
Encounter Date: 9/27/2019    SCRIBE #1 NOTE: I, Roxanne Rust, am scribing for, and in the presence of, Dr. Vo.       History     Chief Complaint   Patient presents with    Abdominal Pain     pt reports LUQ abdominal pain with osnet 2 weeks ago, reports taking pepcid today with no relief of symptoms, reports nausea     Time seen by provider: 10:19 PM    This is a 18 y.o. female who presents with complaint of LUQ abdominal pain that began two weeks ago. Pain is severe. Symptoms are worsening. She had similar pain with enlarged liver three months ago. Family reports her GI, Dr. Meza, told her the swelling has improved. She reports associated  nausea. She denies vomiting, diarrhea, or constipation. She is taking Pepcid. She has been taken off coumadin.  She states her last menstrual period was in early or mid August, and is typically very irregular.    The history is provided by the patient, a parent and medical records.     Review of patient's allergies indicates:  No Known Allergies  Past Medical History:   Diagnosis Date    CHF (congestive heart failure)     Cirrhosis 07/2019    Obesity     Pulmonary atresia with intact ventricular septum     SVT (supraventricular tachycardia)      Past Surgical History:   Procedure Laterality Date    MEMO PROCEDURE      bt shunt and transannular patch    CARDIAC VALVE REPLACEMENT      INSERTION OF IMPLANTABLE LOOP RECORDER N/A 2/6/2019    Procedure: INSERTION, IMPLANTABLE LOOP RECORDER;  Surgeon: Romeo Robles MD;  Location: [facility] EP LAB;  Service: Cardiology;  Laterality: N/A;  SVT, IART, SAI, RFA, +/- ILR, FELECIA, MDT, MAC, MB/SM, 3 prep    INSERTION OF IMPLANTABLE LOOP RECORDER N/A 5/8/2019    Procedure: INSERTION, IMPLANTABLE LOOP RECORDER;  Surgeon: Ricardo Woodward MD;  Location: [facility] EP LAB;  Service: Cardiology;  Laterality: N/A;  afl, palps, ILR, MDT, anes, SM, 441    INSERTION OF TRANSJUGULAR INTRAHEPATIC PORTOSYSTEMIC SHUNT (TIPS) N/A 7/5/2019     "Procedure: INSERTION, SHUNT, TRANSJUGULAR, INTRAHEPATIC PORTOSYSTEMIC;  Surgeon: Lashanda Diagnostic Provider;  Location: [facility] OR [address] [place] [address];  Service: Anesthesiology;  Laterality: N/A;  Room 188/Lisseth    PULMONARY VALVE REPLACEMENT  01/02/2007    25mm sharyn charles    TRICUSPID VALVE REPLACEMENT  01/02/2007    25 mm sharyn-charles     No family history on file.  Social History     Tobacco Use    Smoking status: Never Smoker   Substance Use Topics    Alcohol use: No    Drug use: No     Review of Systems  ROS: As per HPI and below:   General: No fever. No chills. No generalized weakness.   HENT: No sore throat. No rhinorrhea. No facial pain. No facial swelling.   Eyes: No visual changes. No eye pain.   Cardiovascular: No chest pain.   Respiratory: No dyspnea. No cough.   GI: Notes abdominal pain and nausea. No vomiting. No diarrhea.   : No dysuria. No hematuria.  No vaginal discharge. Anovulation and irregular menses.  Skin: No rashes.  Neuro: No focal weakness. No headache. No syncope.  Musculoskeletal: No extremity pain. No swelling.    Psych: No acute changes.  All other systems negative.     Physical Exam     Initial Vitals [09/27/19 2047]   BP Pulse Resp Temp SpO2   131/78 85 18 98.3 °F (36.8 °C) 98 %      MAP       --         Physical Exam  Nursing note and vitals reviewed.  /61   Pulse 91   Temp 98.3 °F (36.8 °C) (Oral)   Resp 16   Ht 5' 6" (1.676 m)   Wt 108.9 kg (240 lb)   SpO2 100%   BMI 38.74 kg/m²   Constitutional: AAOx3. No distress. Morbidly obese.  Eyes: EOMI. No discharge. Anicteric.  HENT:   Mouth/Throat: Oropharynx is clear. Uvula midline. Mucus membranes moist.  Neck: Normal range of motion. Neck supple.  Cardiovascular: Normal rate. No murmur, no gallop and no friction rub heard.   Pulmonary/Chest: No respiratory distress. Effort normal. No wheezes, no rales, no rhonchi.   Abdominal: Bowel sounds normal. Soft. No distension and no mass. There is no tenderness. There is no " rebound, no guarding, no tenderness at McBurney's point.   When palpating the left side of abdomen, she grabbed my hand firmly and pressed it down deeper.  Musculoskeletal: Normal range of motion.   Neurological: GCS 15. Alert and oriented to person, place, and time. No gross cranial nerve, light touch or strength deficit. Coordination normal.   Skin: Skin is warm and dry.   Psychiatric: Behavior is normal. Judgment normal.    ED Course   Procedures  Labs Reviewed   CBC W/ AUTO DIFFERENTIAL - Abnormal; Notable for the following components:       Result Value    Hematocrit 35.5 (*)     All other components within normal limits   COMPREHENSIVE METABOLIC PANEL - Abnormal; Notable for the following components:    Potassium 2.9 (*)     Glucose 115 (*)     All other components within normal limits    Narrative:     Recoll. [id number] by [facility] at 09/28/2019 00:24, reason: hemolyzed.   Ashwin will redraw   TROPONIN I    Narrative:     Recoll. [id number] by [facility] at 09/28/2019 00:24, reason: hemolyzed.   [facility] will redraw   LIPASE    Narrative:     Recoll. [id number] by [facility] at 09/28/2019 00:24, reason: hemolyzed.   [facility] will redraw   URINALYSIS, REFLEX TO URINE CULTURE    Narrative:     Preferred Collection Type->Urine, Clean Catch   APTT   PROTIME-INR   MAGNESIUM    Narrative:     Recoll. [id number] by [facility] at 09/28/2019 00:24, reason: hemolyzed.   [facility] will redraw   POCT URINE PREGNANCY          Imaging Results          CT Abdomen Pelvis With Contrast (Final result)  Result time 09/28/19 01:56:14    Final result by Dot Mcmanus MD (09/28/19 01:56:14)                 Impression:      1. No acute intra-abdominal abnormalities identified.  2. Multiple stable indeterminate enhancing hepatic lesions.  3. Additional findings as detailed above.      Electronically signed by: Dot Mcmanus MD  Date:    09/28/2019  Time:    01:56             Narrative:    EXAMINATION:  CT ABDOMEN PELVIS WITH CONTRAST    CLINICAL  HISTORY:  LLQ pain, suspect diverticulitis;    TECHNIQUE:  Low dose axial images, sagittal and coronal reformations were obtained from the lung bases to the pubic symphysis following the IV administration of 100 mL of Omnipaque 350 .  Oral contrast was not given.    COMPARISON:  MRI abdomen from 06/03/2019.    FINDINGS:  Postsurgical sternotomy changes and partially visualized cardiac valve stent are seen.  The lung bases are clear.    Multiple stable enhancing hepatic lesions are seen.  Liver has a lobular contour.  There is no intra-or extrahepatic biliary ductal dilatation.  The gallbladder is unremarkable.  Spleen is mildly enlarged measuring 13 cm.  The stomach, pancreas, and adrenal glands are unremarkable.    Kidneys enhance normally with no evidence of hydronephrosis.  No abnormalities are seen along the ureteral courses.  Urinary bladder and uterus are unremarkable.  Left ovarian 2.4 cm small cyst or dominant follicle is visualized which is within normal limits in size for a patient of this age.    Appendix is visualized and is unremarkable.  The visualized loops of small and large bowel show no evidence of obstruction or inflammation.  No free air or free fluid.    Aorta tapers normally.    No acute osseous abnormality identified. Subcutaneous soft tissue structures are unremarkable.                               X-Ray Chest 1 View (Final result)  Result time 09/27/19 23:40:38    Final result by Douglas Barlow MD (09/27/19 23:40:38)                 Impression:      Stable cardiomegaly with postop valve replacement changes.  Loop recorder device present.    No acute findings.    Minimal scarring left base with tenting of left hemidiaphragm.      Electronically signed by: Douglas Barlow MD  Date:    09/27/2019  Time:    23:40             Narrative:    EXAMINATION:  XR CHEST 1 VIEW    CLINICAL HISTORY:  Left upper quadrant pain    TECHNIQUE:  Single frontal view of the chest was  performed.    COMPARISON:  [facility] 3, 2019.    FINDINGS:  Stable cardiomegaly.  Postop changes consistent with prior valve repair with sternotomy wires present, similar to prior.  Loop recorder device seen overlying the heart.    Minimal scarring left base with tenting left hemidiaphragm.    Heart and lungs otherwise appear unchanged when allowing for differences in technique and positioning.                                 Medical Decision Making:   History:   Old Medical Records: I decided to obtain old medical records.  Independently Interpreted Test(s):   I have ordered and independently interpreted EKG Reading(s) - see summary below  Clinical Tests:   Lab Tests: Ordered and Reviewed  Radiological Study: Ordered and Reviewed  Medical Tests: Ordered and Reviewed            Scribe Attestation:   Scribe #1: I performed the above scribed service and the documentation accurately describes the services I performed. I attest to the accuracy of the note.    Attending Attestation:           Physician Attestation for Scribe:  Physician Attestation Statement for Scribe #1: I, Dr. Vo, reviewed documentation, as scribed by Roxanne Rust in my presence, and it is both accurate and complete.                 ED Course as of Sep 28 0242   Fri Sep 27, 2019   2218 Patient is a 18-year-old female with history of congenital heart disease (ASD, pulmonary atresia status post multiple valve replacements, last valve replacement in 2007, follows with pediatric Cardiology at [facility]), on chronic anticoagulation with Coumadin for her mechanical valve, SVT, liver biopsy in July who presents with abdominal pain mostly in the left side for the last 2 weeks.  Pain is similar to pain she had in June.  At that time she was admitted for supratherapeutic INR, transaminitis, subsequently had liver biopsy reflecting cirrhosis, followed up with GI and PCP with improved transaminitis.  She has been on H2 blocker, however the pain is returning.   Not improved or worsened by eating.  On exam, patient morbidly obese, has no abdominal tenderness, well-appearing.  The initial differential included GERD, gastroenteritis, colitis, splenomegaly and splenic sequestration.  History and physical are consistent with current.  I had a shared decision making discussion with the patient regarding risks and benefits of trial of GI cocktail, and if her pain is well controlled we will start her on PPI, have her follow up with PCP and GI.    [RC]   Sat Sep 28, 2019   0018 I independently reviewed and interpreted EKG which shows normal sinus rhythm at 79 beats per minute, no STEMI, no ischemic changes, normal intervals.  No acute change compared to prior tracing.    [RC]   0125 Patient's pain improved with GI cocktail.  I ordered labs, CT abdomen/pelvis, parental analgesics.  I independently reviewed and interpreted labs which are notable for hypokalemia with potassium 2.9. Otherwise unremarkable. Will repeat.  CT pending.    [RC]   0219 I independently interpreted and reviewed patient's CT abdomen/pelvis which shows no acute to process, free fluid, ruptured viscus, does show left sided ovarian cyst which may explain the patient's symptoms, but is not large enough to be at risk for torsion.  Patient states that her pain is well controlled.  She feels comfortable with discharge. Patient's mother agrees.  I discussed with patient and/or guardian/caretaker that this evaluation in the ED does not suggest any emergent or life threatening medical condition requiring admission or immediate intervention beyond what was provided in the ED. Regardless, an unremarkable evaluation in the ED does not preclude the development or presence of a serious or life threatening condition. As such, patient was instructed to return immediately for any worsening or change in current symptoms.     I had a detailed discussion with patient  and/or guardian/caretaker regarding findings, plan, return  precautions, importance of medication adherence, need to follow-up with a PCP and specialist. All questions answered.     Note was created using voice recognition software. It may have occasional typographical errors not identified and edited despite initial review prior to signing.    [RC]      ED Course User Index  [RC] Robert Vo MD     Clinical Impression:     1. Recurrent left upper quadrant abdominal pain    2. Ovarian cyst, left    3. Hepatosplenomegaly    4. Hepatic cirrhosis, unspecified hepatic cirrhosis type, unspecified whether ascites present                                 Robert Vo MD  09/28/19 0242

## 2019-09-30 ENCOUNTER — HOSPITAL ENCOUNTER (EMERGENCY)
Facility: OTHER | Age: 19
Discharge: HOME | End: 2019-09-30
Attending: EMERGENCY MEDICINE
Payer: MEDICAID

## 2019-09-30 VITALS
SYSTOLIC BLOOD PRESSURE: 114 MMHG | BODY MASS INDEX: 37.12 KG/M2 | DIASTOLIC BLOOD PRESSURE: 75 MMHG | HEART RATE: 74 BPM | OXYGEN SATURATION: 96 % | WEIGHT: 230 LBS | RESPIRATION RATE: 16 BRPM | TEMPERATURE: 98 F

## 2019-09-30 DIAGNOSIS — R10.12 ABDOMINAL PAIN, LEFT UPPER QUADRANT: Primary | ICD-10-CM

## 2019-09-30 LAB
ALBUMIN SERPL BCP-MCNC: 4.3 G/DL (ref 3.5–5.2)
ALP SERPL-CCNC: 70 U/L (ref 55–135)
ALT SERPL W/O P-5'-P-CCNC: 22 U/L (ref 10–44)
ANION GAP SERPL CALC-SCNC: 12 MMOL/L (ref 8–16)
AST SERPL-CCNC: 24 U/L (ref 10–40)
B-HCG UR QL: NEGATIVE
BASOPHILS # BLD AUTO: 0.04 K/UL (ref 0–0.2)
BASOPHILS NFR BLD: 0.5 % (ref 0–1.9)
BILIRUB SERPL-MCNC: 1.1 MG/DL (ref 0.1–1)
BILIRUB UR QL STRIP: NEGATIVE
BUN SERPL-MCNC: 8 MG/DL (ref 6–20)
CALCIUM SERPL-MCNC: 9.8 MG/DL (ref 8.7–10.5)
CHLORIDE SERPL-SCNC: 100 MMOL/L (ref 95–110)
CLARITY UR: ABNORMAL
CO2 SERPL-SCNC: 26 MMOL/L (ref 23–29)
COLOR UR: YELLOW
CREAT SERPL-MCNC: 0.8 MG/DL (ref 0.5–1.4)
CTP QC/QA: YES
DIFFERENTIAL METHOD: NORMAL
EOSINOPHIL # BLD AUTO: 0.1 K/UL (ref 0–0.5)
EOSINOPHIL NFR BLD: 1.4 % (ref 0–8)
ERYTHROCYTE [DISTWIDTH] IN BLOOD BY AUTOMATED COUNT: 13 % (ref 11.5–14.5)
EST. GFR  (AFRICAN AMERICAN): >60 ML/MIN/1.73 M^2
EST. GFR  (NON AFRICAN AMERICAN): >60 ML/MIN/1.73 M^2
GLUCOSE SERPL-MCNC: 101 MG/DL (ref 70–110)
GLUCOSE UR QL STRIP: NEGATIVE
HCT VFR BLD AUTO: 39.3 % (ref 37–48.5)
HETEROPH AB SERPL QL IA: NEGATIVE
HGB BLD-MCNC: 13 G/DL (ref 12–16)
HGB UR QL STRIP: NEGATIVE
IMM GRANULOCYTES # BLD AUTO: 0.03 K/UL (ref 0–0.04)
IMM GRANULOCYTES NFR BLD AUTO: 0.4 % (ref 0–0.5)
KETONES UR QL STRIP: NEGATIVE
LEUKOCYTE ESTERASE UR QL STRIP: NEGATIVE
LIPASE SERPL-CCNC: 22 U/L (ref 4–60)
LYMPHOCYTES # BLD AUTO: 1.7 K/UL (ref 1–4.8)
LYMPHOCYTES NFR BLD: 21.5 % (ref 18–48)
MCH RBC QN AUTO: 27.3 PG (ref 27–31)
MCHC RBC AUTO-ENTMCNC: 33.1 G/DL (ref 32–36)
MCV RBC AUTO: 83 FL (ref 82–98)
MONOCYTES # BLD AUTO: 0.6 K/UL (ref 0.3–1)
MONOCYTES NFR BLD: 8.1 % (ref 4–15)
NEUTROPHILS # BLD AUTO: 5.4 K/UL (ref 1.8–7.7)
NEUTROPHILS NFR BLD: 68.1 % (ref 38–73)
NITRITE UR QL STRIP: NEGATIVE
NRBC BLD-RTO: 0 /100 WBC
PH UR STRIP: 7 [PH] (ref 5–8)
PLATELET # BLD AUTO: 324 K/UL (ref 150–350)
PMV BLD AUTO: 9.6 FL (ref 9.2–12.9)
POTASSIUM SERPL-SCNC: 3.1 MMOL/L (ref 3.5–5.1)
PROT SERPL-MCNC: 8.6 G/DL (ref 6–8.4)
PROT UR QL STRIP: NEGATIVE
RBC # BLD AUTO: 4.76 M/UL (ref 4–5.4)
SODIUM SERPL-SCNC: 138 MMOL/L (ref 136–145)
SP GR UR STRIP: 1.01 (ref 1–1.03)
URN SPEC COLLECT METH UR: ABNORMAL
UROBILINOGEN UR STRIP-ACNC: 1 EU/DL
WBC # BLD AUTO: 7.92 K/UL (ref 3.9–12.7)

## 2019-09-30 PROCEDURE — 96376 TX/PRO/DX INJ SAME DRUG ADON: CPT

## 2019-09-30 PROCEDURE — 81003 URINALYSIS AUTO W/O SCOPE: CPT

## 2019-09-30 PROCEDURE — 86308 HETEROPHILE ANTIBODY SCREEN: CPT

## 2019-09-30 PROCEDURE — 83690 ASSAY OF LIPASE: CPT

## 2019-09-30 PROCEDURE — 99285 EMERGENCY DEPT VISIT HI MDM: CPT | Mod: 25

## 2019-09-30 PROCEDURE — 63600175 PHARM REV CODE 636 W HCPCS: Performed by: PHYSICIAN ASSISTANT

## 2019-09-30 PROCEDURE — 96375 TX/PRO/DX INJ NEW DRUG ADDON: CPT

## 2019-09-30 PROCEDURE — 81025 URINE PREGNANCY TEST: CPT | Performed by: EMERGENCY MEDICINE

## 2019-09-30 PROCEDURE — 96374 THER/PROPH/DIAG INJ IV PUSH: CPT

## 2019-09-30 PROCEDURE — 36415 COLL VENOUS BLD VENIPUNCTURE: CPT

## 2019-09-30 PROCEDURE — 85025 COMPLETE CBC W/AUTO DIFF WBC: CPT

## 2019-09-30 PROCEDURE — 80053 COMPREHEN METABOLIC PANEL: CPT

## 2019-09-30 RX ORDER — SUCRALFATE 1 G/1
1 TABLET ORAL 4 TIMES DAILY
Qty: 20 TABLET | Refills: 0 | Status: SHIPPED | OUTPATIENT
Start: 2019-09-30 | End: 2023-06-14

## 2019-09-30 RX ORDER — MORPHINE SULFATE 10 MG/ML
4 INJECTION INTRAMUSCULAR; INTRAVENOUS; SUBCUTANEOUS
Status: COMPLETED | OUTPATIENT
Start: 2019-09-30 | End: 2019-09-30

## 2019-09-30 RX ORDER — ONDANSETRON 2 MG/ML
4 INJECTION INTRAMUSCULAR; INTRAVENOUS
Status: COMPLETED | OUTPATIENT
Start: 2019-09-30 | End: 2019-09-30

## 2019-09-30 RX ORDER — KETOROLAC TROMETHAMINE 30 MG/ML
15 INJECTION, SOLUTION INTRAMUSCULAR; INTRAVENOUS
Status: COMPLETED | OUTPATIENT
Start: 2019-09-30 | End: 2019-09-30

## 2019-09-30 RX ADMIN — MORPHINE SULFATE 4 MG: 10 INJECTION INTRAVENOUS at 02:09

## 2019-09-30 RX ADMIN — ONDANSETRON 4 MG: 2 INJECTION INTRAMUSCULAR; INTRAVENOUS at 02:09

## 2019-09-30 RX ADMIN — MORPHINE SULFATE 4 MG: 10 INJECTION INTRAVENOUS at 05:09

## 2019-09-30 RX ADMIN — KETOROLAC TROMETHAMINE 15 MG: 30 INJECTION, SOLUTION INTRAMUSCULAR; INTRAVENOUS at 02:09

## 2019-09-30 NOTE — ED PROVIDER NOTES
Encounter Date: 9/30/2019       History     Chief Complaint   Patient presents with    Abdominal Pain     seen in ED x 4 days ago for same complaints but denies any relief. 1 episode of vomiting and nausea.      Patient is a 18-year-old female with CHF, pulmonary atresia (with history of tricuspid valve repair) and history of abnormal liver function tests (evaluated by hepatology, negative biopsy, negative hepatitis panel) who presents to the emergency department with upper abdominal pain.  Patient reports persistent, left upper abdominal pain for the past 4 days.  She was seen here 3 days ago and had blood work and a CT with no acute findings.  She reports nausea and 1 episode of emesis.  She denies loose or bloody stools.  She denies fever.  Mother states she is taking Motrin and Bentyl at home with no relief of her symptoms. She denies urinary symptoms. She is no longer on anticoagulation.    The history is provided by the patient.     Review of patient's allergies indicates:  No Known Allergies  Past Medical History:   Diagnosis Date    CHF (congestive heart failure)     Cirrhosis 07/2019    Obesity     Pulmonary atresia with intact ventricular septum     SVT (supraventricular tachycardia)      Past Surgical History:   Procedure Laterality Date    MEMO PROCEDURE      bt shunt and transannular patch    CARDIAC VALVE REPLACEMENT      INSERTION OF IMPLANTABLE LOOP RECORDER N/A 2/6/2019    Procedure: INSERTION, IMPLANTABLE LOOP RECORDER;  Surgeon: Romeo Robles MD;  Location: [facility] EP LAB;  Service: Cardiology;  Laterality: N/A;  SVT, IART, SAI, RFA, +/- ILR, FELECIA, MDT, MAC, MB/SM, 3 prep    INSERTION OF IMPLANTABLE LOOP RECORDER N/A 5/8/2019    Procedure: INSERTION, IMPLANTABLE LOOP RECORDER;  Surgeon: Ricardo Woodward MD;  Location: [facility] EP LAB;  Service: Cardiology;  Laterality: N/A;  afl, palps, ILR, MDT, anes, SM, 441    INSERTION OF TRANSJUGULAR INTRAHEPATIC PORTOSYSTEMIC SHUNT (TIPS) N/A  7/5/2019    Procedure: INSERTION, SHUNT, TRANSJUGULAR, INTRAHEPATIC PORTOSYSTEMIC;  Surgeon: Lashanda Diagnostic Provider;  Location: [facility] OR [address] [place] [address];  Service: Anesthesiology;  Laterality: N/A;  Room 188/Lisseth    PULMONARY VALVE REPLACEMENT  01/02/2007    25mm sharyn charles    TRICUSPID VALVE REPLACEMENT  01/02/2007    25 mm sharyn-charles     No family history on file.  Social History     Tobacco Use    Smoking status: Never Smoker   Substance Use Topics    Alcohol use: No    Drug use: No     Review of Systems   Constitutional: Negative for chills and fever.   HENT: Negative for congestion and sore throat.    Respiratory: Negative for shortness of breath.    Cardiovascular: Negative for chest pain.   Gastrointestinal: Positive for abdominal pain, nausea and vomiting. Negative for diarrhea.   Endocrine: Negative for polyuria.   Genitourinary: Negative for dysuria.   Musculoskeletal: Negative for arthralgias.   Skin: Negative for rash.   Neurological: Negative for headaches.       Physical Exam     Initial Vitals [09/30/19 1211]   BP Pulse Resp Temp SpO2   (!) 144/78 87 16 98.2 °F (36.8 °C) 97 %      MAP       --         Physical Exam    Vitals reviewed.  Constitutional: She is Obese . She is cooperative. No distress.   HENT:   Mouth/Throat: Oropharynx is clear and moist.   Eyes: Conjunctivae and EOM are normal.   Neck: Normal range of motion. Neck supple.   Cardiovascular: Normal rate, regular rhythm and intact distal pulses.   Pulmonary/Chest: Breath sounds normal. She has no wheezes. She has no rhonchi. She has no rales.   Abdominal: Soft. Bowel sounds are normal. There is tenderness (Diffuse, worse to left upper quadrant). There is guarding.   Musculoskeletal: Normal range of motion. She exhibits no edema.   Neurological: She is alert and oriented to person, place, and time. GCS eye subscore is 4. GCS verbal subscore is 5. GCS motor subscore is 6.   Skin: Skin is warm and dry. No rash noted.         ED  Course   Procedures  Labs Reviewed   COMPREHENSIVE METABOLIC PANEL - Abnormal; Notable for the following components:       Result Value    Potassium 3.1 (*)     Total Protein 8.6 (*)     Total Bilirubin 1.1 (*)     All other components within normal limits   URINALYSIS - Abnormal; Notable for the following components:    Appearance, UA Hazy (*)     All other components within normal limits   CBC W/ AUTO DIFFERENTIAL   LIPASE   HETEROPHILE AB SCREEN   HETEROPHILE AB SCREEN   POCT URINE PREGNANCY          Imaging Results          US Abdomen Limited (Final result)  Result time 09/30/19 15:53:58    Final result by Anthony Mc MD (09/30/19 15:53:58)                 Impression:      No acute sonographic abnormality.    Nodular contour of the liver with diffuse heterogeneous echotexture which may represent sequela of underlying cirrhosis.  The known hepatic lesions seen on recent cross-sectional imaging were not well demonstrated on today's exam which may be related to technical factors including decreased penetration from body habitus and also heterogeneous echotexture.    Borderline splenomegaly.      Electronically signed by: Anthony Mc MD  Date:    09/30/2019  Time:    15:53             Narrative:    EXAMINATION:  US ABDOMEN LIMITED    CLINICAL HISTORY:  LUQ pain;    TECHNIQUE:  Limited ultrasound of the right upper quadrant of the abdomen (including pancreas, liver, gallbladder, common bile duct, and spleen) was performed.    COMPARISON:  CT abdomen and pelvis 09/28/2019 and MRI abdomen 06/03/2019    FINDINGS:  Liver: Normal in size, measuring 17.1 cm. Heterogeneous echotexture and nodular contour.  No focal hepatic lesion definitively seen.    Gallbladder: No calculi, wall thickening, or pericholecystic fluid.  No sonographic Chauhan's sign.    Biliary system: The common duct is not dilated, measuring 3 mm.  No intrahepatic ductal dilatation.    Spleen: Upper limits of normal in size with normal echotexture,  measuring 12.2 cm.    Miscellaneous: No upper abdominal ascites.  Kidney measures 11.8 cm in length without hydronephrosis.  Midline structures including the pancreas are not well visualized due to bowel gas.                                 Medical Decision Making:   History:   Old Records Summarized: other records.       <> Summary of Records: CT abdomen pelvis on 9/27/19: Multiple stable indeterminate enhancing hepatic lesions, splenomegaly, a 2.4 cm left ovarian cyst  Initial Assessment:   Urgent evaluation of a 18 y.o. female presenting to the emergency department complaining of left upper quadrant abdominal pain, nausea and 1 episode of emesis. Patient is afebrile, nontoxic appearing and hemodynamically stable.  Patient has diffuse tenderness with guarding on exam.  Differential includes PUD and gastritis.  Recent CT 3 days ago, I do not believe repeat CT is indicated.  Will obtain upper abdominal ultrasound.    ED Management:  CBC reveals no acute abnormalities.  Chemistry reveals hypokalemia of 3.1.  Total bili is elevated at 1.1.  LFTs are normal.  Lipase is normal. Urinalysis reveals no signs of infection.  Abdominal ultrasound reveals nodular contour of the liver with note of borderline splenomegaly.  Biliary system has no abnormalities.  Patient's pain improved with pain medication given.  Do believe patient will need further workup by GI for her symptoms. She is taking PPI and I will add Carafate to this.  She was given strict return precautions.  She had no other complaints today and was stable at discharge.  Other:   I have discussed this case with another health care provider.                   ED Course as of Sep 30 1411   Mon Sep 30, 2019   Sandi Ruth, 18 y.o.  presented to the ED complaining of left upper and central abdominal pain with vomiting. No diarrhea. Bentyl not working. Patient with significant co-morbidities.      Patient seen and medically screened by myself in the Provider in  Triage Sort system due to ED crowding.  Appropriate tests and/or medications ordered.  A medical screening exam has been performed.  The care will be assumed by myself or another provider when treatment space becomes available.  I am not assuming care of this patient at this time. 12:58 PM. YANA RED        [AM]      ED Course User Index  [AM] Meagan Ribera PA-C     Clinical Impression:     1. Abdominal pain, left upper quadrant                               Viktor Corbin PA-C  09/30/19 [id number]

## 2019-09-30 NOTE — ED TRIAGE NOTES
Patient presents to ER c/o abdominal pain for 4 days.  Patient states she was seen in the ER several days ago with the same symptoms.  Pt reports 1 episode of vomiting.  Pt denies diarrhea.

## 2019-10-01 ENCOUNTER — OFFICE VISIT (OUTPATIENT)
Dept: CARDIOLOGY | Facility: CLINIC | Age: 19
End: 2019-10-01
Payer: MEDICAID

## 2019-10-01 VITALS
DIASTOLIC BLOOD PRESSURE: 47 MMHG | SYSTOLIC BLOOD PRESSURE: 106 MMHG | WEIGHT: 262.44 LBS | HEIGHT: 68 IN | OXYGEN SATURATION: 98 % | BODY MASS INDEX: 39.77 KG/M2 | HEART RATE: 77 BPM

## 2019-10-01 DIAGNOSIS — Z98.890 H/O HEART SURGERY: ICD-10-CM

## 2019-10-01 DIAGNOSIS — R10.9 CHRONIC ABDOMINAL PAIN: ICD-10-CM

## 2019-10-01 DIAGNOSIS — G89.29 CHRONIC ABDOMINAL PAIN: ICD-10-CM

## 2019-10-01 DIAGNOSIS — I47.19 ATRIAL TACHYCARDIA: Primary | ICD-10-CM

## 2019-10-01 DIAGNOSIS — Z95.3 HISTORY OF TRICUSPID VALVE REPLACEMENT WITH BIOPROSTHETIC VALVE: ICD-10-CM

## 2019-10-01 PROCEDURE — 93000 ELECTROCARDIOGRAM COMPLETE: CPT | Mod: S$GLB,,, | Performed by: INTERNAL MEDICINE

## 2019-10-01 PROCEDURE — 99213 PR OFFICE/OUTPT VISIT, EST, LEVL III, 20-29 MIN: ICD-10-PCS | Mod: 25,S$GLB,, | Performed by: PEDIATRICS

## 2019-10-01 PROCEDURE — 99213 OFFICE O/P EST LOW 20 MIN: CPT | Mod: 25,S$GLB,, | Performed by: PEDIATRICS

## 2019-10-01 PROCEDURE — 93000 EKG 12-LEAD: ICD-10-PCS | Mod: S$GLB,,, | Performed by: INTERNAL MEDICINE

## 2019-10-01 NOTE — PROGRESS NOTES
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 She underwent reconstruction of the right ventricular outflow tract and placement of a bioprosthetic pulmonary valve in early childhood. She subsequently has required multiple heart valve replacements. Her most recent heart surgery involved placement of a bioprosthetic valve in the pulmonary position and placement of a bioprosthetic valve in the tricuspid position, in 2007. Recently, she had a heart catheterization for recurrent insufficiency and obstruction of the bioprosthetic tricuspid valve.  The current procedure entailed a trans-catheter approach. It involved a valvuloplasty with a Z-Med 25 x 5 cm balloon followed by placement of an Sotelo S3 26 mm bioprosthetic valve in the tricuspid position.. 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 She leads a rather sedentary lifestyle, and is overweight. She is currently on:  Norvasc 2.5 mg once a day for BP control. The Lasix was changed to HCTZ 25 mg once a day.  Sotalol 120 mg q 12hrs for atrial tachcardia.  Warfarin was DCed. She is taking ASA 81 mg qd.     ...........................................     Kacey underwent an EPS with transcatheter ablation for both typical and atypical reentry atrial tachycardia, at [facility], by Dr Robles and Dr Woodward. Post ablation, a  transmission showed a run of probable atrial flutter at  ~ 170 bpm. Kacey was admitted to [facility] for additional treatment of the atrial tachycardia. Her Metoprolol was DCed and she was started on Sotalol 80 mg q 12 hrs. She also had an implantable loop recorder placed.  She also reported still having short episodes of rapid HRs while lying  down at night.  Download of the loop recorder still showed episodes of atrial tachycardia.  The Sotalol was increased to 120 mg q 12 hrs. Clinically, she is doing better.    Recently, she was again admitted to [facility] for severe abdominal pain and vomiting.  She is believed to have a gastric ulcer.  She was placed on Carafate.          ..............................................................................                    REVIEW OF SYSTEMS:  There are no visual disturbances. No HBP. No HAs, lightheadedness, syncope or seizures. No swallowing disorder or PIETER. No difficulty breathing, SOB or wheezing.  No asthma.  She does have a history of abdominal pain, which required hospitalization.  Bowel movements appear to be normal.  No pelvic pain. Urination is normal. No blood in the stool or urine. No DM of thyroid disorder.  No lipid disorder. No arterial disease. No known intrinsic problem with the lungs, liver or kidneys. No intrinsic bleeding condition. No smoking or ETOH use.  Not sexually active. There is a FH of strokes and adult onset DM.         PHYSICAL EXAMINATION:   GENERAL:  The patient is an overweight 18 -year-old female in no distress.  VITAL SIGNS:  Her weight is 119.2 kg (263 lbs) and her height is 1.676 meters (5' 6''). Heart rate is 100 beats per minute (sinus).  Sat 97 %.   Blood pressure in the right arm is 123/76 mmHg in the RA..  Color and perfusion are good.  HEENT:  Eye movements are normal.  No bruits are noted over the head.  No jugular venous distention or pulsations.  Mucous membranes are moist and pink.  Throat is clear with no evidence of blood. There is no adenopathy.  The neck is supple. No carotid bruits. The thyroid is not enlarged. SKIN:  Is pink and well perfused. CHEST:  Loop recorder is in the L upper chest with a small scar. There is a well-healed midline scar.  Lungs are clear to auscultation bilaterally, although the breath sounds are somewhat decreased at the base.  There are no wheezes or rhonchi. CARDIOVASCULAR:  Precordial activity is normal.  HR ~ 80 bpm.  The first heart sound is prominent and crisp.  The second heart sound is narrowly split and eccentuated. There is a grade 1/6 systolic ejection murmur heard best up the left sternal border and across the base.  No diastolic murmur is heard today.   ABDOMEN:  There is exogenous obesity. The liver edge is precussed about 4 FBs at the right costal margin.  It is nonpulsatile and nontender.  Bowel sounds appear to be normal. EXTREMITIES:  Pulses are 2+ throughout. Capillary refill is good. There is no cyanosis or peripheral edema.  No bruising in the groins or exts.  No rashes or cyanosis.  No bruits in the groins.              ........................................................................................................................................        ECG (Today): Sinus rhythm at a ventricular rate of 79 bpm.  Atrial abnormality. MS at 160 ms.  RBBB (164 ms). T wave abnormality.  Prolonged QTc at 494 ms,  but she also has a RBBB.  (Need to follow QT since on Sotalol).  PVC seen.                ECHOCARDIOGRAM (Previous visit):   An echocardiogram was performed.  2-D STUDY: There is no pericardial effusion.  No clots or vegetations. The bioprosthetic tricuspid valve is seen to be in good position. Annulus is 20-22 mm. The struts are easily seen.  The leaflets are mobile. No evidence for thrombus formation.  The right atrium is enlarged measuring 5.0 x 4.5 cm or area 17 - 18 cm2 (dilated).  One can also see the bioprosthetic tricuspid valve in a cross sectional view.  It appears circular and measures 2 cm x 2 cm. Again, no clots or vegetations are seen.  No right ventricular or left ventricular outflow tract obstruction.  The right ventricular circumferential area of shortening is  ~30%,  which is reduced.       M-MODE:   LV 5.3 cm, RV 3.3 cm, Ao 2.9 cm, LA 4.6 cm (enlarged), Sep 1.2 cm, PW 1.2 cm,  "EF ~ 59 %.      DOPPLER VELOCITY ANALYSIS:  There is trivial insufficiency of the recently-placed bioprosthetic tricuspid valve.  Mild turbulent antegrade flow is seen. Continuous wave Doppler analysis across the valve shows a peak pressure drop of 17 mmHg with a mean value of 9 mmHg. Importantly, the leaflets are moving freely.  There is no mitral insufficiency.  Mild aortic insufficiency  (P1/2 513 ms).  Peak pressure drop across the aortic valve is 8 mmHg. Peak pressure drop across the bioprosthetic pulmonary valve is 20 mmHg. Mild PI. PVs to the LA.        DOWNLOAD OF LOOP RECORDER (Previous visit):  No atrial flutter or fib. Rare atrial couplet. One atrial triplet.  No sustained atrial ectopy. Single PVC. No ventricular ectopy. We decreased the upper rate to 130 bpm.           .................................................................................................................................................               ASSESSMENT:  In summary, we have a 18-year-old AA female originally diagnosed to have pulmonary atresia with intact inter-ventricular septum, who is status-post multiple open heart operations involving both the pulmonary and tricuspid valves.  In February 2017 she had placement of an Sotelo S3 26 mm bioprosthetic tricuspid valve via the transvenous approach.  She tolerated the procedure well. She recently developed atrial flutter, for which she underwent an ablation.  "She was then started on Sotalol 80 mg q 12 hrs.  Also, a loop recorder was placed under the skin in the upper chest.   The Sotalol was increased to 120 mg q 12 hrs.  She appears to be tolerating the Sotalol, except for a few head aches.  Rhythm seems to be under much better control.             PLAN:  Given our findings, our suggestions are as follows:  1). She of course needs antibiotic prophylaxis for any invasive procedure, especially dental work.   2).  She should continue with the Sotalol at 120 mg q 12 " hrs.  She should continue with her remaining medications.  3). Kacey needs a GI consult, because of chronic abdominal pain.  We started her on Codeine 30 mg q 12 hrs. 4) RTC in 2 weeks.  If there are any questions concerning the cardiac status of this patient, please feel free to contact us. Thank you so much for allowing us to partake in the care of this patient. Donovan Gonzalez PhD, MD.

## 2019-10-10 DIAGNOSIS — I48.92 ATRIAL FLUTTER, UNSPECIFIED TYPE: ICD-10-CM

## 2019-10-10 DIAGNOSIS — I47.19 ATRIAL TACHYCARDIA: ICD-10-CM

## 2019-10-10 DIAGNOSIS — I51.9 LV DYSFUNCTION: Primary | ICD-10-CM

## 2019-10-10 DIAGNOSIS — I47.10 SVT (SUPRAVENTRICULAR TACHYCARDIA): ICD-10-CM

## 2019-10-10 DIAGNOSIS — Z95.818 STATUS POST PLACEMENT OF IMPLANTABLE LOOP RECORDER: ICD-10-CM

## 2019-10-22 ENCOUNTER — TELEPHONE (OUTPATIENT)
Dept: ELECTROPHYSIOLOGY | Facility: CLINIC | Age: 19
End: 2019-10-22

## 2019-10-22 NOTE — TELEPHONE ENCOUNTER
Spoke with Kacey. Requested she do a REMOTE transmission from her LOOP recorder. She states she will do it today.

## 2019-11-15 ENCOUNTER — TELEPHONE (OUTPATIENT)
Dept: PEDIATRIC CARDIOLOGY | Facility: CLINIC | Age: 19
End: 2019-11-15

## 2019-11-18 ENCOUNTER — CLINICAL SUPPORT (OUTPATIENT)
Dept: PEDIATRIC CARDIOLOGY | Facility: CLINIC | Age: 19
End: 2019-11-18
Attending: PEDIATRICS
Payer: MEDICAID

## 2019-11-18 DIAGNOSIS — Z95.818 STATUS POST PLACEMENT OF IMPLANTABLE LOOP RECORDER: ICD-10-CM

## 2019-11-18 DIAGNOSIS — I47.10 SVT (SUPRAVENTRICULAR TACHYCARDIA): ICD-10-CM

## 2019-11-18 DIAGNOSIS — I47.19 ATRIAL TACHYCARDIA: ICD-10-CM

## 2019-11-18 DIAGNOSIS — I48.92 ATRIAL FLUTTER, UNSPECIFIED TYPE: ICD-10-CM

## 2019-11-18 DIAGNOSIS — I51.9 LV DYSFUNCTION: ICD-10-CM

## 2019-11-18 PROCEDURE — 93299 CV LOOP RECORDER REMOTE PEDIATRICS (CUPID ONLY): CPT | Mod: PBBFAC | Performed by: PEDIATRICS

## 2019-11-18 PROCEDURE — 93298 REM INTERROG DEV EVAL SCRMS: CPT | Mod: ,,, | Performed by: PEDIATRICS

## 2019-11-18 PROCEDURE — 93298 CV LOOP RECORDER REMOTE PEDIATRICS (CUPID ONLY): ICD-10-PCS | Mod: ,,, | Performed by: PEDIATRICS

## 2019-11-19 ENCOUNTER — TELEPHONE (OUTPATIENT)
Dept: PEDIATRIC CARDIOLOGY | Facility: CLINIC | Age: 19
End: 2019-11-19

## 2019-11-19 NOTE — TELEPHONE ENCOUNTER
Spoke with mom who stated Kacey said she sent transmission on Saturday. Did not receive transmission. Requested they re-send.

## 2019-11-20 DIAGNOSIS — I47.19 ATRIAL TACHYCARDIA: ICD-10-CM

## 2019-11-20 DIAGNOSIS — I48.92 ATRIAL FLUTTER, UNSPECIFIED TYPE: ICD-10-CM

## 2019-11-20 DIAGNOSIS — I47.10 SVT (SUPRAVENTRICULAR TACHYCARDIA): Primary | ICD-10-CM

## 2019-11-21 ENCOUNTER — TELEPHONE (OUTPATIENT)
Dept: PEDIATRIC CARDIOLOGY | Facility: CLINIC | Age: 19
End: 2019-11-21

## 2019-11-21 NOTE — TELEPHONE ENCOUNTER
Spoke with mom. Informed her that Kacey had suspicious atrial arrhythmia activity that was captures on Loop. All episodes she pressed button were normal rhythm. Scheduled Kacey to see Dr Christopher.

## 2019-11-25 ENCOUNTER — OFFICE VISIT (OUTPATIENT)
Dept: CARDIOLOGY | Facility: CLINIC | Age: 19
End: 2019-11-25
Payer: MEDICAID

## 2019-11-25 VITALS
WEIGHT: 263 LBS | DIASTOLIC BLOOD PRESSURE: 81 MMHG | HEART RATE: 66 BPM | HEIGHT: 68 IN | BODY MASS INDEX: 39.86 KG/M2 | SYSTOLIC BLOOD PRESSURE: 119 MMHG | OXYGEN SATURATION: 99 %

## 2019-11-25 DIAGNOSIS — Z95.3 HISTORY OF TRICUSPID VALVE REPLACEMENT WITH BIOPROSTHETIC VALVE: ICD-10-CM

## 2019-11-25 DIAGNOSIS — R63.5 EXCESSIVE WEIGHT GAIN: ICD-10-CM

## 2019-11-25 DIAGNOSIS — I48.4 ATYPICAL ATRIAL FLUTTER: Primary | ICD-10-CM

## 2019-11-25 DIAGNOSIS — Z98.890 HISTORY OF OPEN HEART SURGERY: ICD-10-CM

## 2019-11-25 PROCEDURE — 93000 PR ELECTROCARDIOGRAM, COMPLETE: ICD-10-PCS | Mod: S$GLB,,, | Performed by: PEDIATRICS

## 2019-11-25 PROCEDURE — 99213 OFFICE O/P EST LOW 20 MIN: CPT | Mod: 25,S$GLB,, | Performed by: PEDIATRICS

## 2019-11-25 PROCEDURE — 93000 ELECTROCARDIOGRAM COMPLETE: CPT | Mod: S$GLB,,, | Performed by: PEDIATRICS

## 2019-11-25 PROCEDURE — 99213 PR OFFICE/OUTPT VISIT, EST, LEVL III, 20-29 MIN: ICD-10-PCS | Mod: 25,S$GLB,, | Performed by: PEDIATRICS

## 2019-11-25 NOTE — PROGRESS NOTES
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 She underwent reconstruction of the right ventricular outflow tract and placement of a bioprosthetic pulmonary valve in early childhood. She subsequently has required multiple heart valve replacements. Her most recent heart surgery involved placement of a bioprosthetic valve in the pulmonary position and placement of a bioprosthetic valve in the tricuspid position, in 2007. Recently, she had a heart catheterization for recurrent insufficiency and obstruction of the bioprosthetic tricuspid valve.  The current procedure entailed a trans-catheter approach. It involved a valvuloplasty with a Z-Med 25 x 5 cm balloon followed by placement of an Sotelo S3 26 mm bioprosthetic valve in the tricuspid position.. 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 She leads a rather sedentary lifestyle, and is overweight.       MEDS: She is currently on:  Norvasc 2.5 mg once a day for BP control. The Lasix was changed to HCTZ 25 mg once a day.  Sotalol 120 mg q 12hrs for atrial tachcardia.  Warfarin was DCed. She is taking ASA 81 mg qd.     ...........................................     Kacey underwent an EPS with transcatheter ablation for both typical and atypical reentry atrial tachycardia, at [facility], by Dr Robles and Dr Woodward. Post ablation, a  transmission showed a run of probable atrial flutter at  ~ 170 bpm. Kacey was admitted again to [facility] for additional treatment of the atrial tachycardia. Her Metoprolol was DCed and she was started on Sotalol 80 mg q 12 hrs. She also had an implantable loop recorder placed.  She still was having short episodes of rapid HRs while lying  down at night.  Download of the loop recorder showed episodes of short lived atrial tachycardia.  The Sotalol was increased to 120 mg q 12 hrs. Clinically, she is doing better.     Additionally, she was also admitted to [facility] for severe abdominal pain and vomiting. She is believed to have a gastric ulcer.  She was placed on Carafate.         ..............................................................................                    REVIEW OF SYSTEMS:  There are no visual or hearing disturbances. No HBP. No HAs, lightheadedness, syncope or seizures. No swallowing disorder or PIETER. No difficulty breathing, SOB, wheezing or asthma.  She does have a history of abdominal pain, which required hospitalization.  Bowel movements appear to be normal.  No pelvic pain. Urination is normal. No blood in the stool or urine. No DM of thyroid disorder.  No lipid disorder. No arterial disease. No known intrinsic problem with the lungs, liver or kidneys. No intrinsic bleeding condition. No smoking or ETOH use.  Not sexually active. There is a FH of strokes and adult onset DM.         PHYSICAL EXAMINATION:   GENERAL:  The patient is an overweight 19 -year-old female in no distress.  VITAL SIGNS:  Her weight is 119.3 kg (263 lbs) and her height is 1.676 meters (5' 6''). Heart rate is 66 beats per minute (sinus).  Sat 99 %.   Blood pressure in the right arm is 119/81 mmHg in the RA..  Color and perfusion are good.  HEENT:  Eye movements are normal.  No bruits are noted over the head.  No jugular venous distention or pulsations.  Mucous membranes are moist and pink.  There is no adenopathy.  The neck is supple. No carotid bruits. The thyroid is not enlarged. SKIN:  Is pink and well perfused. CHEST:  Loop recorder is in the L upper chest with a small scar. There is a well-healed midline scar.  Lungs are clear to auscultation bilaterally, although the breath sounds are somewhat decreased at the base. There are no wheezes or rhonchi.  CARDIOVASCULAR:  Precordial activity is normal.  HR ~ 70 bpm.  The first heart sound is prominent and crisp.  The second heart sound is narrowly split and eccentuated. There is a grade 1/6 systolic ejection murmur heard best up the left sternal border and across the base. No diastolic murmur is heard today.   ABDOMEN:  There is exogenous obesity. The liver edge is precussed about 4 FBs at the right costal margin.  It is nonpulsatile and nontender.  Bowel sounds appear to be normal. EXTREMITIES:  Pulses are 2+ throughout. Capillary refill is good. There is no cyanosis or peripheral edema.  No rashes or cyanosis.  No bruits in the groins.              ........................................................................................................................................        ECG (Today): Sinus rhythm at a ventricular rate of 69 bpm.  Atrial abnormality. VA at 206 ms. Essentially 1st degree block.  RBBB (160 ms). T wave abnormality.  Prolonged QTc at 505 ms, but she also has a RBBB.  (Need to follow QTc and T waves since she is on Sotalol).                 ECHOCARDIOGRAM (Previous visit):   An echocardiogram was performed.  2-D STUDY: There is no pericardial effusion.  No clots or vegetations. The bioprosthetic tricuspid valve is seen to be in good position. Annulus is 20-22 mm. The struts are easily seen.  The leaflets are mobile. No evidence for thrombus formation.  The right atrium is enlarged measuring 5.0 x 4.5 cm or area 17 - 18 cm2 (dilated).  One can also see the bioprosthetic tricuspid valve in a cross sectional view.  It appears circular and measures 2 cm x 2 cm. Again, no clots or vegetations are seen.  No right ventricular or left ventricular outflow tract obstruction.  The right ventricular circumferential area of shortening is  ~30%,  which is reduced.       M-MODE:   LV 5.3 cm, RV 3.3 cm, Ao 2.9 cm, LA 4.6 cm (enlarged), Sep 1.2 cm, PW 1.2 cm, EF ~ 59 %.      DOPPLER VELOCITY  "ANALYSIS:  There is trivial insufficiency of the recently-placed bioprosthetic tricuspid valve.  Mild turbulent antegrade flow is seen. Continuous wave Doppler analysis across the valve shows a peak pressure drop of 17 mmHg with a mean value of 9 mmHg. Importantly, the leaflets are moving freely.  There is no mitral insufficiency.  Mild aortic insufficiency  (P1/2 513 ms).  Peak pressure drop across the aortic valve is 8 mmHg. Peak pressure drop across the bioprosthetic pulmonary valve is 20 mmHg. Mild PI. PVs to the LA.        LOOP RECORDER TRANSMISSIONS:    Not performed today.       PREVIOUS TRANSITIONS:       7 SYMPTOM triggered episode- Available EGMs reveal sinus rhythm with intermittent noise artifact.     72 TACHY AUTO triggered episodes - available EGMs reveal sinus tach @ 133-162 bpm with intermittent noise artifact.     4 AF AUTO triggered episodes- suspicious for AF vs ST/SVT with PACs.     Next REMOTE transmission scheduled for Monday, December 16, 2019.      .................................................................................................................................................               ASSESSMENT:  In summary, we have a 18-year-old AA female originally diagnosed to have pulmonary atresia with intact inter-ventricular septum, who is status-post multiple open heart operations involving both the pulmonary and tricuspid valves.  In February 2017 she had placement of an Sotelo S3 26 mm bioprosthetic tricuspid valve via the transvenous approach.  She tolerated the procedure well. She recently developed atrial flutter, for which she underwent an ablation.  "She was then started on Sotalol 80 mg q 12 hrs.  Also, a loop recorder was placed under the skin in the upper chest.   The Sotalol was increased to 120 mg q 12 hrs.  She appears to be tolerating the Sotalol, except for a few head aches.  Rhythm seems to be under better control, but still occasional " "breakthroughs.             PLAN:  Given our findings, our suggestions are as follows:  1). She of course needs antibiotic prophylaxis for any invasive procedure, especially dental work.   2).  She should continue with the Sotalol at 120 mg q 12 hrs.  She should continue with her remaining medications.  3). "Kacey needs a GI consult, because of chronic abdominal pain".   I believe she also will need another EPS.   Consider adding a low-dose beta blocker.  4) RTC in 2 weeks for download of loop recorder. .  If there are any questions concerning the cardiac status of this patient, please feel free to contact us. Thank you so much for allowing us to partake in the care of this patient. Donovan Gonzalez PhD, MD.                                                    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 She underwent reconstruction of the right ventricular outflow tract and placement of a bioprosthetic pulmonary valve in early childhood. She subsequently has required multiple heart valve replacements. Her most recent heart surgery involved placement of a bioprosthetic valve in the pulmonary position and placement of a bioprosthetic valve in the tricuspid position, in 2007. Recently, she had a heart catheterization for recurrent insufficiency and obstruction of the bioprosthetic tricuspid valve.  The current procedure entailed a trans-catheter approach. It involved a valvuloplasty with a Z-Med 25 x 5 cm balloon followed by placement of an Sotelo S3 26 mm bioprosthetic valve in the tricuspid position.. 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 She leads a rather sedentary lifestyle, and is overweight. She is currently on:  Norvasc 2.5 mg once a " day for BP control. The Lasix was changed to HCTZ 25 mg once a day.  Sotalol 120 mg q 12hrs for atrial tachcardia.  Warfarin was DCed. She is taking ASA 81 mg qd.     ...........................................     Kacey underwent an EPS with transcatheter ablation for both typical and atypical reentry atrial tachycardia, at [facility], by Dr Robles and Dr Woodward. Post ablation, a  transmission showed a run of probable atrial flutter at  ~ 170 bpm. Kacey was admitted to [facility] for additional treatment of the atrial tachycardia. Her Metoprolol was DCed and she was started on Sotalol 80 mg q 12 hrs. She also had an implantable loop recorder placed.  She still was having short episodes of rapid HRs while lying down at night.  Download of the loop recorder showed episodes of short lived atrial tachycardia.  The Sotalol was increased to 120 mg q 12 hrs. Clinically, she is doing better.     Recently, she was again admitted to [facility] for severe abdominal pain and vomiting. She is believed to have a gastric ulcer.  She was placed on Carafate.           ..............................................................................                    REVIEW OF SYSTEMS:  There are no visual disturbances. No HBP. No HAs, lightheadedness, syncope or seizures. No swallowing disorder or PIETER. No difficulty breathing, SOB or wheezing.  No asthma.  She does have a history of abdominal pain, which required hospitalization.  Bowel movements appear to be normal.  No pelvic pain. Urination is normal. No blood in the stool or urine. No DM of thyroid disorder.  No lipid disorder. No arterial disease. No known intrinsic problem with the lungs, liver or kidneys. No intrinsic bleeding condition. No smoking or ETOH use.  Not sexually active. There is a FH of strokes and adult onset DM.         PHYSICAL EXAMINATION:   GENERAL:  The patient is an overweight 19 -year-old female in no distress.  VITAL SIGNS:  Her  weight is 119.3 kg (263 lbs) and her height is 1.676 meters (5' 6''). Heart rate is 66 beats per minute (sinus).  Sat 99 %.   Blood pressure in the right arm is 119/81 mmHg in the RA..  Color and perfusion are good.  HEENT:  Eye movements are normal.  No bruits are noted over the head.  No jugular venous distention or pulsations.  Mucous membranes are moist and pink.  There is no adenopathy.  The neck is supple. No carotid bruits. The thyroid is not enlarged. SKIN:  Is pink and well perfused. CHEST:  Loop recorder is in the L upper chest with a small scar. There is a well-healed midline scar.  Lungs are clear to auscultation bilaterally, although the breath sounds are somewhat decreased at the base. There are no wheezes or rhonchi. CARDIOVASCULAR:  Precordial activity is normal.  HR ~ 70 bpm.  The first heart sound is prominent and crisp.  The second heart sound is narrowly split and eccentuated. There is a grade 1/6 systolic ejection murmur heard best up the left sternal border and across the base. No diastolic murmur is heard today.   ABDOMEN:  There is exogenous obesity. The liver edge is precussed about 4 FBs at the right costal margin.  It is nonpulsatile and nontender.  Bowel sounds appear to be normal. EXTREMITIES:  Pulses are 2+ throughout. Capillary refill is good. There is no cyanosis or peripheral edema.  No rashes or cyanosis.  No bruits in the groins.              ........................................................................................................................................        ECG (Today): Sinus rhythm at a ventricular rate of 69 bpm.  Atrial abnormality. DC at 206 ms. Essentially 1st degree block.  RBBB (160 ms). T wave abnormality.  Prolonged QTc at 505 ms, but she also has a RBBB.  (Need to follow QTc and T waves since she is on Sotalol).                 ECHOCARDIOGRAM (Previous visit):   An echocardiogram was performed.  2-D STUDY: There is no pericardial effusion.  No  clots or vegetations. The bioprosthetic tricuspid valve is seen to be in good position. Annulus is 20-22 mm. The struts are easily seen.  The leaflets are mobile. No evidence for thrombus formation.  The right atrium is enlarged measuring 5.0 x 4.5 cm or area 17 - 18 cm2 (dilated).  One can also see the bioprosthetic tricuspid valve in a cross sectional view.  It appears circular and measures 2 cm x 2 cm. Again, no clots or vegetations are seen.  No right ventricular or left ventricular outflow tract obstruction.  The right ventricular circumferential area of shortening is  ~30%,  which is reduced.       M-MODE:   LV 5.3 cm, RV 3.3 cm, Ao 2.9 cm, LA 4.6 cm (enlarged), Sep 1.2 cm, PW 1.2 cm, EF ~ 59 %.      DOPPLER VELOCITY ANALYSIS:  There is trivial insufficiency of the recently-placed bioprosthetic tricuspid valve.  Mild turbulent antegrade flow is seen. Continuous wave Doppler analysis across the valve shows a peak pressure drop of 17 mmHg with a mean value of 9 mmHg. Importantly, the leaflets are moving freely.  There is no mitral insufficiency.  Mild aortic insufficiency  (P1/2 513 ms).  Peak pressure drop across the aortic valve is 8 mmHg. Peak pressure drop across the bioprosthetic pulmonary valve is 20 mmHg. Mild PI. PVs to the LA.        LOOP RECORDER TRANSMISSIONS:    Battery: OK        7 SYMPTOM triggered episode- Available EGMs reveal sinus rhythm with intermittent noise artifact     72 TACHY AUTO triggered episodes - available EGMs reveal sinus tach @ 133-162 bpm with intermittent noise artifact     4 AF AUTO triggered episodes- suspicious for AF vs ST/SVT with PACs     Next REMOTE transmission scheduled for Monday, December 16, 2019      .................................................................................................................................................               ASSESSMENT:  In summary, we have a 18-year-old AA female originally diagnosed to have pulmonary atresia with  "intact inter-ventricular septum, who is status-post multiple open heart operations involving both the pulmonary and tricuspid valves.  In February 2017 she had placement of an Sotelo S3 26 mm bioprosthetic tricuspid valve via the transvenous approach.  She tolerated the procedure well. She recently developed atrial flutter, for which she underwent an ablation.  "She was then started on Sotalol 80 mg q 12 hrs.  Also, a loop recorder was placed under the skin in the upper chest.   The Sotalol was increased to 120 mg q 12 hrs.  She appears to be tolerating the Sotalol, except for a few head aches.  Rhythm seems to be under better control, but still occasional breakthroughs.             PLAN:  Given our findings, our suggestions are as follows:  1). She of course needs antibiotic prophylaxis for any invasive procedure, especially dental work.   2).  She should continue with the Sotalol at 120 mg q 12 hrs.  She should continue with her remaining medications.  3). "Kacey needs a GI consult, because of chronic abdominal pain".   I believe she also will need another EPS.   Consider adding a low-dose beta blocker.  4) RTC in 2 weeks for download of loop recorder. .  If there are any questions concerning the cardiac status of this patient, please feel free to contact us. Thank you so much for allowing us to partake in the care of this patient. Donovan Gonzalez PhD, MD.                   "

## 2020-01-08 ENCOUNTER — TELEPHONE (OUTPATIENT)
Dept: PEDIATRIC CARDIOLOGY | Facility: CLINIC | Age: 20
End: 2020-01-08

## 2020-01-08 NOTE — TELEPHONE ENCOUNTER
Spoke with Kacey Ruth's mother regarding device followup. [facility] will no longer be doing a clinic at Dr Gonzalez's office. We would love to take care of you and service your device, but we will need to set you up an appointment in [facility] clinic with Dr Christopher to continue REMOTE monitoring. Please let us know if you would like to schedule an appointment or speak with Dr Gonzalez to see who they would like you follow up with. Mom wanted to make an appointment to establish care with Dr Christopher.

## 2020-01-17 ENCOUNTER — TELEPHONE (OUTPATIENT)
Dept: PEDIATRIC CARDIOLOGY | Facility: CLINIC | Age: 20
End: 2020-01-17

## 2020-01-17 NOTE — TELEPHONE ENCOUNTER
Spoke to patients mom  , informed patient is scheduled on Monday to do a remote transmission on device. Informed transmission can be sent anytime before Monday. Verbalized understanding.

## 2020-01-24 ENCOUNTER — TELEPHONE (OUTPATIENT)
Dept: PEDIATRIC CARDIOLOGY | Facility: CLINIC | Age: 20
End: 2020-01-24

## 2020-02-03 ENCOUNTER — CLINICAL SUPPORT (OUTPATIENT)
Dept: PEDIATRIC CARDIOLOGY | Facility: CLINIC | Age: 20
End: 2020-02-03
Attending: PEDIATRICS
Payer: MEDICAID

## 2020-02-03 DIAGNOSIS — Z95.818 STATUS POST PLACEMENT OF IMPLANTABLE LOOP RECORDER: ICD-10-CM

## 2020-02-03 DIAGNOSIS — I48.92 ATRIAL FLUTTER, UNSPECIFIED TYPE: ICD-10-CM

## 2020-02-03 DIAGNOSIS — I47.19 ATRIAL TACHYCARDIA: ICD-10-CM

## 2020-02-03 DIAGNOSIS — I51.9 LV DYSFUNCTION: ICD-10-CM

## 2020-02-03 DIAGNOSIS — I47.10 SVT (SUPRAVENTRICULAR TACHYCARDIA): ICD-10-CM

## 2020-02-03 PROCEDURE — 93298 REM INTERROG DEV EVAL SCRMS: CPT | Mod: ,,, | Performed by: PEDIATRICS

## 2020-02-03 PROCEDURE — 93298 CV LOOP RECORDER REMOTE PEDIATRICS (CUPID ONLY): ICD-10-PCS | Mod: ,,, | Performed by: PEDIATRICS

## 2020-02-03 PROCEDURE — G2066 INTER DEVC REMOTE 30D: HCPCS | Mod: PBBFAC | Performed by: PEDIATRICS

## 2020-02-18 NOTE — LETTER
"Chief Complaint   Patient presents with     RECHECK     2 month follow up     Height 1.715 m (5' 7.5\"), weight 111.6 kg (246 lb).    Tiffany Salinas, EMT  " August 22, 2019        Sharad Yun MD  [address]  [facility]  [place] [address]             [facility]  [address] [place] [id number]  Phone: [phone]  Fax: [phone]   Patient: Kacey Ruth   MR Number: [id number]   YOB: 2000   Date of Visit: 8/8/2019       Dear Dr. Yun:    Thank you for referring Kacey Ruth to me for evaluation. Attached you will find relevant portions of my assessment and plan of care.    If you have questions, please do not hesitate to call me. I look forward to following Kacey Ruth along with you.    Sincerely,      Ricardo Woodward MD            CC  No Recipients    Enclosure

## 2020-02-19 NOTE — PROGRESS NOTES
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 She underwent reconstruction of the right ventricular outflow tract and placement of a bioprosthetic pulmonary valve in early childhood. She subsequently has required multiple heart valve replacements. Her most recent heart surgery involved placement of a bioprosthetic valve in the pulmonary position and placement of a bioprosthetic valve in the tricuspid position, in 2007. Recently, she had a heart catheterization for recurrent insufficiency and obstruction of the bioprosthetic tricuspid valve.  The current procedure entailed a trans-catheter approach. It involved a valvuloplasty with a Z-Med 25 x 5 cm balloon followed by placement of an Sotelo S3 26 mm bioprosthetic valve in the tricuspid position.. 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 She leads a rather sedentary lifestyle, and is overweight.         MEDS: She is on Pepsid 20 mg q 12 hrs. The Lasix was changed to HCTZ 25 mg once a day.  Sotalol 120 mg q 12hrs for atrial tachcardia.  Warfarin was DCed. She is taking ASA 81 mg qd.     ...........................................           PAST HISTORY of IMPORTANCE:  Kacey underwent an EPS with transcatheter ablation for both typical and atypical reentry atrial tachycardia, at [facility], by Dr Robles and Dr Woodward. Post ablation, a  transmission showed a run of probable atrial flutter at ~ 170 bpm. Kacey was admitted again to [facility] for additional treatment of the atrial tachycardia. Her Metoprolol was DCed and she was started on Sotalol 80 mg q 12 hrs. She also had an implantable loop recorder placed.  She still was having short episodes of rapid HRs while  lying down at night.  Download of the loop recorder showed episodes of short lived atrial tachycardia.  The Sotalol was increased to 120 mg q 12 hrs. Clinically, she is doing better.     Additionally, she was also admitted to [facility] for severe abdominal pain and vomiting. She is believed to have a gastric ulcer.  She was placed on Carafate.         ..............................................................................                    REVIEW OF SYSTEMS:  There are no visual or hearing disturbances. No HBP. No HAs, lightheadedness, syncope or seizures. No swallowing disorder or PIETER. No difficulty breathing, SOB, wheezing or asthma.  She does have a history of abdominal pain, which required hospitalization.  Bowel movements appear to be normal.  No pelvic pain. Urination is normal. No blood in the stool or urine. No DM of thyroid disorder.  No lipid disorder. No arterial disease. No known intrinsic problem with the lungs, liver or kidneys. No intrinsic bleeding condition. No smoking or ETOH use.  Not sexually active. There is a FH of strokes and adult onset DM.         PHYSICAL EXAMINATION:   GENERAL:  The patient is an overweight 19 -year-old female in no distress.  VITAL SIGNS:  Her weight is 121.3 kg (267 1/2 lbs) and her height is 1.676 meters (5' 6''). Heart rate is 83 beats per minute (sinus).  Sat 98 %.   Blood pressure in the right arm is 135/66 mmHg in the RA..  Color and perfusion are good.  HEENT:  Eye movements are normal.  No bruits are noted over the head.  No jugular venous distention or pulsations.  Mucous membranes are moist and pink.  There is no adenopathy.  The neck is supple. No carotid bruits. The thyroid is not enlarged. SKIN:  Is pink and well perfused. CHEST:  Loop recorder is in the L upper chest with a small scar. There is a well-healed midline scar.  Lungs are clear to auscultation bilaterally, although the breath sounds are somewhat decreased at the base. There are no wheezes or  rhonchi. CARDIOVASCULAR:  Precordial activity is normal.  HR ~ 70 bpm.  The first heart sound is prominent and crisp.  The second heart sound is narrowly split and eccentuated. There is a grade 1/6 systolic ejection murmur heard best up the left sternal border and across the base. No diastolic murmur is heard today.   ABDOMEN:  There is exogenous obesity. The liver edge is precussed about 4 FBs at the right costal margin.  It is nonpulsatile and nontender.  Bowel sounds appear to be normal. EXTREMITIES:  Pulses are 2+ throughout. Capillary refill is good. There is no cyanosis or peripheral edema.  No rashes or cyanosis.  No bruits in the groins.              ........................................................................................................................................        ECG (Today): Sinus rhythm at a ventricular rate of 74 bpm.  Atrial abnormality. AL at 208 ms. Essentially 1st degree block.  RBBB (158 ms). T wave abnormality.  Prolonged QTc at 512 ms, but she also has a RBBB.  (Need to follow QTc and T waves since she is on Sotalol).                 ECHOCARDIOGRAM (Previous visit):   An echocardiogram was performed.  2-D STUDY: There is no pericardial effusion.  No clots or vegetations. The bioprosthetic tricuspid valve is seen to be in good position. Annulus is 20-22 mm. The struts are easily seen.  The leaflets are mobile. No evidence for thrombus formation.  The right atrium is enlarged measuring 5.0 x 4.5 cm or area 17 - 18 cm2 (dilated).  One can also see the bioprosthetic tricuspid valve in a cross sectional view.  It appears circular and measures 2 cm x 2 cm. Again, no clots or vegetations are seen.  No right ventricular or left ventricular outflow tract obstruction.  The right ventricular circumferential area of shortening is  ~30%,  which is reduced.       M-MODE:   LV 5.3 cm, RV 3.3 cm, Ao 2.9 cm, LA 4.6 cm (enlarged), Sep 1.2 cm, PW 1.2 cm, EF ~ 59 %.      DOPPLER VELOCITY  "ANALYSIS:  There is trivial insufficiency of the recently-placed bioprosthetic tricuspid valve.  Mild turbulent antegrade flow is seen. Continuous wave Doppler analysis across the valve shows a peak pressure drop of 17 mmHg with a mean value of 9 mmHg. Importantly, the leaflets are moving freely.  There is no mitral insufficiency.  Mild aortic insufficiency  (P1/2 513 ms).  Peak pressure drop across the aortic valve is 8 mmHg. Peak pressure drop across the bioprosthetic pulmonary valve is 20 mmHg. Mild PI. PVs to the LA.        LOOP RECORDER TRANSMISSIONS  (1/31 to 20/20):    ~ 200 tachycardia events, most very short-lived.  There were 13 with symptoms. On 2/15/20 there were two episodes of ~ 8 min each , average HR of ~ 145 bpm.   They appear to be an atrial tachycardia.  They appeared at ~ 1: 30 pm, causing aggitation.  ESL ~ 2.4 years.            PREVIOUS TRANSMISSIONS:       7 SYMPTOM triggered episode- Available EGMs reveal sinus rhythm with intermittent noise artifact.     72 TACHY AUTO triggered episodes - available EGMs reveal sinus tach @ 133-162 bpm with intermittent noise artifact.     4 AF AUTO triggered episodes- suspicious for AF vs ST/SVT with PACs.        .................................................................................................................................................               ASSESSMENT:  In summary, we have a 19-year-old AA female originally diagnosed to have pulmonary atresia with intact inter-ventricular septum, who is status-post multiple open heart operations involving both the pulmonary and tricuspid valves.  In February 2017 she had placement of an Sotelo S3 26 mm bioprosthetic tricuspid valve via the transvenous approach.  She tolerated the procedure well. She recently developed atrial flutter, for which she underwent an ablation.  "She was then started on Sotalol 80 mg q 12 hrs.  Also, a loop recorder was placed under the skin in the upper chest.   The " "Sotalol was increased to 120 mg q 12 hrs.  She appears to be tolerating the Sotalol, except for a few head aches.  Rhythm seems to be under better control, but still occasional breakthroughs.             PLAN:  Given our findings, our suggestions are as follows:  1). She of course needs antibiotic prophylaxis for any invasive procedure, especially dental work.   2).  She should continue with the Sotalol at 120 mg q 12 hrs.  She should continue with her remaining medications.  3). "Kacey needs a GI consult, because of chronic abdominal pain".   I BELIEVE SHE NEEDS ANOTHER EPS.  WILL SPEAK WITH DR SCHNEIDER.   Consider adding a low-dose beta blocker.  4) RTC in 2 weeks for download of loop recorder. .  If there are any questions concerning the cardiac status of this patient, please feel free to contact us. Thank you so much for allowing us to partake in the care of this patient. Donovan Gonzalez PhD, MD.                   "

## 2020-02-20 ENCOUNTER — OFFICE VISIT (OUTPATIENT)
Dept: CARDIOLOGY | Facility: CLINIC | Age: 20
End: 2020-02-20
Payer: MEDICAID

## 2020-02-20 VITALS
OXYGEN SATURATION: 98 % | HEART RATE: 83 BPM | SYSTOLIC BLOOD PRESSURE: 135 MMHG | DIASTOLIC BLOOD PRESSURE: 66 MMHG | WEIGHT: 267.5 LBS | HEIGHT: 68 IN | BODY MASS INDEX: 40.54 KG/M2

## 2020-02-20 DIAGNOSIS — Z98.890 HISTORY OF OPEN HEART SURGERY: ICD-10-CM

## 2020-02-20 DIAGNOSIS — I47.19 ATRIAL TACHYCARDIA: Primary | ICD-10-CM

## 2020-02-20 DIAGNOSIS — Z95.3 HISTORY OF TRICUSPID VALVE REPLACEMENT WITH BIOPROSTHETIC VALVE: ICD-10-CM

## 2020-02-20 DIAGNOSIS — Z95.3 HISTORY OF PULMONARY VALVE REPLACEMENT WITH BIOPROSTHETIC VALVE: ICD-10-CM

## 2020-02-20 PROCEDURE — 93288 INTERROG EVL PM/LDLS PM IP: CPT | Mod: S$GLB,,, | Performed by: PEDIATRICS

## 2020-02-20 PROCEDURE — 99213 PR OFFICE/OUTPT VISIT, EST, LEVL III, 20-29 MIN: ICD-10-PCS | Mod: 25,S$GLB,, | Performed by: PEDIATRICS

## 2020-02-20 PROCEDURE — 99213 OFFICE O/P EST LOW 20 MIN: CPT | Mod: 25,S$GLB,, | Performed by: PEDIATRICS

## 2020-02-20 PROCEDURE — 93000 PR ELECTROCARDIOGRAM, COMPLETE: ICD-10-PCS | Mod: 59,S$GLB,, | Performed by: PEDIATRICS

## 2020-02-20 PROCEDURE — 93288 PR INTERROG EVAL, IN PERSON,PACEMAKER: ICD-10-PCS | Mod: S$GLB,,, | Performed by: PEDIATRICS

## 2020-02-20 PROCEDURE — 93000 ELECTROCARDIOGRAM COMPLETE: CPT | Mod: 59,S$GLB,, | Performed by: PEDIATRICS

## 2020-03-05 ENCOUNTER — TELEPHONE (OUTPATIENT)
Dept: PEDIATRIC CARDIOLOGY | Facility: CLINIC | Age: 20
End: 2020-03-05

## 2020-03-05 DIAGNOSIS — I47.19 ATRIAL TACHYCARDIA: Primary | ICD-10-CM

## 2020-03-05 NOTE — TELEPHONE ENCOUNTER
Spoke with patients mother, regarding today's missed appointments. Requested to reschedule. Rescheduled for Thursday March 19th with EKG @ 1 and Dr. Christopher @ 1:30.

## 2020-04-09 ENCOUNTER — TELEPHONE (OUTPATIENT)
Dept: PEDIATRIC CARDIOLOGY | Facility: CLINIC | Age: 20
End: 2020-04-09

## 2020-04-09 NOTE — TELEPHONE ENCOUNTER
Spoke to patients mom, informed Kacey is scheduled on Monday to do a remote transmission on device. Informed transmission can be sent anytime before Monday. Verbalized understanding.

## 2020-04-13 ENCOUNTER — CLINICAL SUPPORT (OUTPATIENT)
Dept: PEDIATRIC CARDIOLOGY | Facility: CLINIC | Age: 20
End: 2020-04-13
Attending: PEDIATRICS
Payer: MEDICAID

## 2020-04-13 DIAGNOSIS — I51.9 LV DYSFUNCTION: ICD-10-CM

## 2020-04-13 DIAGNOSIS — I48.92 ATRIAL FLUTTER, UNSPECIFIED TYPE: ICD-10-CM

## 2020-04-13 DIAGNOSIS — I47.10 SVT (SUPRAVENTRICULAR TACHYCARDIA): ICD-10-CM

## 2020-04-13 DIAGNOSIS — I47.19 ATRIAL TACHYCARDIA: ICD-10-CM

## 2020-04-13 DIAGNOSIS — Z95.818 STATUS POST PLACEMENT OF IMPLANTABLE LOOP RECORDER: ICD-10-CM

## 2020-04-13 PROCEDURE — 93298 REM INTERROG DEV EVAL SCRMS: CPT | Mod: ,,, | Performed by: PEDIATRICS

## 2020-04-13 PROCEDURE — G2066 INTER DEVC REMOTE 30D: HCPCS | Mod: PBBFAC | Performed by: PEDIATRICS

## 2020-04-13 PROCEDURE — 93298 CV LOOP RECORDER REMOTE PEDIATRICS (CUPID ONLY): ICD-10-PCS | Mod: ,,, | Performed by: PEDIATRICS

## 2020-06-29 ENCOUNTER — CLINICAL SUPPORT (OUTPATIENT)
Dept: PEDIATRIC CARDIOLOGY | Facility: CLINIC | Age: 20
End: 2020-06-29
Attending: PEDIATRICS
Payer: MEDICAID

## 2020-06-29 DIAGNOSIS — R00.2 PALPITATIONS: ICD-10-CM

## 2020-06-29 DIAGNOSIS — I47.10 SVT (SUPRAVENTRICULAR TACHYCARDIA): Primary | ICD-10-CM

## 2020-06-29 DIAGNOSIS — I51.9 LV DYSFUNCTION: ICD-10-CM

## 2020-06-29 DIAGNOSIS — I47.10 SVT (SUPRAVENTRICULAR TACHYCARDIA): ICD-10-CM

## 2020-06-29 PROCEDURE — G2066 INTER DEVC REMOTE 30D: HCPCS | Mod: PBBFAC | Performed by: PEDIATRICS

## 2020-06-29 PROCEDURE — 93298 REM INTERROG DEV EVAL SCRMS: CPT | Mod: ,,, | Performed by: PEDIATRICS

## 2020-06-29 PROCEDURE — 93298 CV LOOP RECORDER REMOTE PEDIATRICS (CUPID ONLY): ICD-10-PCS | Mod: ,,, | Performed by: PEDIATRICS

## 2020-08-03 ENCOUNTER — CLINICAL SUPPORT (OUTPATIENT)
Dept: PEDIATRIC CARDIOLOGY | Facility: CLINIC | Age: 20
End: 2020-08-03
Attending: PEDIATRICS
Payer: MEDICAID

## 2020-08-03 ENCOUNTER — TELEPHONE (OUTPATIENT)
Dept: PEDIATRIC CARDIOLOGY | Facility: CLINIC | Age: 20
End: 2020-08-03

## 2020-08-03 DIAGNOSIS — I51.9 LV DYSFUNCTION: ICD-10-CM

## 2020-08-03 DIAGNOSIS — R00.2 PALPITATIONS: ICD-10-CM

## 2020-08-03 DIAGNOSIS — I47.10 SVT (SUPRAVENTRICULAR TACHYCARDIA): ICD-10-CM

## 2020-08-04 ENCOUNTER — TELEPHONE (OUTPATIENT)
Dept: PEDIATRIC CARDIOLOGY | Facility: CLINIC | Age: 20
End: 2020-08-04

## 2020-08-04 DIAGNOSIS — I47.10 SVT (SUPRAVENTRICULAR TACHYCARDIA): Primary | ICD-10-CM

## 2020-08-04 NOTE — TELEPHONE ENCOUNTER
Spoke to mom regarding remote transmission and patient needing to be seen in the office.     Mom agreed to schedule appointment for Thursday, 8/6/20 at 1:30.

## 2020-08-06 ENCOUNTER — OFFICE VISIT (OUTPATIENT)
Dept: CARDIOLOGY | Facility: CLINIC | Age: 20
End: 2020-08-06
Payer: MEDICAID

## 2020-08-06 ENCOUNTER — HOSPITAL ENCOUNTER (OUTPATIENT)
Dept: CARDIOLOGY | Facility: CLINIC | Age: 20
Discharge: HOME | End: 2020-08-06
Payer: MEDICAID

## 2020-08-06 ENCOUNTER — CLINICAL SUPPORT (OUTPATIENT)
Dept: CARDIOLOGY | Facility: CLINIC | Age: 20
End: 2020-08-06
Attending: PEDIATRICS
Payer: MEDICAID

## 2020-08-06 VITALS
WEIGHT: 275.38 LBS | BODY MASS INDEX: 41.74 KG/M2 | DIASTOLIC BLOOD PRESSURE: 59 MMHG | OXYGEN SATURATION: 97 % | SYSTOLIC BLOOD PRESSURE: 130 MMHG | HEIGHT: 68 IN | HEART RATE: 88 BPM

## 2020-08-06 DIAGNOSIS — I47.19 ATRIAL TACHYCARDIA: ICD-10-CM

## 2020-08-06 DIAGNOSIS — Z95.818 STATUS POST PLACEMENT OF IMPLANTABLE LOOP RECORDER: Primary | ICD-10-CM

## 2020-08-06 DIAGNOSIS — I47.10 SVT (SUPRAVENTRICULAR TACHYCARDIA): ICD-10-CM

## 2020-08-06 DIAGNOSIS — Q25.5 PULMONARY ATRESIA WITH INTACT VENTRICULAR SEPTUM: ICD-10-CM

## 2020-08-06 DIAGNOSIS — Q24.9 ADULT CONGENITAL HEART DISEASE: ICD-10-CM

## 2020-08-06 DIAGNOSIS — Z95.2 S/P PULMONARY VALVE REPLACEMENT: ICD-10-CM

## 2020-08-06 DIAGNOSIS — I36.2 NONRHEUMATIC TRICUSPID VALVE STENOSIS WITH REGURGITATION: ICD-10-CM

## 2020-08-06 DIAGNOSIS — I48.92 ATRIAL FLUTTER, UNSPECIFIED TYPE: ICD-10-CM

## 2020-08-06 DIAGNOSIS — Z95.4 S/P TRICUSPID VALVE REPLACEMENT: ICD-10-CM

## 2020-08-06 PROCEDURE — 99215 PR OFFICE/OUTPT VISIT, EST, LEVL V, 40-54 MIN: ICD-10-PCS | Mod: 25,S$PBB,, | Performed by: PEDIATRICS

## 2020-08-06 PROCEDURE — 99999 PR PBB SHADOW E&M-EST. PATIENT-LVL IV: ICD-10-PCS | Mod: PBBFAC,,, | Performed by: PEDIATRICS

## 2020-08-06 PROCEDURE — 99999 PR PBB SHADOW E&M-EST. PATIENT-LVL IV: CPT | Mod: PBBFAC,,, | Performed by: PEDIATRICS

## 2020-08-06 PROCEDURE — 99215 OFFICE O/P EST HI 40 MIN: CPT | Mod: 25,S$PBB,, | Performed by: PEDIATRICS

## 2020-08-06 PROCEDURE — 99214 OFFICE O/P EST MOD 30 MIN: CPT | Mod: PBBFAC,25 | Performed by: PEDIATRICS

## 2020-08-06 PROCEDURE — 93010 EKG 12-LEAD: ICD-10-PCS | Mod: S$PBB,,, | Performed by: INTERNAL MEDICINE

## 2020-08-06 PROCEDURE — 93010 ELECTROCARDIOGRAM REPORT: CPT | Mod: S$PBB,,, | Performed by: INTERNAL MEDICINE

## 2020-08-06 PROCEDURE — 93005 ELECTROCARDIOGRAM TRACING: CPT | Mod: PBBFAC | Performed by: INTERNAL MEDICINE

## 2020-08-06 NOTE — PROGRESS NOTES
Thank you for referring your patient Kacey Ruth to the electrophysiology clinic for consultation. The patient is accompanied by her mother. Please review my findings below.    CHIEF COMPLAINT: EP evaluation of atrial arrhythmia vs. SVT    HISTORY OF PRESENT ILLNESS:   19 y.o. female with history of pulmonary atresia intact ventricular septum and ASD.  She underwent reconstruction of the right ventricular outflow tract and placement of a bioprosthetic pulmonary valve in early childhood. She has had multiple valve replacements. Her most recent open heart surgery involved placement of a bioprosthetic valve in the pulmonary position and placement of a bioprosthetic valve in the tricuspid position in 2007. She had a heart catheterization for recurrent tricuspid valve insufficiency and stenosis.  The procedure employed a trans-catheter approach. It entailed tricuspid valve balloon valvuloplasty with a Z-Med 25 x 5 cm balloon followed by placement of an Sotelo S3 26 mm valve in Feb 2017. The catheterization also showed that she had only mild pulmonary valve stenosis and insufficiency.  She was then found by Dr. Gonzalez to have SVT vs. Atrial flutter and started on Metoprolol.  On 2/6/19, she had ablation of dual loop tachycardia involving CTI and lateral wall.  She continued to have SVT so was admitted for sotalol initiation on 5/8/19 and loop recorder implantation.      Interval Hx:  She returns today for scheduled follow up.  She was last seen in August 2019.  Her rhythm has been much better controlled on sotalol 120 mg BID.  Unfortunately, she is having headaches that she attributes to the sotalol.  Dr. Guerrero told her to take Advil for the headaches.        REVIEW OF SYSTEMS:     GENERAL: No fever, chills, fatigability or weight loss.  SKIN: No rashes, itching or changes in color or texture of skin.  CHEST: Denies SR, cyanosis, wheezing, cough and sputum production.  CARDIOVASCULAR: see HPI  ABDOMEN: Appetite fine.  No weight loss. Denies diarrhea, abdominal pain, or vomiting.  PERIPHERAL VASCULAR: No claudication or cyanosis.  MUSCULOSKELETAL: No joint stiffness or swelling.   NEUROLOGIC: No history of seizures,  alteration of gait or coordination.    PAST MEDICAL HISTORY:   Past Medical History:   Diagnosis Date    CHF (congestive heart failure)     Cirrhosis 07/2019    Obesity     Pulmonary atresia with intact ventricular septum     SVT (supraventricular tachycardia)        FAMILY HISTORY:   History reviewed. No pertinent family history.      SOCIAL HISTORY:   Social History     Socioeconomic History    Marital status: Single     Spouse name: Not on file    Number of children: Not on file    Years of education: Not on file    Highest education level: Not on file   Occupational History    Not on file   Social Needs    Financial resource strain: Not on file    Food insecurity     Worry: Not on file     Inability: Not on file    Transportation needs     Medical: Not on file     Non-medical: Not on file   Tobacco Use    Smoking status: Never Smoker    Smokeless tobacco: Never Used   Substance and Sexual Activity    Alcohol use: No    Drug use: No    Sexual activity: Never   Lifestyle    Physical activity     Days per week: Not on file     Minutes per session: Not on file    Stress: Not on file   Relationships    Social connections     Talks on phone: Not on file     Gets together: Not on file     Attends Muslim service: Not on file     Active member of club or organization: Not on file     Attends meetings of clubs or organizations: Not on file     Relationship status: Not on file   Other Topics Concern    Not on file   Social History Narrative    Not on file       ALLERGIES:  Review of patient's allergies indicates:  No Known Allergies    MEDICATIONS:    Current Outpatient Medications:     amlodipine (NORVASC) 2.5 MG tablet, Take 1 tablet (2.5 mg total) by mouth once daily., Disp: 30 tablet, Rfl:  "11    aspirin (ECOTRIN) 81 MG EC tablet, TAKE 1 TABLET BY MOUTH EVERY DAY, Disp: 30 tablet, Rfl: 0    hydroCHLOROthiazide (HYDRODIURIL) 25 MG tablet, Take 1 tablet (25 mg total) by mouth once daily., Disp: 30 tablet, Rfl: 1    sotalol (BETAPACE) 120 MG Tab, Take 1 tablet (120 mg total) by mouth 2 (two) times daily., Disp: 60 tablet, Rfl: 11    sucralfate (CARAFATE) 1 gram tablet, Take 1 tablet (1 g total) by mouth 4 (four) times daily., Disp: 20 tablet, Rfl: 0    famotidine (PEPCID) 20 MG tablet, Take 1 tablet (20 mg total) by mouth 2 (two) times daily., Disp: 60 tablet, Rfl: 2    omeprazole (PRILOSEC) 20 MG capsule, Take 2 capsules (40 mg total) by mouth once daily., Disp: 60 capsule, Rfl: 0  No current facility-administered medications for this visit.     Facility-Administered Medications Ordered in Other Visits:     0.9%  NaCl infusion, , Intravenous, Continuous, Slime Salcido NP, Last Rate: 0 mL/hr at 02/06/19 1521    sodium chloride 0.9% flush 5 mL, 5 mL, Intravenous, PRN, Slime Salcido NP      PHYSICAL EXAM:   Vitals:    08/06/20 1307 08/06/20 1310   BP: 127/80 (!) 130/59   BP Location: Right arm Left arm   Patient Position: Sitting Sitting   BP Method: Large (Automatic) Large (Automatic)   Pulse: 88 88   SpO2: 97%    Weight: 124.9 kg (275 lb 5.7 oz)    Height: 5' 8" (1.727 m)          GENERAL: Awake, well-developed well-nourished, no apparent distress  HEENT: mucous membranes moist and pink, normocephalic atraumatic, no cranial or carotid bruits, sclera anicteric  NECK: no jugular venous distention, no lymphadenopathy  CHEST: Good air movement, clear to auscultation bilaterally  CARDIOVASCULAR: Quiet precordium, regular rate and rhythm, S1S2, no murmurs rubs or gallops  ABDOMEN: Soft, nontender nondistended, no hepatomegaly, no aortic bruits  EXTREMITIES: Warm well perfused, 2+ radial/pedal pulses, capillary refill 2 seconds, no clubbing, cyanosis, or edema  NEURO: Alert and oriented, " cooperative with exam, face symmetric, moves all extremities well    STUDIES:  ECG: Normal Sinus rhythm, right bundle branch block    Loop:   MDT ILR REMOTE transmission received and data reviewed.      Battery: OK     Current ECG reveals sinus tach with PACs     1 SYMPTOM triggered episode- Reveals sinus arrhythmia/ sinus tach with PACs rare noise artifact     156 TACHY AUTO triggered episodes - Reveals sinus arrhythmia/ sinus tach with intermittent PACs, noise artifact, and rare PVCs     Next REMOTE transmission scheduled for Monday, September 7, 2020.    ASSESSMENT:  19 y.o. female with pulmonary atresia intact septum with biventricular repair with charles valve in tricuspid position who is s/p flutter ablation with recurrent SVT that is now controlled on sotalol although she is having headaches that she attributes to medicine.    PLAN:   Increase fluid intake  Continue Sotalol 120mg po BID  Consider repeat ablation  Continue aspirin  Continue monthly loop transmissions  F/u in 6 months in EP clinic.  Keep f/u with Dr. Gonzalez in between.  Call for further palpitations, chest pain, syncope, or any other questions or concerns.  Light aerobic exercise for 1 hour per day 5 days per week.        The patient's doctor will be notified via EPIC    I hope this brings you up-to-date on Kacey Ruth  Please contact me with any questions or concerns.    Lorraine Christopher MD  Pediatric and Adult Congenital Electrophysiologist  Pediatric Cardiologist

## 2020-08-18 ENCOUNTER — HOSPITAL ENCOUNTER (EMERGENCY)
Facility: OTHER | Age: 20
Discharge: HOME | End: 2020-08-18
Attending: EMERGENCY MEDICINE
Payer: MEDICAID

## 2020-08-18 VITALS
HEIGHT: 67 IN | OXYGEN SATURATION: 98 % | HEART RATE: 79 BPM | TEMPERATURE: 99 F | RESPIRATION RATE: 16 BRPM | DIASTOLIC BLOOD PRESSURE: 71 MMHG | WEIGHT: 275 LBS | BODY MASS INDEX: 43.16 KG/M2 | SYSTOLIC BLOOD PRESSURE: 175 MMHG

## 2020-08-18 DIAGNOSIS — R07.89 CHEST PRESSURE: ICD-10-CM

## 2020-08-18 DIAGNOSIS — R51.9 NONINTRACTABLE HEADACHE, UNSPECIFIED CHRONICITY PATTERN, UNSPECIFIED HEADACHE TYPE: Primary | ICD-10-CM

## 2020-08-18 DIAGNOSIS — R07.89 CHEST TIGHTNESS: ICD-10-CM

## 2020-08-18 LAB
ALBUMIN SERPL BCP-MCNC: 4.1 G/DL (ref 3.5–5.2)
ALP SERPL-CCNC: 73 U/L (ref 55–135)
ALT SERPL W/O P-5'-P-CCNC: 17 U/L (ref 10–44)
ANION GAP SERPL CALC-SCNC: 10 MMOL/L (ref 8–16)
AST SERPL-CCNC: 23 U/L (ref 10–40)
B-HCG UR QL: NEGATIVE
BASOPHILS # BLD AUTO: 0.03 K/UL (ref 0–0.2)
BASOPHILS NFR BLD: 0.3 % (ref 0–1.9)
BILIRUB SERPL-MCNC: 0.6 MG/DL (ref 0.1–1)
BNP SERPL-MCNC: 51 PG/ML (ref 0–99)
BUN SERPL-MCNC: 8 MG/DL (ref 6–20)
CALCIUM SERPL-MCNC: 10 MG/DL (ref 8.7–10.5)
CHLORIDE SERPL-SCNC: 103 MMOL/L (ref 95–110)
CO2 SERPL-SCNC: 27 MMOL/L (ref 23–29)
CREAT SERPL-MCNC: 0.9 MG/DL (ref 0.5–1.4)
CTP QC/QA: YES
DIFFERENTIAL METHOD: NORMAL
EOSINOPHIL # BLD AUTO: 0.1 K/UL (ref 0–0.5)
EOSINOPHIL NFR BLD: 0.8 % (ref 0–8)
ERYTHROCYTE [DISTWIDTH] IN BLOOD BY AUTOMATED COUNT: 12.9 % (ref 11.5–14.5)
EST. GFR  (AFRICAN AMERICAN): >60 ML/MIN/1.73 M^2
EST. GFR  (NON AFRICAN AMERICAN): >60 ML/MIN/1.73 M^2
GLUCOSE SERPL-MCNC: 129 MG/DL (ref 70–110)
HCT VFR BLD AUTO: 40.7 % (ref 37–48.5)
HGB BLD-MCNC: 13.1 G/DL (ref 12–16)
IMM GRANULOCYTES # BLD AUTO: 0.01 K/UL (ref 0–0.04)
IMM GRANULOCYTES NFR BLD AUTO: 0.1 % (ref 0–0.5)
LYMPHOCYTES # BLD AUTO: 1.7 K/UL (ref 1–4.8)
LYMPHOCYTES NFR BLD: 18.8 % (ref 18–48)
MCH RBC QN AUTO: 27.6 PG (ref 27–31)
MCHC RBC AUTO-ENTMCNC: 32.2 G/DL (ref 32–36)
MCV RBC AUTO: 86 FL (ref 82–98)
MONOCYTES # BLD AUTO: 0.8 K/UL (ref 0.3–1)
MONOCYTES NFR BLD: 9.1 % (ref 4–15)
NEUTROPHILS # BLD AUTO: 6.4 K/UL (ref 1.8–7.7)
NEUTROPHILS NFR BLD: 70.9 % (ref 38–73)
NRBC BLD-RTO: 0 /100 WBC
PLATELET # BLD AUTO: 277 K/UL (ref 150–350)
PMV BLD AUTO: 9.2 FL (ref 9.2–12.9)
POTASSIUM SERPL-SCNC: 3.7 MMOL/L (ref 3.5–5.1)
PROT SERPL-MCNC: 8 G/DL (ref 6–8.4)
RBC # BLD AUTO: 4.75 M/UL (ref 4–5.4)
SARS-COV-2 RDRP RESP QL NAA+PROBE: NEGATIVE
SODIUM SERPL-SCNC: 140 MMOL/L (ref 136–145)
TROPONIN I SERPL DL<=0.01 NG/ML-MCNC: 0.01 NG/ML (ref 0–0.03)
WBC # BLD AUTO: 9.03 K/UL (ref 3.9–12.7)

## 2020-08-18 PROCEDURE — 83880 ASSAY OF NATRIURETIC PEPTIDE: CPT

## 2020-08-18 PROCEDURE — 96375 TX/PRO/DX INJ NEW DRUG ADDON: CPT

## 2020-08-18 PROCEDURE — 93010 ELECTROCARDIOGRAM REPORT: CPT | Mod: ,,, | Performed by: INTERNAL MEDICINE

## 2020-08-18 PROCEDURE — 96374 THER/PROPH/DIAG INJ IV PUSH: CPT

## 2020-08-18 PROCEDURE — U0002 COVID-19 LAB TEST NON-CDC: HCPCS

## 2020-08-18 PROCEDURE — 99285 EMERGENCY DEPT VISIT HI MDM: CPT | Mod: 25

## 2020-08-18 PROCEDURE — 93005 ELECTROCARDIOGRAM TRACING: CPT

## 2020-08-18 PROCEDURE — 81025 URINE PREGNANCY TEST: CPT | Performed by: EMERGENCY MEDICINE

## 2020-08-18 PROCEDURE — 80053 COMPREHEN METABOLIC PANEL: CPT

## 2020-08-18 PROCEDURE — 93010 EKG 12-LEAD: ICD-10-PCS | Mod: ,,, | Performed by: INTERNAL MEDICINE

## 2020-08-18 PROCEDURE — 63600175 PHARM REV CODE 636 W HCPCS: Performed by: PHYSICIAN ASSISTANT

## 2020-08-18 PROCEDURE — 84484 ASSAY OF TROPONIN QUANT: CPT

## 2020-08-18 PROCEDURE — 85025 COMPLETE CBC W/AUTO DIFF WBC: CPT

## 2020-08-18 RX ORDER — KETOROLAC TROMETHAMINE 30 MG/ML
10 INJECTION, SOLUTION INTRAMUSCULAR; INTRAVENOUS
Status: COMPLETED | OUTPATIENT
Start: 2020-08-18 | End: 2020-08-18

## 2020-08-18 RX ORDER — DIPHENHYDRAMINE HYDROCHLORIDE 50 MG/ML
12.5 INJECTION INTRAMUSCULAR; INTRAVENOUS
Status: COMPLETED | OUTPATIENT
Start: 2020-08-18 | End: 2020-08-18

## 2020-08-18 RX ORDER — PROCHLORPERAZINE EDISYLATE 5 MG/ML
5 INJECTION INTRAMUSCULAR; INTRAVENOUS
Status: COMPLETED | OUTPATIENT
Start: 2020-08-18 | End: 2020-08-18

## 2020-08-18 RX ADMIN — DIPHENHYDRAMINE HYDROCHLORIDE 12.5 MG: 50 INJECTION, SOLUTION INTRAMUSCULAR; INTRAVENOUS at 07:08

## 2020-08-18 RX ADMIN — KETOROLAC TROMETHAMINE 10 MG: 30 INJECTION, SOLUTION INTRAMUSCULAR at 07:08

## 2020-08-18 RX ADMIN — PROCHLORPERAZINE EDISYLATE 5 MG: 5 INJECTION INTRAMUSCULAR; INTRAVENOUS at 07:08

## 2020-08-18 NOTE — ED PROVIDER NOTES
Encounter Date: 8/18/2020       History     Chief Complaint   Patient presents with    Headache     headache x last 4 days. 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 She reports that she was placed on sotalol for her SVT about one year ago and as the sotalol was titrated up to 120mg daily, she developed headaches. The headaches have been present for about one year in waxing and waning pattern. Patient reports these headaches are usually well managed with PO Excedrin and Ibuprofen. She reports that for the past 4 days she has had a headache with the same character as previous headaches however the HA has not been relieved with a single dose of iburopfen 600 mg and a dose of Excedrin yesterday. She admits that today she started feeling some chest pressure that has been constant. She attributes this headache to her chest pressure. She reports this is why she is presenting to the ER. She denies dizziness, fever, chills, nausea, vomiting. She reports no associated vision changes. She denies neck stiffness. She denies trauma or injury. She is currently unaccompanied in the ER.         Review of patient's allergies indicates:  No Known Allergies  Past Medical History:   Diagnosis Date    CHF (congestive heart failure)     Cirrhosis 07/2019    Obesity     Pulmonary atresia with intact ventricular septum     SVT (supraventricular tachycardia)      Past Surgical History:   Procedure Laterality Date    MEMO PROCEDURE      bt shunt and transannular patch    CARDIAC VALVE REPLACEMENT      INSERTION OF IMPLANTABLE LOOP RECORDER N/A 2/6/2019    Procedure: INSERTION,  IMPLANTABLE LOOP RECORDER;  Surgeon: Romeo Robles MD;  Location: [facility] EP LAB;  Service: Cardiology;  Laterality: N/A;  SVT, IART, SAI, RFA, +/- ILR, FELECIA, MDT, MAC, MB/SM, 3 prep    INSERTION OF IMPLANTABLE LOOP RECORDER N/A 5/8/2019    Procedure: INSERTION, IMPLANTABLE LOOP RECORDER;  Surgeon: Ricardo Woodward MD;  Location: [facility] EP LAB;  Service: Cardiology;  Laterality: N/A;  afl, palps, ILR, MDT, anes, SM, 441    INSERTION OF TRANSJUGULAR INTRAHEPATIC PORTOSYSTEMIC SHUNT (TIPS) N/A 7/5/2019    Procedure: INSERTION, SHUNT, TRANSJUGULAR, INTRAHEPATIC PORTOSYSTEMIC;  Surgeon: [facility] Diagnostic Provider;  Location: [facility] OR [address] [place] [address];  Service: Anesthesiology;  Laterality: N/A;  Room [facility]    PULMONARY VALVE REPLACEMENT  01/02/2007    25mm sharyn charles    TRICUSPID VALVE REPLACEMENT  01/02/2007    25 mm sharyn-charles     History reviewed. No pertinent family history.  Social History     Tobacco Use    Smoking status: Never Smoker    Smokeless tobacco: Never Used   Substance Use Topics    Alcohol use: No    Drug use: No     Review of Systems   Constitutional: Negative for fever.   HENT: Negative for sore throat.    Respiratory: Negative for shortness of breath.    Cardiovascular: Negative for chest pain.        Chest pressure   Gastrointestinal: Negative for nausea.   Genitourinary: Negative for dysuria.   Musculoskeletal: Negative for back pain.   Skin: Negative for rash.   Neurological: Positive for headaches. Negative for weakness.   Hematological: Does not bruise/bleed easily.       Physical Exam     Initial Vitals [08/18/20 1756]   BP Pulse Resp Temp SpO2   (!) 143/77 86 18 98.1 °F (36.7 °C) 98 %      MAP       --         Physical Exam    Nursing note and vitals reviewed.  Constitutional: She appears well-developed and well-nourished.   Healthy appearing female in NAD or apparent pain. She makes good eye contact, speaks in clear full sentences and ambulates with ease.    HENT:   Head:  Normocephalic and atraumatic.   Eyes: Conjunctivae and EOM are normal. Pupils are equal, round, and reactive to light.   Neck: Normal range of motion.   Cardiovascular: Normal rate, regular rhythm and normal heart sounds.   Pulmonary/Chest: Breath sounds normal.   Abdominal: Soft.   Musculoskeletal: Normal range of motion.   Neurological: She is alert and oriented to person, place, and time. She has normal strength. GCS score is 15. GCS eye subscore is 4. GCS verbal subscore is 5. GCS motor subscore is 6.   No focal neuro deficits    Skin: Skin is warm. Capillary refill takes less than 2 seconds. No rash and no abscess noted. No erythema.   Psychiatric: She has a normal mood and affect. Her behavior is normal. Thought content normal.         ED Course   Procedures  Labs Reviewed   COMPREHENSIVE METABOLIC PANEL - Abnormal; Notable for the following components:       Result Value    Glucose 129 (*)     All other components within normal limits   CBC W/ AUTO DIFFERENTIAL   B-TYPE NATRIURETIC PEPTIDE   TROPONIN I   SARS-COV-2 RNA AMPLIFICATION, QUAL   POCT URINE PREGNANCY     EKG Readings: (Independently Interpreted)   Initial Reading: No STEMI. Previous EKG: Compared with most recent EKG Rhythm: Normal Sinus Rhythm. Conduction: RBBB. ST Segments: Normal ST Segments. T Waves: Normal. T Waves Flipped: V2, V3, V4, V5 and AVL. Axis: Normal.        Labs Reviewed   COMPREHENSIVE METABOLIC PANEL - Abnormal; Notable for the following components:       Result Value    Glucose 129 (*)     All other components within normal limits   CBC W/ AUTO DIFFERENTIAL   B-TYPE NATRIURETIC PEPTIDE   TROPONIN I   SARS-COV-2 RNA AMPLIFICATION, QUAL   POCT URINE PREGNANCY     Imaging Results          CT Head Without Contrast (Final result)  Result time 08/18/20 19:51:11    Final result by Dmitriy Sanchez MD (08/18/20 19:51:11)                 Impression:      No acute intracranial process.  MRI may be attempted for further evaluation, as  clinically warranted.      Electronically signed by: Dmitriy Sanchez MD  Date:    08/18/2020  Time:    19:51             Narrative:    EXAMINATION:  CT HEAD WITHOUT CONTRAST    CLINICAL HISTORY:  Headache, chronic, with new features;    TECHNIQUE:  Low dose axial images were obtained through the head.  Coronal and sagittal reformations were also performed. Contrast was not administered.    COMPARISON:  None.    FINDINGS:  The subcutaneous tissues are unremarkable.  The bony calvarium is intact.  The paranasal sinuses are within normal limits.  The mastoid air cells are clear.  Orbits and intraorbital contents are within normal limits.    The craniocervical junction is within normal limits.  The midline structures appear unremarkable.  There are no extra-axial fluid collections.  There is no evidence of intracranial hemorrhage.  The ventricles and sulci are within normal limits.  The gray-white differentiation is maintained.  There is no dense vessel sign.  There is no evidence of mass effect.                               X-Ray Chest PA And Lateral (Final result)  Result time 08/18/20 19:44:55    Final result by Dmitriy Sanchez MD (08/18/20 19:44:55)                 Impression:      Stable cardiomegaly without evidence of CHF.      Electronically signed by: Dmitriy Sanchez MD  Date:    08/18/2020  Time:    19:44             Narrative:    EXAMINATION:  XR CHEST PA AND LATERAL    CLINICAL HISTORY:  Other chest pain    TECHNIQUE:  PA and lateral views of the chest were performed.    COMPARISON:  09/27/2019.    FINDINGS:  There are unchanged postop changes of median sternotomy along with valve replacements.  There is also loop recorder present.    The trachea is unremarkable.  There is stable enlargement of the cardiomediastinal silhouette.  The hemidiaphragms are unremarkable.  There is no evidence of free air beneath the hemidiaphragms.  There are no pleural effusions.  There is no evidence of a pneumothorax.  There is no  evidence of pneumomediastinum.  No airspace opacity identified.  The pulmonary vascularity is within normal limits.  The osseous structures are unremarkable.                                   Medical Decision Making:   ED Management:  Urgent evaluation of 19 year old female who presents with complaints of headache that has been present for 4 days PTA. She is afebrile, nontoxic appearing, hemodynamically stable.  Physical exam outlined above and reveals no focal neuro deficits.  She has normal cardiopulmonary auscultation and no reproducible chest wall tenderness to palpation.  Intra-ocular pressures within normal range.  Headache resolved with Compazine, Benadryl, Toradol.  CT imaging reveals no acute abnormalities especially no clinical evidence of hydrocephalus.  I do suspect that her symptoms are consistent with previous headaches this year and could be reported to sotalol as the patient and her mother expect however I do not think that they should all to this medication regimen at this time as it is intergral to her SVT management.  She is however encouraged to follow up with Neurology and I did place and ambulatory referral to Dr. Blanco, our headache specialist.  Patient is encouraged to hydrate, rest, follow up planned with low threshold to return to the emergency department if any worsening symptoms present.  Mother verbalizes understanding and is amenable plan.  Patient stable for discharge. Case discussed with EDMD, Dr. Ty who is amenable to plan.      IOP OD; 22 and OS: 20               Attending Attestation:     Physician Attestation Statement for NP/PA:   I discussed this assessment and plan of this patient with the NP/PA, but I did not personally examine the patient. The face to face encounter was performed by the NP/PA.                                Clinical Impression:       ICD-10-CM ICD-9-CM   1. Nonintractable headache, unspecified chronicity pattern, unspecified headache type  R51 784.0   2.  Chest tightness  R07.89 786.59   3. Chest pressure  R07.89 786.59             ED Disposition Condition    Discharge Good        ED Prescriptions     None        Follow-up Information     Follow up With Specialties Details Why Contact Info    Josh Blanco III, MD Neurology In 2 days For symptom re-check [address] [facility] [facility]  [address]  [facility] [address]  [phone]      [facility] Emergency Medicine  If symptoms worsen [address] [address]  [phone]                                     Meagan Ribera PA-C  08/18/20 221       Olinda Ty MD  08/18/20 1748

## 2020-08-19 ENCOUNTER — TELEPHONE (OUTPATIENT)
Dept: NEUROLOGY | Facility: CLINIC | Age: 20
End: 2020-08-19

## 2020-08-19 NOTE — TELEPHONE ENCOUNTER
----- Message from Woody Obando sent at 8/19/2020  9:13 AM CDT -----  Name of Who is Calling: JULITA GARCIA [[id number]]    What is the request in detail:JULITA GARCIA [[id number]]Patient is requesting a call back  in regards to becoming a new Patient  Please contact to further discuss and advise      Can the clinic reply by [facility]: no     [facility]  [phone] (home)

## 2020-09-04 ENCOUNTER — TELEPHONE (OUTPATIENT)
Dept: PEDIATRIC CARDIOLOGY | Facility: CLINIC | Age: 20
End: 2020-09-04

## 2020-09-16 ENCOUNTER — OFFICE VISIT (OUTPATIENT)
Dept: NEUROLOGY | Facility: CLINIC | Age: 20
End: 2020-09-16
Payer: MEDICAID

## 2020-09-16 VITALS
DIASTOLIC BLOOD PRESSURE: 78 MMHG | HEIGHT: 67 IN | BODY MASS INDEX: 43.07 KG/M2 | SYSTOLIC BLOOD PRESSURE: 141 MMHG | HEART RATE: 90 BPM

## 2020-09-16 DIAGNOSIS — R00.2 PALPITATIONS: ICD-10-CM

## 2020-09-16 DIAGNOSIS — K74.60 HEPATIC CIRRHOSIS, UNSPECIFIED HEPATIC CIRRHOSIS TYPE, UNSPECIFIED WHETHER ASCITES PRESENT: ICD-10-CM

## 2020-09-16 DIAGNOSIS — Z00.00 WELLNESS EXAMINATION: ICD-10-CM

## 2020-09-16 PROCEDURE — 99214 OFFICE O/P EST MOD 30 MIN: CPT | Mod: PBBFAC | Performed by: PHYSICIAN ASSISTANT

## 2020-09-16 PROCEDURE — 99999 PR PBB SHADOW E&M-EST. PATIENT-LVL IV: CPT | Mod: PBBFAC,,, | Performed by: PHYSICIAN ASSISTANT

## 2020-09-16 PROCEDURE — 99204 PR OFFICE/OUTPT VISIT, NEW, LEVL IV, 45-59 MIN: ICD-10-PCS | Mod: S$PBB,,, | Performed by: PHYSICIAN ASSISTANT

## 2020-09-16 PROCEDURE — 99999 PR PBB SHADOW E&M-EST. PATIENT-LVL IV: ICD-10-PCS | Mod: PBBFAC,,, | Performed by: PHYSICIAN ASSISTANT

## 2020-09-16 PROCEDURE — 99204 OFFICE O/P NEW MOD 45 MIN: CPT | Mod: S$PBB,,, | Performed by: PHYSICIAN ASSISTANT

## 2020-09-16 RX ORDER — AMITRIPTYLINE HYDROCHLORIDE 25 MG/1
25 TABLET, FILM COATED ORAL NIGHTLY
Qty: 30 TABLET | Refills: 3 | Status: SHIPPED | OUTPATIENT
Start: 2020-09-16 | End: 2020-09-22

## 2020-09-16 RX ORDER — NAPROXEN 250 MG/1
250 TABLET ORAL 2 TIMES DAILY PRN
Qty: 20 TABLET | Refills: 3 | OUTPATIENT
Start: 2020-09-16 | End: 2021-12-25

## 2020-09-16 NOTE — PATIENT INSTRUCTIONS
Supplements for Migraine:  1. Magnesium Oxide - 400mg by mouth daily  2. Riboflavin (Vitamin B2) - 400mg by mouth daily  3. Coenzyme Q10 - 200mg tablet by mouth daily    - Please download Migraine Olile grover on phone and begin tracking your headaches     Sleep Hygiene:  1. Avoid watching TV, eating, and discussing emotional issues in bed. The bed should be used for sleep and sex only. If not, we can associate the bed with other activities and it often becomes difficult to fall asleep.  2. Minimize noise, light, and temperature extremes during sleep with ear plugs, window blinds, or an electric blanket or air conditioner. Even the slightest nighttime noises or luminescent lights can disrupt the quality of your sleep. Try to keep your bedroom at a comfortable temperature -- not too hot (above 75 degrees) or too cold (below 54 degrees).  3. Try not to drink fluids after 8 p.m. This may reduce awakenings due to urination.  4. Avoid naps, but if you do nap, make it no more than about 25 minutes about eight hours after you awake. But if you have problems falling asleep, then no naps for you.  5. Do not expose your self to bright light if you need to get up at night. Use a small night-light instead.  6. Nicotine is a stimulant and should be avoided particularly near bedtime and upon night awakenings. Having a smoke before bed, although it may feel relaxing, is actually putting a stimulant into your bloodstream.  7. Caffeine is also a stimulant and is present in coffee (100-200 mg), soda (50-75 mg), tea (50-75 mg), and various over-the-counter medications. Caffeine should be discontinued at least four to six hours before bedtime. If you consume large amounts of caffeine and you cut your self off too quickly, beware; you may get headaches that could keep you awake.  8. Although alcohol is a depressant and may help you fall asleep, the subsequent metabolism that clears it from your body when you are sleeping causes a  withdrawal syndrome. This withdrawal causes awakenings and is often associated with nightmares and sweats.  9. A light snack may be sleep-inducing, but a heavy meal too close to bedtime interferes with sleep. Stay away from protein and stick to carbohydrates or dairy products. Milk contains the amino acid L-tryptophan, which has been shown in research to help people go to sleep. So milk and cookies or crackers (without chocolate) may be useful and taste good as well.

## 2020-09-16 NOTE — PROGRESS NOTES
New Patient     SUBJECTIVE:  Patient ID: Kacey Ruth   MRN: [id number]  Referred By: Aaarefreginaal Self  Chief Complaint: Headache      History of Present Illness:   19 y.o. 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   PMHx negative for TBI, Meningitis, Aneurysms, Kidney Stones, asthma, GI bleed, osteoporosis, CAD/MI, CVA/TIA, DM, cancer, pregnancy   Family Hx Positive for mother and brother with Ha's.     Patient was seen in the ED 8/18/20 for Ha's. No neuro deficits on exam. CTH was unremarkable. Was given toradol, compazine, and benadryl during admission which helped her HA but she later felt she experienced itching and insomnia.. Of note, her BP was elevated at 143/77      Headache History:  Onset - high school, worsened 1 year ago  Location/Radiation - unilateral right temporalis, forehead, retro-orbital  Quality - sharp, pounding,  Duration - 2 days - 2 wks  Intensity (range) - 5-10+/10  Frequency - 27/30 ha days per month, 15/30 are debilitating  Triggers - stress, hunger, poor sleep  Aggravating Factors - light, noise  Alleviating Factors - dark quiet room  Recent Changes - more abdominal pain, heart complications,   Prodrome/Aura - no  Time of day of most headaches- anytime   Sleep - has had sleep studies that are negative for GRIFFIN most recently 3 years ago, awakening w/ Ha's, poor sleep - frequent awakenings and trouble falling asleep,   Eye exam next month    Associated symptoms with the headache: dizziness, photo/phonophobia, n/v,     Treatments Tried   Propranolol  sotolol  excedrin  tylenol  ibuprofen    Social History  Alcohol - denies  Smoke - denies  Recreational Drug Use- denies    Current Medications:    Current Outpatient Medications:      "aspirin (ECOTRIN) 81 MG EC tablet, TAKE 1 TABLET BY MOUTH EVERY DAY, Disp: 30 tablet, Rfl: 0    hydroCHLOROthiazide (HYDRODIURIL) 25 MG tablet, Take 1 tablet (25 mg total) by mouth once daily., Disp: 30 tablet, Rfl: 1    sucralfate (CARAFATE) 1 gram tablet, Take 1 tablet (1 g total) by mouth 4 (four) times daily., Disp: 20 tablet, Rfl: 0    amitriptyline (ELAVIL) 25 MG tablet, Take 1 tablet (25 mg total) by mouth every evening., Disp: 30 tablet, Rfl: 3    amlodipine (NORVASC) 2.5 MG tablet, Take 1 tablet (2.5 mg total) by mouth once daily., Disp: 30 tablet, Rfl: 11    famotidine (PEPCID) 20 MG tablet, Take 1 tablet (20 mg total) by mouth 2 (two) times daily., Disp: 60 tablet, Rfl: 2    naproxen (NAPROSYN) 250 MG tablet, Take 1 tablet (250 mg total) by mouth 2 (two) times daily as needed., Disp: 20 tablet, Rfl: 3    omeprazole (PRILOSEC) 20 MG capsule, Take 2 capsules (40 mg total) by mouth once daily., Disp: 60 capsule, Rfl: 0    sotalol (BETAPACE) 120 MG Tab, Take 1 tablet (120 mg total) by mouth 2 (two) times daily., Disp: 60 tablet, Rfl: 11  No current facility-administered medications for this visit.     Facility-Administered Medications Ordered in Other Visits:     0.9%  NaCl infusion, , Intravenous, Continuous, Slime Salcido NP, Last Rate: 0 mL/hr at 02/06/19 1521    sodium chloride 0.9% flush 5 mL, 5 mL, Intravenous, PRN, Slime Salcido NP    Review of Systems - as per HPI, otherwise a balanced 10 systems review is negative.    OBJECTIVE:  Vitals:  BP (!) 141/78   Pulse 90   Ht 5' 7" (1.702 m)   BMI 43.07 kg/m²     Physical Exam   Constitutional: she appears well-developed and well-nourished. she is well groomed. NAD  HENT:    Head: Normocephalic and atraumatic,  Frontalis was TTP, temporalis was TTP   Eyes: Conjunctivae and EOM are normal. Pupils are equal, round, and reactive to light   Neck: Neck supple. Occiput and trapezius TTP, traps tight, + tinnel   Musculoskeletal: Normal " range of motion. No joint stiffness. No vertebral point tenderness.  Skin: Skin is warm and dry.  Psychiatric: Normal mood and affect.     Neuro exam:    Mental status:  The patient is alert and oriented to person, place and time.  Language is intact and fluent  Remote and recent memory are intact  Normal attention and concentration  Mood is stable    Cranial Nerves:  Fundoscopic examination - limited due to photophobia and patient participation   Pupils are equal and reactive to light.    Extraocular movements are intact and without nystagmus.    Visual fields are full to confrontation testing.   Facial movement is symmetric.  Facial sensation is intact.    Hearing is intact to finger rub   FROM of neck in all (6) directions without pain  Shoulder shrug symmetrical.    Coordination:     Finger to nose - normal and symmetric bilaterally   Heel to shin test - normal and symmetric bilaterally     Motor:  Normal muscle bulk and symmetry. No fasciculations were noted.   Tremor not apparent   Pronator drift not apparent.    strength was strong and symmetric  Finger extension strength was strong and symmetric  RUE:appropriate against gravity and medium force as tested 5/5  LUE: appropriate against gravity and medium force as tested 5/5  RLE:appropriate against gravity and medium force as tested 5/5              LLE: appropriate against gravity and medium force as tested 5/5    Reflexes:  Right Brachioradialis 2+  Left Brachioradialis 2+  Right Biceps 2+  Left Biceps 2+  Right Patellar2+  Left Patellar 2+  Right Achilles 2+  Left Achilles 2+                          Garnica was negative    Sensory:  RUE  intact light touch  LUE intact light touch  RLE intact light touch  LLE intact light touch    Gait:   Romberg - negative  Normal gait  Tandem, Heel, and Toe Walk - able to perform without difficulty    Review of Data:   Notes from ED reviewed   Labs:  Admission on 08/18/2020, Discharged on 08/18/2020   Component Date  Value Ref Range Status    POC Preg Test, Ur 08/18/2020 Negative  Negative Final     Acceptable 08/18/2020 Yes   Final    WBC 08/18/2020 9.03  3.90 - 12.70 K/uL Final    RBC 08/18/2020 4.75  4.00 - 5.40 M/uL Final    Hemoglobin 08/18/2020 13.1  12.0 - 16.0 g/dL Final    Hematocrit 08/18/2020 40.7  37.0 - 48.5 % Final    Mean Corpuscular Volume 08/18/2020 86  82 - 98 fL Final    Mean Corpuscular Hemoglobin 08/18/2020 27.6  27.0 - 31.0 pg Final    Mean Corpuscular Hemoglobin Conc 08/18/2020 32.2  32.0 - 36.0 g/dL Final    RDW 08/18/2020 12.9  11.5 - 14.5 % Final    Platelets 08/18/2020 277  150 - 350 K/uL Final    MPV 08/18/2020 9.2  9.2 - 12.9 fL Final    Immature Granulocytes 08/18/2020 0.1  0.0 - 0.5 % Final    Gran # (ANC) 08/18/2020 6.4  1.8 - 7.7 K/uL Final    Immature Grans (Abs) 08/18/2020 0.01  0.00 - 0.04 K/uL Final    Lymph # 08/18/2020 1.7  1.0 - 4.8 K/uL Final    Mono # 08/18/2020 0.8  0.3 - 1.0 K/uL Final    Eos # 08/18/2020 0.1  0.0 - 0.5 K/uL Final    Baso # 08/18/2020 0.03  0.00 - 0.20 K/uL Final    nRBC 08/18/2020 0  0 /100 WBC Final    Gran% 08/18/2020 70.9  38.0 - 73.0 % Final    Lymph% 08/18/2020 18.8  18.0 - 48.0 % Final    Mono% 08/18/2020 9.1  4.0 - 15.0 % Final    Eosinophil% 08/18/2020 0.8  0.0 - 8.0 % Final    Basophil% 08/18/2020 0.3  0.0 - 1.9 % Final    Differential Method 08/18/2020 Automated   Final    Sodium 08/18/2020 140  136 - 145 mmol/L Final    Potassium 08/18/2020 3.7  3.5 - 5.1 mmol/L Final    Chloride 08/18/2020 103  95 - 110 mmol/L Final    CO2 08/18/2020 27  23 - 29 mmol/L Final    Glucose 08/18/2020 129* 70 - 110 mg/dL Final    BUN, Bld 08/18/2020 8  6 - 20 mg/dL Final    Creatinine 08/18/2020 0.9  0.5 - 1.4 mg/dL Final    Calcium 08/18/2020 10.0  8.7 - 10.5 mg/dL Final    Total Protein 08/18/2020 8.0  6.0 - 8.4 g/dL Final    Albumin 08/18/2020 4.1  3.5 - 5.2 g/dL Final    Total Bilirubin 08/18/2020 0.6  0.1 - 1.0 mg/dL  Final    Alkaline Phosphatase 08/18/2020 73  55 - 135 U/L Final    AST 08/18/2020 23  10 - 40 U/L Final    ALT 08/18/2020 17  10 - 44 U/L Final    Anion Gap 08/18/2020 10  8 - 16 mmol/L Final    eGFR if African American 08/18/2020 >60  >60 mL/min/1.73 m^2 Final    eGFR if non African American 08/18/2020 >60  >60 mL/min/1.73 m^2 Final    BNP 08/18/2020 51  0 - 99 pg/mL Final    Troponin I 08/18/2020 0.008  0.000 - 0.026 ng/mL Final    SARS-CoV-2 RNA, Amplification, Qual 08/18/2020 Negative  Negative Final     Imaging:  Results for orders placed or performed during the hospital encounter of 08/18/20   CT Head Without Contrast    Narrative    EXAMINATION:  CT HEAD WITHOUT CONTRAST    CLINICAL HISTORY:  Headache, chronic, with new features;    TECHNIQUE:  Low dose axial images were obtained through the head.  Coronal and sagittal reformations were also performed. Contrast was not administered.    COMPARISON:  None.    FINDINGS:  The subcutaneous tissues are unremarkable.  The bony calvarium is intact.  The paranasal sinuses are within normal limits.  The mastoid air cells are clear.  Orbits and intraorbital contents are within normal limits.    The craniocervical junction is within normal limits.  The midline structures appear unremarkable.  There are no extra-axial fluid collections.  There is no evidence of intracranial hemorrhage.  The ventricles and sulci are within normal limits.  The gray-white differentiation is maintained.  There is no dense vessel sign.  There is no evidence of mass effect.      Impression    No acute intracranial process.  MRI may be attempted for further evaluation, as clinically warranted.      Electronically signed by: Dmitriy Sanchez MD  Date:    08/18/2020  Time:    19:51     Note: I have independently reviewed any/all imaging/labs/tests and agree with the report (s) as documented.  Any discrepancies will be as noted/demarcated by free text.  YANA HOBBS 9/16/2020    ASSESSMENT:  1.  Chronic migraine    2. Wellness examination    3. Hepatic cirrhosis, unspecified hepatic cirrhosis type, unspecified whether ascites present    4. Palpitations          PLAN:  - Discussed symptoms appear to be consistent with migraines, discussed treatment options and patient agreed with the following plan:  - ppx - start elavil  - abortive - trial naproxen  - referral to PCP  - avoid tylenol - hx of cirrhosis  - caution w/ triptans - hx of palpitations and cardiac complaints  - risks, benefits, and potential side effects of elavil, naproxen discussed   - alternative treatment options offered   - importance of healthy diet, regular exercise and sleep hygiene in the treatment of headaches    - Start tracking headaches via Migraine Ollie grover on phone   - RTC in 6 wks     Orders Placed This Encounter    Ambulatory referral/consult to Family Practice    amitriptyline (ELAVIL) 25 MG tablet    naproxen (NAPROSYN) 250 MG tablet       I have discussed realistic goals of care with patient at length as well as medication options, and need for lifestyle adjustment. I have explained that treatment will take time. We have agreed that the goal will be to reduce frequency/intensity/quantity of HA, not to be completely HA free. I have explained my non narcotic policy regarding headache treatment.    Patient agreeable to work on lifestyle adjustments.    Questions and concerns were sought and answered to the patient's stated verbal satisfaction.  The patient verbalizes understanding and agreement with the above stated treatment plan.     CC: [facility] Arreola PA-C  [facility]  [phone]    Dr. Ordoñez was available during today's encounter.

## 2020-09-18 ENCOUNTER — TELEPHONE (OUTPATIENT)
Dept: NEUROLOGY | Facility: CLINIC | Age: 20
End: 2020-09-18

## 2020-09-18 ENCOUNTER — TELEPHONE (OUTPATIENT)
Dept: PEDIATRIC CARDIOLOGY | Facility: CLINIC | Age: 20
End: 2020-09-18

## 2020-09-18 NOTE — TELEPHONE ENCOUNTER
Attempted to leave message requesting Kacey Ruth to perform REMOTE device check. No answer. No voicemail.

## 2020-09-18 NOTE — TELEPHONE ENCOUNTER
Called and spoke with pt and mother regarding symptoms. Pt states that she was started on Elavil two days ago and took her first dose last night. Has since felt dizzy and very somnnolent waking up and through the day. Denies nausea, vomiting, fever, swelling, rash, shortness of breath, or palpitations. Pt expresses discomfort taking the medication again. Informed patient that she did not have to take dose again tonight if uncomfortable due to symptoms as of now. Instructed patient if they felt short of breath or if symptoms become unmanageable to see ER.    Informed pt that I would convey message to Lizeth Arreola for further guidance, and follow up on Monday.

## 2020-09-18 NOTE — TELEPHONE ENCOUNTER
----- Message from Suki Casillas sent at 9/18/2020  4:04 PM CDT -----  Regarding: pt advice  Contact: Addis )mother) @ [phone]  Calling regarding patient reaction to medication prescribed by Dr. Arreola, says the medication amitriptyline (ELAVIL) 25 MG tablet, says it is causing her to be dizzy and tired. Please call.

## 2020-09-21 NOTE — TELEPHONE ENCOUNTER
"Called and followed up with pt and mother this morning. Pt is reported as feeling much better today and has not taken any other dose of Elavil. Previous symptoms carried on into Saturday. Yesterday, pt "perked up" and was less tired.     Attempted to schedule patient for f/u with Lizeth per provider request. Virtual appointment for tomorrow at 2pm to be held while pt attempts to set up [employer]. Will recontact this afternoon to ensure download and set up of grover and officially schedule.   "

## 2020-09-22 ENCOUNTER — OFFICE VISIT (OUTPATIENT)
Dept: NEUROLOGY | Facility: CLINIC | Age: 20
End: 2020-09-22
Payer: MEDICAID

## 2020-09-22 PROCEDURE — 99214 PR OFFICE/OUTPT VISIT, EST, LEVL IV, 30-39 MIN: ICD-10-PCS | Mod: 95,,, | Performed by: PHYSICIAN ASSISTANT

## 2020-09-22 PROCEDURE — 99214 OFFICE O/P EST MOD 30 MIN: CPT | Mod: 95,,, | Performed by: PHYSICIAN ASSISTANT

## 2020-09-22 RX ORDER — NORTRIPTYLINE HYDROCHLORIDE 10 MG/1
10 CAPSULE ORAL NIGHTLY
Qty: 30 CAPSULE | Refills: 3 | Status: SHIPPED | OUTPATIENT
Start: 2020-09-22 | End: 2023-01-14

## 2020-09-22 NOTE — PROGRESS NOTES
Established Patient     The patient location is: home in [place]  The chief complaint leading to consultation is: headache follow up visit    Visit type: audiovisual    Face to Face time with patient: 15 min  25 minutes of total time spent on the encounter, which includes face to face time and non-face to face time preparing to see the patient (eg, review of tests), Obtaining and/or reviewing separately obtained history, Documenting clinical information in the electronic or other health record, Independently interpreting results (not separately reported) and communicating results to the patient/family/caregiver, or Care coordination (not separately reported).     Each patient to whom he or she provides medical services by telemedicine is:  (1) informed of the relationship between the physician and patient and the respective role of any other health care provider with respect to management of the patient; and (2) notified that he or she may decline to receive medical services by telemedicine and may withdraw from such care at any time.    Notes:        SUBJECTIVE:  Patient ID: Kacey Ruth   Chief Complaint: Headache    History of Present Illness:  Kacey Ruth is a 19 y.o. 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       Recommendations made at last Office Visit on 9/16/20:  - Discussed symptoms appear to be consistent with migraines, discussed treatment options and patient agreed with the following plan:  - ppx - start elavil  - abortive - trial naproxen  - referral to PCP  - avoid tylenol - hx of cirrhosis  - caution w/ triptans - hx of palpitations and cardiac complaints  - risks, benefits, and potential side effects of elavil, naproxen discussed   -  "alternative treatment options offered   - importance of healthy diet, regular exercise and sleep hygiene in the treatment of headaches    - Start tracking headaches via Migraine Buddy grover on phone   - RTC in 6 wks     09/22/2020 - Interval History:  Last visit for pt's migraines, started her on elavil and naproxen. She started elavil which caused fatigue and dizziness, but helped her sleep. She took it at midnight, woke up at 1pm next day which is typical for her but felt "drained" and dizzy the next day. She has only taken it this one time. She reports "it made my sleep so good, but when I woke up I felt so drowsy and dizzy." She reports her sleep has been poor. Her Ha's have somewhat improved since we saw each other last week. She has tried naproxen which dulls the HA greatly.   Plan: switch to pamelor, increase naproxen and let me know, f/u 6 wks    Treatments Tried:  Elavil - fatigue, dizziness  Propranolol  sotolol  excedrin  tylenol  ibuprofen    Current Medications:    Current Outpatient Medications:     amlodipine (NORVASC) 2.5 MG tablet, Take 1 tablet (2.5 mg total) by mouth once daily., Disp: 30 tablet, Rfl: 11    aspirin (ECOTRIN) 81 MG EC tablet, TAKE 1 TABLET BY MOUTH EVERY DAY, Disp: 30 tablet, Rfl: 0    famotidine (PEPCID) 20 MG tablet, Take 1 tablet (20 mg total) by mouth 2 (two) times daily., Disp: 60 tablet, Rfl: 2    hydroCHLOROthiazide (HYDRODIURIL) 25 MG tablet, Take 1 tablet (25 mg total) by mouth once daily., Disp: 30 tablet, Rfl: 1    naproxen (NAPROSYN) 250 MG tablet, Take 1 tablet (250 mg total) by mouth 2 (two) times daily as needed., Disp: 20 tablet, Rfl: 3    nortriptyline (PAMELOR) 10 MG capsule, Take 1 capsule (10 mg total) by mouth every evening., Disp: 30 capsule, Rfl: 3    omeprazole (PRILOSEC) 20 MG capsule, Take 2 capsules (40 mg total) by mouth once daily., Disp: 60 capsule, Rfl: 0    sotalol (BETAPACE) 120 MG Tab, Take 1 tablet (120 mg total) by mouth 2 (two) times " daily., Disp: 60 tablet, Rfl: 11    sucralfate (CARAFATE) 1 gram tablet, Take 1 tablet (1 g total) by mouth 4 (four) times daily., Disp: 20 tablet, Rfl: 0  No current facility-administered medications for this visit.     Facility-Administered Medications Ordered in Other Visits:     0.9%  NaCl infusion, , Intravenous, Continuous, Slime Salcido NP, Last Rate: 0 mL/hr at 02/06/19 1521    sodium chloride 0.9% flush 5 mL, 5 mL, Intravenous, PRN, Slime Salcido NP    Review of Systems - as per HPI, otherwise a balanced 10 systems review is negative.    OBJECTIVE:  Vitals:  There were no vitals taken for this visit.     Physical Exam:  Constitutional: she appears well-developed and well-nourished. she is well groomed. NAD   HENT:    Head: Normocephalic and atraumatic  Eyes: Conjunctivae and EOM are normal  Musculoskeletal: Normal range of motion. No joint stiffness.   Psychiatric: Mood and affect are normal    Neuro: Patient is alert and oriented to person, place, and time. Language is intact and fluent. Speech is clear and fluent. Recent and remote memory are intact.  Normal attention and concentration.  Facial movement is symmetric. Moves all 4 extremities against gravity.      Review of Data:   Notes from neuro reviewed   Labs:  Admission on 08/18/2020, Discharged on 08/18/2020   Component Date Value Ref Range Status    POC Preg Test, Ur 08/18/2020 Negative  Negative Final     Acceptable 08/18/2020 Yes   Final    WBC 08/18/2020 9.03  3.90 - 12.70 K/uL Final    RBC 08/18/2020 4.75  4.00 - 5.40 M/uL Final    Hemoglobin 08/18/2020 13.1  12.0 - 16.0 g/dL Final    Hematocrit 08/18/2020 40.7  37.0 - 48.5 % Final    Mean Corpuscular Volume 08/18/2020 86  82 - 98 fL Final    Mean Corpuscular Hemoglobin 08/18/2020 27.6  27.0 - 31.0 pg Final    Mean Corpuscular Hemoglobin Conc 08/18/2020 32.2  32.0 - 36.0 g/dL Final    RDW 08/18/2020 12.9  11.5 - 14.5 % Final    Platelets 08/18/2020  277  150 - 350 K/uL Final    MPV 08/18/2020 9.2  9.2 - 12.9 fL Final    Immature Granulocytes 08/18/2020 0.1  0.0 - 0.5 % Final    Gran # (ANC) 08/18/2020 6.4  1.8 - 7.7 K/uL Final    Immature Grans (Abs) 08/18/2020 0.01  0.00 - 0.04 K/uL Final    Lymph # 08/18/2020 1.7  1.0 - 4.8 K/uL Final    Mono # 08/18/2020 0.8  0.3 - 1.0 K/uL Final    Eos # 08/18/2020 0.1  0.0 - 0.5 K/uL Final    Baso # 08/18/2020 0.03  0.00 - 0.20 K/uL Final    nRBC 08/18/2020 0  0 /100 WBC Final    Gran% 08/18/2020 70.9  38.0 - 73.0 % Final    Lymph% 08/18/2020 18.8  18.0 - 48.0 % Final    Mono% 08/18/2020 9.1  4.0 - 15.0 % Final    Eosinophil% 08/18/2020 0.8  0.0 - 8.0 % Final    Basophil% 08/18/2020 0.3  0.0 - 1.9 % Final    Differential Method 08/18/2020 Automated   Final    Sodium 08/18/2020 140  136 - 145 mmol/L Final    Potassium 08/18/2020 3.7  3.5 - 5.1 mmol/L Final    Chloride 08/18/2020 103  95 - 110 mmol/L Final    CO2 08/18/2020 27  23 - 29 mmol/L Final    Glucose 08/18/2020 129* 70 - 110 mg/dL Final    BUN, Bld 08/18/2020 8  6 - 20 mg/dL Final    Creatinine 08/18/2020 0.9  0.5 - 1.4 mg/dL Final    Calcium 08/18/2020 10.0  8.7 - 10.5 mg/dL Final    Total Protein 08/18/2020 8.0  6.0 - 8.4 g/dL Final    Albumin 08/18/2020 4.1  3.5 - 5.2 g/dL Final    Total Bilirubin 08/18/2020 0.6  0.1 - 1.0 mg/dL Final    Alkaline Phosphatase 08/18/2020 73  55 - 135 U/L Final    AST 08/18/2020 23  10 - 40 U/L Final    ALT 08/18/2020 17  10 - 44 U/L Final    Anion Gap 08/18/2020 10  8 - 16 mmol/L Final    eGFR if African American 08/18/2020 >60  >60 mL/min/1.73 m^2 Final    eGFR if non African American 08/18/2020 >60  >60 mL/min/1.73 m^2 Final    BNP 08/18/2020 51  0 - 99 pg/mL Final    Troponin I 08/18/2020 0.008  0.000 - 0.026 ng/mL Final    SARS-CoV-2 RNA, Amplification, Qual 08/18/2020 Negative  Negative Final     Imaging:  Results for orders placed or performed during the hospital encounter of 08/18/20   CT  Head Without Contrast    Narrative    EXAMINATION:  CT HEAD WITHOUT CONTRAST    CLINICAL HISTORY:  Headache, chronic, with new features;    TECHNIQUE:  Low dose axial images were obtained through the head.  Coronal and sagittal reformations were also performed. Contrast was not administered.    COMPARISON:  None.    FINDINGS:  The subcutaneous tissues are unremarkable.  The bony calvarium is intact.  The paranasal sinuses are within normal limits.  The mastoid air cells are clear.  Orbits and intraorbital contents are within normal limits.    The craniocervical junction is within normal limits.  The midline structures appear unremarkable.  There are no extra-axial fluid collections.  There is no evidence of intracranial hemorrhage.  The ventricles and sulci are within normal limits.  The gray-white differentiation is maintained.  There is no dense vessel sign.  There is no evidence of mass effect.      Impression    No acute intracranial process.  MRI may be attempted for further evaluation, as clinically warranted.      Electronically signed by: Dmitriy Sanchez MD  Date:    08/18/2020  Time:    19:51     Note: I have independently reviewed any/all imaging/labs/tests and agree with the report (s) as documented.  Any discrepancies will be as noted/demarcated by free text.  YANA HOBBS 9/22/2020    ASSESSMENT:  1. Chronic migraine        PLAN:  - Discussed symptoms appear to be consistent with migraines, discussed treatment options and patient agreed with the following plan:  - ppx - start pamelor as a dual therapy for both HA's and trouble falling asleep  - abortive - increase naproxen  - referral to PCP  - avoid tylenol - hx of cirrhosis  - caution w/ triptans - hx of palpitations and cardiac complaints  - Continue tracking headaches   - Discussed goals of therapy are to decrease the frequency, intensity, and duration of headaches  - RTC in 6 wks     Orders Placed This Encounter    nortriptyline (PAMELOR) 10 MG capsule        Questions and concerns were sought and answered to the patient's stated verbal satisfaction.  The patient verbalizes understanding and agreement with the above stated treatment plan.     CC: [facility] Arreola PA-C  [facility]   [phone]    Dr. Ordoñez was available during today's encounter.

## 2020-09-23 ENCOUNTER — DOCUMENTATION ONLY (OUTPATIENT)
Dept: NEUROLOGY | Facility: CLINIC | Age: 20
End: 2020-09-23

## 2020-10-05 DIAGNOSIS — I48.92 ATRIAL FLUTTER, UNSPECIFIED TYPE: ICD-10-CM

## 2020-10-05 DIAGNOSIS — I47.19 ATRIAL TACHYCARDIA: ICD-10-CM

## 2020-10-05 DIAGNOSIS — I47.10 SVT (SUPRAVENTRICULAR TACHYCARDIA): Primary | ICD-10-CM

## 2020-10-23 ENCOUNTER — TELEPHONE (OUTPATIENT)
Dept: PEDIATRIC CARDIOLOGY | Facility: CLINIC | Age: 20
End: 2020-10-23

## 2020-10-26 ENCOUNTER — HOSPITAL ENCOUNTER (OUTPATIENT)
Dept: PEDIATRIC CARDIOLOGY | Facility: HOSPITAL | Age: 20
Discharge: HOME | End: 2020-10-26
Attending: PEDIATRICS
Payer: MEDICAID

## 2020-10-26 DIAGNOSIS — I47.10 SVT (SUPRAVENTRICULAR TACHYCARDIA): ICD-10-CM

## 2020-10-26 DIAGNOSIS — R00.2 PALPITATIONS: ICD-10-CM

## 2020-10-26 DIAGNOSIS — I51.9 LV DYSFUNCTION: ICD-10-CM

## 2020-10-26 PROCEDURE — 93298 CV LOOP RECORDER REMOTE PEDIATRICS (CUPID ONLY): ICD-10-PCS | Mod: ,,, | Performed by: PEDIATRICS

## 2020-10-26 PROCEDURE — 93298 REM INTERROG DEV EVAL SCRMS: CPT | Mod: ,,, | Performed by: PEDIATRICS

## 2020-10-26 PROCEDURE — G2066 INTER DEVC REMOTE 30D: HCPCS

## 2020-11-27 ENCOUNTER — TELEPHONE (OUTPATIENT)
Dept: PEDIATRIC CARDIOLOGY | Facility: HOSPITAL | Age: 20
End: 2020-11-27

## 2020-11-30 ENCOUNTER — HOSPITAL ENCOUNTER (OUTPATIENT)
Dept: PEDIATRIC CARDIOLOGY | Facility: HOSPITAL | Age: 20
Discharge: HOME | End: 2020-11-30
Attending: PEDIATRICS
Payer: MEDICAID

## 2020-11-30 DIAGNOSIS — I48.92 ATRIAL FLUTTER, UNSPECIFIED TYPE: ICD-10-CM

## 2020-11-30 DIAGNOSIS — I47.10 SVT (SUPRAVENTRICULAR TACHYCARDIA): ICD-10-CM

## 2020-11-30 DIAGNOSIS — I47.19 ATRIAL TACHYCARDIA: ICD-10-CM

## 2020-12-15 ENCOUNTER — TELEPHONE (OUTPATIENT)
Dept: INTERNAL MEDICINE | Facility: CLINIC | Age: 20
End: 2020-12-15

## 2020-12-15 PROBLEM — R16.2 HEPATOSPLENOMEGALY: Chronic | Status: ACTIVE | Noted: 2019-06-03

## 2020-12-15 PROBLEM — K74.60 LIVER CIRRHOSIS: Chronic | Status: ACTIVE | Noted: 2019-06-03

## 2020-12-15 NOTE — TELEPHONE ENCOUNTER
Spoke to Dr. Gonzalez.  She has heart diease and has a cardiologist for this.  Her basic medicine issues has not been addressed.  She presented a while back with severe abdominal pain and was thought to have an obstruction.  Severe heart disease can be associated with a twisted bowel.  She has elevated right sided blood pressures.  He does not think the liver is the issue.  She is having abdominal pain again.  She was recommended to go to the ER at [facility] and see what they think.  Concern for intermittent bowel obstruction.    Please call to offer an appointment for evaluation of abdominal pain.

## 2020-12-15 NOTE — TELEPHONE ENCOUNTER
----- Message from Joe Obando sent at 12/15/2020 12:39 PM CST -----  Regarding: Dr. Donovan Gonzalez-[phone]  Dr. Donovan Gonzalez is requesting a callback.  He would like to get an appointment for the pt to see the doctor. The pt was seeing him at a facility that was associated with [facility] but it closed.     Callback number: Dr. Donovan Gonzalez-[phone]

## 2020-12-17 NOTE — TELEPHONE ENCOUNTER
We got disconnectted , and phone keeps going to voicemail/ mailbox full    Patient called back schedule for 12-19@ 920 am; okay per Meka KIRKPATRICK LPN

## 2020-12-31 ENCOUNTER — TELEPHONE (OUTPATIENT)
Dept: PEDIATRIC CARDIOLOGY | Facility: HOSPITAL | Age: 20
End: 2020-12-31

## 2021-01-04 ENCOUNTER — HOSPITAL ENCOUNTER (OUTPATIENT)
Dept: PEDIATRIC CARDIOLOGY | Facility: HOSPITAL | Age: 21
Discharge: HOME | End: 2021-01-04
Attending: PEDIATRICS
Payer: MEDICAID

## 2021-01-04 DIAGNOSIS — I48.92 ATRIAL FLUTTER, UNSPECIFIED TYPE: ICD-10-CM

## 2021-01-04 DIAGNOSIS — I47.10 SVT (SUPRAVENTRICULAR TACHYCARDIA): ICD-10-CM

## 2021-01-04 DIAGNOSIS — I47.19 ATRIAL TACHYCARDIA: ICD-10-CM

## 2021-02-05 ENCOUNTER — TELEPHONE (OUTPATIENT)
Dept: PEDIATRIC CARDIOLOGY | Facility: HOSPITAL | Age: 21
End: 2021-02-05

## 2021-02-08 ENCOUNTER — HOSPITAL ENCOUNTER (OUTPATIENT)
Dept: PEDIATRIC CARDIOLOGY | Facility: HOSPITAL | Age: 21
Discharge: HOME | End: 2021-02-08
Attending: PEDIATRICS
Payer: MEDICAID

## 2021-02-08 DIAGNOSIS — I47.10 SVT (SUPRAVENTRICULAR TACHYCARDIA): ICD-10-CM

## 2021-02-08 DIAGNOSIS — I48.92 ATRIAL FLUTTER, UNSPECIFIED TYPE: ICD-10-CM

## 2021-02-08 DIAGNOSIS — I47.19 ATRIAL TACHYCARDIA: ICD-10-CM

## 2021-03-12 ENCOUNTER — TELEPHONE (OUTPATIENT)
Dept: PEDIATRIC CARDIOLOGY | Facility: HOSPITAL | Age: 21
End: 2021-03-12

## 2021-03-15 ENCOUNTER — HOSPITAL ENCOUNTER (OUTPATIENT)
Dept: PEDIATRIC CARDIOLOGY | Facility: HOSPITAL | Age: 21
Discharge: HOME | End: 2021-03-15
Attending: PEDIATRICS
Payer: MEDICAID

## 2021-03-15 DIAGNOSIS — I47.19 ATRIAL TACHYCARDIA: ICD-10-CM

## 2021-03-15 DIAGNOSIS — I47.10 SVT (SUPRAVENTRICULAR TACHYCARDIA): ICD-10-CM

## 2021-03-15 DIAGNOSIS — I48.92 ATRIAL FLUTTER, UNSPECIFIED TYPE: ICD-10-CM

## 2021-04-16 ENCOUNTER — TELEPHONE (OUTPATIENT)
Dept: PEDIATRIC CARDIOLOGY | Facility: HOSPITAL | Age: 21
End: 2021-04-16

## 2021-04-19 ENCOUNTER — HOSPITAL ENCOUNTER (OUTPATIENT)
Dept: PEDIATRIC CARDIOLOGY | Facility: HOSPITAL | Age: 21
Discharge: HOME | End: 2021-04-19
Attending: PEDIATRICS
Payer: MEDICAID

## 2021-04-19 DIAGNOSIS — I47.19 ATRIAL TACHYCARDIA: ICD-10-CM

## 2021-04-19 DIAGNOSIS — I48.92 ATRIAL FLUTTER, UNSPECIFIED TYPE: ICD-10-CM

## 2021-04-19 DIAGNOSIS — Q25.5 PULMONARY ATRESIA WITH INTACT VENTRICULAR SEPTUM: ICD-10-CM

## 2021-04-19 DIAGNOSIS — I36.2 NONRHEUMATIC TRICUSPID VALVE STENOSIS WITH REGURGITATION: Primary | ICD-10-CM

## 2021-04-19 DIAGNOSIS — I47.10 SVT (SUPRAVENTRICULAR TACHYCARDIA): ICD-10-CM

## 2021-04-22 ENCOUNTER — HOSPITAL ENCOUNTER (EMERGENCY)
Facility: OTHER | Age: 21
Discharge: HOME | End: 2021-04-22
Attending: EMERGENCY MEDICINE
Payer: MEDICAID

## 2021-04-22 VITALS
RESPIRATION RATE: 18 BRPM | OXYGEN SATURATION: 94 % | SYSTOLIC BLOOD PRESSURE: 164 MMHG | HEART RATE: 104 BPM | BODY MASS INDEX: 39.16 KG/M2 | WEIGHT: 250 LBS | DIASTOLIC BLOOD PRESSURE: 75 MMHG | TEMPERATURE: 99 F

## 2021-04-22 DIAGNOSIS — R06.02 SOB (SHORTNESS OF BREATH): Primary | ICD-10-CM

## 2021-04-22 DIAGNOSIS — I10 ESSENTIAL HYPERTENSION: ICD-10-CM

## 2021-04-22 LAB
ALBUMIN SERPL BCP-MCNC: 4.2 G/DL (ref 3.5–5.2)
ALP SERPL-CCNC: 78 U/L (ref 55–135)
ALT SERPL W/O P-5'-P-CCNC: 19 U/L (ref 10–44)
ANION GAP SERPL CALC-SCNC: 13 MMOL/L (ref 8–16)
AST SERPL-CCNC: 24 U/L (ref 10–40)
B-HCG UR QL: NEGATIVE
BASOPHILS # BLD AUTO: 0.03 K/UL (ref 0–0.2)
BASOPHILS NFR BLD: 0.2 % (ref 0–1.9)
BILIRUB SERPL-MCNC: 1.5 MG/DL (ref 0.1–1)
BNP SERPL-MCNC: 25 PG/ML (ref 0–99)
BUN SERPL-MCNC: 9 MG/DL (ref 6–20)
CALCIUM SERPL-MCNC: 9.5 MG/DL (ref 8.7–10.5)
CHLORIDE SERPL-SCNC: 102 MMOL/L (ref 95–110)
CO2 SERPL-SCNC: 20 MMOL/L (ref 23–29)
CREAT SERPL-MCNC: 0.9 MG/DL (ref 0.5–1.4)
CTP QC/QA: YES
CTP QC/QA: YES
D DIMER PPP IA.FEU-MCNC: 0.52 MG/L FEU
DIFFERENTIAL METHOD: ABNORMAL
EOSINOPHIL # BLD AUTO: 0.1 K/UL (ref 0–0.5)
EOSINOPHIL NFR BLD: 0.4 % (ref 0–8)
ERYTHROCYTE [DISTWIDTH] IN BLOOD BY AUTOMATED COUNT: 12.8 % (ref 11.5–14.5)
EST. GFR  (AFRICAN AMERICAN): >60 ML/MIN/1.73 M^2
EST. GFR  (NON AFRICAN AMERICAN): >60 ML/MIN/1.73 M^2
GLUCOSE SERPL-MCNC: 124 MG/DL (ref 70–110)
HCT VFR BLD AUTO: 41.6 % (ref 37–48.5)
HGB BLD-MCNC: 13.7 G/DL (ref 12–16)
IMM GRANULOCYTES # BLD AUTO: 0.06 K/UL (ref 0–0.04)
IMM GRANULOCYTES NFR BLD AUTO: 0.4 % (ref 0–0.5)
LYMPHOCYTES # BLD AUTO: 1.5 K/UL (ref 1–4.8)
LYMPHOCYTES NFR BLD: 10.7 % (ref 18–48)
MCH RBC QN AUTO: 27.2 PG (ref 27–31)
MCHC RBC AUTO-ENTMCNC: 32.9 G/DL (ref 32–36)
MCV RBC AUTO: 83 FL (ref 82–98)
MONOCYTES # BLD AUTO: 0.8 K/UL (ref 0.3–1)
MONOCYTES NFR BLD: 5.7 % (ref 4–15)
NEUTROPHILS # BLD AUTO: 11.7 K/UL (ref 1.8–7.7)
NEUTROPHILS NFR BLD: 82.6 % (ref 38–73)
NRBC BLD-RTO: 0 /100 WBC
PLATELET # BLD AUTO: 340 K/UL (ref 150–450)
PMV BLD AUTO: 9 FL (ref 9.2–12.9)
POTASSIUM SERPL-SCNC: 4.3 MMOL/L (ref 3.5–5.1)
PROT SERPL-MCNC: 9 G/DL (ref 6–8.4)
RBC # BLD AUTO: 5.03 M/UL (ref 4–5.4)
SARS-COV-2 RDRP RESP QL NAA+PROBE: NEGATIVE
SODIUM SERPL-SCNC: 135 MMOL/L (ref 136–145)
TROPONIN I SERPL DL<=0.01 NG/ML-MCNC: <0.006 NG/ML (ref 0–0.03)
WBC # BLD AUTO: 14.18 K/UL (ref 3.9–12.7)

## 2021-04-22 PROCEDURE — 84484 ASSAY OF TROPONIN QUANT: CPT | Performed by: EMERGENCY MEDICINE

## 2021-04-22 PROCEDURE — 25000003 PHARM REV CODE 250: Performed by: EMERGENCY MEDICINE

## 2021-04-22 PROCEDURE — 81025 URINE PREGNANCY TEST: CPT | Performed by: EMERGENCY MEDICINE

## 2021-04-22 PROCEDURE — 99285 EMERGENCY DEPT VISIT HI MDM: CPT | Mod: 25

## 2021-04-22 PROCEDURE — 85025 COMPLETE CBC W/AUTO DIFF WBC: CPT | Performed by: EMERGENCY MEDICINE

## 2021-04-22 PROCEDURE — 93010 ELECTROCARDIOGRAM REPORT: CPT | Mod: ,,, | Performed by: INTERNAL MEDICINE

## 2021-04-22 PROCEDURE — 93005 ELECTROCARDIOGRAM TRACING: CPT

## 2021-04-22 PROCEDURE — 85379 FIBRIN DEGRADATION QUANT: CPT | Performed by: EMERGENCY MEDICINE

## 2021-04-22 PROCEDURE — 25500020 PHARM REV CODE 255: Performed by: EMERGENCY MEDICINE

## 2021-04-22 PROCEDURE — U0002 COVID-19 LAB TEST NON-CDC: HCPCS | Performed by: EMERGENCY MEDICINE

## 2021-04-22 PROCEDURE — 80053 COMPREHEN METABOLIC PANEL: CPT | Performed by: EMERGENCY MEDICINE

## 2021-04-22 PROCEDURE — 83880 ASSAY OF NATRIURETIC PEPTIDE: CPT | Performed by: EMERGENCY MEDICINE

## 2021-04-22 PROCEDURE — 93010 EKG 12-LEAD: ICD-10-PCS | Mod: ,,, | Performed by: INTERNAL MEDICINE

## 2021-04-22 RX ORDER — ALPRAZOLAM 0.5 MG/1
0.5 TABLET ORAL
Status: COMPLETED | OUTPATIENT
Start: 2021-04-22 | End: 2021-04-22

## 2021-04-22 RX ADMIN — ALPRAZOLAM 0.5 MG: 0.5 TABLET ORAL at 09:04

## 2021-04-22 RX ADMIN — IOHEXOL 100 ML: 350 INJECTION, SOLUTION INTRAVENOUS at 10:04

## 2021-04-26 ENCOUNTER — PATIENT MESSAGE (OUTPATIENT)
Dept: RESEARCH | Facility: HOSPITAL | Age: 21
End: 2021-04-26

## 2021-05-25 ENCOUNTER — HOSPITAL ENCOUNTER (EMERGENCY)
Facility: OTHER | Age: 21
Discharge: HOME | End: 2021-05-25
Attending: EMERGENCY MEDICINE
Payer: MEDICAID

## 2021-05-25 VITALS
SYSTOLIC BLOOD PRESSURE: 117 MMHG | RESPIRATION RATE: 18 BRPM | HEIGHT: 67 IN | TEMPERATURE: 98 F | WEIGHT: 192 LBS | HEART RATE: 76 BPM | OXYGEN SATURATION: 99 % | BODY MASS INDEX: 30.13 KG/M2 | DIASTOLIC BLOOD PRESSURE: 75 MMHG

## 2021-05-25 DIAGNOSIS — R10.13 EPIGASTRIC PAIN: ICD-10-CM

## 2021-05-25 LAB
ALBUMIN SERPL BCP-MCNC: 4.1 G/DL (ref 3.5–5.2)
ALP SERPL-CCNC: 73 U/L (ref 55–135)
ALT SERPL W/O P-5'-P-CCNC: 21 U/L (ref 10–44)
ANION GAP SERPL CALC-SCNC: 8 MMOL/L (ref 8–16)
AST SERPL-CCNC: 21 U/L (ref 10–40)
B-HCG UR QL: NEGATIVE
BASOPHILS # BLD AUTO: 0.04 K/UL (ref 0–0.2)
BASOPHILS NFR BLD: 0.5 % (ref 0–1.9)
BILIRUB SERPL-MCNC: 0.7 MG/DL (ref 0.1–1)
BILIRUB UR QL STRIP: NEGATIVE
BUN SERPL-MCNC: 14 MG/DL (ref 6–20)
CALCIUM SERPL-MCNC: 9.7 MG/DL (ref 8.7–10.5)
CHLORIDE SERPL-SCNC: 105 MMOL/L (ref 95–110)
CLARITY UR: ABNORMAL
CO2 SERPL-SCNC: 23 MMOL/L (ref 23–29)
COLOR UR: YELLOW
CREAT SERPL-MCNC: 0.8 MG/DL (ref 0.5–1.4)
CTP QC/QA: YES
DIFFERENTIAL METHOD: NORMAL
EOSINOPHIL # BLD AUTO: 0.1 K/UL (ref 0–0.5)
EOSINOPHIL NFR BLD: 1.2 % (ref 0–8)
ERYTHROCYTE [DISTWIDTH] IN BLOOD BY AUTOMATED COUNT: 12.6 % (ref 11.5–14.5)
EST. GFR  (AFRICAN AMERICAN): >60 ML/MIN/1.73 M^2
EST. GFR  (NON AFRICAN AMERICAN): >60 ML/MIN/1.73 M^2
GLUCOSE SERPL-MCNC: 114 MG/DL (ref 70–110)
GLUCOSE UR QL STRIP: NEGATIVE
HCT VFR BLD AUTO: 42 % (ref 37–48.5)
HGB BLD-MCNC: 13.6 G/DL (ref 12–16)
HGB UR QL STRIP: NEGATIVE
IMM GRANULOCYTES # BLD AUTO: 0.02 K/UL (ref 0–0.04)
IMM GRANULOCYTES NFR BLD AUTO: 0.3 % (ref 0–0.5)
KETONES UR QL STRIP: NEGATIVE
LEUKOCYTE ESTERASE UR QL STRIP: NEGATIVE
LIPASE SERPL-CCNC: 31 U/L (ref 4–60)
LYMPHOCYTES # BLD AUTO: 1.5 K/UL (ref 1–4.8)
LYMPHOCYTES NFR BLD: 19.3 % (ref 18–48)
MCH RBC QN AUTO: 27.6 PG (ref 27–31)
MCHC RBC AUTO-ENTMCNC: 32.4 G/DL (ref 32–36)
MCV RBC AUTO: 85 FL (ref 82–98)
MONOCYTES # BLD AUTO: 0.6 K/UL (ref 0.3–1)
MONOCYTES NFR BLD: 7.8 % (ref 4–15)
NEUTROPHILS # BLD AUTO: 5.3 K/UL (ref 1.8–7.7)
NEUTROPHILS NFR BLD: 70.9 % (ref 38–73)
NITRITE UR QL STRIP: NEGATIVE
NRBC BLD-RTO: 0 /100 WBC
PH UR STRIP: 6 [PH] (ref 5–8)
PLATELET # BLD AUTO: 288 K/UL (ref 150–450)
PMV BLD AUTO: 9.2 FL (ref 9.2–12.9)
POTASSIUM SERPL-SCNC: 3.8 MMOL/L (ref 3.5–5.1)
PROT SERPL-MCNC: 8 G/DL (ref 6–8.4)
PROT UR QL STRIP: NEGATIVE
RBC # BLD AUTO: 4.92 M/UL (ref 4–5.4)
SODIUM SERPL-SCNC: 136 MMOL/L (ref 136–145)
SP GR UR STRIP: 1.02 (ref 1–1.03)
URN SPEC COLLECT METH UR: ABNORMAL
UROBILINOGEN UR STRIP-ACNC: NEGATIVE EU/DL
WBC # BLD AUTO: 7.53 K/UL (ref 3.9–12.7)

## 2021-05-25 PROCEDURE — 93005 ELECTROCARDIOGRAM TRACING: CPT

## 2021-05-25 PROCEDURE — 81003 URINALYSIS AUTO W/O SCOPE: CPT | Performed by: EMERGENCY MEDICINE

## 2021-05-25 PROCEDURE — 96374 THER/PROPH/DIAG INJ IV PUSH: CPT

## 2021-05-25 PROCEDURE — 93010 EKG 12-LEAD: ICD-10-PCS | Mod: ,,, | Performed by: INTERNAL MEDICINE

## 2021-05-25 PROCEDURE — 99285 EMERGENCY DEPT VISIT HI MDM: CPT | Mod: 25

## 2021-05-25 PROCEDURE — 85025 COMPLETE CBC W/AUTO DIFF WBC: CPT | Performed by: EMERGENCY MEDICINE

## 2021-05-25 PROCEDURE — 80053 COMPREHEN METABOLIC PANEL: CPT | Performed by: EMERGENCY MEDICINE

## 2021-05-25 PROCEDURE — 25000003 PHARM REV CODE 250: Performed by: EMERGENCY MEDICINE

## 2021-05-25 PROCEDURE — 96361 HYDRATE IV INFUSION ADD-ON: CPT

## 2021-05-25 PROCEDURE — 81025 URINE PREGNANCY TEST: CPT | Performed by: EMERGENCY MEDICINE

## 2021-05-25 PROCEDURE — 93010 ELECTROCARDIOGRAM REPORT: CPT | Mod: ,,, | Performed by: INTERNAL MEDICINE

## 2021-05-25 PROCEDURE — 83690 ASSAY OF LIPASE: CPT | Performed by: EMERGENCY MEDICINE

## 2021-05-25 RX ORDER — HYDROCODONE BITARTRATE AND ACETAMINOPHEN 5; 325 MG/1; MG/1
1 TABLET ORAL EVERY 6 HOURS PRN
Qty: 12 TABLET | Refills: 0 | Status: SHIPPED | OUTPATIENT
Start: 2021-05-25 | End: 2021-05-28

## 2021-05-25 RX ORDER — FAMOTIDINE 10 MG/ML
20 INJECTION INTRAVENOUS
Status: COMPLETED | OUTPATIENT
Start: 2021-05-25 | End: 2021-05-25

## 2021-05-25 RX ORDER — HYDROCODONE BITARTRATE AND ACETAMINOPHEN 5; 325 MG/1; MG/1
1 TABLET ORAL
Status: COMPLETED | OUTPATIENT
Start: 2021-05-25 | End: 2021-05-25

## 2021-05-25 RX ADMIN — FAMOTIDINE 20 MG: 10 INJECTION INTRAVENOUS at 01:05

## 2021-05-25 RX ADMIN — ALUMINUM HYDROXIDE, MAGNESIUM HYDROXIDE, DIMETHICONE 50 ML: 200; 200; 20 LIQUID ORAL at 01:05

## 2021-05-25 RX ADMIN — SODIUM CHLORIDE 1000 ML: 0.9 INJECTION, SOLUTION INTRAVENOUS at 01:05

## 2021-05-25 RX ADMIN — HYDROCODONE BITARTRATE AND ACETAMINOPHEN 1 TABLET: 5; 325 TABLET ORAL at 03:05

## 2021-06-18 ENCOUNTER — TELEPHONE (OUTPATIENT)
Dept: PEDIATRIC CARDIOLOGY | Facility: HOSPITAL | Age: 21
End: 2021-06-18

## 2021-06-18 ENCOUNTER — TELEPHONE (OUTPATIENT)
Dept: PEDIATRIC CARDIOLOGY | Facility: CLINIC | Age: 21
End: 2021-06-18

## 2021-06-21 ENCOUNTER — HOSPITAL ENCOUNTER (OUTPATIENT)
Dept: PEDIATRIC CARDIOLOGY | Facility: HOSPITAL | Age: 21
Discharge: HOME | End: 2021-06-21
Attending: PEDIATRICS
Payer: MEDICAID

## 2021-06-21 DIAGNOSIS — I47.10 SVT (SUPRAVENTRICULAR TACHYCARDIA): ICD-10-CM

## 2021-06-21 DIAGNOSIS — I47.19 ATRIAL TACHYCARDIA: ICD-10-CM

## 2021-06-21 DIAGNOSIS — I48.92 ATRIAL FLUTTER, UNSPECIFIED TYPE: ICD-10-CM

## 2021-06-21 PROCEDURE — G2066 INTER DEVC REMOTE 30D: HCPCS

## 2021-06-21 PROCEDURE — 93298 REM INTERROG DEV EVAL SCRMS: CPT | Mod: ,,, | Performed by: PEDIATRICS

## 2021-06-21 PROCEDURE — 93298 CV LOOP RECORDER REMOTE PEDIATRICS (CUPID ONLY): ICD-10-PCS | Mod: ,,, | Performed by: PEDIATRICS

## 2021-07-26 ENCOUNTER — HOSPITAL ENCOUNTER (OUTPATIENT)
Dept: PEDIATRIC CARDIOLOGY | Facility: HOSPITAL | Age: 21
Discharge: HOME | End: 2021-07-26
Attending: PEDIATRICS
Payer: MEDICAID

## 2021-07-26 DIAGNOSIS — I48.92 ATRIAL FLUTTER, UNSPECIFIED TYPE: ICD-10-CM

## 2021-07-26 DIAGNOSIS — I47.19 ATRIAL TACHYCARDIA: ICD-10-CM

## 2021-07-26 DIAGNOSIS — I47.10 SVT (SUPRAVENTRICULAR TACHYCARDIA): ICD-10-CM

## 2021-07-26 PROCEDURE — 93298 CV LOOP RECORDER REMOTE PEDIATRICS (CUPID ONLY): ICD-10-PCS | Mod: ,,, | Performed by: PEDIATRICS

## 2021-07-26 PROCEDURE — 93298 REM INTERROG DEV EVAL SCRMS: CPT | Mod: ,,, | Performed by: PEDIATRICS

## 2021-07-26 PROCEDURE — G2066 INTER DEVC REMOTE 30D: HCPCS

## 2021-08-27 ENCOUNTER — TELEPHONE (OUTPATIENT)
Dept: PEDIATRIC CARDIOLOGY | Facility: CLINIC | Age: 21
End: 2021-08-27

## 2021-09-02 ENCOUNTER — HOSPITAL ENCOUNTER (OUTPATIENT)
Dept: PEDIATRIC CARDIOLOGY | Facility: HOSPITAL | Age: 21
Discharge: HOME | End: 2021-09-02
Attending: PEDIATRICS
Payer: MEDICAID

## 2021-09-02 DIAGNOSIS — I47.10 SVT (SUPRAVENTRICULAR TACHYCARDIA): ICD-10-CM

## 2021-09-02 DIAGNOSIS — I47.19 ATRIAL TACHYCARDIA: ICD-10-CM

## 2021-09-02 DIAGNOSIS — I48.92 ATRIAL FLUTTER, UNSPECIFIED TYPE: ICD-10-CM

## 2021-09-02 PROCEDURE — G2066 INTER DEVC REMOTE 30D: HCPCS

## 2021-09-02 PROCEDURE — 93298 CV LOOP RECORDER REMOTE PEDIATRICS (CUPID ONLY): ICD-10-PCS | Mod: ,,, | Performed by: PEDIATRICS

## 2021-09-02 PROCEDURE — 93298 REM INTERROG DEV EVAL SCRMS: CPT | Mod: ,,, | Performed by: PEDIATRICS

## 2021-10-04 ENCOUNTER — HOSPITAL ENCOUNTER (OUTPATIENT)
Dept: PEDIATRIC CARDIOLOGY | Facility: HOSPITAL | Age: 21
Discharge: HOME | End: 2021-10-04
Attending: PEDIATRICS
Payer: MEDICAID

## 2021-10-04 DIAGNOSIS — I47.19 ATRIAL TACHYCARDIA: ICD-10-CM

## 2021-10-04 DIAGNOSIS — I47.10 SVT (SUPRAVENTRICULAR TACHYCARDIA): ICD-10-CM

## 2021-10-04 DIAGNOSIS — I48.92 ATRIAL FLUTTER, UNSPECIFIED TYPE: ICD-10-CM

## 2021-10-04 PROCEDURE — 93298 REM INTERROG DEV EVAL SCRMS: CPT | Mod: ,,, | Performed by: PEDIATRICS

## 2021-10-04 PROCEDURE — 93298 CV LOOP RECORDER REMOTE PEDIATRICS (CUPID ONLY): ICD-10-PCS | Mod: ,,, | Performed by: PEDIATRICS

## 2021-10-04 PROCEDURE — G2066 INTER DEVC REMOTE 30D: HCPCS

## 2021-10-05 ENCOUNTER — TELEPHONE (OUTPATIENT)
Dept: PEDIATRIC CARDIOLOGY | Facility: CLINIC | Age: 21
End: 2021-10-05

## 2021-11-08 ENCOUNTER — HOSPITAL ENCOUNTER (OUTPATIENT)
Dept: PEDIATRIC CARDIOLOGY | Facility: HOSPITAL | Age: 21
Discharge: HOME | End: 2021-11-08
Attending: PEDIATRICS
Payer: MEDICAID

## 2021-11-08 DIAGNOSIS — I48.92 ATRIAL FLUTTER, UNSPECIFIED TYPE: ICD-10-CM

## 2021-11-08 DIAGNOSIS — I47.19 ATRIAL TACHYCARDIA: ICD-10-CM

## 2021-11-08 DIAGNOSIS — Q25.5 PULMONARY ATRESIA WITH INTACT VENTRICULAR SEPTUM: ICD-10-CM

## 2021-11-08 DIAGNOSIS — I36.2 NONRHEUMATIC TRICUSPID VALVE STENOSIS WITH REGURGITATION: ICD-10-CM

## 2021-11-08 DIAGNOSIS — I47.10 SVT (SUPRAVENTRICULAR TACHYCARDIA): ICD-10-CM

## 2021-11-08 PROCEDURE — G2066 INTER DEVC REMOTE 30D: HCPCS

## 2021-11-08 PROCEDURE — 93298 CV LOOP RECORDER REMOTE PEDIATRICS (CUPID ONLY): ICD-10-PCS | Mod: ,,, | Performed by: PEDIATRICS

## 2021-11-08 PROCEDURE — 93298 REM INTERROG DEV EVAL SCRMS: CPT | Mod: ,,, | Performed by: PEDIATRICS
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
## 2021-12-13 ENCOUNTER — HOSPITAL ENCOUNTER (OUTPATIENT)
Dept: PEDIATRIC CARDIOLOGY | Facility: HOSPITAL | Age: 21
Discharge: HOME | End: 2021-12-13
Attending: PEDIATRICS
Payer: MEDICAID

## 2021-12-13 DIAGNOSIS — I47.10 SVT (SUPRAVENTRICULAR TACHYCARDIA): ICD-10-CM

## 2021-12-13 DIAGNOSIS — I36.2 NONRHEUMATIC TRICUSPID VALVE STENOSIS WITH REGURGITATION: ICD-10-CM

## 2021-12-13 DIAGNOSIS — Q25.5 PULMONARY ATRESIA WITH INTACT VENTRICULAR SEPTUM: ICD-10-CM

## 2021-12-13 DIAGNOSIS — I48.92 ATRIAL FLUTTER, UNSPECIFIED TYPE: ICD-10-CM

## 2021-12-13 DIAGNOSIS — I47.19 ATRIAL TACHYCARDIA: ICD-10-CM

## 2021-12-13 PROCEDURE — G2066 INTER DEVC REMOTE 30D: HCPCS

## 2021-12-13 PROCEDURE — 93298 CV LOOP RECORDER REMOTE PEDIATRICS (CUPID ONLY): ICD-10-PCS | Mod: ,,, | Performed by: PEDIATRICS

## 2021-12-13 PROCEDURE — 93298 REM INTERROG DEV EVAL SCRMS: CPT | Mod: ,,, | Performed by: PEDIATRICS

## 2021-12-14 ENCOUNTER — TELEPHONE (OUTPATIENT)
Dept: PEDIATRIC CARDIOLOGY | Facility: CLINIC | Age: 21
End: 2021-12-14
Payer: MEDICAID

## 2022-01-18 ENCOUNTER — TELEPHONE (OUTPATIENT)
Dept: PEDIATRIC CARDIOLOGY | Facility: CLINIC | Age: 22
End: 2022-01-18
Payer: MEDICAID

## 2022-01-18 DIAGNOSIS — Z95.2 S/P PULMONARY VALVE REPLACEMENT: ICD-10-CM

## 2022-01-18 DIAGNOSIS — I47.10 SVT (SUPRAVENTRICULAR TACHYCARDIA): Primary | ICD-10-CM

## 2022-01-18 DIAGNOSIS — Q24.9 ADULT CONGENITAL HEART DISEASE: ICD-10-CM

## 2022-01-18 DIAGNOSIS — Z95.4 S/P TRICUSPID VALVE REPLACEMENT: Primary | ICD-10-CM

## 2022-01-18 NOTE — TELEPHONE ENCOUNTER
----- Message from Tanvi Johnson sent at 1/18/2022 11:39 AM CST -----  Contact: mom Addis Villeda  [phone]  Mom called scheduled patient a new patient appt with Dr. Christopher 03/10/22 but, wants to get patient in sooner per Dr. Marisela Qiu, please call mom to schedule in if possible

## 2022-01-18 NOTE — TELEPHONE ENCOUNTER
Spoke with patient's mother. Advised that patient needs to be seen in adult clinic with an Echo, since she has not had an Echo in over a year. Scheduled clinic visit with Dr. Cheung on Thursday 2/3 with an EKG & Echo.

## 2022-01-19 ENCOUNTER — TELEPHONE (OUTPATIENT)
Dept: PEDIATRIC CARDIOLOGY | Facility: CLINIC | Age: 22
End: 2022-01-19
Payer: MEDICAID

## 2022-01-19 NOTE — TELEPHONE ENCOUNTER
Left message requesting Kacey Ruth perform REMOTE device check. We have not received transmission. Please plug in monitor and perform manual transmission. If you are having transmitter issues, please call [facility] [phone]  for troubleshooting. Call back number left in message if any questions or issues and to update clinic.

## 2022-01-24 ENCOUNTER — HOSPITAL ENCOUNTER (OUTPATIENT)
Dept: PEDIATRIC CARDIOLOGY | Facility: HOSPITAL | Age: 22
Discharge: HOME | End: 2022-01-24
Attending: PEDIATRICS
Payer: MEDICAID

## 2022-01-24 DIAGNOSIS — I36.2 NONRHEUMATIC TRICUSPID VALVE STENOSIS WITH REGURGITATION: ICD-10-CM

## 2022-01-24 DIAGNOSIS — I48.92 ATRIAL FLUTTER, UNSPECIFIED TYPE: ICD-10-CM

## 2022-01-24 DIAGNOSIS — I47.10 SVT (SUPRAVENTRICULAR TACHYCARDIA): ICD-10-CM

## 2022-01-24 DIAGNOSIS — I47.19 ATRIAL TACHYCARDIA: ICD-10-CM

## 2022-01-24 DIAGNOSIS — Q25.5 PULMONARY ATRESIA WITH INTACT VENTRICULAR SEPTUM: ICD-10-CM

## 2022-01-24 PROCEDURE — G2066 INTER DEVC REMOTE 30D: HCPCS

## 2022-01-24 PROCEDURE — 93298 CV LOOP RECORDER REMOTE PEDIATRICS (CUPID ONLY): ICD-10-PCS | Mod: ,,, | Performed by: PEDIATRICS

## 2022-01-24 PROCEDURE — 93298 REM INTERROG DEV EVAL SCRMS: CPT | Mod: ,,, | Performed by: PEDIATRICS

## 2022-02-22 ENCOUNTER — PATIENT MESSAGE (OUTPATIENT)
Dept: RESEARCH | Facility: HOSPITAL | Age: 22
End: 2022-02-22
Payer: MEDICAID

## 2022-03-03 ENCOUNTER — TELEPHONE (OUTPATIENT)
Dept: PEDIATRIC CARDIOLOGY | Facility: CLINIC | Age: 22
End: 2022-03-03
Payer: MEDICAID

## 2022-03-03 NOTE — TELEPHONE ENCOUNTER
Left message requesting Kacey Ruth perform REMOTE device check. Call back number left in message if any questions or issues.

## 2022-03-07 ENCOUNTER — HOSPITAL ENCOUNTER (OUTPATIENT)
Dept: PEDIATRIC CARDIOLOGY | Facility: HOSPITAL | Age: 22
Discharge: HOME | End: 2022-03-07
Attending: PEDIATRICS
Payer: MEDICAID

## 2022-03-07 DIAGNOSIS — I48.92 ATRIAL FLUTTER, UNSPECIFIED TYPE: ICD-10-CM

## 2022-03-07 DIAGNOSIS — I47.19 ATRIAL TACHYCARDIA: ICD-10-CM

## 2022-03-07 DIAGNOSIS — I36.2 NONRHEUMATIC TRICUSPID VALVE STENOSIS WITH REGURGITATION: ICD-10-CM

## 2022-03-07 DIAGNOSIS — I47.10 SVT (SUPRAVENTRICULAR TACHYCARDIA): ICD-10-CM

## 2022-03-07 DIAGNOSIS — Q25.5 PULMONARY ATRESIA WITH INTACT VENTRICULAR SEPTUM: ICD-10-CM

## 2022-03-07 PROCEDURE — G2066 INTER DEVC REMOTE 30D: HCPCS

## 2022-03-07 PROCEDURE — 93298 REM INTERROG DEV EVAL SCRMS: CPT | Mod: ,,, | Performed by: PEDIATRICS

## 2022-03-07 PROCEDURE — 93298 CV LOOP RECORDER REMOTE PEDIATRICS (CUPID ONLY): ICD-10-PCS | Mod: ,,, | Performed by: PEDIATRICS

## 2022-04-11 ENCOUNTER — HOSPITAL ENCOUNTER (OUTPATIENT)
Dept: PEDIATRIC CARDIOLOGY | Facility: HOSPITAL | Age: 22
Discharge: HOME | End: 2022-04-11
Attending: PEDIATRICS
Payer: MEDICAID

## 2022-04-11 DIAGNOSIS — I47.19 ATRIAL TACHYCARDIA: ICD-10-CM

## 2022-04-11 DIAGNOSIS — Q25.5 PULMONARY ATRESIA WITH INTACT VENTRICULAR SEPTUM: ICD-10-CM

## 2022-04-11 DIAGNOSIS — I48.92 ATRIAL FLUTTER, UNSPECIFIED TYPE: ICD-10-CM

## 2022-04-11 DIAGNOSIS — I36.2 NONRHEUMATIC TRICUSPID VALVE STENOSIS WITH REGURGITATION: ICD-10-CM

## 2022-04-11 DIAGNOSIS — I47.10 SVT (SUPRAVENTRICULAR TACHYCARDIA): ICD-10-CM

## 2022-04-11 PROCEDURE — 93298 CV LOOP RECORDER REMOTE PEDIATRICS (CUPID ONLY): ICD-10-PCS | Mod: ,,, | Performed by: PEDIATRICS

## 2022-04-11 PROCEDURE — 93298 REM INTERROG DEV EVAL SCRMS: CPT | Mod: ,,, | Performed by: PEDIATRICS

## 2022-04-11 PROCEDURE — G2066 INTER DEVC REMOTE 30D: HCPCS

## 2022-04-13 DIAGNOSIS — I48.92 ATRIAL FLUTTER, UNSPECIFIED TYPE: ICD-10-CM

## 2022-04-13 DIAGNOSIS — I47.10 SVT (SUPRAVENTRICULAR TACHYCARDIA): ICD-10-CM

## 2022-04-13 DIAGNOSIS — Q24.9 ADULT CONGENITAL HEART DISEASE: ICD-10-CM

## 2022-04-13 DIAGNOSIS — Q25.5 PULMONARY ATRESIA WITH INTACT VENTRICULAR SEPTUM: Primary | ICD-10-CM

## 2022-05-16 ENCOUNTER — HOSPITAL ENCOUNTER (OUTPATIENT)
Dept: PEDIATRIC CARDIOLOGY | Facility: HOSPITAL | Age: 22
Discharge: HOME | End: 2022-05-16
Attending: PHYSICIAN ASSISTANT
Payer: MEDICAID

## 2022-05-16 DIAGNOSIS — I48.92 ATRIAL FLUTTER, UNSPECIFIED TYPE: ICD-10-CM

## 2022-05-16 DIAGNOSIS — I47.10 SVT (SUPRAVENTRICULAR TACHYCARDIA): ICD-10-CM

## 2022-05-16 DIAGNOSIS — Q24.9 ADULT CONGENITAL HEART DISEASE: ICD-10-CM

## 2022-05-16 PROCEDURE — 93298 REM INTERROG DEV EVAL SCRMS: CPT | Mod: ,,, | Performed by: PEDIATRICS

## 2022-05-16 PROCEDURE — 93298 CV LOOP RECORDER REMOTE PEDIATRICS (CUPID ONLY): ICD-10-PCS | Mod: ,,, | Performed by: PEDIATRICS

## 2022-05-16 PROCEDURE — G2066 INTER DEVC REMOTE 30D: HCPCS

## 2022-06-17 ENCOUNTER — PATIENT MESSAGE (OUTPATIENT)
Dept: PEDIATRIC CARDIOLOGY | Facility: CLINIC | Age: 22
End: 2022-06-17
Payer: MEDICAID

## 2022-06-20 ENCOUNTER — HOSPITAL ENCOUNTER (OUTPATIENT)
Dept: PEDIATRIC CARDIOLOGY | Facility: HOSPITAL | Age: 22
Discharge: HOME | End: 2022-06-20
Attending: PHYSICIAN ASSISTANT
Payer: MEDICAID

## 2022-06-20 DIAGNOSIS — Q24.9 ADULT CONGENITAL HEART DISEASE: ICD-10-CM

## 2022-06-20 DIAGNOSIS — I48.92 ATRIAL FLUTTER, UNSPECIFIED TYPE: ICD-10-CM

## 2022-06-20 DIAGNOSIS — I47.10 SVT (SUPRAVENTRICULAR TACHYCARDIA): ICD-10-CM

## 2022-06-20 PROCEDURE — 93298 CV LOOP RECORDER REMOTE PEDIATRICS (CUPID ONLY): ICD-10-PCS | Mod: ,,, | Performed by: PEDIATRICS

## 2022-06-20 PROCEDURE — G2066 INTER DEVC REMOTE 30D: HCPCS

## 2022-06-20 PROCEDURE — 93298 REM INTERROG DEV EVAL SCRMS: CPT | Mod: ,,, | Performed by: PEDIATRICS

## 2022-06-22 ENCOUNTER — TELEPHONE (OUTPATIENT)
Dept: PEDIATRIC CARDIOLOGY | Facility: HOSPITAL | Age: 22
End: 2022-06-22
Payer: MEDICAID

## 2022-06-22 NOTE — TELEPHONE ENCOUNTER
Called to remind patient she is due for remote loop transmission. SW patient. She will send transmission today. Also asked that she call to schedule follow up with Dr. Galloway and Dr. Cheung since she is overdue. Provided her with the number to clinic.

## 2022-07-25 ENCOUNTER — HOSPITAL ENCOUNTER (OUTPATIENT)
Dept: PEDIATRIC CARDIOLOGY | Facility: HOSPITAL | Age: 22
Discharge: HOME | End: 2022-07-25
Attending: PHYSICIAN ASSISTANT
Payer: MEDICAID

## 2022-07-25 DIAGNOSIS — Q24.9 ADULT CONGENITAL HEART DISEASE: ICD-10-CM

## 2022-07-25 DIAGNOSIS — I47.10 SVT (SUPRAVENTRICULAR TACHYCARDIA): ICD-10-CM

## 2022-07-25 DIAGNOSIS — I48.92 ATRIAL FLUTTER, UNSPECIFIED TYPE: ICD-10-CM

## 2022-07-25 PROCEDURE — 93298 CV LOOP RECORDER REMOTE PEDIATRICS (CUPID ONLY): ICD-10-PCS | Mod: ,,, | Performed by: PEDIATRICS

## 2022-07-25 PROCEDURE — G2066 INTER DEVC REMOTE 30D: HCPCS

## 2022-07-25 PROCEDURE — 93298 REM INTERROG DEV EVAL SCRMS: CPT | Mod: ,,, | Performed by: PEDIATRICS

## 2022-07-26 DIAGNOSIS — I47.10 SVT (SUPRAVENTRICULAR TACHYCARDIA): Primary | ICD-10-CM

## 2022-07-26 DIAGNOSIS — I48.92 ATRIAL FLUTTER, UNSPECIFIED TYPE: ICD-10-CM

## 2022-07-26 DIAGNOSIS — Q24.9 ADULT CONGENITAL HEART DISEASE: ICD-10-CM

## 2022-07-26 DIAGNOSIS — Z95.4 S/P TRICUSPID VALVE REPLACEMENT: ICD-10-CM

## 2022-07-26 DIAGNOSIS — I36.2 NONRHEUMATIC TRICUSPID VALVE STENOSIS WITH REGURGITATION: ICD-10-CM

## 2022-07-26 DIAGNOSIS — I51.9 LV DYSFUNCTION: ICD-10-CM

## 2022-07-26 DIAGNOSIS — Q25.5 PULMONARY ATRESIA WITH INTACT VENTRICULAR SEPTUM: ICD-10-CM

## 2022-07-26 DIAGNOSIS — R00.2 PALPITATIONS: ICD-10-CM

## 2022-07-26 DIAGNOSIS — I47.19 ATRIAL TACHYCARDIA: ICD-10-CM

## 2022-07-26 DIAGNOSIS — Z95.2 S/P PULMONARY VALVE REPLACEMENT: ICD-10-CM

## 2022-08-29 ENCOUNTER — HOSPITAL ENCOUNTER (OUTPATIENT)
Dept: PEDIATRIC CARDIOLOGY | Facility: HOSPITAL | Age: 22
Discharge: HOME | End: 2022-08-29
Attending: PEDIATRICS
Payer: MEDICAID

## 2022-08-29 ENCOUNTER — PATIENT MESSAGE (OUTPATIENT)
Dept: PEDIATRIC CARDIOLOGY | Facility: CLINIC | Age: 22
End: 2022-08-29

## 2022-08-29 DIAGNOSIS — R00.2 PALPITATIONS: ICD-10-CM

## 2022-08-29 DIAGNOSIS — Q24.9 ADULT CONGENITAL HEART DISEASE: ICD-10-CM

## 2022-08-29 DIAGNOSIS — Q25.5 PULMONARY ATRESIA WITH INTACT VENTRICULAR SEPTUM: ICD-10-CM

## 2022-08-29 DIAGNOSIS — I48.92 ATRIAL FLUTTER, UNSPECIFIED TYPE: ICD-10-CM

## 2022-08-29 DIAGNOSIS — Z95.4 S/P TRICUSPID VALVE REPLACEMENT: ICD-10-CM

## 2022-08-29 DIAGNOSIS — I47.10 SVT (SUPRAVENTRICULAR TACHYCARDIA): ICD-10-CM

## 2022-08-29 DIAGNOSIS — I47.19 ATRIAL TACHYCARDIA: ICD-10-CM

## 2022-08-29 DIAGNOSIS — Z95.2 S/P PULMONARY VALVE REPLACEMENT: ICD-10-CM

## 2022-08-29 DIAGNOSIS — I51.9 LV DYSFUNCTION: ICD-10-CM

## 2022-08-29 DIAGNOSIS — I36.2 NONRHEUMATIC TRICUSPID VALVE STENOSIS WITH REGURGITATION: ICD-10-CM

## 2022-08-29 PROCEDURE — 93298 CV LOOP RECORDER REMOTE PEDIATRICS (CUPID ONLY): ICD-10-PCS | Mod: ,,, | Performed by: PEDIATRICS

## 2022-08-29 PROCEDURE — G2066 INTER DEVC REMOTE 30D: HCPCS

## 2022-08-29 PROCEDURE — 93298 REM INTERROG DEV EVAL SCRMS: CPT | Mod: ,,, | Performed by: PEDIATRICS

## 2022-10-03 ENCOUNTER — HOSPITAL ENCOUNTER (OUTPATIENT)
Dept: PEDIATRIC CARDIOLOGY | Facility: HOSPITAL | Age: 22
Discharge: HOME | End: 2022-10-03
Attending: PEDIATRICS
Payer: MEDICAID

## 2022-10-03 ENCOUNTER — PATIENT MESSAGE (OUTPATIENT)
Dept: PEDIATRIC CARDIOLOGY | Facility: CLINIC | Age: 22
End: 2022-10-03
Payer: MEDICAID

## 2022-10-03 DIAGNOSIS — I47.19 ATRIAL TACHYCARDIA: ICD-10-CM

## 2022-10-03 DIAGNOSIS — Q24.9 ADULT CONGENITAL HEART DISEASE: ICD-10-CM

## 2022-10-03 DIAGNOSIS — Q25.5 PULMONARY ATRESIA WITH INTACT VENTRICULAR SEPTUM: ICD-10-CM

## 2022-10-03 DIAGNOSIS — Z95.2 S/P PULMONARY VALVE REPLACEMENT: ICD-10-CM

## 2022-10-03 DIAGNOSIS — Z95.4 S/P TRICUSPID VALVE REPLACEMENT: ICD-10-CM

## 2022-10-03 DIAGNOSIS — I51.9 LV DYSFUNCTION: ICD-10-CM

## 2022-10-03 DIAGNOSIS — R00.2 PALPITATIONS: ICD-10-CM

## 2022-10-03 DIAGNOSIS — I36.2 NONRHEUMATIC TRICUSPID VALVE STENOSIS WITH REGURGITATION: ICD-10-CM

## 2022-10-03 DIAGNOSIS — I48.92 ATRIAL FLUTTER, UNSPECIFIED TYPE: ICD-10-CM

## 2022-10-03 DIAGNOSIS — I47.10 SVT (SUPRAVENTRICULAR TACHYCARDIA): ICD-10-CM

## 2022-10-03 PROCEDURE — 93298 CV LOOP RECORDER REMOTE PEDIATRICS (CUPID ONLY): ICD-10-PCS | Mod: ,,, | Performed by: PEDIATRICS

## 2022-10-03 PROCEDURE — G2066 INTER DEVC REMOTE 30D: HCPCS

## 2022-10-03 PROCEDURE — 93298 REM INTERROG DEV EVAL SCRMS: CPT | Mod: ,,, | Performed by: PEDIATRICS

## 2022-10-27 ENCOUNTER — HOSPITAL ENCOUNTER (EMERGENCY)
Facility: OTHER | Age: 22
Discharge: HOME | End: 2022-10-28
Payer: MEDICAID

## 2022-10-27 DIAGNOSIS — G43.909 MIGRAINE WITHOUT STATUS MIGRAINOSUS, NOT INTRACTABLE, UNSPECIFIED MIGRAINE TYPE: Primary | ICD-10-CM

## 2022-10-27 LAB
B-HCG UR QL: NEGATIVE
CTP QC/QA: YES

## 2022-10-27 PROCEDURE — 81025 URINE PREGNANCY TEST: CPT

## 2022-10-27 PROCEDURE — 99284 EMERGENCY DEPT VISIT MOD MDM: CPT | Mod: 25

## 2022-10-27 PROCEDURE — 63600175 PHARM REV CODE 636 W HCPCS: Performed by: PHYSICIAN ASSISTANT

## 2022-10-27 PROCEDURE — 96375 TX/PRO/DX INJ NEW DRUG ADDON: CPT

## 2022-10-27 PROCEDURE — 25000003 PHARM REV CODE 250: Performed by: PHYSICIAN ASSISTANT

## 2022-10-27 PROCEDURE — 96374 THER/PROPH/DIAG INJ IV PUSH: CPT

## 2022-10-27 RX ORDER — KETOROLAC TROMETHAMINE 30 MG/ML
15 INJECTION, SOLUTION INTRAMUSCULAR; INTRAVENOUS
Status: COMPLETED | OUTPATIENT
Start: 2022-10-27 | End: 2022-10-27

## 2022-10-27 RX ORDER — PROCHLORPERAZINE EDISYLATE 5 MG/ML
10 INJECTION INTRAMUSCULAR; INTRAVENOUS
Status: COMPLETED | OUTPATIENT
Start: 2022-10-27 | End: 2022-10-27

## 2022-10-27 RX ORDER — DIPHENHYDRAMINE HYDROCHLORIDE 50 MG/ML
25 INJECTION INTRAMUSCULAR; INTRAVENOUS
Status: COMPLETED | OUTPATIENT
Start: 2022-10-27 | End: 2022-10-27

## 2022-10-27 RX ADMIN — KETOROLAC TROMETHAMINE 15 MG: 30 INJECTION, SOLUTION INTRAMUSCULAR; INTRAVENOUS at 11:10

## 2022-10-27 RX ADMIN — SODIUM CHLORIDE 1000 ML: 0.9 INJECTION, SOLUTION INTRAVENOUS at 11:10

## 2022-10-27 RX ADMIN — PROCHLORPERAZINE EDISYLATE 10 MG: 5 INJECTION INTRAMUSCULAR; INTRAVENOUS at 11:10

## 2022-10-27 RX ADMIN — DIPHENHYDRAMINE HYDROCHLORIDE 25 MG: 50 INJECTION, SOLUTION INTRAMUSCULAR; INTRAVENOUS at 11:10

## 2022-10-28 VITALS
RESPIRATION RATE: 16 BRPM | HEIGHT: 66 IN | BODY MASS INDEX: 28.93 KG/M2 | WEIGHT: 180 LBS | DIASTOLIC BLOOD PRESSURE: 86 MMHG | TEMPERATURE: 98 F | OXYGEN SATURATION: 99 % | SYSTOLIC BLOOD PRESSURE: 138 MMHG | HEART RATE: 72 BPM

## 2022-10-28 NOTE — FIRST PROVIDER EVALUATION
Emergency Department TeleTriage Encounter Note      CHIEF COMPLAINT    Chief Complaint   Patient presents with    Headache     C/o headache for the last 3 days, Unrelieved by OTC meds. Reports nausea. Denies visual deficit. Reports pain is all over head. Resp even unlabored. Skin warm and dry. Aaox3.        VITAL SIGNS   Initial Vitals [10/27/22 2219]   BP Pulse Resp Temp SpO2   132/62 75 16 98 °F (36.7 °C) 98 %      MAP       --            ALLERGIES    Review of patient's allergies indicates:  No Known Allergies    PROVIDER TRIAGE NOTE  This is a teletriage evaluation of a 22 y.o. female presenting to the ED complaining of hx of migraines - feels like typical migraines.  Nsaids at home are not working.     Initial orders will be placed and care will be transferred to an alternate provider when patient is roomed for a full evaluation. Any additional orders and the final disposition will be determined by that provider.         ORDERS  Labs Reviewed - No data to display    ED Orders (720h ago, onward)      Start Ordered     Status Ordering Provider    10/27/22 2230 10/27/22 2225  prochlorperazine injection Soln 10 mg  ED 1 Time         Ordered MYLES CLINTON    10/27/22 2230 10/27/22 2225  diphenhydrAMINE injection 25 mg  ED 1 Time         Ordered MYLES CLINTON    10/27/22 2230 10/27/22 2225  ketorolac injection 15 mg  ED 1 Time         Ordered MYLES CLINTON    10/27/22 2230 10/27/22 2225  sodium chloride 0.9% bolus 1,000 mL  ED 1 Time         Ordered MYLES CLINTON    10/27/22 2225 10/27/22 2225  Saline lock IV  Once         Ordered MYLES CLINTON              Virtual Visit Note: The provider triage portion of this emergency department evaluation and documentation was performed via [employer], a HIPAA-compliant telemedicine application, in concert with a tele-presenter in the room. A face to face patient evaluation with one of my colleagues  will occur once the patient is placed in an emergency department room.      DISCLAIMER: This note was prepared with [employer] voice recognition transcription software. Garbled syntax, mangled pronouns, and other bizarre constructions may be attributed to that software system.

## 2022-10-28 NOTE — ED TRIAGE NOTES
/o headache for the last 3 days, Unrelieved by OTC meds. Reports nausea. Denies visual deficit. Reports pain is all over head. Resp even unlabored. Skin warm and dry. Aaox3.

## 2022-10-28 NOTE — ED PROVIDER NOTES
Encounter Date: 10/27/2022       History     Chief Complaint   Patient presents with    Headache     C/o headache for the last 3 days, Unrelieved by OTC meds. Reports nausea. Denies visual deficit. Reports pain is all over head. Resp even unlabored. Skin warm and dry. Aaox3.      HPI  Patient presenting to ED for evaluation of persistent headache over the past 3 days.  Headache located on right side with intermittent radiation more diffusely throughout her head, described as aching and throbbing in nature, no specific aggravating or alleviating factors, associated with nausea but no vomiting.  She has tried ibuprofen and Excedrin at home without relief.  Patient notes history of chronic migraine headaches with very similar symptoms, however her previous migraines typically been controlled with over-the-counter medication.  Patient denies any other specific complaints at this time.      Review of patient's allergies indicates:  No Known Allergies  Past Medical History:   Diagnosis Date    CHF (congestive heart failure)     Cirrhosis 07/2019    Obesity     Pulmonary atresia with intact ventricular septum     SVT (supraventricular tachycardia)      Past Surgical History:   Procedure Laterality Date    MEMO PROCEDURE      bt shunt and transannular patch    CARDIAC VALVE REPLACEMENT      INSERTION OF IMPLANTABLE LOOP RECORDER N/A 2/6/2019    Procedure: INSERTION, IMPLANTABLE LOOP RECORDER;  Surgeon: Romeo Robles MD;  Location: [facility] EP LAB;  Service: Cardiology;  Laterality: N/A;  SVT, IART, SAI, RFA, +/- ILR, FELECIA, MDT, MAC, MB/SM, 3 prep    INSERTION OF IMPLANTABLE LOOP RECORDER N/A 5/8/2019    Procedure: INSERTION, IMPLANTABLE LOOP RECORDER;  Surgeon: Ricardo Woodward MD;  Location: [facility] EP LAB;  Service: Cardiology;  Laterality: N/A;  afl, palps, ILR, MDT, anes, SM, 441    INSERTION OF TRANSJUGULAR INTRAHEPATIC PORTOSYSTEMIC SHUNT (TIPS) N/A 7/5/2019    Procedure: INSERTION, SHUNT, TRANSJUGULAR, INTRAHEPATIC  PORTOSYSTEMIC;  [facility];  Location: [facility] OR [address] [place] [address];  Service: Anesthesiology;  Laterality: N/A;  Room 188/Lisseth    PULMONARY VALVE REPLACEMENT  01/02/2007    25mm sharyn charles    TRICUSPID VALVE REPLACEMENT  01/02/2007    25 mm sharyn-charles     History reviewed. No pertinent family history.  Social History     Tobacco Use    Smoking status: Never    Smokeless tobacco: Never   Substance Use Topics    Alcohol use: No    Drug use: No     Review of Systems   Constitutional:  Negative for chills and fever.   HENT:  Negative for congestion and rhinorrhea.    Eyes:  Negative for photophobia, pain and visual disturbance.   Respiratory:  Negative for cough and shortness of breath.    Cardiovascular:  Negative for chest pain.   Gastrointestinal:  Positive for nausea. Negative for abdominal pain, diarrhea and vomiting.   Genitourinary:  Negative for dysuria.   Musculoskeletal:  Negative for arthralgias, back pain and myalgias.   Skin:  Negative for rash.   Allergic/Immunologic: Negative for immunocompromised state.   Neurological:  Positive for headaches. Negative for weakness, light-headedness and numbness.   Hematological:  Does not bruise/bleed easily.     Physical Exam     Initial Vitals [10/27/22 2219]   BP Pulse Resp Temp SpO2   132/62 75 16 98 °F (36.7 °C) 98 %      MAP       --         Physical Exam    Constitutional: She appears well-developed. No distress.   HENT:   Head: Normocephalic and atraumatic.   Mouth/Throat: Oropharynx is clear and moist.   Eyes: EOM are normal. Pupils are equal, round, and reactive to light.   Neck: Neck supple.   Normal range of motion.  Cardiovascular:  Normal rate, regular rhythm and normal heart sounds.           Pulmonary/Chest: Breath sounds normal. No respiratory distress.   Abdominal: Abdomen is soft. Bowel sounds are normal. There is no abdominal tenderness. There is no rebound and no guarding.   Musculoskeletal:         General: Normal range of  motion.      Cervical back: Normal range of motion and neck supple.     Neurological: She is alert and oriented to person, place, and time. She has normal strength. No cranial nerve deficit. GCS score is 15. GCS eye subscore is 4. GCS verbal subscore is 5. GCS motor subscore is 6.   Skin: Skin is warm and dry.       ED Course   Procedures  Labs Reviewed   HIV 1 / 2 ANTIBODY   POCT URINE PREGNANCY          Imaging Results    None          Medications   prochlorperazine injection Soln 10 mg (10 mg Intravenous Given 10/27/22 2332)   diphenhydrAMINE injection 25 mg (25 mg Intravenous Given 10/27/22 2334)   ketorolac injection 15 mg (15 mg Intravenous Given 10/27/22 2330)   sodium chloride 0.9% bolus 1,000 mL (1,000 mLs Intravenous New Bag 10/27/22 2328)       MDM:  Patient presented with headache and nausea over the past 3 days, stated symptoms were consistent with history of chronic migraines just more persistent than usual and not relieved with over-the-counter medication at home.  Patient given IV fluids, Toradol, Compazine, and Benadryl with good improvement in symptoms on re-evaluation.  Does not appear to be indication for further emergent testing, observation, or hospitalization at this time.  Patient appears stable for and is comfortable with discharge home.  Advised to follow-up with PCP for outpatient recheck.  Signs and symptoms that would warrant immediate return to ED were reviewed prior to discharge.      Clinical Impression:   Final diagnoses:  [G43.909] Migraine without status migrainosus, not intractable, unspecified migraine type (Primary)      ED Disposition Condition    Discharge Stable          ED Prescriptions    None       Follow-up Information       Follow up With Specialties Details Why Contact Info    Primary care physician  Schedule an appointment as soon as possible for a visit  Follow-up with the primary care physician for outpatient recheck.     [facility] Emergency Dept Emergency Medicine   Return to the ED sooner for any new or worsening symptoms or for any other concerns. [address]  [facility] [id number] [phone]             Corey Kendall MD  10/28/22 0049

## 2022-11-04 ENCOUNTER — PATIENT MESSAGE (OUTPATIENT)
Dept: PEDIATRIC CARDIOLOGY | Facility: CLINIC | Age: 22
End: 2022-11-04
Payer: MEDICAID

## 2022-11-07 ENCOUNTER — HOSPITAL ENCOUNTER (OUTPATIENT)
Dept: PEDIATRIC CARDIOLOGY | Facility: HOSPITAL | Age: 22
Discharge: HOME | End: 2022-11-07
Attending: PEDIATRICS
Payer: MEDICAID

## 2022-11-07 ENCOUNTER — TELEPHONE (OUTPATIENT)
Dept: PEDIATRIC CARDIOLOGY | Facility: CLINIC | Age: 22
End: 2022-11-07
Payer: MEDICAID

## 2022-11-07 DIAGNOSIS — I48.92 ATRIAL FLUTTER, UNSPECIFIED TYPE: ICD-10-CM

## 2022-11-07 DIAGNOSIS — Z95.2 S/P PULMONARY VALVE REPLACEMENT: ICD-10-CM

## 2022-11-07 DIAGNOSIS — Z95.4 S/P TRICUSPID VALVE REPLACEMENT: ICD-10-CM

## 2022-11-07 DIAGNOSIS — I47.19 ATRIAL TACHYCARDIA: ICD-10-CM

## 2022-11-07 DIAGNOSIS — I51.9 LV DYSFUNCTION: ICD-10-CM

## 2022-11-07 DIAGNOSIS — Q25.5 PULMONARY ATRESIA WITH INTACT VENTRICULAR SEPTUM: ICD-10-CM

## 2022-11-07 DIAGNOSIS — Q24.9 ADULT CONGENITAL HEART DISEASE: ICD-10-CM

## 2022-11-07 DIAGNOSIS — I47.10 SVT (SUPRAVENTRICULAR TACHYCARDIA): ICD-10-CM

## 2022-11-07 DIAGNOSIS — R00.2 PALPITATIONS: ICD-10-CM

## 2022-11-07 DIAGNOSIS — I36.2 NONRHEUMATIC TRICUSPID VALVE STENOSIS WITH REGURGITATION: ICD-10-CM

## 2022-11-07 PROCEDURE — 93298 REM INTERROG DEV EVAL SCRMS: CPT | Mod: ,,, | Performed by: PEDIATRICS

## 2022-11-07 PROCEDURE — G2066 INTER DEVC REMOTE 30D: HCPCS

## 2022-11-07 PROCEDURE — 93298 CV LOOP RECORDER REMOTE PEDIATRICS (CUPID ONLY): ICD-10-PCS | Mod: ,,, | Performed by: PEDIATRICS

## 2022-11-07 NOTE — TELEPHONE ENCOUNTER
"Informed patient that she is due for a remote transmission today. I told her that her monitor has not been connected since October 14. Pt stated that the monitor is saying "no signal." I told her to unplug and plug the monitor back in. The pt stated that the monitor still said "no signal" after that. I told her to move the monitor to the other side of the room and try again. After doing this, the monitor no longer said "no signal" and she was able to send a full transmission.   "

## 2022-12-09 ENCOUNTER — PATIENT MESSAGE (OUTPATIENT)
Dept: PEDIATRIC CARDIOLOGY | Facility: CLINIC | Age: 22
End: 2022-12-09
Payer: MEDICAID

## 2022-12-12 ENCOUNTER — TELEPHONE (OUTPATIENT)
Dept: PEDIATRIC CARDIOLOGY | Facility: CLINIC | Age: 22
End: 2022-12-12
Payer: MEDICAID

## 2022-12-12 NOTE — TELEPHONE ENCOUNTER
Patient called to do REMOTE transmission. Patient also called about loop reaching RRT and that she is overdue for follow-up with EP and [facility]. No answer. Message left with phone number to call if any issues.

## 2022-12-27 ENCOUNTER — TELEPHONE (OUTPATIENT)
Dept: PEDIATRIC CARDIOLOGY | Facility: CLINIC | Age: 22
End: 2022-12-27
Payer: MEDICAID

## 2022-12-27 ENCOUNTER — HOSPITAL ENCOUNTER (OUTPATIENT)
Dept: PEDIATRIC CARDIOLOGY | Facility: HOSPITAL | Age: 22
Discharge: HOME | End: 2022-12-27
Attending: PEDIATRICS
Payer: MEDICAID

## 2022-12-27 DIAGNOSIS — Z95.2 S/P PULMONARY VALVE REPLACEMENT: ICD-10-CM

## 2022-12-27 DIAGNOSIS — Q25.5 PULMONARY ATRESIA WITH INTACT VENTRICULAR SEPTUM: Primary | ICD-10-CM

## 2022-12-27 DIAGNOSIS — Z95.4 S/P TRICUSPID VALVE REPLACEMENT: ICD-10-CM

## 2022-12-27 DIAGNOSIS — I51.9 LV DYSFUNCTION: ICD-10-CM

## 2022-12-27 DIAGNOSIS — Z95.818 STATUS POST PLACEMENT OF IMPLANTABLE LOOP RECORDER: ICD-10-CM

## 2022-12-27 DIAGNOSIS — I48.92 ATRIAL FLUTTER, UNSPECIFIED TYPE: ICD-10-CM

## 2022-12-27 DIAGNOSIS — I47.10 SVT (SUPRAVENTRICULAR TACHYCARDIA): ICD-10-CM

## 2022-12-27 DIAGNOSIS — I47.19 ATRIAL TACHYCARDIA: ICD-10-CM

## 2022-12-27 DIAGNOSIS — Q24.9 ADULT CONGENITAL HEART DISEASE: ICD-10-CM

## 2022-12-27 DIAGNOSIS — I36.2 NONRHEUMATIC TRICUSPID VALVE STENOSIS WITH REGURGITATION: ICD-10-CM

## 2022-12-27 DIAGNOSIS — Q25.5 PULMONARY ATRESIA WITH INTACT VENTRICULAR SEPTUM: ICD-10-CM

## 2022-12-27 DIAGNOSIS — R00.2 PALPITATIONS: ICD-10-CM

## 2022-12-27 PROCEDURE — G2066 INTER DEVC REMOTE 30D: HCPCS

## 2022-12-27 PROCEDURE — 93298 REM INTERROG DEV EVAL SCRMS: CPT | Mod: ,,, | Performed by: PEDIATRICS

## 2022-12-27 PROCEDURE — 93298 CV LOOP RECORDER REMOTE PEDIATRICS (CUPID ONLY): ICD-10-PCS | Mod: ,,, | Performed by: PEDIATRICS

## 2022-12-27 NOTE — TELEPHONE ENCOUNTER
Patient called to perform loop transmission. Notified patient that loop has reached RRT and that she is overdue for follow up in EP and [facility] clinics. Scheduled follow up appointments with Dr. Cheung and Dr. Galloway. Patient expressed understanding.

## 2023-01-05 ENCOUNTER — HOSPITAL ENCOUNTER (OUTPATIENT)
Dept: CARDIOLOGY | Facility: CLINIC | Age: 23
Discharge: HOME | End: 2023-01-05
Payer: MEDICAID

## 2023-01-05 ENCOUNTER — OFFICE VISIT (OUTPATIENT)
Dept: CARDIOLOGY | Facility: CLINIC | Age: 23
End: 2023-01-05
Payer: MEDICAID

## 2023-01-05 VITALS
OXYGEN SATURATION: 95 % | BODY MASS INDEX: 45.91 KG/M2 | DIASTOLIC BLOOD PRESSURE: 74 MMHG | HEART RATE: 79 BPM | HEIGHT: 66 IN | WEIGHT: 285.69 LBS | SYSTOLIC BLOOD PRESSURE: 153 MMHG

## 2023-01-05 DIAGNOSIS — I47.19 ATRIAL TACHYCARDIA: ICD-10-CM

## 2023-01-05 DIAGNOSIS — Z95.4 S/P TRICUSPID VALVE REPLACEMENT: ICD-10-CM

## 2023-01-05 DIAGNOSIS — Z95.818 STATUS POST PLACEMENT OF IMPLANTABLE LOOP RECORDER: ICD-10-CM

## 2023-01-05 DIAGNOSIS — R06.2 WHEEZING: ICD-10-CM

## 2023-01-05 DIAGNOSIS — Z95.2 S/P PULMONARY VALVE REPLACEMENT: ICD-10-CM

## 2023-01-05 DIAGNOSIS — Q25.5 PULMONARY ATRESIA WITH INTACT VENTRICULAR SEPTUM: Primary | ICD-10-CM

## 2023-01-05 DIAGNOSIS — R00.2 PALPITATIONS: ICD-10-CM

## 2023-01-05 DIAGNOSIS — Q25.5 PULMONARY ATRESIA WITH INTACT VENTRICULAR SEPTUM: ICD-10-CM

## 2023-01-05 PROCEDURE — 1159F MED LIST DOCD IN RCRD: CPT | Mod: CPTII,,, | Performed by: PEDIATRICS

## 2023-01-05 PROCEDURE — 99999 PR PBB SHADOW E&M-EST. PATIENT-LVL III: ICD-10-PCS | Mod: PBBFAC,,, | Performed by: PEDIATRICS

## 2023-01-05 PROCEDURE — 1159F PR MEDICATION LIST DOCUMENTED IN MEDICAL RECORD: ICD-10-PCS | Mod: CPTII,,, | Performed by: PEDIATRICS

## 2023-01-05 PROCEDURE — 3078F DIAST BP <80 MM HG: CPT | Mod: CPTII,,, | Performed by: PEDIATRICS

## 2023-01-05 PROCEDURE — 93010 ELECTROCARDIOGRAM REPORT: CPT | Mod: S$PBB,,, | Performed by: PEDIATRICS

## 2023-01-05 PROCEDURE — 93010 EKG 12-LEAD: ICD-10-PCS | Mod: S$PBB,,, | Performed by: PEDIATRICS

## 2023-01-05 PROCEDURE — 3008F BODY MASS INDEX DOCD: CPT | Mod: CPTII,,, | Performed by: PEDIATRICS

## 2023-01-05 PROCEDURE — 3077F PR MOST RECENT SYSTOLIC BLOOD PRESSURE >= 140 MM HG: ICD-10-PCS | Mod: CPTII,,, | Performed by: PEDIATRICS

## 2023-01-05 PROCEDURE — 99214 PR OFFICE/OUTPT VISIT, EST, LEVL IV, 30-39 MIN: ICD-10-PCS | Mod: 25,S$PBB,, | Performed by: PEDIATRICS

## 2023-01-05 PROCEDURE — 93005 ELECTROCARDIOGRAM TRACING: CPT | Mod: PBBFAC | Performed by: PEDIATRICS

## 2023-01-05 PROCEDURE — 99214 OFFICE O/P EST MOD 30 MIN: CPT | Mod: 25,S$PBB,, | Performed by: PEDIATRICS

## 2023-01-05 PROCEDURE — 99999 PR PBB SHADOW E&M-EST. PATIENT-LVL III: CPT | Mod: PBBFAC,,, | Performed by: PEDIATRICS

## 2023-01-05 PROCEDURE — 3078F PR MOST RECENT DIASTOLIC BLOOD PRESSURE < 80 MM HG: ICD-10-PCS | Mod: CPTII,,, | Performed by: PEDIATRICS

## 2023-01-05 PROCEDURE — 3077F SYST BP >= 140 MM HG: CPT | Mod: CPTII,,, | Performed by: PEDIATRICS

## 2023-01-05 PROCEDURE — 3008F PR BODY MASS INDEX (BMI) DOCUMENTED: ICD-10-PCS | Mod: CPTII,,, | Performed by: PEDIATRICS

## 2023-01-05 PROCEDURE — 99213 OFFICE O/P EST LOW 20 MIN: CPT | Mod: PBBFAC | Performed by: PEDIATRICS

## 2023-01-05 NOTE — PROGRESS NOTES
Name: Kacey Ruth  MRN: [id number]  : 2000      Subjective:   CC: [facility], [facility] c IVS, SVT.    HPI:    Kacey Ruth is a 22 y.o. 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  She reports having wheezing at times, but otherwise she has no particular concerns today.    To review, she has a history of pulmonary atresia intact ventricular septum and ASD.  She underwent reconstruction of the right ventricular outflow tract and placement of a bioprosthetic pulmonary valve in early childhood. She has had multiple valve replacements. Her most recent open heart surgery involved placement of a bioprosthetic valve in the pulmonary position and placement of a bioprosthetic valve in the tricuspid position in . She had a heart catheterization for recurrent tricuspid valve insufficiency and stenosis.  The procedure employed a trans-catheter approach. It entailed tricuspid valve balloon valvuloplasty with a Z-Med 25 x 5 cm balloon followed by placement of an Sotelo S3 26 mm valve in 2017. The catheterization also showed that she had only mild pulmonary valve stenosis and insufficiency.  She was then found by Dr. Gonzalez to have SVT vs. Atrial flutter and started on Metoprolol.  On 19, she had ablation of dual loop tachycardia involving CTI and lateral wall.  She continued to have SVT so was admitted for sotalol initiation on 19 and loop recorder implantation.    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   S/P multiple valve replacements.   Most recent open heart surgery involved placement of a  bioprosthetic valve in the pulmonary position and placement of a bioprosthetic valve in the tricuspid position in 2007.   Tricuspid valve insufficiency and stenosis.    S/P catheterization with tricuspid valve balloon valvuloplasty, placement of an Sotelo S3 26 mm valve in Feb 2017. Also showed only mild pulmonary valve stenosis and insufficiency.   SVT vs. Atrial flutter and   Started on Metoprolol by Dr. Gonzalez  S/P ablation, 2/6/19, dual loop tachycardia involving CTI and lateral wall.    S/P sotalol initiation on 5/8/19 and loop recorder implantation due to recurrent SVT.    Family Hx:  No known family history of congenital heart defects or cardiac surgeries in childhood.  No known family members with pacemakers or defibrillators.  No known inherited channelopathies or cardiomyopathies.  No known hx of sudden cardiac death or heart transplant.  No No known heart attack in someone less than 50yoa.    Social Hx:  Lives in [place] with Mother.    Review of Systems:  GEN:  No fevers, No fatigue, No weight-loss, No weight-gain  EYE:  No significant changes in vision, No eye redness, No lens dislocation  ENT: No cough, No congestion, No swelling, No snoring, No hearing loss,   RESP: No increased work of breathing, No dyspnea, + noisy breathing, No hx of pneumothorax  CV:  No chest pain, No palpitations, No tachycardia, No activity or exercise intolerance  GI:  No abdominal pain, No nausea, No vomiting, No diarrhea, No constipation  BETITO: Normal UOP  MSK: No pain, No swelling, No joint dislocations, No scoliosis, No extremity swelling  HEME: No easy bruising or bleeding  NEUR: No history of seizures, No dizziness, No near-syncope, No syncope  DERM: No Rashes  PSY: + anxiety, No depression  ALL: See below.    Medications & Allergy:  Current Outpatient Medications on File Prior to Visit   Medication Sig Dispense Refill    amlodipine (NORVASC) 2.5 MG tablet Take 1 tablet (2.5 mg total) by mouth once daily. 30 tablet  "11    aspirin (ECOTRIN) 81 MG EC tablet TAKE 1 TABLET BY MOUTH EVERY DAY 30 tablet 0    hydroCHLOROthiazide (HYDRODIURIL) 25 MG tablet Take 1 tablet (25 mg total) by mouth once daily. 30 tablet 1    sotalol (BETAPACE) 120 MG Tab Take 1 tablet (120 mg total) by mouth 2 (two) times daily. 60 tablet 11    sucralfate (CARAFATE) 1 gram tablet Take 1 tablet (1 g total) by mouth 4 (four) times daily. 20 tablet 0    famotidine (PEPCID) 20 MG tablet Take 1 tablet (20 mg total) by mouth 2 (two) times daily. (Patient not taking: Reported on 1/5/2023) 60 tablet 2    ibuprofen (ADVIL,MOTRIN) 800 MG tablet Take 1 tablet (800 mg total) by mouth every 6 (six) hours as needed for Pain. 20 tablet 0    nortriptyline (PAMELOR) 10 MG capsule Take 1 capsule (10 mg total) by mouth every evening. (Patient not taking: Reported on 1/5/2023) 30 capsule 3    omeprazole (PRILOSEC) 20 MG capsule Take 2 capsules (40 mg total) by mouth once daily. 60 capsule 0     Current Facility-Administered Medications on File Prior to Visit   Medication Dose Route Frequency Provider Last Rate Last Admin    0.9%  NaCl infusion   Intravenous Continuous Slime Salcido NP 0 mL/hr at 02/06/19 1521 New Bag at 05/08/19 1420    sodium chloride 0.9% flush 5 mL  5 mL Intravenous PRN Slime Salcido NP           Review of patient's allergies indicates:  No Known Allergies       Objective:   Vitals:  Vitals:    01/05/23 1059 01/05/23 1104   BP: (!) 143/66 (!) 153/74   BP Location: Right arm Left arm   Patient Position: Sitting Sitting   BP Method: Large (Automatic) Large (Automatic)   Pulse: 79 79   SpO2: 95%    Weight: 129.6 kg (285 lb 11.5 oz)    Height: 5' 6" (1.676 m)      Body mass index is 46.12 kg/m².  Body surface area is 2.46 meters squared.    Exam:  GEN: No acute distress, Normal appearing  EYE: Anicteric sclerae  ENT: No drainage, Moist mucous membranes  PULM: Normal work of breathing;  Clear to auscultation bilaterally, Good air movement " throughout.  CV: No chest pain;   No murmurs;   No rubs or gallops;  EXT: No cyanosis, No edema   2+ radial and PT pulses bilaterally  ABD: Soft, Non-distended, Non-tender,    No hepatomegaly;  Normal bowel sounds  DERM: No rashes  NEUR: Normal gait, Grossly normal tone.  PSY: Normal mood and affect      Results / Data:   ECG:   (01/05/2023) - Sinus rhythm, RBBB  (05/25/2021) - Sinus rhythm, RBBB    ILR: (12/28/2022)  MDT ILR REMOTE transmission received and data reviewed.   Battery: EOS since December 14, 2022  Current ECG reveals sinus rhythm with isolated PACs.  NO SYMPTOM triggered episodes noted.  204 TACHY AUTO triggered episodes - available ECGs reveal sinus tachycardia with noise artifact, oversensing, undersensing, intermittent PACs, and one PVC      Assessment / Plan:   Kacey Ruth is a 22 y.o. 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      She does complain of occasional wheezing, so referral to pulmonology was placed.    Follow-up:   Scheduled to see Dr. Galloway in February with echocardiogram.  Depending on follow-up with Dr. Galloway, can try to align so both him and I see her in about 12-14 months with ECG and echocardiogram.  Cardiac medications:    Sotalol  Amlodipine, HCTZ  Aspirin  SBE prophylaxis:    Yes.  Antibiotic prophylaxis is required prior to all dental procedures cleanings.    Please contact us if he has any questions or concerns.  Our clinic from his [phone] during office hours. For urgent night and weekend concerns, call [phone] and ask for the pediatric cardiologist on call to be paged.

## 2023-01-27 ENCOUNTER — PATIENT MESSAGE (OUTPATIENT)
Dept: PEDIATRIC CARDIOLOGY | Facility: CLINIC | Age: 23
End: 2023-01-27
Payer: MEDICAID

## 2023-01-30 ENCOUNTER — HOSPITAL ENCOUNTER (OUTPATIENT)
Dept: PEDIATRIC CARDIOLOGY | Facility: HOSPITAL | Age: 23
Discharge: HOME | End: 2023-01-30
Attending: PEDIATRICS
Payer: MEDICAID

## 2023-01-30 ENCOUNTER — TELEPHONE (OUTPATIENT)
Dept: PEDIATRIC CARDIOLOGY | Facility: CLINIC | Age: 23
End: 2023-01-30
Payer: MEDICAID

## 2023-01-30 DIAGNOSIS — I48.92 ATRIAL FLUTTER, UNSPECIFIED TYPE: ICD-10-CM

## 2023-01-30 DIAGNOSIS — R00.2 PALPITATIONS: ICD-10-CM

## 2023-01-30 DIAGNOSIS — Q24.9 ADULT CONGENITAL HEART DISEASE: ICD-10-CM

## 2023-01-30 DIAGNOSIS — I47.10 SVT (SUPRAVENTRICULAR TACHYCARDIA): ICD-10-CM

## 2023-01-30 DIAGNOSIS — I36.2 NONRHEUMATIC TRICUSPID VALVE STENOSIS WITH REGURGITATION: ICD-10-CM

## 2023-01-30 DIAGNOSIS — Q25.5 PULMONARY ATRESIA WITH INTACT VENTRICULAR SEPTUM: ICD-10-CM

## 2023-01-30 DIAGNOSIS — Z95.2 S/P PULMONARY VALVE REPLACEMENT: ICD-10-CM

## 2023-01-30 DIAGNOSIS — I51.9 LV DYSFUNCTION: ICD-10-CM

## 2023-01-30 DIAGNOSIS — I47.19 ATRIAL TACHYCARDIA: ICD-10-CM

## 2023-01-30 DIAGNOSIS — Z95.4 S/P TRICUSPID VALVE REPLACEMENT: ICD-10-CM

## 2023-01-30 PROCEDURE — G2066 INTER DEVC REMOTE 30D: HCPCS

## 2023-01-30 PROCEDURE — 93298 REM INTERROG DEV EVAL SCRMS: CPT | Mod: ,,, | Performed by: PEDIATRICS

## 2023-01-30 PROCEDURE — 93298 CV LOOP RECORDER REMOTE PEDIATRICS (CUPID ONLY): ICD-10-PCS | Mod: ,,, | Performed by: PEDIATRICS

## 2023-01-30 NOTE — TELEPHONE ENCOUNTER
Notified patient that she is due for a loop transmission. Patient stated that she is out of town but that she will get it done when she gets home.

## 2023-03-03 ENCOUNTER — TELEPHONE (OUTPATIENT)
Dept: PEDIATRIC CARDIOLOGY | Facility: CLINIC | Age: 23
End: 2023-03-03
Payer: MEDICAID

## 2023-03-03 NOTE — TELEPHONE ENCOUNTER
Notified patient that she is due for a loop transmission on Monday and can perform it anytime over the weekend. Patient expressed understanding.

## 2023-03-09 ENCOUNTER — TELEPHONE (OUTPATIENT)
Dept: PEDIATRIC CARDIOLOGY | Facility: CLINIC | Age: 23
End: 2023-03-09
Payer: MEDICAID

## 2023-03-09 NOTE — TELEPHONE ENCOUNTER
Notified patient that she is past due for a loop transmission. She stated that she has been out of town but will send it tomorrow.

## 2023-03-13 ENCOUNTER — TELEPHONE (OUTPATIENT)
Dept: PEDIATRIC CARDIOLOGY | Facility: CLINIC | Age: 23
End: 2023-03-13
Payer: MEDICAID

## 2023-03-13 ENCOUNTER — HOSPITAL ENCOUNTER (OUTPATIENT)
Dept: PEDIATRIC CARDIOLOGY | Facility: HOSPITAL | Age: 23
Discharge: HOME | End: 2023-03-13
Attending: PEDIATRICS
Payer: MEDICAID

## 2023-03-13 DIAGNOSIS — R00.2 PALPITATIONS: ICD-10-CM

## 2023-03-13 DIAGNOSIS — I51.9 LV DYSFUNCTION: ICD-10-CM

## 2023-03-13 DIAGNOSIS — I48.92 ATRIAL FLUTTER, UNSPECIFIED TYPE: ICD-10-CM

## 2023-03-13 DIAGNOSIS — Z95.2 S/P PULMONARY VALVE REPLACEMENT: ICD-10-CM

## 2023-03-13 DIAGNOSIS — I36.2 NONRHEUMATIC TRICUSPID VALVE STENOSIS WITH REGURGITATION: ICD-10-CM

## 2023-03-13 DIAGNOSIS — I47.10 SVT (SUPRAVENTRICULAR TACHYCARDIA): ICD-10-CM

## 2023-03-13 DIAGNOSIS — I47.19 ATRIAL TACHYCARDIA: ICD-10-CM

## 2023-03-13 DIAGNOSIS — Q24.9 ADULT CONGENITAL HEART DISEASE: ICD-10-CM

## 2023-03-13 DIAGNOSIS — Z95.4 S/P TRICUSPID VALVE REPLACEMENT: ICD-10-CM

## 2023-03-13 DIAGNOSIS — Q25.5 PULMONARY ATRESIA WITH INTACT VENTRICULAR SEPTUM: ICD-10-CM

## 2023-03-13 PROCEDURE — G2066 INTER DEVC REMOTE 30D: HCPCS

## 2023-03-13 PROCEDURE — 93298 REM INTERROG DEV EVAL SCRMS: CPT | Mod: ,,, | Performed by: PEDIATRICS

## 2023-03-13 PROCEDURE — 93298 CV LOOP RECORDER REMOTE PEDIATRICS (CUPID ONLY): ICD-10-PCS | Mod: ,,, | Performed by: PEDIATRICS

## 2023-03-13 NOTE — TELEPHONE ENCOUNTER
Patient requesting new appointment date with Dr. Galloway since missing appointment in February. Scheduled next available June 14 at 9:30am. Confirmed location of [facility] clinic with patient.

## 2023-04-14 ENCOUNTER — PATIENT MESSAGE (OUTPATIENT)
Dept: PEDIATRIC CARDIOLOGY | Facility: CLINIC | Age: 23
End: 2023-04-14
Payer: MEDICAID

## 2023-04-18 ENCOUNTER — TELEPHONE (OUTPATIENT)
Dept: PEDIATRIC CARDIOLOGY | Facility: CLINIC | Age: 23
End: 2023-04-18
Payer: MEDICAID

## 2023-04-18 NOTE — TELEPHONE ENCOUNTER
Reached out to [employer] regarding loop transmission that patient sent over. The representative stated that the loop is officially dead and cannot send over anymore information which is why nothing came up on the transmission that was sent.

## 2023-05-05 NOTE — PROGRESS NOTES
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 She underwent reconstruction of the right ventricular outflow tract and placement of a bioprosthetic pulmonary valve in early childhood. She subsequently has required multiple valve replacements. Her most recent heart surgery involved placement of a bioprosthetic valve in the pulmonary position and placement of a bioprosthetic valve in the tricuspid position, in 2007. Recently, she had a heart catheterization for recurrent insufficiency and obstruction of the bioprosthetic valve that had been placed in the tricuspid position.  The current procedure employed a trans-catheter approach. It entailed valvuloplasty with a Z-Med 25 x 5 cm balloon followed by placement of an Sotelo S3 26 mm bioprosthetic valve in the tricuspid position.. 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 She leads a rather sedentary lifestyle, and is overweight. She currently is on: Digoxin 0.125 mg once a day, Norvasc 2.5 mg once a day. The Lasix was recently changed to HCTZ 25 mg once a day. (She likes the HCTZ better.)    Kacey recently underwent an EPS with successful transcatheter ablation for both typical and atypical reentry atrial tachycardia, at [facility], by Dr Robles and Dr Woodward. Clinically, she is doing well.  She comes to clinic today for follow-up.  For some reason, she has not taken her beta blocker since the ablation !    She should additionally be on Metoprolol Succinate 50 mg qd and Warfarin 7.5 mg qd.         REVIEW OF SYSTEMS:  There are no visual disturbances. No HAs, lightheadedness, syncope or seizures. No swallowing disorder or PIETER. No difficulty with breathing, SOB,  wheezing or rhonchi. No asthma. No abdominal pain.  Bowel movements are  normal.  No pelvic pain. Urination is normal.  No DM of thyroid disorder.  No lipid disorder. No arterial disease. No known intrinsic problem with the lungs, liver or kidneys. No bleeding disorder. No smoking or ETOH use.        PHYSICAL EXAMINATION:   GENERAL:  The patient is overweight 18 -year-old female in no distress.  VITAL SIGNS:  Her weight is 122.4.4 kg (270 lbs) and her height is 1.676 meters (5' 6''). Heart rate is 102 beats per minute (probably not sinus).  Sat 98 %.   Blood pressure in the right arm is 132/80 mmHg in the RA..  Color and perfusion are good.  HEENT:  Eye movements are normal.  No bruits are noted over the head.  No jugular venous distention or pulsations.  Mucous membranes are moist and pink.  There is no adenopathy.  The neck is supple.  No carotid bruits. SKIN:  Showed is pink and well perfused. CHEST:  There is a well-healed midline scar.  Lungs are clear to auscultation bilaterally, although the breath sounds are somewhat decreased at the base. There are no wheezes or rhonchi. CARDIOVASCULAR:  Precordial activity is normal.  HR ~ 140 bpm. The first heart sound is prominent and crisp.  The second heart sound is narrowly split. There is a grade 1/6 systolic ejection murmur heard best up the left sternal border and across the base.  Short diastolic murmur is heard today.  ABDOMEN:  There is exogenous obesity.  The liver edge is palpable about 2-3 FB at the right costal margin.  It is nonpulsatile and nontender.  Bowel sounds appear to be normal.  EXTREMITIES:  Pulses are 2+ throughout.  Capillary refill is good.  There is no cyanosis or peripheral edema.  No bruising or rashes.              ........................................................................................................................................        ECG (TODAY): Sinus rhythm at a ventricular rate of 91 bpm.  ms.  RBBB (156 ms). Minor T wave abnormality.  Prolonged QTc but she has a wide RBBB.                 ECHOCARDIOGRAM  TODAY):  An echocardiogram was performed.  2-D STUDY: There is no pericardial effusion.  No clots or vegetations. The bioprosthetic tricuspid valve is seen to be in good position. Annulus is 18 mm. The struts are easily seen.  The leaflets are mobile.  No evidence for thrombus formation.  The right atrium is enlarged measuring 5.4 x 4.8 cm. One can also see  the bioprosthetic tricuspid valve in a cross sectional view.  It appears circular and measures 2 cm x 2 cm. Again, no clots or vegetations are seen.  No right ventricular or left ventricular outflow tract obstruction.  The right ventricular circumferential area of shortening is 30%,  which is reduced.  Circumferential area of the RA is 23.4 cm2, which is dilated. M-MODE:   LV 5.1 cm, RV 3.1 cm, Ao 3.0 cm, LA 3.4 cm, Sep 1.1 cm, PW 1.1 cm, EF ~ 39 % (reduced). DOPPLER VELOCITY ANALYSIS:  There is no insufficiency of the recently-placed bioprosthetic tricuspid valve.  Mild turbulent antegrade flow is seen. Continuous wave Doppler analysis shows a peak pressure drop of 19 mmHg with a mean value of 10 mmHg.  Importantly, the leaflets are moving freely.  There is no mitral insufficiency or aortic insufficiency.  Peak pressure drop across the aortic valve is 4 mmHg.  Peak pressure drop across the bioprosthetic pulmonary valve is 12 mmHg.                    .................................................................................................................................................               ASSESSMENT:  In summary, we have a 18-year-old AA female originally diagnosed to have pulmonary atresia with intact inter-ventricular septum, who is status-post multiple open heart operations involving both the pulmonary and tricuspid valves.  She recently in February 2017 had placement of an Sotelo S3 26 mm bioprosthetic tricuspid valve via the transvenous approach.  She tolerated the procedure well.  Clinically, she has been  stable. However, she continues to have poor exercise capacity, chronic fatigue and more recently developed a new rhythm disturbance, diagnosed to be atrial flutter. She recently underwent successful ablation for the atrial rhythm disturbance.  PLAN:  Given our findings, our suggestions are as follows:  1.  She of course needs antibiotic prophylaxis for any invasive procedure.   2.  She should continue taking Metoprolol 50 mg in the PM, HCTZ 25 mg qd, and her other meds.  We restarted Warfarin 7.5 mg qd.     3. Kacey is also seen by (Dr Wodoward).  4. RTC in 1 wk with ECHO, ECG and INR.  5. Place event recorder.  If there are any questions concerning the cardiac status of this patient, please feel free to contact us.  Thank you so much for allowing us to partake in the care of this patient. Donovan Gonzalez PhD, MD.                  PS:  RECENT EPS with ABLATION for Atypical and Typical Atrial Flutter. (S/P) Mami type valve placement in the tricuspid position.  The ablation catheter was advanced into the right atrium and positioned at the atrial aspect of the tricuspid annulus.  Lesions were tracked using the FELECIA (3D) mapping system. The catheter was guided using fluoroscopy and electroanatomic mapping. The post ablation rhythm was sinus rhythm. After a continuous linear lesion set was made from the prosthetic tricuspid valve to the IVC, the TCL was changed from 205 to 215 ms. Ablation from the lateral scar inferior border to the CTI line did not change the TCL. Ablation along the most anterior portion of the line, near the inferior/lateral tricuspid annulus resulted in termination of the tachycardia. Bidirectional block across the CTI and lateral scar lines was confirmed. This was maintained after 20 minutes.                                  Within functional limits Prolonged mastication of hard solid

## 2023-05-11 NOTE — PLAN OF CARE
Problem: Adult Inpatient Plan of Care  Goal: Plan of Care Review  Outcome: Ongoing (interventions implemented as appropriate)  Pt stable overnight. VSS, HR 60's-70's. Incision site remains cdi. Pressure dressing noted to come loose, kerlix wrap ordered and applied over pressure dressing to reinforce. Ice pack applied to wound area. Sotalol administered. INR drawn from piv. Ancef administered. Tylenol x1 for headache and lower back pain, denies pain to incision site. Poc reviewed w pt, verbalized understanding, will continue to monitor.        English

## 2023-06-02 ENCOUNTER — PATIENT MESSAGE (OUTPATIENT)
Dept: PEDIATRIC CARDIOLOGY | Facility: CLINIC | Age: 23
End: 2023-06-02
Payer: MEDICAID

## 2023-06-14 ENCOUNTER — HOSPITAL ENCOUNTER (OUTPATIENT)
Dept: CARDIOLOGY | Facility: CLINIC | Age: 23
Discharge: HOME | End: 2023-06-14
Payer: MEDICAID

## 2023-06-14 ENCOUNTER — HOSPITAL ENCOUNTER (OUTPATIENT)
Dept: CARDIOLOGY | Facility: HOSPITAL | Age: 23
Discharge: HOME | End: 2023-06-14
Attending: PEDIATRICS
Payer: MEDICAID

## 2023-06-14 ENCOUNTER — OFFICE VISIT (OUTPATIENT)
Dept: CARDIOLOGY | Facility: CLINIC | Age: 23
End: 2023-06-14
Attending: PEDIATRICS
Payer: MEDICAID

## 2023-06-14 VITALS
HEIGHT: 66 IN | WEIGHT: 285 LBS | BODY MASS INDEX: 47.09 KG/M2 | HEART RATE: 82 BPM | OXYGEN SATURATION: 95 % | WEIGHT: 293 LBS | HEIGHT: 66 IN | BODY MASS INDEX: 45.8 KG/M2 | SYSTOLIC BLOOD PRESSURE: 152 MMHG | DIASTOLIC BLOOD PRESSURE: 76 MMHG

## 2023-06-14 DIAGNOSIS — Z95.2 S/P PULMONARY VALVE REPLACEMENT: ICD-10-CM

## 2023-06-14 DIAGNOSIS — I51.9 LV DYSFUNCTION: ICD-10-CM

## 2023-06-14 DIAGNOSIS — Z95.818 STATUS POST PLACEMENT OF IMPLANTABLE LOOP RECORDER: ICD-10-CM

## 2023-06-14 DIAGNOSIS — I36.2 NONRHEUMATIC TRICUSPID VALVE STENOSIS WITH REGURGITATION: ICD-10-CM

## 2023-06-14 DIAGNOSIS — Z95.4 S/P TRICUSPID VALVE REPLACEMENT: ICD-10-CM

## 2023-06-14 DIAGNOSIS — Q25.5 PULMONARY ATRESIA WITH INTACT VENTRICULAR SEPTUM: ICD-10-CM

## 2023-06-14 DIAGNOSIS — Q24.9 ADULT CONGENITAL HEART DISEASE: ICD-10-CM

## 2023-06-14 DIAGNOSIS — I47.10 SVT (SUPRAVENTRICULAR TACHYCARDIA): ICD-10-CM

## 2023-06-14 DIAGNOSIS — R00.2 PALPITATIONS: ICD-10-CM

## 2023-06-14 DIAGNOSIS — Q25.5 PULMONARY ATRESIA WITH INTACT VENTRICULAR SEPTUM: Primary | ICD-10-CM

## 2023-06-14 DIAGNOSIS — I48.92 ATRIAL FLUTTER, UNSPECIFIED TYPE: ICD-10-CM

## 2023-06-14 DIAGNOSIS — I47.19 ATRIAL TACHYCARDIA: ICD-10-CM

## 2023-06-14 LAB
ASCENDING AORTA: 2.69 CM
AV INDEX (PROSTH): 0.95
AV MEAN GRADIENT: 2 MMHG
AV PEAK GRADIENT: 4 MMHG
AV VALVE AREA: 3.38 CM2
AV VELOCITY RATIO: 0.89
BSA FOR ECHO PROCEDURE: 2.45 M2
CV ECHO LV RWT: 0.29 CM
DOP CALC AO PEAK VEL: 0.98 M/S
DOP CALC AO VTI: 18.44 CM
DOP CALC LVOT AREA: 3.6 CM2
DOP CALC LVOT DIAMETER: 2.13 CM
DOP CALC LVOT PEAK VEL: 0.87 M/S
DOP CALC LVOT STROKE VOLUME: 62.29 CM3
DOP CALC RVOT PEAK VEL: 0.95 M/S
DOP CALC RVOT VTI: 25.09 CM
DOP CALCLVOT PEAK VEL VTI: 17.49 CM
E WAVE DECELERATION TIME: 142.9 MSEC
E/A RATIO: 1.58
E/E' RATIO: 8.4 M/S
ECHO LV POSTERIOR WALL: 0.86 CM (ref 0.6–1.1)
EJECTION FRACTION: 55 %
FRACTIONAL SHORTENING: 20 % (ref 28–44)
INTERVENTRICULAR SEPTUM: 0.83 CM (ref 0.6–1.1)
LA MAJOR: 5.75 CM
LA MINOR: 5.07 CM
LA WIDTH: 3.9 CM
LEFT ATRIUM SIZE: 4.61 CM
LEFT ATRIUM VOLUME INDEX MOD: 20 ML/M2
LEFT ATRIUM VOLUME INDEX: 34.7 ML/M2
LEFT ATRIUM VOLUME MOD: 47.29 CM3
LEFT ATRIUM VOLUME: 82.35 CM3
LEFT INTERNAL DIMENSION IN SYSTOLE: 4.78 CM (ref 2.1–4)
LEFT VENTRICLE DIASTOLIC VOLUME INDEX: 75.8 ML/M2
LEFT VENTRICLE DIASTOLIC VOLUME: 179.64 ML
LEFT VENTRICLE MASS INDEX: 84 G/M2
LEFT VENTRICLE SYSTOLIC VOLUME INDEX: 44.9 ML/M2
LEFT VENTRICLE SYSTOLIC VOLUME: 106.42 ML
LEFT VENTRICULAR INTERNAL DIMENSION IN DIASTOLE: 5.99 CM (ref 3.5–6)
LEFT VENTRICULAR MASS: 198.67 G
LV LATERAL E/E' RATIO: 6.46 M/S
LV SEPTAL E/E' RATIO: 12 M/S
MV A" WAVE DURATION": 7.99 MSEC
MV PEAK A VEL: 0.53 M/S
MV PEAK E VEL: 0.84 M/S
MV STENOSIS PRESSURE HALF TIME: 41.44 MS
MV VALVE AREA P 1/2 METHOD: 5.31 CM2
PULM VEIN S/D RATIO: 0.51
PV MEAN GRADIENT: 2.28 MMHG
PV PEAK D VEL: 0.68 M/S
PV PEAK S VEL: 0.35 M/S
PV PEAK VELOCITY: 2.31 CM/S
RA MAJOR: 6.77 CM
RA WIDTH: 6.75 CM
RIGHT VENTRICULAR END-DIASTOLIC DIMENSION: 4.77 CM
SINUS: 3.09 CM
STJ: 2.7 CM
TDI LATERAL: 0.13 M/S
TDI SEPTAL: 0.07 M/S
TDI: 0.1 M/S
TRICUSPID ANNULAR PLANE SYSTOLIC EXCURSION: 0.9 CM

## 2023-06-14 PROCEDURE — 99214 PR OFFICE/OUTPT VISIT, EST, LEVL IV, 30-39 MIN: ICD-10-PCS | Mod: S$PBB,,, | Performed by: PEDIATRICS

## 2023-06-14 PROCEDURE — 3078F DIAST BP <80 MM HG: CPT | Mod: CPTII,,, | Performed by: PEDIATRICS

## 2023-06-14 PROCEDURE — 93005 ELECTROCARDIOGRAM TRACING: CPT | Mod: PBBFAC | Performed by: INTERNAL MEDICINE

## 2023-06-14 PROCEDURE — 93010 EKG 12-LEAD: ICD-10-PCS | Mod: S$PBB,,, | Performed by: INTERNAL MEDICINE

## 2023-06-14 PROCEDURE — 3078F PR MOST RECENT DIASTOLIC BLOOD PRESSURE < 80 MM HG: ICD-10-PCS | Mod: CPTII,,, | Performed by: PEDIATRICS

## 2023-06-14 PROCEDURE — 93325 ECHO (CUPID ONLY): ICD-10-PCS | Mod: 26,,, | Performed by: PEDIATRICS

## 2023-06-14 PROCEDURE — 3077F SYST BP >= 140 MM HG: CPT | Mod: CPTII,,, | Performed by: PEDIATRICS

## 2023-06-14 PROCEDURE — 3008F BODY MASS INDEX DOCD: CPT | Mod: CPTII,,, | Performed by: PEDIATRICS

## 2023-06-14 PROCEDURE — 3077F PR MOST RECENT SYSTOLIC BLOOD PRESSURE >= 140 MM HG: ICD-10-PCS | Mod: CPTII,,, | Performed by: PEDIATRICS

## 2023-06-14 PROCEDURE — 93303 ECHO (CUPID ONLY): ICD-10-PCS | Mod: 26,,, | Performed by: PEDIATRICS

## 2023-06-14 PROCEDURE — 93320 ECHO (CUPID ONLY): ICD-10-PCS | Mod: 26,,, | Performed by: PEDIATRICS

## 2023-06-14 PROCEDURE — 93325 DOPPLER ECHO COLOR FLOW MAPG: CPT

## 2023-06-14 PROCEDURE — 93010 ELECTROCARDIOGRAM REPORT: CPT | Mod: S$PBB,,, | Performed by: INTERNAL MEDICINE

## 2023-06-14 PROCEDURE — 93325 DOPPLER ECHO COLOR FLOW MAPG: CPT | Mod: 26,,, | Performed by: PEDIATRICS

## 2023-06-14 PROCEDURE — 3008F PR BODY MASS INDEX (BMI) DOCUMENTED: ICD-10-PCS | Mod: CPTII,,, | Performed by: PEDIATRICS

## 2023-06-14 PROCEDURE — 1159F PR MEDICATION LIST DOCUMENTED IN MEDICAL RECORD: ICD-10-PCS | Mod: CPTII,,, | Performed by: PEDIATRICS

## 2023-06-14 PROCEDURE — 93320 DOPPLER ECHO COMPLETE: CPT | Mod: 26,,, | Performed by: PEDIATRICS

## 2023-06-14 PROCEDURE — 99999 PR PBB SHADOW E&M-EST. PATIENT-LVL III: CPT | Mod: PBBFAC,,, | Performed by: PEDIATRICS

## 2023-06-14 PROCEDURE — 99214 OFFICE O/P EST MOD 30 MIN: CPT | Mod: S$PBB,,, | Performed by: PEDIATRICS

## 2023-06-14 PROCEDURE — 99999 PR PBB SHADOW E&M-EST. PATIENT-LVL III: ICD-10-PCS | Mod: PBBFAC,,, | Performed by: PEDIATRICS

## 2023-06-14 PROCEDURE — 1159F MED LIST DOCD IN RCRD: CPT | Mod: CPTII,,, | Performed by: PEDIATRICS

## 2023-06-14 PROCEDURE — 93303 ECHO TRANSTHORACIC: CPT | Mod: 26,,, | Performed by: PEDIATRICS

## 2023-06-14 PROCEDURE — 99213 OFFICE O/P EST LOW 20 MIN: CPT | Mod: PBBFAC,25 | Performed by: PEDIATRICS

## 2023-06-14 RX ORDER — AMLODIPINE BESYLATE 10 MG/1
10 TABLET ORAL DAILY
Qty: 30 TABLET | Refills: 11 | Status: SHIPPED | OUTPATIENT
Start: 2023-06-14 | End: 2024-06-13

## 2023-06-14 NOTE — PROGRESS NOTES
2023    re:Kacey Ruth  :2000    [facility]  [address] [place] [id number]    [facility]     Kacey Ruth is a 22 y.o. female seen in my [facility] clinic today for evaluation of congenital heart disease.  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   S/P multiple valve replacements.   Most recent open heart surgery involved placement of a bioprosthetic valve in the pulmonary position and placement of a bioprosthetic valve in the tricuspid position in .   Likely mild pulmonary valve stenosis and no significant insufficiency  Tricuspid valve insufficiency and stenosis.    S/P catheterization with tricuspid valve balloon valvuloplasty, placement of an Sotelo S3 26 mm valve in 2017. Also showed only mild pulmonary valve stenosis and insufficiency.   Valve working well with trivial acceleration and no insufficiency  SVT vs. Atrial flutter  Started on Metoprolol by Dr. Gonzalez  S/P ablation, 19, dual loop tachycardia involving CTI and lateral wall.    S/P sotalol initiation on 19 and loop recorder implantation due to recurrent SVT.  3.   Obesity  4.   Hypertension    To summarize, my recommendations are as follows:  SBEP is indicated  Increase amlodipine to 10mg daily  Healthy diet, regular exercise.  Work on healthy weight loss.  We will discuss her loop recorder with the electrophysiology team.  She would like it removed.  Follow-up with me and electrophysiology in 1 year with echo, EKG.    Discussion:  She looks great.  Her bioprosthetic pulmonary valve works well.  The Sotelo valve in the tricuspid position is also working quite well.  My recommendations are as noted above.  She remains at risk of heart failure and arrhythmias.  She will  need further intervention on her valves in the future, but at present there is no indication.    History of present illness:  She has done very well since she was last seen in clinic.  No chest pain, shortness of breath, palpitations, syncope, near syncope, cyanosis, or edema.  She had COVID in 2021, and she has had some dyspnea since that time although it has improved.  Her amlodipine was recently increased from 2.5-5 mg.    The review of systems is as noted above. It is otherwise negative for other symptoms related to the general, neurological, psychiatric, endocrine, gastrointestinal, genitourinary, respiratory, dermatologic, musculoskeletal, hematologic, and immunologic systems.    Past Medical History:   Diagnosis Date    CHF (congestive heart failure)     Cirrhosis 07/2019    Obesity     Pulmonary atresia with intact ventricular septum     SVT (supraventricular tachycardia)      Past Surgical History:   Procedure Laterality Date    MEMO PROCEDURE      bt shunt and transannular patch    CARDIAC VALVE REPLACEMENT      INSERTION OF IMPLANTABLE LOOP RECORDER N/A 2/6/2019    Procedure: INSERTION, IMPLANTABLE LOOP RECORDER;  Surgeon: Romeo Robles MD;  Location: [facility] EP LAB;  Service: Cardiology;  Laterality: N/A;  SVT, IART, SAI, RFA, +/- ILR, FELECIA, MDT, MAC, MB/SM, 3 prep    INSERTION OF IMPLANTABLE LOOP RECORDER N/A 5/8/2019    Procedure: INSERTION, IMPLANTABLE LOOP RECORDER;  Surgeon: Ricardo Woodward MD;  Location: [facility] EP LAB;  Service: Cardiology;  Laterality: N/A;  afl, palps, ILR, MDT, anes, SM, 441    INSERTION OF TRANSJUGULAR INTRAHEPATIC PORTOSYSTEMIC SHUNT (TIPS) N/A 7/5/2019    Procedure: INSERTION, SHUNT, TRANSJUGULAR, INTRAHEPATIC PORTOSYSTEMIC;  Surgeon: Lashanda Diagnostic Provider;  Location: [facility] OR [address] [place] [address];  Service: Anesthesiology;  Laterality: N/A;  Room [facility]    PULMONARY VALVE REPLACEMENT  01/02/2007    25mm sharyn charles    TRICUSPID VALVE REPLACEMENT  01/02/2007    25 mm  john     Family History   Problem Relation Age of Onset    No Known Problems Mother     No Known Problems Father     No Known Problems Sister     No Known Problems Brother     No Known Problems Brother     No Known Problems Maternal Aunt     No Known Problems Maternal Uncle     No Known Problems Paternal Aunt     No Known Problems Paternal Uncle     No Known Problems Maternal Grandmother     Heart disease Maternal Grandfather     No Known Problems Paternal Grandmother     No Known Problems Paternal Grandfather     No Known Problems Other     Anemia Neg Hx     Arrhythmia Neg Hx     Asthma Neg Hx     Clotting disorder Neg Hx     Fainting Neg Hx     Heart attack Neg Hx     Heart failure Neg Hx     Hyperlipidemia Neg Hx     Hypertension Neg Hx     Stroke Neg Hx     Atrial Septal Defect Neg Hx      Social History     Socioeconomic History    Marital status: Single   Tobacco Use    Smoking status: Never    Smokeless tobacco: Never   Substance and Sexual Activity    Alcohol use: No    Drug use: No    Sexual activity: Never       Current Outpatient Medications:     amlodipine (NORVASC) 2.5 MG tablet, Take 1 tablet (2.5 mg total) by mouth once daily. (Patient taking differently: Take 5 mg by mouth once daily.), Disp: 30 tablet, Rfl: 11    aspirin (ECOTRIN) 81 MG EC tablet, TAKE 1 TABLET BY MOUTH EVERY DAY, Disp: 30 tablet, Rfl: 0    hydroCHLOROthiazide (HYDRODIURIL) 25 MG tablet, Take 1 tablet (25 mg total) by mouth once daily., Disp: 30 tablet, Rfl: 1    sotalol (BETAPACE) 120 MG Tab, Take 1 tablet (120 mg total) by mouth 2 (two) times daily., Disp: 60 tablet, Rfl: 11  No current facility-administered medications for this visit.    Facility-Administered Medications Ordered in Other Visits:     0.9%  NaCl infusion, , Intravenous, Continuous, Slime Salcido NP, Last Rate: 0 mL/hr at 02/06/19 1521, New Bag at 05/08/19 1420    sodium chloride 0.9% flush 5 mL, 5 mL, Intravenous, PRN, Slime Salcido,  "NP      Review of patient's allergies indicates:  No Known Allergies     BP (!) 152/76 (BP Location: Left arm, Patient Position: Sitting, BP Method: Large (Automatic))   Pulse 82   Ht 5' 6" (1.676 m)   Wt 135 kg (297 lb 9.9 oz)   SpO2 95%   BMI 48.04 kg/m²     Wt Readings from Last 3 Encounters:   06/14/23 135 kg (297 lb 9.9 oz)   06/14/23 129.3 kg (285 lb)   01/05/23 129.6 kg (285 lb 11.5 oz)     Ht Readings from Last 3 Encounters:   06/14/23 5' 6" (1.676 m)   06/14/23 5' 6" (1.676 m)   01/05/23 5' 6" (1.676 m)     Body mass index is 48.04 kg/m².  Facility age limit for growth percentiles is 20 years.  Facility age limit for growth percentiles is 20 years.  Facility age limit for growth percentiles is 20 years.    In general, she is an obese, otherwise very healthy-appearing nondysmorphic female in no apparent distress.  The eyes, nares, and oropharynx are clear.  Eyelids and conjunctiva are normal without drainage or erythema.  Pupils equal and round bilaterally.  The head is normocephalic and atraumatic.  The neck is supple without jugular venous distention or thyroid enlargement.  The lungs are clear to auscultation bilaterally.  There is a well healed sternotomy.  Normal S1, fixed split S2.  2/6 systolic ejection murmur.  No diastolic murmur.  The abdominal exam is benign without hepatosplenomegaly, tenderness, or distention.  Pulses are normal in all 4 extremities with brisk capillary refill and no clubbing, cyanosis, or edema.  No rashes are noted.    I personally reviewed the following tests performed today and my interpretation follows:  EKG with normal sinus rhythm and right bundle branch block.    Results for orders placed during the hospital encounter of 06/14/23    Echo Saline Bubble?   IMPRESSION:  Technically difficult acoustic windows-  Severely dilated right atrium.  Structure of the bioprosthetic tricuspid valve is not well demonstrated with mean gradient measured 6 -9 mmHg  There is no " significant tricuspid insufficiency demonstrated.  Qualitative impression of at least mildly dilated right ventricle with good systolic function.  Very thickened bioprosthetic valve material in pulmonary position with peak velocity 2.4 m/sec and mean gradient <14 mm Hg with trivial jet of insufficiency.  Limited images suggest normal size confluent pulmonary branches.  The left atrial volume index is mildly enlarged measuring 20 ml/m2.  The left ventricle is moderately dilated.  Very flattened septal motion with reasonable movement of the LV free wall, SF = 20 % and EF estimated 50-55 % from apical views.  Normal left ventricular outflow tract with no evidence of aortic stenosis or significant insufficiency.  Qualitative impression of mildly dilated ascending aorta with normal transverse arch and descending aorta.  There is no evidence for coarctation.    Lab Results   Component Value Date    WBC 4.91 06/14/2023    HGB 13.6 06/14/2023    HCT 42.7 06/14/2023    MCV 86 06/14/2023     06/14/2023     CMP  Sodium   Date Value Ref Range Status   06/14/2023 139 136 - 145 mmol/L Final     Potassium   Date Value Ref Range Status   06/14/2023 4.0 3.5 - 5.1 mmol/L Final     Chloride   Date Value Ref Range Status   06/14/2023 103 95 - 110 mmol/L Final     CO2   Date Value Ref Range Status   06/14/2023 27 23 - 29 mmol/L Final     Glucose   Date Value Ref Range Status   06/14/2023 132 (H) 70 - 110 mg/dL Final     BUN   Date Value Ref Range Status   06/14/2023 8 6 - 20 mg/dL Final     Creatinine   Date Value Ref Range Status   06/14/2023 0.8 0.5 - 1.4 mg/dL Final     Calcium   Date Value Ref Range Status   06/14/2023 10.1 8.7 - 10.5 mg/dL Final     Total Protein   Date Value Ref Range Status   06/14/2023 7.9 6.0 - 8.4 g/dL Final     Albumin   Date Value Ref Range Status   06/14/2023 3.9 3.5 - 5.2 g/dL Final     Total Bilirubin   Date Value Ref Range Status   06/14/2023 0.8 0.1 - 1.0 mg/dL Final     Comment:     For infants  and newborns, interpretation of results should be based  on gestational age, weight and in agreement with clinical  observations.    Premature Infant recommended reference ranges:  Up to 24 hours.............<8.0 mg/dL  Up to 48 hours............<12.0 mg/dL  3-5 days..................<15.0 mg/dL  6-29 days.................<15.0 mg/dL       Alkaline Phosphatase   Date Value Ref Range Status   06/14/2023 71 55 - 135 U/L Final     AST   Date Value Ref Range Status   06/14/2023 24 10 - 40 U/L Final     ALT   Date Value Ref Range Status   06/14/2023 20 10 - 44 U/L Final     Anion Gap   Date Value Ref Range Status   06/14/2023 9 8 - 16 mmol/L Final     eGFR   Date Value Ref Range Status   06/14/2023 >60.0 >60 mL/min/1.73 m^2 Final     Lab Results   Component Value Date    CHOL 167 06/14/2023    CHOL 192 02/27/2019    CHOL 149 02/02/2017     Lab Results   Component Value Date    HDL 33 (L) 06/14/2023    HDL 47 02/27/2019    HDL 43 02/02/2017     Lab Results   Component Value Date    LDLCALC 118.8 06/14/2023    LDLCALC 126.6 02/27/2019    LDLCALC 94.8 02/02/2017     No results found for: DLDL  Lab Results   Component Value Date    TRIG 76 06/14/2023    TRIG 92 02/27/2019    TRIG 56 02/02/2017       f1   Lab Results   Component Value Date    CHOLHDL 19.8 (L) 06/14/2023    CHOLHDL 24.5 02/27/2019    CHOLHDL 28.9 02/02/2017       Latest Reference Range & Units 06/14/23 11:52   BNP 0 - 99 pg/mL 70   TSH 0.400 - 4.000 uIU/mL 3.540       Thank you for referring this patient to our clinic.  Please call with any questions.    Sincerely,        Ashwin Galloway MD  Pediatric Cardiology  Adult Congenital Heart Disease  Pediatric Heart Failure and Transplantation  [facility] [address] [place]  [address]  [phone]

## 2023-06-16 ENCOUNTER — TELEPHONE (OUTPATIENT)
Dept: PEDIATRIC CARDIOLOGY | Facility: CLINIC | Age: 23
End: 2023-06-16
Payer: MEDICAID

## 2023-06-16 NOTE — TELEPHONE ENCOUNTER
Upon Dr. Cheung's request, scheduled virtual appt to discuss loop removal vs replacement on August 3 at 9:30am.

## 2023-08-02 NOTE — PROGRESS NOTES
Name: Kacey Ruth  MRN: [id number]  : 2000      The patient location is: [place], [place].    Visit type: audiovisual    Face to Face time with patient: 09:52am-10:04am (12 mins)  32 minutes of total time spent on the encounter, which includes face to face time and non-face to face time preparing to see the patient (eg, review of tests), Obtaining and/or reviewing separately obtained history, Documenting clinical information in the electronic or other health record, Independently interpreting results (not separately reported) and communicating results to the patient/family/caregiver, or Care coordination (not separately reported).     Each patient to whom he or she provides medical services by telemedicine is:  (1) informed of the relationship between the physician and patient and the respective role of any other health care provider with respect to management of the patient; and (2) notified that he or she may decline to receive medical services by telemedicine and may withdraw from such care at any time.    Subjective:   CC: [facility], PA c IVS, SVT.    HPI:    Kacey Ruth is a 22 y.o. 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    Since I last saw her, she is overall doing well. She denies palpitations, and she has not had any issues taking Sotalol. She states she continues to have headaches. She previously saw Neurology for this, but it has been about 3 years since seeing them. She also previously saw sleep medicine several years ago, but she does not recall the result of that evaluation. I reviewed my concern of her risk of sleep apnea and its potential effect on arrhythmia risk.      To review, she has a history of pulmonary atresia intact  ventricular septum and ASD.  She underwent reconstruction of the right ventricular outflow tract and placement of a bioprosthetic pulmonary valve in early childhood. She has had multiple valve replacements. Her most recent open heart surgery involved placement of a bioprosthetic valve in the pulmonary position and placement of a bioprosthetic valve in the tricuspid position in 2007. She had a heart catheterization for recurrent tricuspid valve insufficiency and stenosis.  The procedure employed a trans-catheter approach. It entailed tricuspid valve balloon valvuloplasty with a Z-Med 25 x 5 cm balloon followed by placement of an Sotelo S3 26 mm valve in Feb 2017. The catheterization also showed that she had only mild pulmonary valve stenosis and insufficiency.  She was then found by Dr. Gonzalez to have SVT vs. Atrial flutter and started on Metoprolol.  On 2/6/19, she had ablation of dual loop tachycardia involving CTI and lateral wall.  She continued to have SVT so was admitted for sotalol initiation on 5/8/19 and loop recorder implantation.    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   S/P multiple valve replacements.   Most recent open heart surgery involved placement of a bioprosthetic valve in the pulmonary position and placement of a bioprosthetic valve in the tricuspid position in 2007.   Tricuspid valve insufficiency and stenosis.    S/P catheterization with tricuspid valve balloon valvuloplasty, placement of an Sotelo S3 26 mm valve in Feb 2017. Also showed only mild pulmonary valve stenosis and insufficiency.   SVT vs. Atrial flutter and   Started on Metoprolol by Dr. Gonzalez  S/P ablation, 2/6/19, dual loop tachycardia involving CTI and lateral wall.    S/P sotalol initiation on 5/8/19 and loop recorder implantation due to  recurrent SVT.    Family Hx:  No known family history of congenital heart defects or cardiac surgeries in childhood.  No known family members with pacemakers or defibrillators.  No known inherited channelopathies or cardiomyopathies.  No known hx of sudden cardiac death or heart transplant.  No No known heart attack in someone less than 50yoa.    Social Hx:  Lives in [place] with Mother.    Review of Systems:  GEN:  No fevers, No fatigue, No weight-loss, No weight-gain, +HA  EYE:  No significant changes in vision, No eye redness, No lens dislocation  ENT: No cough, No congestion, No swelling, No snoring, No hearing loss,   RESP: No increased work of breathing, No dyspnea, +noisy breathing, No hx of pneumothorax  CV:  No chest pain, No palpitations, No tachycardia, No activity or exercise intolerance  GI:  No abdominal pain, No nausea, No vomiting, No diarrhea, No constipation  BETITO: Normal UOP  MSK: No pain, No swelling, No joint dislocations, No scoliosis, No extremity swelling  HEME: No easy bruising or bleeding  NEUR: No history of seizures, No dizziness, No near-syncope, No syncope  DERM: No Rashes  PSY: + anxiety, No depression  ALL: See below.    Medications & Allergy:  Current Outpatient Medications on File Prior to Visit   Medication Sig Dispense Refill    amLODIPine (NORVASC) 10 MG tablet Take 1 tablet (10 mg total) by mouth once daily. 30 tablet 11    aspirin (ECOTRIN) 81 MG EC tablet TAKE 1 TABLET BY MOUTH EVERY DAY 30 tablet 0    hydroCHLOROthiazide (HYDRODIURIL) 25 MG tablet Take 1 tablet (25 mg total) by mouth once daily. 30 tablet 1    sotalol (BETAPACE) 120 MG Tab Take 1 tablet (120 mg total) by mouth 2 (two) times daily. 60 tablet 11     Current Facility-Administered Medications on File Prior to Visit   Medication Dose Route Frequency Provider Last Rate Last Admin    0.9%  NaCl infusion   Intravenous Continuous Slime Salcido, NP 0 mL/hr at 02/06/19 1521 New Bag at 05/08/19 1420    sodium  chloride 0.9% flush 5 mL  5 mL Intravenous PRN Slime Salcido NP           Review of patient's allergies indicates:  No Known Allergies       Objective:   Vitals:  There were no vitals filed for this visit.    There is no height or weight on file to calculate BMI.  There is no height or weight on file to calculate BSA.      Exam:  (Virtual visit exam today)  GEN: No acute distress, Normal appearing  EYE: Anicteric sclerae  ENT: No drainage, Moist mucous membranes  PULM: Normal work of breathing;  CV: No cyanosis   EXT: No apparent edema  ABD: Non-distended  DERM: No visible rashes  NEUR: Grossly normal and age-appropriate movements.  PSY: Normal mood and affect    (Prior in-clinic exam)  GEN: No acute distress, Normal appearing  EYE: Anicteric sclerae  ENT: No drainage, Moist mucous membranes  PULM: Normal work of breathing;  Clear to auscultation bilaterally, Good air movement throughout.  CV: No chest pain;   No murmurs;   No rubs or gallops;  EXT: No cyanosis, No edema   2+ radial and PT pulses bilaterally  ABD: Soft, Non-distended, Non-tender,    No hepatomegaly;  Normal bowel sounds  DERM: No rashes  NEUR: Normal gait, Grossly normal tone.  PSY: Normal mood and affect      Results / Data:   ECG:   (06/14/2023) - Sinus rhythm, RBBB  (01/05/2023) - Sinus rhythm, RBBB  (05/25/2021) - Sinus rhythm, RBBB    ILR:   (03/14/2023)  MDT ILR REMOTE transmission received and data reviewed. Battery: EOS since December 14, 2022 Current ECG reveals sinus tachycardia. NO SYMPTOM triggered episodes noted. 486 TACHY AUTO triggered episodes - available ECGs reveal sinus tachycardia with rare isolated PVCs, noise artifact, over-sensed and under-sensed beats.    (12/28/2022)  MDT ILR REMOTE transmission received and data reviewed.   Battery: EOS since December 14, 2022  Current ECG reveals sinus rhythm with isolated PACs.  NO SYMPTOM triggered episodes noted.  204 TACHY AUTO triggered episodes - available ECGs reveal sinus  tachycardia with noise artifact, oversensing, undersensing, intermittent PACs, and one PVC    Echocardiogram:   (06/14/2023)  CONGENITAL CARDIAC HISTORY:  Pulmonary Atresia with Intact Ventricular Septum and ASD  ? Initial palliative BT shunt.  S/P Reconstruction of the right ventricular outflow tract and placement of a bioprosthetic pulmonary valve in early childhood.   S/P Multiple surgical valve replacements - most recently bioprosthetic valves in the pulmonary position and tricuspid position in 2007.   S/P Catheterization with tricuspid valve balloon valvuloplasty and Sotelo S3 26 mm valve(?tricuspid position) in Feb 2017.      SEGMENTAL CARDIAC CONNECTIONS:  Abdominal situs solitus.   Atrial situs solitus.   Atrioventricular alignment is concordant.   D-loop ventricles.   Bioprosthetic valve in tricuspid position.  The mitral valve appears grossly normal.   Qualitative impression of mildly dilated right ventricle.  There is mild dilation of the left ventricle.   The ventriculoarterial alignment is concordant.   Bioprosthetic valve in pulmonary position.  Normal aortic valve.   Cardiac position is levocardia.   There is a left aortic arch with additional branches not well demonstrated.   No coarctation is demonstrated.     IMPRESSION:  Technically difficult acoustic windows-  Severely dilated right atrium.  Structure of the bioprosthetic tricuspid valve is not well demonstrated with mean gradient measured 6 -9 mmHg  There is no significant tricuspid insufficiency demonstrated.  Qualitative impression of at least mildly dilated right ventricle with good systolic function.  Very thickened bioprosthetic valve material in pulmonary position with peak velocity 2.4 m/sec and mean gradient <14 mm Hg with trivial jet of insufficiency.  Limited images suggest normal size confluent pulmonary branches.  The left atrial volume index is mildly enlarged measuring 20 ml/m2.   Qualitative impression of normal mitral valve  structure and function with no mitral stenosis or significant insufficiency.  The left ventricle is moderately dilated.   Very flattened septal motion with reasonable movement of the LV free wall, SF = 20 % and EF estimated 50-55 % from apical views.   Normal left ventricular outflow tract with no evidence of aortic stenosis or significant insufficiency.  The structure of the aortic valve was not well demonstrated.  Qualitative impression of mildly dilated ascending aorta with normal transverse arch and descending aorta.  There is no evidence for coarctation.  No pericardial effusion.    Assessment / Plan:   Kacey Ruth is a 22 y.o. 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      She has a hx of migraines and wishes to have that evaluated again, so a referral to neurology was placed. I am concerned she is at significant risk of sleep apnea given body habitus and hx of atrial arrhythmias; a referral to sleep medicine has been placed.    She continues to take sotalol without issue. She should continue this medicine without interruption.    She is unsure if she wishes to have the current ILR removed or replaced now that it is EOS. I would recommend replacement given ongoing risk of arrhythmias, but would be very respectful and supportive of her decision should she wish to have it removed. She is going to consider these options further and communicate with us in the next month or so which direction she would like to proceed. At minimum, I would like to see her in clinic in 6 months.        Follow-up:   She is to communicate in the next month or so whether or not she wishes to have her ILR removed or replaced.  6 months clinic visit with me with ECG and lab-work (CMP, CBC,  BNP).  1 year with Dr. Galloway and myself in combined EP/ACHD clinic with ECG and echocardiogram.  Cardiac medications:  Sotalol  Amlodipine, HCTZ  Aspirin  SBE prophylaxis:    Yes.  Antibiotic prophylaxis is required prior to all dental procedures cleanings.    Please contact us if he has any questions or concerns.  Our clinic from his [phone] during office hours. For urgent night and weekend concerns, call [phone] and ask for the pediatric cardiologist on call to be paged.

## 2023-08-03 ENCOUNTER — OFFICE VISIT (OUTPATIENT)
Dept: CARDIOLOGY | Facility: CLINIC | Age: 23
End: 2023-08-03
Payer: MEDICAID

## 2023-08-03 DIAGNOSIS — Z95.818 STATUS POST PLACEMENT OF IMPLANTABLE LOOP RECORDER: ICD-10-CM

## 2023-08-03 DIAGNOSIS — E66.01 CLASS 2 SEVERE OBESITY DUE TO EXCESS CALORIES WITH SERIOUS COMORBIDITY IN ADULT, UNSPECIFIED BMI: ICD-10-CM

## 2023-08-03 DIAGNOSIS — Q24.9 ADULT CONGENITAL HEART DISEASE: ICD-10-CM

## 2023-08-03 DIAGNOSIS — R51.9 NONINTRACTABLE HEADACHE, UNSPECIFIED CHRONICITY PATTERN, UNSPECIFIED HEADACHE TYPE: ICD-10-CM

## 2023-08-03 DIAGNOSIS — I51.9 LV DYSFUNCTION: ICD-10-CM

## 2023-08-03 DIAGNOSIS — I47.10 SVT (SUPRAVENTRICULAR TACHYCARDIA): ICD-10-CM

## 2023-08-03 DIAGNOSIS — Q25.5 PULMONARY ATRESIA WITH INTACT VENTRICULAR SEPTUM: Primary | ICD-10-CM

## 2023-08-03 PROCEDURE — 99214 OFFICE O/P EST MOD 30 MIN: CPT | Mod: 95,,, | Performed by: PEDIATRICS

## 2023-08-03 PROCEDURE — 99214 PR OFFICE/OUTPT VISIT, EST, LEVL IV, 30-39 MIN: ICD-10-PCS | Mod: 95,,, | Performed by: PEDIATRICS

## 2023-11-03 RX ORDER — AMOXICILLIN 500 MG/1
2000 CAPSULE ORAL ONCE AS NEEDED
Qty: 4 CAPSULE | Refills: 2 | Status: SHIPPED | OUTPATIENT
Start: 2023-11-03 | End: 2023-11-03

## 2024-03-22 ENCOUNTER — OFFICE VISIT (OUTPATIENT)
Dept: PEDIATRIC CARDIOLOGY | Facility: CLINIC | Age: 24
End: 2024-03-22
Payer: MEDICAID

## 2024-03-22 DIAGNOSIS — Z95.4 S/P TRICUSPID VALVE REPLACEMENT: ICD-10-CM

## 2024-03-22 DIAGNOSIS — Q25.5 PULMONARY ATRESIA WITH INTACT VENTRICULAR SEPTUM: ICD-10-CM

## 2024-03-22 DIAGNOSIS — E66.01 CLASS 3 SEVERE OBESITY DUE TO EXCESS CALORIES WITH SERIOUS COMORBIDITY AND BODY MASS INDEX (BMI) OF 45.0 TO 49.9 IN ADULT: Primary | ICD-10-CM

## 2024-03-22 DIAGNOSIS — Z95.2 S/P PULMONARY VALVE REPLACEMENT: ICD-10-CM

## 2024-03-22 PROCEDURE — 99213 OFFICE O/P EST LOW 20 MIN: CPT | Mod: 95,,, | Performed by: PEDIATRICS

## 2024-03-22 NOTE — PROGRESS NOTES
2024    re:Kacey Ruth  :2000    [facility]  [address] [place] [id number]    The patient location is: home  The chief complaint leading to consultation is: congenital heart disease    Visit type: audiovisual    Face to Face time with patient: 15  30 minutes of total time spent on the encounter, which includes face to face time and non-face to face time preparing to see the patient (eg, review of tests), Obtaining and/or reviewing separately obtained history, Documenting clinical information in the electronic or other health record, Independently interpreting results (not separately reported) and communicating results to the patient/family/caregiver, or Care coordination (not separately reported).         Each patient to whom he or she provides medical services by telemedicine is:  (1) informed of the relationship between the physician and patient and the respective role of any other health care provider with respect to management of the patient; and (2) notified that he or she may decline to receive medical services by telemedicine and may withdraw from such care at any time.    Notes:       [facility]     Kacey Ruth is a 23 y.o. female seen in my [facility] clinic today for evaluation of congenital heart disease.  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   S/P multiple valve replacements.   Most recent open heart surgery involved placement of a bioprosthetic valve in the pulmonary position and placement of a bioprosthetic valve in the tricuspid position in .   Likely mild pulmonary valve stenosis and no significant insufficiency  Tricuspid valve insufficiency and stenosis.    S/P catheterization with tricuspid valve balloon valvuloplasty,  placement of an Sotelo S3 26 mm valve in Feb 2017. Also showed only mild pulmonary valve stenosis and insufficiency.   Valve working well with trivial acceleration and no insufficiency  SVT vs. Atrial flutter  Started on Metoprolol by Dr. Gonzalez  S/P ablation, 2/6/19, dual loop tachycardia involving CTI and lateral wall.    S/P sotalol initiation on 5/8/19 and loop recorder implantation due to recurrent SVT.  3.   Obesity  4.   Hypertension    To summarize, my recommendations are as follows:  SBEP is indicated  continue current medications  Healthy diet, regular exercise.  Work on healthy weight loss.  Follow-up with me and electrophysiology in June 2024 with echo, EKG, CPX testing.  We will refer her to the bariatric surgery program at [facility].  From a cardiac standpoint, I would clear her for bariatric surgery.  Preferably she would continue the aspirin throughout the surgery, but we could discuss holding it if necessary.  I would anticipate her being at higher risk for atrial arrhythmias during surgery, and I would want to continue her sotalol.  I would definitely want to get an echo and stress test prior to that surgery.    Discussion:  Clinically she is doing well.  She is morbidly obese, and she has hypertension.  She clearly will do better if she loses weight.  I would support bariatric surgery.  My recommendations are as noted above.  She has asked me to refer her for bariatric surgery, and I will send her to the program at [place].    History of present illness:  She has done very well since she was last seen in clinic.  No chest pain, shortness of breath, palpitations, syncope, near syncope, cyanosis, or edema.  She is very interested in bariatric surgery.  Her mother had this a few years ago and did well.    The review of systems is as noted above. It is otherwise negative for other symptoms related to the general, neurological, psychiatric, endocrine, gastrointestinal, genitourinary,  respiratory, dermatologic, musculoskeletal, hematologic, and immunologic systems.    Past Medical History:   Diagnosis Date    CHF (congestive heart failure)     Cirrhosis 07/2019    Hypertension     Obesity     Pulmonary atresia with intact ventricular septum     SVT (supraventricular tachycardia)      Past Surgical History:   Procedure Laterality Date    MEMO PROCEDURE      bt shunt and transannular patch    CARDIAC VALVE REPLACEMENT      INSERTION OF IMPLANTABLE LOOP RECORDER N/A 2/6/2019    Procedure: INSERTION, IMPLANTABLE LOOP RECORDER;  Surgeon: Romeo Robles MD;  Location: [facility] EP LAB;  Service: Cardiology;  Laterality: N/A;  SVT, IART, SAI, RFA, +/- ILR, FELECIA, MDT, MAC, MB/SM, 3 prep    INSERTION OF IMPLANTABLE LOOP RECORDER N/A 5/8/2019    Procedure: INSERTION, IMPLANTABLE LOOP RECORDER;  Surgeon: Ricardo Woodward MD;  Location: [facility] EP LAB;  Service: Cardiology;  Laterality: N/A;  afl, palps, ILR, MDT, anes, SM, 441    INSERTION OF TRANSJUGULAR INTRAHEPATIC PORTOSYSTEMIC SHUNT (TIPS) N/A 7/5/2019    Procedure: INSERTION, SHUNT, TRANSJUGULAR, INTRAHEPATIC PORTOSYSTEMIC;  Surgeon: Lashanda Diagnostic Provider;  Location: [facility] OR [address] [place] [address];  Service: Anesthesiology;  Laterality: N/A;  Room [facility]    PULMONARY VALVE REPLACEMENT  01/02/2007    25mm sharyn charles    TRICUSPID VALVE REPLACEMENT  01/02/2007    25 mm sharyn-charles     Family History   Problem Relation Age of Onset    No Known Problems Mother     No Known Problems Father     No Known Problems Sister     No Known Problems Brother     No Known Problems Brother     No Known Problems Maternal Aunt     No Known Problems Maternal Uncle     No Known Problems Paternal Aunt     No Known Problems Paternal Uncle     No Known Problems Maternal Grandmother     Heart disease Maternal Grandfather     No Known Problems Paternal Grandmother     No Known Problems Paternal Grandfather     No Known Problems Other     Anemia Neg Hx     Arrhythmia Neg Hx      Asthma Neg Hx     Clotting disorder Neg Hx     Fainting Neg Hx     Heart attack Neg Hx     Heart failure Neg Hx     Hyperlipidemia Neg Hx     Hypertension Neg Hx     Stroke Neg Hx     Atrial Septal Defect Neg Hx      Social History     Socioeconomic History    Marital status: Single   Tobacco Use    Smoking status: Never    Smokeless tobacco: Never   Substance and Sexual Activity    Alcohol use: No    Drug use: No    Sexual activity: Never       Current Outpatient Medications:     amLODIPine (NORVASC) 10 MG tablet, Take 1 tablet (10 mg total) by mouth once daily., Disp: 30 tablet, Rfl: 11    aspirin (ECOTRIN) 81 MG EC tablet, TAKE 1 TABLET BY MOUTH EVERY DAY, Disp: 30 tablet, Rfl: 0    hydroCHLOROthiazide (HYDRODIURIL) 25 MG tablet, Take 1 tablet (25 mg total) by mouth once daily., Disp: 30 tablet, Rfl: 1    sotalol (BETAPACE) 120 MG Tab, Take 1 tablet (120 mg total) by mouth 2 (two) times daily., Disp: 60 tablet, Rfl: 11  No current facility-administered medications for this visit.    Facility-Administered Medications Ordered in Other Visits:     0.9%  NaCl infusion, , Intravenous, Continuous, Slime Salcido NP, Last Rate: 0 mL/hr at 02/06/19 1521, New Bag at 05/08/19 1420    sodium chloride 0.9% flush 5 mL, 5 mL, Intravenous, PRN, Slime Salcido NP      Review of patient's allergies indicates:  No Known Allergies         In general, she is an obese, otherwise very healthy-appearing nondysmorphic female in no apparent distress.      I personally reviewed the following tests performed today and my interpretation follows:  No tests in clinic today.        Results for orders placed during the hospital encounter of 06/14/23    Echo Saline Bubble?   IMPRESSION:  Technically difficult acoustic windows-  Severely dilated right atrium.  Structure of the bioprosthetic tricuspid valve is not well demonstrated with mean gradient measured 6 -9 mmHg  There is no significant tricuspid insufficiency  demonstrated.  Qualitative impression of at least mildly dilated right ventricle with good systolic function.  Very thickened bioprosthetic valve material in pulmonary position with peak velocity 2.4 m/sec and mean gradient <14 mm Hg with trivial jet of insufficiency.  Limited images suggest normal size confluent pulmonary branches.  The left atrial volume index is mildly enlarged measuring 20 ml/m2.  The left ventricle is moderately dilated.  Very flattened septal motion with reasonable movement of the LV free wall, SF = 20 % and EF estimated 50-55 % from apical views.  Normal left ventricular outflow tract with no evidence of aortic stenosis or significant insufficiency.  Qualitative impression of mildly dilated ascending aorta with normal transverse arch and descending aorta.  There is no evidence for coarctation.    Lab Results   Component Value Date    WBC 4.91 06/14/2023    HGB 13.6 06/14/2023    HCT 42.7 06/14/2023    MCV 86 06/14/2023     06/14/2023     CMP  Sodium   Date Value Ref Range Status   06/14/2023 139 136 - 145 mmol/L Final     Potassium   Date Value Ref Range Status   06/14/2023 4.0 3.5 - 5.1 mmol/L Final     Chloride   Date Value Ref Range Status   06/14/2023 103 95 - 110 mmol/L Final     CO2   Date Value Ref Range Status   06/14/2023 27 23 - 29 mmol/L Final     Glucose   Date Value Ref Range Status   06/14/2023 132 (H) 70 - 110 mg/dL Final     BUN   Date Value Ref Range Status   06/14/2023 8 6 - 20 mg/dL Final     Creatinine   Date Value Ref Range Status   06/14/2023 0.8 0.5 - 1.4 mg/dL Final     Calcium   Date Value Ref Range Status   06/14/2023 10.1 8.7 - 10.5 mg/dL Final     Total Protein   Date Value Ref Range Status   06/14/2023 7.9 6.0 - 8.4 g/dL Final     Albumin   Date Value Ref Range Status   06/14/2023 3.9 3.5 - 5.2 g/dL Final     Total Bilirubin   Date Value Ref Range Status   06/14/2023 0.8 0.1 - 1.0 mg/dL Final     Comment:     For infants and newborns, interpretation of  "results should be based  on gestational age, weight and in agreement with clinical  observations.    Premature Infant recommended reference ranges:  Up to 24 hours.............<8.0 mg/dL  Up to 48 hours............<12.0 mg/dL  3-5 days..................<15.0 mg/dL  6-29 days.................<15.0 mg/dL       Alkaline Phosphatase   Date Value Ref Range Status   06/14/2023 71 55 - 135 U/L Final     AST   Date Value Ref Range Status   06/14/2023 24 10 - 40 U/L Final     ALT   Date Value Ref Range Status   06/14/2023 20 10 - 44 U/L Final     Anion Gap   Date Value Ref Range Status   06/14/2023 9 8 - 16 mmol/L Final     eGFR   Date Value Ref Range Status   06/14/2023 >60.0 >60 mL/min/1.73 m^2 Final     Lab Results   Component Value Date    CHOL 167 06/14/2023    CHOL 192 02/27/2019    CHOL 149 02/02/2017     Lab Results   Component Value Date    HDL 33 (L) 06/14/2023    HDL 47 02/27/2019    HDL 43 02/02/2017     Lab Results   Component Value Date    LDLCALC 118.8 06/14/2023    LDLCALC 126.6 02/27/2019    LDLCALC 94.8 02/02/2017     No results found for: "DLDL"  Lab Results   Component Value Date    TRIG 76 06/14/2023    TRIG 92 02/27/2019    TRIG 56 02/02/2017       f1   Lab Results   Component Value Date    CHOLHDL 19.8 (L) 06/14/2023    CHOLHDL 24.5 02/27/2019    CHOLHDL 28.9 02/02/2017       Latest Reference Range & Units 06/14/23 11:52   BNP 0 - 99 pg/mL 70   TSH 0.400 - 4.000 uIU/mL 3.540       Thank you for referring this patient to our clinic.  Please call with any questions.    Sincerely,        Ashwin Galloway MD  Pediatric Cardiology  Adult Congenital Heart Disease  Pediatric Heart Failure and Transplantation  [facility] [address] [place]  [address] [phone]        "

## 2024-04-09 ENCOUNTER — PATIENT MESSAGE (OUTPATIENT)
Dept: CARDIOLOGY | Facility: CLINIC | Age: 24
End: 2024-04-09
Payer: MEDICAID

## 2024-04-09 DIAGNOSIS — I36.2 NONRHEUMATIC TRICUSPID VALVE STENOSIS WITH REGURGITATION: ICD-10-CM

## 2024-04-09 DIAGNOSIS — I47.10 SVT (SUPRAVENTRICULAR TACHYCARDIA): ICD-10-CM

## 2024-04-09 DIAGNOSIS — Z95.2 S/P PULMONARY VALVE REPLACEMENT: Primary | ICD-10-CM

## 2024-04-09 DIAGNOSIS — Q24.9 ADULT CONGENITAL HEART DISEASE: ICD-10-CM

## 2024-04-09 DIAGNOSIS — Z95.4 S/P TRICUSPID VALVE REPLACEMENT: ICD-10-CM

## 2024-06-20 ENCOUNTER — OFFICE VISIT (OUTPATIENT)
Dept: CARDIOLOGY | Facility: CLINIC | Age: 24
End: 2024-06-20
Payer: MEDICAID

## 2024-06-20 ENCOUNTER — HOSPITAL ENCOUNTER (OUTPATIENT)
Dept: CARDIOLOGY | Facility: CLINIC | Age: 24
Discharge: HOME | End: 2024-06-20
Payer: MEDICAID

## 2024-06-20 ENCOUNTER — HOSPITAL ENCOUNTER (OUTPATIENT)
Dept: CARDIOLOGY | Facility: HOSPITAL | Age: 24
Discharge: HOME | End: 2024-06-20
Attending: PEDIATRICS
Payer: MEDICAID

## 2024-06-20 VITALS
HEIGHT: 67 IN | SYSTOLIC BLOOD PRESSURE: 127 MMHG | SYSTOLIC BLOOD PRESSURE: 127 MMHG | OXYGEN SATURATION: 95 % | BODY MASS INDEX: 45.99 KG/M2 | HEART RATE: 75 BPM | BODY MASS INDEX: 45.26 KG/M2 | HEART RATE: 75 BPM | WEIGHT: 288.38 LBS | HEART RATE: 75 BPM | DIASTOLIC BLOOD PRESSURE: 66 MMHG | BODY MASS INDEX: 45.26 KG/M2 | WEIGHT: 293 LBS | OXYGEN SATURATION: 95 % | HEIGHT: 67 IN | HEIGHT: 67 IN | WEIGHT: 288.38 LBS | DIASTOLIC BLOOD PRESSURE: 66 MMHG

## 2024-06-20 DIAGNOSIS — Q24.9 ADULT CONGENITAL HEART DISEASE: ICD-10-CM

## 2024-06-20 DIAGNOSIS — Z95.4 S/P TRICUSPID VALVE REPLACEMENT: ICD-10-CM

## 2024-06-20 DIAGNOSIS — Z95.2 S/P PULMONARY VALVE REPLACEMENT: ICD-10-CM

## 2024-06-20 DIAGNOSIS — I36.2 NONRHEUMATIC TRICUSPID VALVE STENOSIS WITH REGURGITATION: ICD-10-CM

## 2024-06-20 DIAGNOSIS — I47.10 SVT (SUPRAVENTRICULAR TACHYCARDIA): ICD-10-CM

## 2024-06-20 DIAGNOSIS — Q25.5 PULMONARY ATRESIA WITH INTACT VENTRICULAR SEPTUM: ICD-10-CM

## 2024-06-20 DIAGNOSIS — Z45.09 ENCOUNTER FOR LOOP RECORDER AT END OF BATTERY LIFE: ICD-10-CM

## 2024-06-20 DIAGNOSIS — I47.19 ATRIAL TACHYCARDIA: Primary | ICD-10-CM

## 2024-06-20 DIAGNOSIS — Z95.2 S/P PULMONARY VALVE REPLACEMENT: Primary | ICD-10-CM

## 2024-06-20 LAB
ASCENDING AORTA: 3.07 CM
AV INDEX (PROSTH): 0.77
AV MEAN GRADIENT: 4 MMHG
AV PEAK GRADIENT: 6 MMHG
AV VALVE AREA BY VELOCITY RATIO: 2.91 CM²
AV VALVE AREA: 2.96 CM²
AV VELOCITY RATIO: 0.76
BSA FOR ECHO PROCEDURE: 2.52 M2
CV ECHO LV RWT: 0.34 CM
DOP CALC AO PEAK VEL: 1.24 M/S
DOP CALC AO VTI: 24.79 CM
DOP CALC LVOT AREA: 3.8 CM2
DOP CALC LVOT DIAMETER: 2.21 CM
DOP CALC LVOT PEAK VEL: 0.94 M/S
DOP CALC LVOT STROKE VOLUME: 73.34 CM3
DOP CALC RVOT PEAK VEL: 1.08 M/S
DOP CALC RVOT VTI: 30.32 CM
DOP CALCLVOT PEAK VEL VTI: 19.13 CM
E WAVE DECELERATION TIME: 137.63 MSEC
E/A RATIO: 2.15
E/E' RATIO: 9 M/S
ECHO LV POSTERIOR WALL: 0.89 CM (ref 0.6–1.1)
FRACTIONAL SHORTENING: 20 % (ref 28–44)
INTERVENTRICULAR SEPTUM: 0.92 CM (ref 0.6–1.1)
LA MAJOR: 5.45 CM
LA MINOR: 5.28 CM
LA WIDTH: 4.41 CM
LEFT ATRIUM SIZE: 3.96 CM
LEFT ATRIUM VOLUME INDEX MOD: 26.5 ML/M2
LEFT ATRIUM VOLUME INDEX: 33.3 ML/M2
LEFT ATRIUM VOLUME MOD: 63.45 CM3
LEFT ATRIUM VOLUME: 79.62 CM3
LEFT INTERNAL DIMENSION IN SYSTOLE: 4.2 CM (ref 2.1–4)
LEFT VENTRICLE DIASTOLIC VOLUME INDEX: 56.03 ML/M2
LEFT VENTRICLE DIASTOLIC VOLUME: 133.9 ML
LEFT VENTRICLE MASS INDEX: 73 G/M2
LEFT VENTRICLE SYSTOLIC VOLUME INDEX: 32.8 ML/M2
LEFT VENTRICLE SYSTOLIC VOLUME: 78.49 ML
LEFT VENTRICULAR INTERNAL DIMENSION IN DIASTOLE: 5.28 CM (ref 3.5–6)
LEFT VENTRICULAR MASS: 174.66 G
LV LATERAL E/E' RATIO: 6.6 M/S
LV SEPTAL E/E' RATIO: 14.14 M/S
MV A" WAVE DURATION": 8.28 MSEC
MV PEAK A VEL: 0.46 M/S
MV PEAK E VEL: 0.99 M/S
MV STENOSIS PRESSURE HALF TIME: 39.91 MS
MV VALVE AREA P 1/2 METHOD: 5.51 CM2
OHS QRS DURATION: 178 MS
OHS QTC CALCULATION: 509 MS
PULM VEIN S/D RATIO: 0.62
PV MEAN GRADIENT: 3 MMHG
PV PEAK D VEL: 0.74 M/S
PV PEAK GRADIENT: 23
PV PEAK S VEL: 0.46 M/S
PV PEAK VELOCITY: 2.42 M/S
RA MAJOR: 5.93 CM
RA PRESSURE ESTIMATED: 3 MMHG
RA WIDTH: 5.28 CM
RIGHT VENTRICLE DIASTOLIC BASEL DIMENSION: 4.3 CM
SINUS: 2.9 CM
STJ: 2.65 CM
TDI LATERAL: 0.15 M/S
TDI SEPTAL: 0.07 M/S
TDI: 0.11 M/S
TRICUSPID ANNULAR PLANE SYSTOLIC EXCURSION: 1.06 CM
Z-SCORE OF LEFT VENTRICULAR DIMENSION IN END DIASTOLE: -7.06
Z-SCORE OF LEFT VENTRICULAR DIMENSION IN END SYSTOLE: -3.34

## 2024-06-20 PROCEDURE — 99999 PR PBB SHADOW E&M-EST. PATIENT-LVL III: CPT | Mod: PBBFAC,,, | Performed by: PEDIATRICS

## 2024-06-20 PROCEDURE — 93320 DOPPLER ECHO COMPLETE: CPT

## 2024-06-20 PROCEDURE — 99212 OFFICE O/P EST SF 10 MIN: CPT | Mod: PBBFAC,25 | Performed by: PEDIATRICS

## 2024-06-20 PROCEDURE — 99213 OFFICE O/P EST LOW 20 MIN: CPT | Mod: PBBFAC,25,27 | Performed by: PEDIATRICS

## 2024-06-20 PROCEDURE — 93303 ECHO TRANSTHORACIC: CPT | Mod: 26,,, | Performed by: PEDIATRICS

## 2024-06-20 PROCEDURE — 93010 ELECTROCARDIOGRAM REPORT: CPT | Mod: S$PBB,,, | Performed by: INTERNAL MEDICINE

## 2024-06-20 PROCEDURE — 93325 DOPPLER ECHO COLOR FLOW MAPG: CPT

## 2024-06-20 PROCEDURE — 93005 ELECTROCARDIOGRAM TRACING: CPT | Mod: PBBFAC | Performed by: INTERNAL MEDICINE

## 2024-06-20 PROCEDURE — 93325 DOPPLER ECHO COLOR FLOW MAPG: CPT | Mod: 26,,, | Performed by: PEDIATRICS

## 2024-06-20 PROCEDURE — 99999 PR PBB SHADOW E&M-EST. PATIENT-LVL II: CPT | Mod: PBBFAC,,, | Performed by: PEDIATRICS

## 2024-06-20 PROCEDURE — 93320 DOPPLER ECHO COMPLETE: CPT | Mod: 26,,, | Performed by: PEDIATRICS

## 2024-06-20 NOTE — PROGRESS NOTES
Name: Kacey Ruth  MRN: [id number]  : 2000      38 minutes of total time spent on the encounter, which includes face to face time and non-face to face time preparing to see the patient (eg, review of tests), Obtaining and/or reviewing separately obtained history, Documenting clinical information in the electronic or other health record, Independently interpreting results (not separately reported) and communicating results to the patient/family/caregiver, or Care coordination (not separately reported).         Subjective:   CC: [facility]D, PA c IVS, SVT.    HPI:    Kacey Ruth is a 23 y.o. 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   Since I last saw her, she is overall doing well. She denies palpitations, and she has not had any issues taking Sotalol.     To review, she has a history of pulmonary atresia intact ventricular septum and ASD.  She underwent reconstruction of the right ventricular outflow tract and placement of a bioprosthetic pulmonary valve in early childhood. She has had multiple valve replacements. Her most recent open heart surgery involved placement of a bioprosthetic valve in the pulmonary position and placement of a bioprosthetic valve in the tricuspid position in . She had a heart catheterization for recurrent tricuspid valve insufficiency and stenosis.  The procedure employed a trans-catheter approach. It entailed tricuspid valve balloon valvuloplasty with a Z-Med 25 x 5 cm balloon followed by placement of an Sotelo S3 26 mm valve in 2017. The catheterization also showed that she had only mild pulmonary valve stenosis and insufficiency.  She was then found by Dr. Gonzalez to have SVT vs. Atrial flutter and started on Metoprolol.  On 19,  she had ablation of dual loop tachycardia involving CTI and lateral wall.  She continued to have SVT so was admitted for sotalol initiation on 5/8/19 and loop recorder implantation.    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   S/P multiple valve replacements.   Most recent open heart surgery involved placement of a bioprosthetic valve in the pulmonary position and placement of a bioprosthetic valve in the tricuspid position in 2007.   Tricuspid valve insufficiency and stenosis.    S/P catheterization with tricuspid valve balloon valvuloplasty, placement of an Sotelo S3 26 mm valve in Feb 2017. Also showed only mild pulmonary valve stenosis and insufficiency.   SVT vs. Atrial flutter   Started on Metoprolol by Dr. Gonzalez  S/P ablation, 2/6/19, dual loop tachycardia involving CTI and lateral wall.    S/P sotalol initiation on 5/8/19 and loop recorder implantation due to recurrent SVT.    Family Hx:  No known family history of congenital heart defects or cardiac surgeries in childhood.  No known family members with pacemakers or defibrillators.  No known inherited channelopathies or cardiomyopathies.  No known hx of sudden cardiac death or heart transplant.  No No known heart attack in someone less than 50yoa.    Social Hx:  Lives in [place] with Mother.    Review of Systems:  GEN:  No fevers, No fatigue, No weight-loss, No weight-gain  EYE:  No significant changes in vision, No eye redness, No lens dislocation  ENT: No cough, No congestion, No swelling, No snoring, No hearing loss,   RESP: No increased work of breathing, No dyspnea, + noisy breathing, No hx of pneumothorax  CV:  No chest pain, No palpitations, No tachycardia, No activity or exercise intolerance  GI:  No abdominal pain, No nausea, No vomiting, No diarrhea, No  "constipation  BETITO: Normal UOP  MSK: No pain, No swelling, No joint dislocations, No scoliosis, No extremity swelling  HEME: No easy bruising or bleeding  NEUR: No history of seizures, No dizziness, No near-syncope, No syncope  DERM: No Rashes  PSY: + anxiety, No depression  ALL: See below.    Medications & Allergy:  Current Outpatient Medications on File Prior to Visit   Medication Sig Dispense Refill    amLODIPine (NORVASC) 10 MG tablet Take 1 tablet (10 mg total) by mouth once daily. 30 tablet 11    aspirin (ECOTRIN) 81 MG EC tablet TAKE 1 TABLET BY MOUTH EVERY DAY 30 tablet 0    hydroCHLOROthiazide (HYDRODIURIL) 25 MG tablet Take 1 tablet (25 mg total) by mouth once daily. 30 tablet 1    sotalol (BETAPACE) 120 MG Tab Take 1 tablet (120 mg total) by mouth 2 (two) times daily. 60 tablet 11     Current Facility-Administered Medications on File Prior to Visit   Medication Dose Route Frequency Provider Last Rate Last Admin    0.9%  NaCl infusion   Intravenous Continuous Slime Salcido NP 0 mL/hr at 02/06/19 1521 New Bag at 05/08/19 1420    sodium chloride 0.9% flush 5 mL  5 mL Intravenous PRN Slime Salcido NP           Review of patient's allergies indicates:  No Known Allergies       Objective:   Vitals:  Vitals:    06/20/24 0936   BP: 127/66   BP Location: Left arm   Patient Position: Sitting   Pulse: 75   SpO2: 95%   Weight: 130.8 kg (288 lb 5.8 oz)   Height: 5' 7.01" (1.702 m)     Body mass index is 45.15 kg/m².  Body surface area is 2.49 meters squared.    Exam:  GEN: No acute distress, Normal appearing  EYE: Anicteric sclerae  ENT: No drainage, Moist mucous membranes  PULM: Normal work of breathing;  Clear to auscultation bilaterally, Good air movement throughout.  CV: No chest pain;   II/VI systolic murmur;   No rubs or gallops;  EXT: No cyanosis, No edema   2+ radial and PT pulses bilaterally  ABD: Soft, Non-distended, Non-tender,    No hepatomegaly;  Normal bowel sounds  DERM: No " rashes  NEUR: Normal gait, Grossly normal tone.  PSY: Normal mood and affect      Results / Data:   ECG:   (06/20/2024) - Sinus rhythm, RBBB; no significant change in QTc   (06/14/2023) - Sinus rhythm, RBBB; no significant change in QTc   (01/05/2023) - Sinus rhythm, RBBB  (05/25/2021) - Sinus rhythm, RBBB    ILR:   - ILR reports since last visit reviewed and do not show any significant arrhythmia    (03/14/2023)  MDT ILR REMOTE transmission received and data reviewed.   Battery: EOS since December 14, 2022   Current ECG reveals sinus tachycardia. NO SYMPTOM triggered episodes noted. 486 TACHY AUTO triggered episodes - available ECGs reveal sinus tachycardia with rare isolated PVCs, noise artifact, over-sensed and under-sensed beats.    (12/28/2022)  MDT ILR REMOTE transmission received and data reviewed.   Battery: EOS since December 14, 2022  Current ECG reveals sinus rhythm with isolated PACs.  NO SYMPTOM triggered episodes noted.  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 Initial palliative BT shunt.  S/P Reconstruction of the right ventricular outflow tract and placement of a bioprosthetic pulmonary valve in early childhood.  S/P Multiple surgical valve replacements - most recently bioprosthetic valves in the pulmonary position and tricuspid position in 2007.  S/P Catheterization with tricuspid valve balloon valvuloplasty and Sotelo S3 26 mm valve implant - Feb 2017.     SEGMENTAL CARDIAC CONNECTIONS (previously demonstrated):  Abdominal situs solitus.  Atrial situs solitus.  Atrioventricular alignment is concordant.  D-loop ventricles.  Bioprosthetic valve in tricuspid position.  The mitral valve appears grossly normal.  Qualitative impression of mildly dilated right ventricle.  There  is mild dilation of the left ventricle.  The ventriculoarterial alignment is concordant.  Bioprosthetic valve in pulmonary position.  Normal aortic valve.  Cardiac position is levocardia.  There is a left aortic arch with additional branches not well demonstrated.  No coarctation is demonstrated.        IMPRESSION:  Technically difficult acoustic windows-  IVC/SVC: Normal venous pressure at 3 mmHg.   Severely dilated right atrium.  Structure of the bioprosthetic tricuspid valve is not well demonstrated with mean gradient measured 5 -9 mmHg  There is no significant tricuspid insufficiency demonstrated.  Qualitative impression of at least mildly dilated right ventricle with good systolic function.  Very thickened bioprosthetic valve material in pulmonary position with peak velocity 2.6 m/sec and mean gradient <16 mm Hg with mild eccentric jet of insufficiency.  Limited images suggest normal size confluent pulmonary branches.  The left atrial volume index is normal measuring 25 ml/m2.  Qualitative impression of normal mitral valve structure and function with no mitral stenosis or significant insufficiency.  The left ventricle is normal in size and structure.  Very flattened septal motion with reasonable movement of the LV free wall, SF = 36% and EF estimated 50-55 % from apical views.  Global left ventricular longitudinal strain normal - peak average measured -18.7%.  Normal left ventricular outflow tract with no evidence of aortic stenosis or significant insufficiency.  The structure of the aortic valve was not well demonstrated.  Aortic dimensions:  Sinuses of Valsalva  = 29 mm.  ST junction               = 26 mm.  Ascending aorta       = 31 mm.  Qualitative impression of mildly dilated ascending aorta with normal transverse arch and descending aorta.  There is no evidence for coarctation.  No pericardial effusion.          (06/14/2023)  CONGENITAL CARDIAC HISTORY:  Pulmonary Atresia with Intact Ventricular Septum  and ASD  ? Initial palliative BT shunt.  S/P Reconstruction of the right ventricular outflow tract and placement of a bioprosthetic pulmonary valve in early childhood.   S/P Multiple surgical valve replacements - most recently bioprosthetic valves in the pulmonary position and tricuspid position in 2007.   S/P Catheterization with tricuspid valve balloon valvuloplasty and Sotelo S3 26 mm valve(?tricuspid position) in Feb 2017.      SEGMENTAL CARDIAC CONNECTIONS:  Abdominal situs solitus.   Atrial situs solitus.   Atrioventricular alignment is concordant.   D-loop ventricles.   Bioprosthetic valve in tricuspid position.  The mitral valve appears grossly normal.   Qualitative impression of mildly dilated right ventricle.  There is mild dilation of the left ventricle.   The ventriculoarterial alignment is concordant.   Bioprosthetic valve in pulmonary position.  Normal aortic valve.   Cardiac position is levocardia.   There is a left aortic arch with additional branches not well demonstrated.   No coarctation is demonstrated.        IMPRESSION:  Technically difficult acoustic windows-  Severely dilated right atrium.  Structure of the bioprosthetic tricuspid valve is not well demonstrated with mean gradient measured 6 -9 mmHg  There is no significant tricuspid insufficiency demonstrated.  Qualitative impression of at least mildly dilated right ventricle with good systolic function.  Very thickened bioprosthetic valve material in pulmonary position with peak velocity 2.4 m/sec and mean gradient <14 mm Hg with trivial jet of insufficiency.  Limited images suggest normal size confluent pulmonary branches.  The left atrial volume index is mildly enlarged measuring 20 ml/m2.   Qualitative impression of normal mitral valve structure and function with no mitral stenosis or significant insufficiency.  The left ventricle is moderately dilated.   Very flattened septal motion with reasonable movement of the LV free wall, SF = 20  % and EF estimated 50-55 % from apical views.   Normal left ventricular outflow tract with no evidence of aortic stenosis or significant insufficiency.  The structure of the aortic valve was not well demonstrated.  Qualitative impression of mildly dilated ascending aorta with normal transverse arch and descending aorta.  There is no evidence for coarctation.  No pericardial effusion.      Assessment / Plan:   Kacey Ruth is a 23 y.o. 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      I have reviewed the plans for myself and Dr. Galloway today, and I am in agreement with this plan as well.  I am in agreement with her ongoing efforts for improve diet and regular exercise, referral to the bariatric surgery program at [facility].    Her loop recorder has been out of battery for over a year.  While she has not had any recent arrhythmias documented on the loop recorder, this is in the setting of active antiarrhythmic treatment with sotalol.  As such, I recommend IR removal and replacement electively.    Follow-up:   Elective ILR removal and replacement.  Combined EP/ACHD clinic in 6 months with ECG and echocardiogram.  Cardiac medications:    Sotalol  Amlodipine, HCTZ  Aspirin  SBE prophylaxis:    Yes.  Antibiotic prophylaxis is required prior to all dental procedures cleanings.    Please contact us if he has any questions or concerns.  Our clinic from his [phone] during office hours. For urgent night and weekend concerns, call [phone] and ask for the pediatric cardiologist on call to be paged.

## 2024-06-20 NOTE — PROGRESS NOTES
2024    re:Kacey Ruth  :2000    [place]  [address] [facility] [address]    [facility]     Kacey Ruth is a 23 y.o. female seen in my [facility] clinic today for evaluation of congenital heart disease.  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   S/P multiple valve replacements.   Most recent open heart surgery involved placement of a bioprosthetic valve in the pulmonary position and placement of a bioprosthetic valve in the tricuspid position in .   Likely mild pulmonary valve stenosis and no significant insufficiency of the pulmonary valve  Tricuspid valve insufficiency and stenosis.    S/P catheterization with tricuspid valve balloon valvuloplasty, placement of an Sotelo S3 26 mm valve in 2017.    Valve working well with trivial acceleration and no insufficiency  SVT vs. Atrial flutter  Started on Metoprolol by Dr. Gonzalez  S/P ablation, 19, dual loop tachycardia involving CTI and lateral wall.    S/P sotalol initiation on 19 and loop recorder implantation due to recurrent SVT.  3.   Obesity - motivated to lose weight  4.   Hypertension    To summarize, my recommendations are as follows:  SBEP is indicated  continue current medications  Healthy diet, regular exercise.  Work on healthy weight loss.  CPX testing.  I referred her to the bariatric surgery program at [facility].  From a cardiac standpoint, I would clear her for bariatric surgery.  Preferably she would continue the aspirin throughout the surgery, but we could discuss holding it if necessary.  I would anticipate her being at higher risk for atrial arrhythmias during surgery, and I would want to continue her sotalol.      Discussion:  Clinically she is doing well.   She is morbidly obese despite really working hard on weight loss, and she has hypertension.  She clearly will do better if she loses weight.  I would support bariatric surgery.  My recommendations are as noted above.      History of present illness:  She has done very well since she was last seen in clinic.  No chest pain, shortness of breath, palpitations, syncope, near syncope, cyanosis, or edema.  She is very interested in bariatric surgery.  Her mother had this a few years ago and did well.  Patient is really working on eating healthy, and she is exercising 4 times a week on the treadmill.    The review of systems is as noted above. It is otherwise negative for other symptoms related to the general, neurological, psychiatric, endocrine, gastrointestinal, genitourinary, respiratory, dermatologic, musculoskeletal, hematologic, and immunologic systems.    Past Medical History:   Diagnosis Date    CHF (congestive heart failure)     Cirrhosis 07/2019    Hypertension     Obesity     Pulmonary atresia with intact ventricular septum     SVT (supraventricular tachycardia)      Past Surgical History:   Procedure Laterality Date    MEMO PROCEDURE      bt shunt and transannular patch    CARDIAC VALVE REPLACEMENT      INSERTION OF IMPLANTABLE LOOP RECORDER N/A 2/6/2019    Procedure: INSERTION, IMPLANTABLE LOOP RECORDER;  Surgeon: Romeo Robles MD;  Location: [facility];  Service: Cardiology;  Laterality: N/A;  SVT, IART, SAI, RFA, +/- ILR, FELECIA, MDT, MAC, MB/SM, 3 prep    INSERTION OF IMPLANTABLE LOOP RECORDER N/A 5/8/2019    Procedure: INSERTION, IMPLANTABLE LOOP RECORDER;  Surgeon: Ricardo Woodward MD;  Location: [facility];  Service: Cardiology;  Laterality: N/A;  afl, palps, ILR, MDT, anes, SM, 441    INSERTION OF TRANSJUGULAR INTRAHEPATIC PORTOSYSTEMIC SHUNT (TIPS) N/A 7/5/2019    Procedure: INSERTION, SHUNT, TRANSJUGULAR, INTRAHEPATIC PORTOSYSTEMIC;  [facility];  Location: [address]  [facility];  Service: Anesthesiology;  Laterality: N/A;  Room [facility]    PULMONARY VALVE REPLACEMENT  01/02/2007    25mm sharyn charles    TRICUSPID VALVE REPLACEMENT  01/02/2007    25 mm sharyn-charles     Family History   Problem Relation Name Age of Onset    No Known Problems Mother      No Known Problems Father      No Known Problems Sister 3     No Known Problems Brother 3     No Known Problems Brother 1     No Known Problems Maternal Aunt      No Known Problems Maternal Uncle      No Known Problems Paternal Aunt      No Known Problems Paternal Uncle      No Known Problems Maternal Grandmother      Heart disease Maternal Grandfather      No Known Problems Paternal Grandmother      No Known Problems Paternal Grandfather      No Known Problems Other      Anemia Neg Hx      Arrhythmia Neg Hx      Asthma Neg Hx      Clotting disorder Neg Hx      Fainting Neg Hx      Heart attack Neg Hx      Heart failure Neg Hx      Hyperlipidemia Neg Hx      Hypertension Neg Hx      Stroke Neg Hx      Atrial Septal Defect Neg Hx       Social History     Socioeconomic History    Marital status: Single   Tobacco Use    Smoking status: Never    Smokeless tobacco: Never   Substance and Sexual Activity    Alcohol use: No    Drug use: No    Sexual activity: Never       Current Outpatient Medications:     amLODIPine (NORVASC) 10 MG tablet, Take 1 tablet (10 mg total) by mouth once daily., Disp: 30 tablet, Rfl: 11    aspirin (ECOTRIN) 81 MG EC tablet, TAKE 1 TABLET BY MOUTH EVERY DAY, Disp: 30 tablet, Rfl: 0    hydroCHLOROthiazide (HYDRODIURIL) 25 MG tablet, Take 1 tablet (25 mg total) by mouth once daily., Disp: 30 tablet, Rfl: 1    sotalol (BETAPACE) 120 MG Tab, Take 1 tablet (120 mg total) by mouth 2 (two) times daily., Disp: 60 tablet, Rfl: 11  No current facility-administered medications for this visit.    Facility-Administered Medications Ordered in Other Visits:     0.9%  NaCl infusion, , Intravenous, Continuous, Loly,  "Slime MCCANN NP, Last Rate: 0 mL/hr at 02/06/19 1521, New Bag at 05/08/19 1420    sodium chloride 0.9% flush 5 mL, 5 mL, Intravenous, PRN, Slime Salcido NP      Review of patient's allergies indicates:  No Known Allergies     Vitals:    06/20/24 0939   BP: 127/66   BP Location: Left arm   Patient Position: Sitting   Pulse: 75   SpO2: 95%   Weight: 130.8 kg (288 lb 5.8 oz)   Height: 5' 7.01" (1.702 m)   In general, she is an obese, otherwise very healthy-appearing nondysmorphic female in no apparent distress.  The eyes, nares, and oropharynx are clear.  Eyelids and conjunctiva are normal without drainage or erythema.  Pupils equal and round bilaterally.  The head is normocephalic and atraumatic.  The neck is supple without jugular venous distention or thyroid enlargement.  The lungs are clear to auscultation bilaterally.  There is a well healed sternotomy.  Normal S1, fixed split S2.  2/6 systolic ejection murmur.  No diastolic murmur.  The abdominal exam is benign without hepatosplenomegaly, tenderness, or distention.  Pulses are normal in all 4 extremities with brisk capillary refill and no clubbing, cyanosis, or edema.  No rashes are noted.     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 Initial palliative BT shunt.  S/P Reconstruction of the right ventricular outflow tract and placement of a bioprosthetic pulmonary valve in early childhood.  S/P Multiple surgical valve replacements - most recently bioprosthetic valves in the pulmonary position and tricuspid position in 2007.  S/P " Catheterization with tricuspid valve balloon valvuloplasty and Sotelo S3 26 mm valve implant - Feb 2017.    SEGMENTAL CARDIAC CONNECTIONS (previously demonstrated):  Abdominal situs solitus.  Atrial situs solitus.  Atrioventricular alignment is concordant.  D-loop ventricles.  Bioprosthetic valve in tricuspid position.  The mitral valve appears grossly normal.  Qualitative impression of mildly dilated right ventricle.  There is mild dilation of the left ventricle.  The ventriculoarterial alignment is concordant.  Bioprosthetic valve in pulmonary position.  Normal aortic valve.  Cardiac position is levocardia.  There is a left aortic arch with additional branches not well demonstrated.  No coarctation is demonstrated.      IMPRESSION:  Technically difficult acoustic windows-  IVC/SVC: Normal venous pressure at 3 mmHg.  Severely dilated right atrium.  Structure of the bioprosthetic tricuspid valve is not well demonstrated with mean gradient measured 5 -9 mmHg  There is no significant tricuspid insufficiency demonstrated.  Qualitative impression of at least mildly dilated right ventricle with good systolic function.  Very thickened bioprosthetic valve material in pulmonary position with peak velocity 2.6 m/sec and mean gradient <16 mm Hg with mild eccentric jet of insufficiency.  Limited images suggest normal size confluent pulmonary branches.  The left atrial volume index is normal measuring 25 ml/m2.  Qualitative impression of normal mitral valve structure and function with no mitral stenosis or significant insufficiency.  The left ventricle is normal in size and structure.  Very flattened septal motion with reasonable movement of the LV free wall, SF = 36% and EF estimated 50-55 % from apical views.  Global left ventricular longitudinal strain normal - peak average measured -18.7%.  Normal left ventricular outflow tract with no evidence of aortic stenosis or significant insufficiency.  The structure of the aortic  valve was not well demonstrated.  Aortic dimensions:  Sinuses of Valsalva  = 29 mm.  ST junction               = 26 mm.  Ascending aorta       = 31 mm.  Qualitative impression of mildly dilated ascending aorta with normal transverse arch and descending aorta.  There is no evidence for coarctation.  No pericardial effusion.      Lab Results   Component Value Date    WBC 4.91 06/14/2023    HGB 13.6 06/14/2023    HCT 42.7 06/14/2023    MCV 86 06/14/2023     06/14/2023     CMP  Sodium   Date Value Ref Range Status   06/14/2023 139 136 - 145 mmol/L Final     Potassium   Date Value Ref Range Status   06/14/2023 4.0 3.5 - 5.1 mmol/L Final     Chloride   Date Value Ref Range Status   06/14/2023 103 95 - 110 mmol/L Final     CO2   Date Value Ref Range Status   06/14/2023 27 23 - 29 mmol/L Final     Glucose   Date Value Ref Range Status   06/14/2023 132 (H) 70 - 110 mg/dL Final     BUN   Date Value Ref Range Status   06/14/2023 8 6 - 20 mg/dL Final     Creatinine   Date Value Ref Range Status   06/14/2023 0.8 0.5 - 1.4 mg/dL Final     Calcium   Date Value Ref Range Status   06/14/2023 10.1 8.7 - 10.5 mg/dL Final     Total Protein   Date Value Ref Range Status   06/14/2023 7.9 6.0 - 8.4 g/dL Final     Albumin   Date Value Ref Range Status   06/14/2023 3.9 3.5 - 5.2 g/dL Final     Total Bilirubin   Date Value Ref Range Status   06/14/2023 0.8 0.1 - 1.0 mg/dL Final     Comment:     For infants and newborns, interpretation of results should be based  on gestational age, weight and in agreement with clinical  observations.    Premature Infant recommended reference ranges:  Up to 24 hours.............<8.0 mg/dL  Up to 48 hours............<12.0 mg/dL  3-5 days..................<15.0 mg/dL  6-29 days.................<15.0 mg/dL       Alkaline Phosphatase   Date Value Ref Range Status   06/14/2023 71 55 - 135 U/L Final     AST   Date Value Ref Range Status   06/14/2023 24 10 - 40 U/L Final     ALT   Date Value Ref Range Status  "  06/14/2023 20 10 - 44 U/L Final     Anion Gap   Date Value Ref Range Status   06/14/2023 9 8 - 16 mmol/L Final     eGFR   Date Value Ref Range Status   06/14/2023 >60.0 >60 mL/min/1.73 m^2 Final     Lab Results   Component Value Date    CHOL 167 06/14/2023    CHOL 192 02/27/2019    CHOL 149 02/02/2017     Lab Results   Component Value Date    HDL 33 (L) 06/14/2023    HDL 47 02/27/2019    HDL 43 02/02/2017     Lab Results   Component Value Date    LDLCALC 118.8 06/14/2023    LDLCALC 126.6 02/27/2019    LDLCALC 94.8 02/02/2017     No results found for: "DLDL"  Lab Results   Component Value Date    TRIG 76 06/14/2023    TRIG 92 02/27/2019    TRIG 56 02/02/2017       f1   Lab Results   Component Value Date    CHOLHDL 19.8 (L) 06/14/2023    CHOLHDL 24.5 02/27/2019    CHOLHDL 28.9 02/02/2017       Latest Reference Range & Units 06/14/23 11:52   BNP 0 - 99 pg/mL 70   TSH 0.400 - 4.000 uIU/mL 3.540       Thank you for referring this patient to our clinic.  Please call with any questions.    Sincerely,        Ashwin Galloway MD  Pediatric Cardiology  Adult Congenital Heart Disease  Pediatric Heart Failure and Transplantation  [facility] [address] [place]  [address]  [phone]          "

## 2024-06-24 ENCOUNTER — HOSPITAL ENCOUNTER (OUTPATIENT)
Dept: PEDIATRIC CARDIOLOGY | Facility: HOSPITAL | Age: 24
Discharge: HOME | End: 2024-06-24
Attending: PEDIATRICS
Payer: MEDICAID

## 2024-06-24 DIAGNOSIS — Z95.2 S/P PULMONARY VALVE REPLACEMENT: ICD-10-CM

## 2024-06-24 PROCEDURE — 94621 CARDIOPULM EXERCISE TESTING: CPT

## 2024-06-24 PROCEDURE — 94621 CARDIOPULM EXERCISE TESTING: CPT | Mod: 26,,, | Performed by: PEDIATRICS

## 2024-06-27 ENCOUNTER — PATIENT MESSAGE (OUTPATIENT)
Dept: CARDIOLOGY | Facility: CLINIC | Age: 24
End: 2024-06-27
Payer: MEDICAID

## 2024-06-27 LAB
OHS CV CPX 85 PERCENT MAX PREDICTED HEART RATE MALE: 166
OHS CV CPX MAX PREDICTED HEART RATE: 195
OHS CV CPX PATIENT AGE: 25
OHS CV CPX PATIENT IS FEMALE AGE 11-19: 0
OHS CV CPX PATIENT IS FEMALE AGE GREATER THAN 19: 1
OHS CV CPX PATIENT IS FEMALE AGE LESS THAN 11: 0
OHS CV CPX PATIENT IS FEMALE: 1
OHS CV CPX PATIENT IS MALE AGE 11-25: 0
OHS CV CPX PATIENT IS MALE AGE GREATER THAN 25: 0
OHS CV CPX PATIENT IS MALE AGE LESS THAN 11: 0
OHS CV CPX PATIENT IS MALE GREATER THAN 18: 0
OHS CV CPX PATIENT IS MALE LESS THAN OR EQUAL TO 18: 0
OHS CV CPX PATIENT IS MALE: 0

## 2024-06-28 ENCOUNTER — PATIENT MESSAGE (OUTPATIENT)
Dept: PEDIATRIC CARDIOLOGY | Facility: CLINIC | Age: 24
End: 2024-06-28
Payer: MEDICAID

## 2024-06-28 DIAGNOSIS — Z95.2 S/P PULMONARY VALVE REPLACEMENT: ICD-10-CM

## 2024-06-28 DIAGNOSIS — I47.10 SVT (SUPRAVENTRICULAR TACHYCARDIA): Primary | ICD-10-CM

## 2024-06-28 DIAGNOSIS — Z95.818 STATUS POST PLACEMENT OF IMPLANTABLE LOOP RECORDER: ICD-10-CM

## 2024-07-01 ENCOUNTER — ANESTHESIA EVENT (OUTPATIENT)
Dept: MEDSURG UNIT | Facility: HOSPITAL | Age: 24
End: 2024-07-01
Payer: MEDICAID

## 2024-07-01 NOTE — H&P
Name: Kacey Ruth  MRN: [id number]  : 2000          Subjective:   CC: [facility], PA c IVS, SVT.    HPI:    Kacey Ruth is a 23 y.o. 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      Since I last saw her, she is overall doing well. She denies palpitations, and she has not had any issues taking Sotalol.     To review, she has a history of pulmonary atresia intact ventricular septum and ASD.  She underwent reconstruction of the right ventricular outflow tract and placement of a bioprosthetic pulmonary valve in early childhood. She has had multiple valve replacements. Her most recent open heart surgery involved placement of a bioprosthetic valve in the pulmonary position and placement of a bioprosthetic valve in the tricuspid position in . She had a heart catheterization for recurrent tricuspid valve insufficiency and stenosis.  The procedure employed a trans-catheter approach. It entailed tricuspid valve balloon valvuloplasty with a Z-Med 25 x 5 cm balloon followed by placement of an Sotelo S3 26 mm valve in 2017. The catheterization also showed that she had only mild pulmonary valve stenosis and insufficiency.  She was then found by Dr. Gonzalez to have SVT vs. Atrial flutter and started on Metoprolol.  On 19, she had ablation of dual loop tachycardia involving CTI and lateral wall.  She continued to have SVT so was admitted for sotalol initiation on 19 and loop recorder implantation.    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   S/P multiple valve replacements.   Most recent open heart surgery involved placement of a  bioprosthetic valve in the pulmonary position and placement of a bioprosthetic valve in the tricuspid position in 2007.   Tricuspid valve insufficiency and stenosis.    S/P catheterization with tricuspid valve balloon valvuloplasty, placement of an Sotelo S3 26 mm valve in Feb 2017. Also showed only mild pulmonary valve stenosis and insufficiency.   SVT vs. Atrial flutter   Started on Metoprolol by Dr. Gonzalez  S/P ablation, 2/6/19, dual loop tachycardia involving CTI and lateral wall.    S/P sotalol initiation on 5/8/19 and loop recorder implantation due to recurrent SVT.    Family Hx:  No known family history of congenital heart defects or cardiac surgeries in childhood.  No known family members with pacemakers or defibrillators.  No known inherited channelopathies or cardiomyopathies.  No known hx of sudden cardiac death or heart transplant.  No No known heart attack in someone less than 50yoa.    Social Hx:  Lives in [place] with Mother.    Review of Systems:  GEN:  No fevers, No fatigue, No weight-loss, No weight-gain  EYE:  No significant changes in vision, No eye redness, No lens dislocation  ENT: No cough, No congestion, No swelling, No snoring, No hearing loss,   RESP: No increased work of breathing, No dyspnea, + noisy breathing, No hx of pneumothorax  CV:  No chest pain, No palpitations, No tachycardia, No activity or exercise intolerance  GI:  No abdominal pain, No nausea, No vomiting, No diarrhea, No constipation  BETITO: Normal UOP  MSK: No pain, No swelling, No joint dislocations, No scoliosis, No extremity swelling  HEME: No easy bruising or bleeding  NEUR: No history of seizures, No dizziness, No near-syncope, No syncope  DERM: No Rashes  PSY: + anxiety, No depression  ALL: See below.    Medications & Allergy:  Current Facility-Administered Medications on File Prior to Encounter   Medication Dose Route Frequency Provider Last Rate Last Admin    0.9%  NaCl infusion   Intravenous Continuous  "Slime Salcido NP 0 mL/hr at 02/06/19 1521 New Bag at 05/08/19 1420    sodium chloride 0.9% flush 5 mL  5 mL Intravenous PRN Slime Salcido NP         Current Outpatient Medications on File Prior to Encounter   Medication Sig Dispense Refill    amLODIPine (NORVASC) 10 MG tablet Take 1 tablet (10 mg total) by mouth once daily. 30 tablet 11    aspirin (ECOTRIN) 81 MG EC tablet TAKE 1 TABLET BY MOUTH EVERY DAY 30 tablet 0    hydroCHLOROthiazide (HYDRODIURIL) 25 MG tablet Take 1 tablet (25 mg total) by mouth once daily. 30 tablet 1    sotalol (BETAPACE) 120 MG Tab Take 1 tablet (120 mg total) by mouth 2 (two) times daily. 60 tablet 11       Review of patient's allergies indicates:  No Known Allergies       Objective:   Vitals:  Vitals:    07/05/24 0635   BP: (!) 157/93   BP Location: Left forearm   Patient Position: Lying   Pulse: 88   Resp: (!) 22   Temp: 99 °F (37.2 °C)   TempSrc: Temporal   SpO2: 99%   Weight: 130.6 kg (288 lb)   Height: 5' 7" (1.702 m)       Body mass index is 45.11 kg/m².  Body surface area is 2.48 meters squared.    Exam:  GEN: No acute distress, Normal appearing  EYE: Anicteric sclerae  ENT: No drainage, Moist mucous membranes  PULM: Normal work of breathing;  Clear to auscultation bilaterally, Good air movement throughout.  CV: No chest pain;   II/VI systolic murmur;   No rubs or gallops;  EXT: No cyanosis, No edema   2+ radial and PT pulses bilaterally  ABD: Soft, Non-distended, Non-tender,    No hepatomegaly;  Normal bowel sounds  DERM: No rashes  NEUR: Normal gait, Grossly normal tone.  PSY: Normal mood and affect      Results / Data:   ECG:   (06/20/2024) - Sinus rhythm, RBBB; no significant change in QTc   (06/14/2023) - Sinus rhythm, RBBB; no significant change in QTc   (01/05/2023) - Sinus rhythm, RBBB  (05/25/2021) - Sinus rhythm, RBBB    ILR:   - ILR reports since last visit reviewed and do not show any significant arrhythmia    (03/14/2023)  MDT ILR REMOTE transmission " received and data reviewed.   Battery: EOS since December 14, 2022   Current ECG reveals sinus tachycardia. NO SYMPTOM triggered episodes noted. 486 TACHY AUTO triggered episodes - available ECGs reveal sinus tachycardia with rare isolated PVCs, noise artifact, over-sensed and under-sensed beats.    (12/28/2022)  MDT ILR REMOTE transmission received and data reviewed.   Battery: EOS since December 14, 2022  Current ECG reveals sinus rhythm with isolated PACs.  NO SYMPTOM triggered episodes noted.  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 Initial palliative BT shunt.  S/P Reconstruction of the right ventricular outflow tract and placement of a bioprosthetic pulmonary valve in early childhood.  S/P Multiple surgical valve replacements - most recently bioprosthetic valves in the pulmonary position and tricuspid position in 2007.  S/P Catheterization with tricuspid valve balloon valvuloplasty and Sotelo S3 26 mm valve implant - Feb 2017.     SEGMENTAL CARDIAC CONNECTIONS (previously demonstrated):  Abdominal situs solitus.  Atrial situs solitus.  Atrioventricular alignment is concordant.  D-loop ventricles.  Bioprosthetic valve in tricuspid position.  The mitral valve appears grossly normal.  Qualitative impression of mildly dilated right ventricle.  There is mild dilation of the left ventricle.  The ventriculoarterial alignment is concordant.  Bioprosthetic valve in pulmonary position.  Normal aortic valve.  Cardiac position is levocardia.  There is a left aortic arch with additional branches not well demonstrated.  No coarctation is demonstrated.        IMPRESSION:  Technically difficult acoustic windows-  IVC/SVC: Normal venous pressure at 3 mmHg.   Severely dilated right atrium.  Structure  of the bioprosthetic tricuspid valve is not well demonstrated with mean gradient measured 5 -9 mmHg  There is no significant tricuspid insufficiency demonstrated.  Qualitative impression of at least mildly dilated right ventricle with good systolic function.  Very thickened bioprosthetic valve material in pulmonary position with peak velocity 2.6 m/sec and mean gradient <16 mm Hg with mild eccentric jet of insufficiency.  Limited images suggest normal size confluent pulmonary branches.  The left atrial volume index is normal measuring 25 ml/m2.  Qualitative impression of normal mitral valve structure and function with no mitral stenosis or significant insufficiency.  The left ventricle is normal in size and structure.  Very flattened septal motion with reasonable movement of the LV free wall, SF = 36% and EF estimated 50-55 % from apical views.  Global left ventricular longitudinal strain normal - peak average measured -18.7%.  Normal left ventricular outflow tract with no evidence of aortic stenosis or significant insufficiency.  The structure of the aortic valve was not well demonstrated.  Aortic dimensions:  Sinuses of Valsalva  = 29 mm.  ST junction               = 26 mm.  Ascending aorta       = 31 mm.  Qualitative impression of mildly dilated ascending aorta with normal transverse arch and descending aorta.  There is no evidence for coarctation.  No pericardial effusion.          (06/14/2023)  CONGENITAL CARDIAC HISTORY:  Pulmonary Atresia with Intact Ventricular Septum and ASD  ? Initial palliative BT shunt.  S/P Reconstruction of the right ventricular outflow tract and placement of a bioprosthetic pulmonary valve in early childhood.   S/P Multiple surgical valve replacements - most recently bioprosthetic valves in the pulmonary position and tricuspid position in 2007.   S/P Catheterization with tricuspid valve balloon valvuloplasty and Sotelo S3 26 mm valve(?tricuspid position) in Feb 2017.      SEGMENTAL  CARDIAC CONNECTIONS:  Abdominal situs solitus.   Atrial situs solitus.   Atrioventricular alignment is concordant.   D-loop ventricles.   Bioprosthetic valve in tricuspid position.  The mitral valve appears grossly normal.   Qualitative impression of mildly dilated right ventricle.  There is mild dilation of the left ventricle.   The ventriculoarterial alignment is concordant.   Bioprosthetic valve in pulmonary position.  Normal aortic valve.   Cardiac position is levocardia.   There is a left aortic arch with additional branches not well demonstrated.   No coarctation is demonstrated.        IMPRESSION:  Technically difficult acoustic windows-  Severely dilated right atrium.  Structure of the bioprosthetic tricuspid valve is not well demonstrated with mean gradient measured 6 -9 mmHg  There is no significant tricuspid insufficiency demonstrated.  Qualitative impression of at least mildly dilated right ventricle with good systolic function.  Very thickened bioprosthetic valve material in pulmonary position with peak velocity 2.4 m/sec and mean gradient <14 mm Hg with trivial jet of insufficiency.  Limited images suggest normal size confluent pulmonary branches.  The left atrial volume index is mildly enlarged measuring 20 ml/m2.   Qualitative impression of normal mitral valve structure and function with no mitral stenosis or significant insufficiency.  The left ventricle is moderately dilated.   Very flattened septal motion with reasonable movement of the LV free wall, SF = 20 % and EF estimated 50-55 % from apical views.   Normal left ventricular outflow tract with no evidence of aortic stenosis or significant insufficiency.  The structure of the aortic valve was not well demonstrated.  Qualitative impression of mildly dilated ascending aorta with normal transverse arch and descending aorta.  There is no evidence for coarctation.  No pericardial effusion.    CPX:  (06/24/2024)  Test Type:  Protocol:                       Intermediate Ramp  Equipment:                  Treadmill  Cardiac Medications:  Sotalol  Effort and Symptoms:  The patient gave a submaximal effort on today's stress test.  Handrail Support: throughout  Exercise Time: 8:48  Highest RPE: OMNI - 2, Alexandrea - 9  RER: 1.02  The patient reported no concerning symptoms today.  Hemodynamic Response:  Normal SpO2 response to exercise.  Blunted HR and BP response to exercise.  ECG:  Sinus rhythm with RBBB and ST and T-wave abnormalities noted throughout.   Cannot comment on QTc, ST-segment or T-wave changes in setting of RBBB.  Pulmonary Function:  The patient had reduced baseline pulmonary function tests. May be confounded by extensive prior surgical history.  FVC = 2.76 (76%-predicted), FEV1 = 2.16 (69%-predicted), FEV1/FVC = 78%, FEF 25-75 = 1.85 (52%-predicted).  Metabolic:  Peak VO2:    Peak VO2, relative (mL/kg/min) = 13.8 (67%-predicted)    Peak VO2, absolute (mL/min)    = 1806 (67%-predicted)  The patient had a reduced peak oxygen uptake relative to age, sex, and size.  Note: Peak VO2 likely underrepresented in submaximal testing.  O2-Pulse (mL/beat)                = 13 (94%-predicted)  The patient had a normal oxygen uptake to heart rate.  Anaerobic Threshold               = 65% of VO2 peak.  The anaerobic threshold occurred at a normal time during exercise.  Peak End Tidal CO2               = 35  The patient had a slightly reduced PETCO2 at peak exercise.  VE/VCO2 slope                                   = 32  The patient exhibited a reduced ventilatory efficiency.  May be confounded by hyperventilation, as below.  Breathing [place]                              = 52.4  The patient exhibited an increased breathing reserve, suggestive of early exercise termination.  Respiratory Rate, peak (Br/min)= 51  The patient experienced hyperventilation at peak exercise.  Heart Rate, peak (BPM)                     = 137 (70% of age predicted max HR)  Heart Rate Reserve=  30%  Work (METS)                          = 3.9       Assessment / Plan:   Kacey Ruth is a 23 y.o. 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      Her loop recorder has been out of battery for over a year.  While she has not had any recent arrhythmias documented on the loop recorder, this is in the setting of active antiarrhythmic treatment with sotalol.  As such, I recommend ILR removal and replacement, which we will perform today.      - Procedure explained  - Risks reviewed  - Consent obtained      Franklyn Cheung MD  Pediatric & Adult-Congenital Electrophysiology

## 2024-07-03 ENCOUNTER — TELEPHONE (OUTPATIENT)
Dept: PEDIATRIC CARDIOLOGY | Facility: CLINIC | Age: 24
End: 2024-07-03
Payer: MEDICAID

## 2024-07-03 DIAGNOSIS — Q25.5 PULMONARY ATRESIA WITH INTACT VENTRICULAR SEPTUM: ICD-10-CM

## 2024-07-03 DIAGNOSIS — I48.92 ATRIAL FLUTTER, UNSPECIFIED TYPE: ICD-10-CM

## 2024-07-03 DIAGNOSIS — I47.19 ATRIAL TACHYCARDIA: ICD-10-CM

## 2024-07-03 DIAGNOSIS — I47.10 SVT (SUPRAVENTRICULAR TACHYCARDIA): Primary | ICD-10-CM

## 2024-07-03 DIAGNOSIS — Z95.818 STATUS POST PLACEMENT OF IMPLANTABLE LOOP RECORDER: ICD-10-CM

## 2024-07-03 NOTE — TELEPHONE ENCOUNTER
Spoke with patient to advise arrival time for procedure on Friday 7/5/24 had been changed to 5:45. Reminded of NPO instructions and advised that morning medications, except aspirin, may be taken with a small sip of water. Patient verified she stopped aspirin as directed on 6/30, voiced understanding of remainder of instructions and had no additional questions.

## 2024-07-05 ENCOUNTER — ANESTHESIA (OUTPATIENT)
Dept: MEDSURG UNIT | Facility: HOSPITAL | Age: 24
End: 2024-07-05
Payer: MEDICAID

## 2024-07-05 ENCOUNTER — HOSPITAL ENCOUNTER (OUTPATIENT)
Facility: HOSPITAL | Age: 24
Discharge: HOME | End: 2024-07-05
Attending: PEDIATRICS | Admitting: PEDIATRICS
Payer: MEDICAID

## 2024-07-05 VITALS
SYSTOLIC BLOOD PRESSURE: 146 MMHG | HEIGHT: 67 IN | HEART RATE: 71 BPM | BODY MASS INDEX: 45.2 KG/M2 | OXYGEN SATURATION: 96 % | DIASTOLIC BLOOD PRESSURE: 73 MMHG | WEIGHT: 288 LBS | RESPIRATION RATE: 20 BRPM | TEMPERATURE: 98 F

## 2024-07-05 DIAGNOSIS — I47.10 SVT (SUPRAVENTRICULAR TACHYCARDIA): ICD-10-CM

## 2024-07-05 DIAGNOSIS — Z95.2 S/P PULMONARY VALVE REPLACEMENT: ICD-10-CM

## 2024-07-05 DIAGNOSIS — Z95.818 STATUS POST PLACEMENT OF IMPLANTABLE LOOP RECORDER: ICD-10-CM

## 2024-07-05 LAB
B-HCG UR QL: NEGATIVE
CTP QC/QA: YES

## 2024-07-05 PROCEDURE — 25000003 PHARM REV CODE 250: Performed by: PEDIATRICS

## 2024-07-05 PROCEDURE — 81025 URINE PREGNANCY TEST: CPT | Performed by: PEDIATRICS

## 2024-07-05 PROCEDURE — 63600175 PHARM REV CODE 636 W HCPCS: Performed by: NURSE ANESTHETIST, CERTIFIED REGISTERED

## 2024-07-05 PROCEDURE — 37000008 HC ANESTHESIA 1ST 15 MINUTES: Performed by: PEDIATRICS

## 2024-07-05 PROCEDURE — 33286 RMVL SUBQ CAR RHYTHM MNTR: CPT | Mod: 59,,, | Performed by: PEDIATRICS

## 2024-07-05 PROCEDURE — 37000009 HC ANESTHESIA EA ADD 15 MINS: Performed by: PEDIATRICS

## 2024-07-05 PROCEDURE — 25000003 PHARM REV CODE 250: Performed by: NURSE PRACTITIONER

## 2024-07-05 PROCEDURE — 25000003 PHARM REV CODE 250: Performed by: NURSE ANESTHETIST, CERTIFIED REGISTERED

## 2024-07-05 PROCEDURE — 33286 RMVL SUBQ CAR RHYTHM MNTR: CPT | Mod: 59 | Performed by: PEDIATRICS

## 2024-07-05 PROCEDURE — C1764 EVENT RECORDER, CARDIAC: HCPCS | Performed by: PEDIATRICS

## 2024-07-05 PROCEDURE — 33285 INSJ SUBQ CAR RHYTHM MNTR: CPT | Mod: ,,, | Performed by: PEDIATRICS

## 2024-07-05 PROCEDURE — 63600175 PHARM REV CODE 636 W HCPCS: Performed by: PEDIATRICS

## 2024-07-05 PROCEDURE — 33285 INSJ SUBQ CAR RHYTHM MNTR: CPT | Performed by: PEDIATRICS

## 2024-07-05 DEVICE — ICM LNQ22 LINQ II PRIME US
Type: IMPLANTABLE DEVICE | Site: CHEST | Status: FUNCTIONAL
Brand: LINQ II™

## 2024-07-05 RX ORDER — PROPOFOL 10 MG/ML
VIAL (ML) INTRAVENOUS
Status: DISCONTINUED | OUTPATIENT
Start: 2024-07-05 | End: 2024-07-05

## 2024-07-05 RX ORDER — DEXMEDETOMIDINE HYDROCHLORIDE 100 UG/ML
INJECTION, SOLUTION INTRAVENOUS
Status: DISCONTINUED | OUTPATIENT
Start: 2024-07-05 | End: 2024-07-05

## 2024-07-05 RX ORDER — VANCOMYCIN HYDROCHLORIDE 1 G/20ML
INJECTION, POWDER, LYOPHILIZED, FOR SOLUTION INTRAVENOUS
Status: DISCONTINUED | OUTPATIENT
Start: 2024-07-05 | End: 2024-07-05 | Stop reason: HOSPADM

## 2024-07-05 RX ORDER — LIDOCAINE HYDROCHLORIDE AND EPINEPHRINE 10; 10 MG/ML; UG/ML
INJECTION, SOLUTION INFILTRATION; PERINEURAL
Status: DISCONTINUED | OUTPATIENT
Start: 2024-07-05 | End: 2024-07-05 | Stop reason: HOSPADM

## 2024-07-05 RX ORDER — CEFAZOLIN SODIUM 1 G/3ML
INJECTION, POWDER, FOR SOLUTION INTRAMUSCULAR; INTRAVENOUS
Status: DISCONTINUED | OUTPATIENT
Start: 2024-07-05 | End: 2024-07-05

## 2024-07-05 RX ORDER — SODIUM CHLORIDE 0.9 % (FLUSH) 0.9 %
10 SYRINGE (ML) INJECTION
Status: DISCONTINUED | OUTPATIENT
Start: 2024-07-05 | End: 2024-07-05 | Stop reason: HOSPADM

## 2024-07-05 RX ORDER — KETAMINE HCL IN 0.9 % NACL 50 MG/5 ML
SYRINGE (ML) INTRAVENOUS
Status: DISCONTINUED | OUTPATIENT
Start: 2024-07-05 | End: 2024-07-05

## 2024-07-05 RX ORDER — LIDOCAINE HYDROCHLORIDE 20 MG/ML
INJECTION INTRAVENOUS
Status: DISCONTINUED | OUTPATIENT
Start: 2024-07-05 | End: 2024-07-05

## 2024-07-05 RX ORDER — MIDAZOLAM HYDROCHLORIDE 1 MG/ML
INJECTION INTRAMUSCULAR; INTRAVENOUS
Status: DISCONTINUED | OUTPATIENT
Start: 2024-07-05 | End: 2024-07-05

## 2024-07-05 RX ADMIN — DEXMEDETOMIDINE 12 MCG: 100 INJECTION, SOLUTION, CONCENTRATE INTRAVENOUS at 08:07

## 2024-07-05 RX ADMIN — CEFAZOLIN 3 G: 330 INJECTION, POWDER, FOR SOLUTION INTRAMUSCULAR; INTRAVENOUS at 07:07

## 2024-07-05 RX ADMIN — MIDAZOLAM HYDROCHLORIDE 2 MG: 2 INJECTION, SOLUTION INTRAMUSCULAR; INTRAVENOUS at 07:07

## 2024-07-05 RX ADMIN — Medication 20 MG: at 07:07

## 2024-07-05 RX ADMIN — DEXMEDETOMIDINE 12 MCG: 100 INJECTION, SOLUTION, CONCENTRATE INTRAVENOUS at 07:07

## 2024-07-05 RX ADMIN — DEXMEDETOMIDINE 8 MCG: 100 INJECTION, SOLUTION, CONCENTRATE INTRAVENOUS at 07:07

## 2024-07-05 RX ADMIN — PROPOFOL 100 MG: 10 INJECTION, EMULSION INTRAVENOUS at 07:07

## 2024-07-05 RX ADMIN — GLYCOPYRROLATE 0.2 MG: 0.2 INJECTION, SOLUTION INTRAMUSCULAR; INTRAVENOUS at 07:07

## 2024-07-05 RX ADMIN — SODIUM CHLORIDE: 0.9 INJECTION, SOLUTION INTRAVENOUS at 07:07

## 2024-07-05 RX ADMIN — LIDOCAINE HYDROCHLORIDE 100 MG: 20 INJECTION INTRAVENOUS at 07:07

## 2024-07-05 RX ADMIN — Medication 10 MG: at 08:07

## 2024-07-05 RX ADMIN — Medication 10 MG: at 07:07

## 2024-07-05 NOTE — PLAN OF CARE
Patient recovering from EP procedure. Loop recorder Insertion site CDI, no redness, swelling, or drainage note.  Skin warm with CRT 2-3 seconds. Caregivers at bedside. Plan of care reviewed and questions answered.

## 2024-07-05 NOTE — ANESTHESIA POSTPROCEDURE EVALUATION
Anesthesia Post Evaluation    Patient: Kacey Ruth    Procedure(s) Performed: Procedure(s) (LRB):  Insertion, Implantable Loop Recorder (Left)    Final Anesthesia Type: general      Patient location during evaluation: PACU  Patient participation: Yes- Able to Participate  Level of consciousness: awake and alert  Post-procedure vital signs: reviewed and stable  Pain management: adequate  Airway patency: patent    PONV status at discharge: No PONV  Anesthetic complications: no      Cardiovascular status: stable  Respiratory status: spontaneous ventilation and face mask  Hydration status: euvolemic  Follow-up not needed.              Vitals Value Taken Time   /73 07/05/24 1043   Temp 36.9 °C (98.4 °F) 07/05/24 1043   Pulse 71 07/05/24 1043   Resp 20 07/05/24 1043   SpO2 96 % 07/05/24 1043         No case tracking events are documented in the log.      Pain/Pinky Score: Pinky Score: 9 (7/5/2024 10:15 AM)

## 2024-07-05 NOTE — DISCHARGE INSTRUCTIONS
Discharge Instructions:    Tylenol or Ibuprofen as needed for pain  Ice Packs as tolerated for pain  Remove dressing on post op day 2  Incision site will have skin glue. This will fall off as the site heals. DO NOT PICK OR SCRUB OFF  Wash site daily with mild soap and water. Pat dry. DO NOT SCRUB  Leave open to air when possible. A loose dressing may be applied if clothing is rubbing or irritating site  NO TUB BATHS, SWIMMING OR HOT TUBS FOR 5 DAYS- DO NOT SUBMERGE WOUND. You may shower.   Some bruising or swelling is expected but should improve  If swelling does not improve or if the site is red, hot to touch, has drainage or if you develop fever, please call the clinic @ [phone].    A wound check is needed 7-10 days post procedure. This may be in person, virtual or via [place] message with photo of wound site attached.    If you have any additional questions, please feel free to call or message the clinic.

## 2024-07-05 NOTE — TRANSFER OF CARE
"Anesthesia Transfer of Care Note    Patient: Kacey Ruth    Procedure(s) Performed: Procedure(s) (LRB):  Insertion, Implantable Loop Recorder (Left)    Patient location: PACU    Anesthesia Type: general    Transport from OR: Transported from OR on 100% O2 by closed face mask with adequate spontaneous ventilation    Post pain: adequate analgesia    Post assessment: no apparent anesthetic complications and tolerated procedure well    Post vital signs: stable    Level of consciousness: responds to stimulation and sedated    Nausea/Vomiting: no nausea/vomiting    Complications: none    Transfer of care protocol was followed      Last vitals: Visit Vitals  BP (!) 157/93 (BP Location: Left forearm, Patient Position: Lying)   Pulse 88   Temp 37.2 °C (99 °F) (Temporal)   Resp (!) 22   Ht 5' 7" (1.702 m)   Wt 130.6 kg (288 lb)   LMP 04/17/2024 (Approximate)   SpO2 99%   Breastfeeding No   BMI 45.11 kg/m²     "

## 2024-07-05 NOTE — ANESTHESIA PREPROCEDURE EVALUATION
07/05/2024  Kacey Ruth is a 23 y.o., female.    Pre-operative evaluation for Procedure(s) (LRB):  Insertion, Implantable Loop Recorder (Left)    Patient Active Problem List   Diagnosis    Pulmonary atresia with intact ventricular septum    S/P pulmonary valve replacement    S/P tricuspid valve replacement    Obesity    Tricuspid valve stenosis and regurgitation    LV dysfunction    SVT (supraventricular tachycardia)    Adult congenital heart disease    Palpitations    Atrial flutter    Status post placement of implantable loop recorder    Hepatosplenomegaly    Chronic anticoagulation    Supratherapeutic INR    Liver cirrhosis    Abdominal pain    Liver mass    Atrial tachycardia    Headache            Medications Prior to Admission   Medication Sig Dispense Refill Last Dose    amLODIPine (NORVASC) 10 MG tablet Take 1 tablet (10 mg total) by mouth once daily. 30 tablet 11 7/4/2024 at 1130    aspirin (ECOTRIN) 81 MG EC tablet TAKE 1 TABLET BY MOUTH EVERY DAY 30 tablet 0 6/29/2024    hydroCHLOROthiazide (HYDRODIURIL) 25 MG tablet Take 1 tablet (25 mg total) by mouth once daily. 30 tablet 1 7/4/2024 at 1130    sotalol (BETAPACE) 120 MG Tab Take 1 tablet (120 mg total) by mouth 2 (two) times daily. 60 tablet 11 7/4/2024 at 1130       Review of patient's allergies indicates:  No Known Allergies    Past Medical History:   Diagnosis Date    CHF (congestive heart failure)     Cirrhosis 07/2019    Hypertension     Obesity     Pulmonary atresia with intact ventricular septum     SVT (supraventricular tachycardia)      Past Surgical History:   Procedure Laterality Date    MEMO PROCEDURE      bt shunt and transannular patch    CARDIAC VALVE REPLACEMENT      INSERTION OF IMPLANTABLE LOOP RECORDER N/A 2/6/2019    Procedure: INSERTION, IMPLANTABLE LOOP RECORDER;  Surgeon: Romeo Robles MD;  [facility];  Service: Cardiology;  Laterality: N/A;  SVT, IART, SAI, RFA, +/- ILR, FELECIA, MDT, MAC, MB/SM, 3 prep    INSERTION OF IMPLANTABLE LOOP RECORDER N/A 5/8/2019    Procedure: INSERTION, IMPLANTABLE LOOP RECORDER;  Surgeon: Ricardo Woodward MD;  Location: [facility] EP LAB;  Service: Cardiology;  Laterality: N/A;  afl, palps, ILR, MDT, anes, SM, 441    INSERTION OF TRANSJUGULAR INTRAHEPATIC PORTOSYSTEMIC SHUNT (TIPS) N/A 7/5/2019    Procedure: INSERTION, SHUNT, TRANSJUGULAR, INTRAHEPATIC PORTOSYSTEMIC;  Surgeon: Dosmanju Diagnostic Provider;  [facility];  Service: Anesthesiology;  Laterality: N/A;  [address]    PULMONARY VALVE REPLACEMENT  01/02/2007    25mm sharyn charles    TRICUSPID VALVE REPLACEMENT  01/02/2007    25 mm sharyn-charles     Tobacco Use    Smoking status: Never    Smokeless tobacco: Never   Substance and Sexual Activity    Alcohol use: No    Drug use: No    Sexual activity: Never       Objective:     Vital Signs (Most Recent):  Temp: 37.2 °C (99 °F) (07/05/24 0635)  Pulse: 88 (07/05/24 0635)  Resp: (!) 22 (07/05/24 0635)  BP: (!) 157/93 (07/05/24 0635)  SpO2: 99 % (07/05/24 0635) Vital Signs (24h Range):  Temp:  [37.2 °C (99 °F)] 37.2 °C (99 °F)  Pulse:  [88] 88  Resp:  [22] 22  SpO2:  [99 %] 99 %  BP: (157)/(93) 157/93     Weight: 130.6 kg (288 lb)  Body mass index is 45.11 kg/m².        Significant Labs:  All pertinent labs from the last 24 hours have been reviewed.    CBC: No results for input(s): "WBC", "RBC", "HGB", "HCT", "PLT", "MCV", "MCH", "MCHC" in the last 72 hours.    CMP: No results for input(s): "NA", "K", "CL", "CO2", "BUN", "CREATININE", "GLU", "MG", "PHOS", "CALCIUM", "ALBUMIN", "PROT", "ALKPHOS", "ALT", "AST", "BILITOT" in the last 72 hours.    INR  No results for input(s): "PT", "INR", "PROTIME", "APTT" in the last 72 hours.      Pre-op Assessment          Review of Systems  Cardiovascular:     Hypertension       CHF              Congestive Heart Failure (CHF)      "           Hypertension     Atrial Flutter     Hepatic/GI:      Liver Disease,         Liver Disease        Neurological:      Headaches      Dx of Headaches                               Physical Exam  General: Well nourished    Airway:  Mallampati: II   Mouth Opening: Normal  TM Distance: Normal  Tongue: Normal  Neck ROM: Normal ROM    Dental:  Any loose and/or missing teeth verified with patient   Chest/Lungs:  Clear to auscultation    Heart:  Rate: Normal  Rhythm: Regular Rhythm  Sounds: Normal    Abdomen:  Normal        Anesthesia Plan  Type of Anesthesia, risks & benefits discussed:    Anesthesia Type: Gen ETT, Gen Supraglottic Airway, Gen Natural Airway  Intra-op Monitoring Plan: Standard ASA Monitors  Post Op Pain Control Plan: multimodal analgesia and IV/PO Opioids PRN  Induction:  IV and Inhalation  Informed Consent: Informed consent signed with the Patient representative and all parties understand the risks and agree with anesthesia plan.  All questions answered.   ASA Score: 3    Ready For Surgery From Anesthesia Perspective.     .

## 2024-07-05 NOTE — DISCHARGE SUMMARY
[facility] - Cardiology  Discharge Note  Short Stay    Procedure(s) (LRB):  Insertion, Implantable Loop Recorder (Left)      OUTCOME: Patient tolerated treatment/procedure well without complication and is now ready for discharge.    DISPOSITION: Home or Self Care    FINAL DIAGNOSIS:  ILR removal and replacement.    FOLLOWUP: In clinic as per usual. Send MyChart image in 1-2 weeks or if any concerns.    DISCHARGE INSTRUCTIONS:  Reviewed with family.    TIME SPENT ON DISCHARGE: 5 minutes

## 2024-07-09 RX ORDER — SOTALOL HYDROCHLORIDE 120 MG/1
120 TABLET ORAL 2 TIMES DAILY
Qty: 60 TABLET | Refills: 11 | Status: SHIPPED | OUTPATIENT
Start: 2024-07-09 | End: 2025-07-09

## 2024-07-16 ENCOUNTER — PATIENT MESSAGE (OUTPATIENT)
Dept: PEDIATRIC CARDIOLOGY | Facility: CLINIC | Age: 24
End: 2024-07-16
Payer: MEDICAID

## 2024-08-05 ENCOUNTER — HOSPITAL ENCOUNTER (OUTPATIENT)
Dept: PEDIATRIC CARDIOLOGY | Facility: HOSPITAL | Age: 24
Discharge: HOME | End: 2024-08-05
Attending: PEDIATRICS
Payer: MEDICAID

## 2024-08-05 DIAGNOSIS — Z95.818 STATUS POST PLACEMENT OF IMPLANTABLE LOOP RECORDER: ICD-10-CM

## 2024-08-05 DIAGNOSIS — I47.19 ATRIAL TACHYCARDIA: ICD-10-CM

## 2024-08-05 DIAGNOSIS — Q25.5 PULMONARY ATRESIA WITH INTACT VENTRICULAR SEPTUM: ICD-10-CM

## 2024-08-05 DIAGNOSIS — I48.92 ATRIAL FLUTTER, UNSPECIFIED TYPE: ICD-10-CM

## 2024-08-05 DIAGNOSIS — I47.10 SVT (SUPRAVENTRICULAR TACHYCARDIA): ICD-10-CM

## 2024-08-05 PROCEDURE — 93298 REM INTERROG DEV EVAL SCRMS: CPT

## 2024-08-05 PROCEDURE — 93298 REM INTERROG DEV EVAL SCRMS: CPT | Mod: 26,,, | Performed by: PEDIATRICS

## 2024-08-26 ENCOUNTER — TELEPHONE (OUTPATIENT)
Dept: PEDIATRIC CARDIOLOGY | Facility: CLINIC | Age: 24
End: 2024-08-26
Payer: MEDICAID

## 2024-08-26 DIAGNOSIS — Q25.5 PULMONARY ATRESIA WITH INTACT VENTRICULAR SEPTUM: ICD-10-CM

## 2024-08-26 DIAGNOSIS — I48.92 ATRIAL FLUTTER, UNSPECIFIED TYPE: ICD-10-CM

## 2024-08-26 DIAGNOSIS — Z95.818 STATUS POST PLACEMENT OF IMPLANTABLE LOOP RECORDER: ICD-10-CM

## 2024-08-26 DIAGNOSIS — I47.10 SVT (SUPRAVENTRICULAR TACHYCARDIA): Primary | ICD-10-CM

## 2024-09-03 RX ORDER — AMLODIPINE BESYLATE 10 MG/1
10 TABLET ORAL DAILY
Qty: 30 TABLET | Refills: 11 | Status: SHIPPED | OUTPATIENT
Start: 2024-09-03 | End: 2025-09-03

## 2024-09-05 RX ORDER — HYDROCHLOROTHIAZIDE 25 MG/1
25 TABLET ORAL DAILY
Qty: 30 TABLET | Refills: 1 | Status: SHIPPED | OUTPATIENT
Start: 2024-09-05

## 2024-09-09 ENCOUNTER — HOSPITAL ENCOUNTER (OUTPATIENT)
Dept: PEDIATRIC CARDIOLOGY | Facility: HOSPITAL | Age: 24
Discharge: HOME | End: 2024-09-09
Attending: PEDIATRICS
Payer: MEDICAID

## 2024-09-09 DIAGNOSIS — Z95.818 STATUS POST PLACEMENT OF IMPLANTABLE LOOP RECORDER: ICD-10-CM

## 2024-09-09 DIAGNOSIS — I47.10 SVT (SUPRAVENTRICULAR TACHYCARDIA): ICD-10-CM

## 2024-09-09 DIAGNOSIS — Q25.5 PULMONARY ATRESIA WITH INTACT VENTRICULAR SEPTUM: ICD-10-CM

## 2024-09-09 DIAGNOSIS — I48.92 ATRIAL FLUTTER, UNSPECIFIED TYPE: ICD-10-CM

## 2024-09-09 DIAGNOSIS — I47.19 ATRIAL TACHYCARDIA: ICD-10-CM

## 2024-09-09 PROCEDURE — 93298 REM INTERROG DEV EVAL SCRMS: CPT

## 2024-09-09 PROCEDURE — 93298 REM INTERROG DEV EVAL SCRMS: CPT | Mod: 26,,, | Performed by: PEDIATRICS

## 2024-09-21 NOTE — PROGRESS NOTES
Name: Kacey Ruth  MRN: [id number]  : 2000          Subjective:   CC: [facility], PA c IVS, SVT.    HPI:    Kacey Ruth is a 23 y.o. 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   Since I last saw her, she is overall doing well. She denies palpitations, and she has not had any issues taking Sotalol.   She underwent ILR replacement on 2024. Since that time, she has done well, and I will our incision has healed well.  She has recently been diagnosed with diabetes, it was started on his in bed by the [facility] bariatric surgery center.  She was told that she needed to establish diabetes care for ongoing on Symbicort use, particularly as she wants to hold off on the bariatric surgery at the moment.    To review, she has a history of pulmonary atresia intact ventricular septum and ASD.  She underwent reconstruction of the right ventricular outflow tract and placement of a bioprosthetic pulmonary valve in early childhood. She has had multiple valve replacements. Her most recent open heart surgery involved placement of a bioprosthetic valve in the pulmonary position and placement of a bioprosthetic valve in the tricuspid position in . She had a heart catheterization for recurrent tricuspid valve insufficiency and stenosis.  The procedure employed a trans-catheter approach. It entailed tricuspid valve balloon valvuloplasty with a Z-Med 25 x 5 cm balloon followed by placement of an Sotelo S3 26 mm valve in 2017. The catheterization also showed that she had only mild pulmonary valve stenosis and insufficiency.  She was then found by Dr. Gonzalez to have SVT vs. Atrial flutter and started on Metoprolol.  On 19, she had ablation of dual loop tachycardia involving CTI and  lateral wall.  She continued to have SVT so was admitted for sotalol initiation on 5/8/19 and loop recorder implantation.    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   S/P multiple valve replacements.   Most recent open heart surgery involved placement of a bioprosthetic valve in the pulmonary position and placement of a bioprosthetic valve in the tricuspid position in 2007.   Tricuspid valve insufficiency and stenosis.    S/P catheterization with tricuspid valve balloon valvuloplasty, placement of an Sotelo S3 26 mm valve in Feb 2017. Also showed only mild pulmonary valve stenosis and insufficiency.   SVT vs. Atrial flutter   Started on Metoprolol by Dr. Gonzalez  S/P ablation, 2/6/19, dual loop tachycardia involving CTI and lateral wall.    S/P sotalol initiation on 5/8/19 and loop recorder implantation due to recurrent SVT.  Obesity  DM-II    Family Hx:  No known family history of congenital heart defects or cardiac surgeries in childhood.  No known family members with pacemakers or defibrillators.  No known inherited channelopathies or cardiomyopathies.  No known hx of sudden cardiac death or heart transplant.  No No known heart attack in someone less than 50yoa.    Social Hx:  Lives in [place] with Mother.    Review of Systems:  GEN:  No fevers, No fatigue, No weight-loss, No weight-gain  EYE:  No significant changes in vision, No eye redness, No lens dislocation  ENT: No cough, No congestion, No swelling, No snoring, No hearing loss,   RESP: No increased work of breathing, No dyspnea, + noisy breathing, No hx of pneumothorax  CV:  No chest pain, No palpitations, No tachycardia, No activity or exercise intolerance  GI:  No abdominal pain, No nausea, No vomiting, No diarrhea, No constipation  BETITO: Normal UOP  MSK: No pain, No swelling,  "No joint dislocations, No scoliosis, No extremity swelling  HEME: No easy bruising or bleeding  NEUR: No history of seizures, No dizziness, No near-syncope, No syncope  DERM: No Rashes  PSY: + anxiety, No depression  ALL: See below.    Medications & Allergy:  Current Outpatient Medications on File Prior to Visit   Medication Sig Dispense Refill    amLODIPine (NORVASC) 10 MG tablet Take 1 tablet (10 mg total) by mouth once daily. 30 tablet 11    aspirin (ECOTRIN) 81 MG EC tablet TAKE 1 TABLET BY MOUTH EVERY DAY 30 tablet 0    ergocalciferol (ERGOCALCIFEROL) 50,000 unit Cap Take 1 capsule by mouth every 7 days.      hydroCHLOROthiazide (HYDRODIURIL) 25 MG tablet Take 1 tablet (25 mg total) by mouth once daily. 30 tablet 1    OZEMPIC 0.25 mg or 0.5 mg (2 mg/3 mL) pen injector Inject 0.25 mg into the skin every 7 days.      sotaloL (BETAPACE) 120 MG Tab Take 1 tablet (120 mg total) by mouth 2 (two) times daily. 60 tablet 11     Current Facility-Administered Medications on File Prior to Visit   Medication Dose Route Frequency Provider Last Rate Last Admin    0.9%  NaCl infusion   Intravenous Continuous Slime Salcido NP 0 mL/hr at 02/06/19 1521 New Bag at 07/05/24 0723    sodium chloride 0.9% flush 5 mL  5 mL Intravenous PRN Slime Salcido NP           Review of patient's allergies indicates:  No Known Allergies       Objective:   Vitals:  Vitals:    09/26/24 0916   BP: 138/74   BP Location: Left arm   Patient Position: Sitting   Pulse: 86   SpO2: 96%   Weight: 130.6 kg (287 lb 14.7 oz)   Height: 5' 7.01" (1.702 m)       Body mass index is 45.08 kg/m².  Body surface area is 2.48 meters squared.    Exam:  GEN: No acute distress, Normal appearing  EYE: Anicteric sclerae  ENT: No drainage, Moist mucous membranes  PULM: Normal work of breathing;  Clear to auscultation bilaterally, Good air movement throughout.  CV: No chest pain;   II/VI systolic murmur;   No rubs or gallops;  EXT: No cyanosis, No edema   2+ " radial and PT pulses bilaterally  ABD: Soft, Non-distended, Non-tender,    No hepatomegaly;  Normal bowel sounds  DERM: No rashes  NEUR: Normal gait, Grossly normal tone.  PSY: Normal mood and affect      Results / Data:   ECG:   (08/29/2024) - Sinus rhythm, RBBB; no significant change in QTc   (06/20/2024) - Sinus rhythm, RBBB; no significant change in QTc   (06/14/2023) - Sinus rhythm, RBBB; no significant change in QTc   (01/05/2023) - Sinus rhythm, RBBB  (05/25/2021) - Sinus rhythm, RBBB    ILR:   (08/07/2024)     MDT ILR REMOTE transmission received and data reviewed. Battery: GOOD Current ECG reveals sinus rhythm. 1 SYMPTOM triggered episodes - ECG reveals sinus rhythm with PACs. 1 PAUSE AUTO triggered episodes - occurred during loop implant. 1 % PVCs     (03/14/2023)  MDT ILR REMOTE transmission received and data reviewed.   Battery: EOS since December 14, 2022   Current ECG reveals sinus tachycardia. NO SYMPTOM triggered episodes noted. 486 TACHY AUTO triggered episodes - available ECGs reveal sinus tachycardia with rare isolated PVCs, noise artifact, over-sensed and under-sensed beats.    (12/28/2022)  MDT ILR REMOTE transmission received and data reviewed.   Battery: EOS since December 14, 2022  Current ECG reveals sinus rhythm with isolated PACs.  NO SYMPTOM triggered episodes noted.  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 Initial palliative BT shunt.  S/P Reconstruction of the right ventricular outflow tract and placement of a bioprosthetic pulmonary valve in early childhood.  S/P Multiple surgical valve replacements - most recently bioprosthetic valves in the pulmonary position and tricuspid position in 2007.  S/P Catheterization with tricuspid valve balloon valvuloplasty and  Sotelo S3 26 mm valve implant - Feb 2017.     SEGMENTAL CARDIAC CONNECTIONS (previously demonstrated):  Abdominal situs solitus.  Atrial situs solitus.  Atrioventricular alignment is concordant.  D-loop ventricles.  Bioprosthetic valve in tricuspid position.  The mitral valve appears grossly normal.  Qualitative impression of mildly dilated right ventricle.  There is mild dilation of the left ventricle.  The ventriculoarterial alignment is concordant.  Bioprosthetic valve in pulmonary position.  Normal aortic valve.  Cardiac position is levocardia.  There is a left aortic arch with additional branches not well demonstrated.  No coarctation is demonstrated.        IMPRESSION:  Technically difficult acoustic windows-  IVC/SVC: Normal venous pressure at 3 mmHg.   Severely dilated right atrium.  Structure of the bioprosthetic tricuspid valve is not well demonstrated with mean gradient measured 5 -9 mmHg  There is no significant tricuspid insufficiency demonstrated.  Qualitative impression of at least mildly dilated right ventricle with good systolic function.  Very thickened bioprosthetic valve material in pulmonary position with peak velocity 2.6 m/sec and mean gradient <16 mm Hg with mild eccentric jet of insufficiency.  Limited images suggest normal size confluent pulmonary branches.  The left atrial volume index is normal measuring 25 ml/m2.  Qualitative impression of normal mitral valve structure and function with no mitral stenosis or significant insufficiency.  The left ventricle is normal in size and structure.  Very flattened septal motion with reasonable movement of the LV free wall, SF = 36% and EF estimated 50-55 % from apical views.  Global left ventricular longitudinal strain normal - peak average measured -18.7%.  Normal left ventricular outflow tract with no evidence of aortic stenosis or significant insufficiency.  The structure of the aortic valve was not well demonstrated.  Aortic  dimensions:  Sinuses of Valsalva  = 29 mm.  ST junction               = 26 mm.  Ascending aorta       = 31 mm.  Qualitative impression of mildly dilated ascending aorta with normal transverse arch and descending aorta.  There is no evidence for coarctation.  No pericardial effusion.          (06/14/2023)  CONGENITAL CARDIAC HISTORY:  Pulmonary Atresia with Intact Ventricular Septum and ASD  ? Initial palliative BT shunt.  S/P Reconstruction of the right ventricular outflow tract and placement of a bioprosthetic pulmonary valve in early childhood.   S/P Multiple surgical valve replacements - most recently bioprosthetic valves in the pulmonary position and tricuspid position in 2007.   S/P Catheterization with tricuspid valve balloon valvuloplasty and Sotelo S3 26 mm valve(?tricuspid position) in Feb 2017.      SEGMENTAL CARDIAC CONNECTIONS:  Abdominal situs solitus.   Atrial situs solitus.   Atrioventricular alignment is concordant.   D-loop ventricles.   Bioprosthetic valve in tricuspid position.  The mitral valve appears grossly normal.   Qualitative impression of mildly dilated right ventricle.  There is mild dilation of the left ventricle.   The ventriculoarterial alignment is concordant.   Bioprosthetic valve in pulmonary position.  Normal aortic valve.   Cardiac position is levocardia.   There is a left aortic arch with additional branches not well demonstrated.   No coarctation is demonstrated.        IMPRESSION:  Technically difficult acoustic windows-  Severely dilated right atrium.  Structure of the bioprosthetic tricuspid valve is not well demonstrated with mean gradient measured 6 -9 mmHg  There is no significant tricuspid insufficiency demonstrated.  Qualitative impression of at least mildly dilated right ventricle with good systolic function.  Very thickened bioprosthetic valve material in pulmonary position with peak velocity 2.4 m/sec and mean gradient <14 mm Hg with trivial jet of  insufficiency.  Limited images suggest normal size confluent pulmonary branches.  The left atrial volume index is mildly enlarged measuring 20 ml/m2.   Qualitative impression of normal mitral valve structure and function with no mitral stenosis or significant insufficiency.  The left ventricle is moderately dilated.   Very flattened septal motion with reasonable movement of the LV free wall, SF = 20 % and EF estimated 50-55 % from apical views.   Normal left ventricular outflow tract with no evidence of aortic stenosis or significant insufficiency.  The structure of the aortic valve was not well demonstrated.  Qualitative impression of mildly dilated ascending aorta with normal transverse arch and descending aorta.  There is no evidence for coarctation.  No pericardial effusion.      Assessment / Plan:   Kacey Ruth is a 23 y.o. 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 for follow-up.      I placed a referral to endocrinology to manage her diabetes as well as Ozempic.  She had she has adequate weight loss with those medicines and along with exercise, then that is a reasonable course.  However, she does not reach her goal, I would like her to still reconsider the bariatric surgery.  Certainly, it is worse seeing what can be done with the management of her diabetes 1st, which is certainly her wishes.        Follow-up:   Combined EP/ACHD clinic in 6 months with ECG and echocardiogram.  Cardiac medications:    Sotalol  Amlodipine, HCTZ  Aspirin  SBE prophylaxis:    Yes.  Antibiotic prophylaxis is required prior to all dental procedures cleanings.    Please contact us if he has any questions or concerns.  Our clinic from his [phone] during office hours. For urgent night and weekend  concerns, call [phone] and ask for the pediatric cardiologist on call to be paged.

## 2024-09-26 ENCOUNTER — HOSPITAL ENCOUNTER (OUTPATIENT)
Dept: CARDIOLOGY | Facility: CLINIC | Age: 24
Discharge: HOME | End: 2024-09-26
Payer: MEDICAID

## 2024-09-26 ENCOUNTER — OFFICE VISIT (OUTPATIENT)
Dept: CARDIOLOGY | Facility: CLINIC | Age: 24
End: 2024-09-26
Payer: MEDICAID

## 2024-09-26 VITALS
HEIGHT: 67 IN | DIASTOLIC BLOOD PRESSURE: 74 MMHG | WEIGHT: 287.94 LBS | BODY MASS INDEX: 45.19 KG/M2 | HEART RATE: 86 BPM | OXYGEN SATURATION: 96 % | SYSTOLIC BLOOD PRESSURE: 138 MMHG

## 2024-09-26 DIAGNOSIS — Q24.9 ADULT CONGENITAL HEART DISEASE: ICD-10-CM

## 2024-09-26 DIAGNOSIS — I51.9 LV DYSFUNCTION: ICD-10-CM

## 2024-09-26 DIAGNOSIS — Q25.5 PULMONARY ATRESIA WITH INTACT VENTRICULAR SEPTUM: ICD-10-CM

## 2024-09-26 DIAGNOSIS — I48.92 ATRIAL FLUTTER, UNSPECIFIED TYPE: Primary | ICD-10-CM

## 2024-09-26 DIAGNOSIS — I48.92 ATRIAL FLUTTER, UNSPECIFIED TYPE: ICD-10-CM

## 2024-09-26 DIAGNOSIS — E11.9 TYPE 2 DIABETES MELLITUS WITHOUT COMPLICATION, WITHOUT LONG-TERM CURRENT USE OF INSULIN: ICD-10-CM

## 2024-09-26 DIAGNOSIS — I47.10 SVT (SUPRAVENTRICULAR TACHYCARDIA): ICD-10-CM

## 2024-09-26 LAB
OHS QRS DURATION: 164 MS
OHS QTC CALCULATION: 513 MS

## 2024-09-26 PROCEDURE — 99999 PR PBB SHADOW E&M-EST. PATIENT-LVL III: CPT | Mod: PBBFAC,,, | Performed by: PEDIATRICS

## 2024-09-26 PROCEDURE — 93005 ELECTROCARDIOGRAM TRACING: CPT | Mod: PBBFAC | Performed by: PEDIATRICS

## 2024-09-26 PROCEDURE — 99213 OFFICE O/P EST LOW 20 MIN: CPT | Mod: PBBFAC,25 | Performed by: PEDIATRICS

## 2024-09-26 PROCEDURE — 93010 ELECTROCARDIOGRAM REPORT: CPT | Mod: S$PBB,,, | Performed by: PEDIATRICS

## 2024-09-26 RX ORDER — SEMAGLUTIDE 0.68 MG/ML
0.25 INJECTION, SOLUTION SUBCUTANEOUS
COMMUNITY
Start: 2024-09-19

## 2024-09-26 RX ORDER — ERGOCALCIFEROL 1.25 MG/1
1 CAPSULE ORAL
COMMUNITY
Start: 2024-09-13 | End: 2025-09-13

## 2024-09-26 RX ORDER — HYDROCHLOROTHIAZIDE 25 MG/1
25 TABLET ORAL DAILY
Qty: 30 TABLET | Refills: 11 | Status: SHIPPED | OUTPATIENT
Start: 2024-09-26

## 2024-09-26 RX ORDER — AMLODIPINE BESYLATE 10 MG/1
10 TABLET ORAL DAILY
Qty: 30 TABLET | Refills: 11 | Status: SHIPPED | OUTPATIENT
Start: 2024-09-26 | End: 2025-09-26

## 2024-10-14 ENCOUNTER — HOSPITAL ENCOUNTER (OUTPATIENT)
Dept: PEDIATRIC CARDIOLOGY | Facility: HOSPITAL | Age: 24
Discharge: HOME | End: 2024-10-14
Attending: PEDIATRICS
Payer: MEDICAID

## 2024-10-14 DIAGNOSIS — I47.10 SVT (SUPRAVENTRICULAR TACHYCARDIA): ICD-10-CM

## 2024-10-14 DIAGNOSIS — I47.19 ATRIAL TACHYCARDIA: ICD-10-CM

## 2024-10-14 DIAGNOSIS — I48.92 ATRIAL FLUTTER, UNSPECIFIED TYPE: ICD-10-CM

## 2024-10-14 DIAGNOSIS — Z95.818 STATUS POST PLACEMENT OF IMPLANTABLE LOOP RECORDER: ICD-10-CM

## 2024-10-14 DIAGNOSIS — Q25.5 PULMONARY ATRESIA WITH INTACT VENTRICULAR SEPTUM: ICD-10-CM

## 2024-10-14 PROCEDURE — 93298 REM INTERROG DEV EVAL SCRMS: CPT

## 2024-10-14 PROCEDURE — 93298 REM INTERROG DEV EVAL SCRMS: CPT | Mod: 26,,, | Performed by: PEDIATRICS

## 2024-12-02 ENCOUNTER — HOSPITAL ENCOUNTER (OUTPATIENT)
Dept: PEDIATRIC CARDIOLOGY | Facility: HOSPITAL | Age: 24
Discharge: HOME | End: 2024-12-02
Attending: PEDIATRICS
Payer: MEDICAID

## 2024-12-02 DIAGNOSIS — Z95.818 STATUS POST PLACEMENT OF IMPLANTABLE LOOP RECORDER: ICD-10-CM

## 2024-12-02 DIAGNOSIS — Q25.5 PULMONARY ATRESIA WITH INTACT VENTRICULAR SEPTUM: ICD-10-CM

## 2024-12-02 DIAGNOSIS — I48.92 ATRIAL FLUTTER, UNSPECIFIED TYPE: ICD-10-CM

## 2024-12-02 DIAGNOSIS — I47.10 SVT (SUPRAVENTRICULAR TACHYCARDIA): ICD-10-CM

## 2024-12-02 DIAGNOSIS — I47.19 ATRIAL TACHYCARDIA: ICD-10-CM

## 2024-12-02 PROCEDURE — 93298 REM INTERROG DEV EVAL SCRMS: CPT

## 2024-12-04 ENCOUNTER — PATIENT MESSAGE (OUTPATIENT)
Dept: PEDIATRIC CARDIOLOGY | Facility: CLINIC | Age: 24
End: 2024-12-04
Payer: MEDICAID

## 2025-01-03 ENCOUNTER — PATIENT MESSAGE (OUTPATIENT)
Dept: PEDIATRIC CARDIOLOGY | Facility: CLINIC | Age: 25
End: 2025-01-03
Payer: MEDICAID

## 2025-01-06 ENCOUNTER — HOSPITAL ENCOUNTER (OUTPATIENT)
Dept: PEDIATRIC CARDIOLOGY | Facility: HOSPITAL | Age: 25
Discharge: HOME | End: 2025-01-06
Attending: PEDIATRICS
Payer: MEDICAID

## 2025-01-06 DIAGNOSIS — I48.92 ATRIAL FLUTTER, UNSPECIFIED TYPE: ICD-10-CM

## 2025-01-06 DIAGNOSIS — I47.19 ATRIAL TACHYCARDIA: ICD-10-CM

## 2025-01-06 DIAGNOSIS — Q25.5 PULMONARY ATRESIA WITH INTACT VENTRICULAR SEPTUM: ICD-10-CM

## 2025-01-06 DIAGNOSIS — Z95.818 STATUS POST PLACEMENT OF IMPLANTABLE LOOP RECORDER: ICD-10-CM

## 2025-01-06 DIAGNOSIS — I47.10 SVT (SUPRAVENTRICULAR TACHYCARDIA): ICD-10-CM

## 2025-01-06 PROCEDURE — 93298 REM INTERROG DEV EVAL SCRMS: CPT | Mod: 26,,, | Performed by: PEDIATRICS

## 2025-01-06 PROCEDURE — 93298 REM INTERROG DEV EVAL SCRMS: CPT

## 2025-01-09 ENCOUNTER — PATIENT MESSAGE (OUTPATIENT)
Dept: PEDIATRIC CARDIOLOGY | Facility: CLINIC | Age: 25
End: 2025-01-09
Payer: MEDICAID

## 2025-02-10 ENCOUNTER — PATIENT MESSAGE (OUTPATIENT)
Dept: PEDIATRIC CARDIOLOGY | Facility: CLINIC | Age: 25
End: 2025-02-10

## 2025-02-10 ENCOUNTER — HOSPITAL ENCOUNTER (OUTPATIENT)
Dept: PEDIATRIC CARDIOLOGY | Facility: HOSPITAL | Age: 25
Discharge: HOME | End: 2025-02-10
Attending: PEDIATRICS
Payer: MEDICAID

## 2025-02-10 DIAGNOSIS — I47.19 ATRIAL TACHYCARDIA: ICD-10-CM

## 2025-02-10 DIAGNOSIS — Z95.818 STATUS POST PLACEMENT OF IMPLANTABLE LOOP RECORDER: ICD-10-CM

## 2025-02-10 DIAGNOSIS — I48.92 ATRIAL FLUTTER, UNSPECIFIED TYPE: ICD-10-CM

## 2025-02-10 DIAGNOSIS — Q25.5 PULMONARY ATRESIA WITH INTACT VENTRICULAR SEPTUM: ICD-10-CM

## 2025-02-10 DIAGNOSIS — I47.10 SVT (SUPRAVENTRICULAR TACHYCARDIA): ICD-10-CM

## 2025-02-10 PROCEDURE — 93298 REM INTERROG DEV EVAL SCRMS: CPT

## 2025-02-10 PROCEDURE — 93298 REM INTERROG DEV EVAL SCRMS: CPT | Mod: 26,,, | Performed by: PEDIATRICS

## 2025-03-14 ENCOUNTER — PATIENT MESSAGE (OUTPATIENT)
Dept: PEDIATRIC CARDIOLOGY | Facility: CLINIC | Age: 25
End: 2025-03-14
Payer: MEDICAID

## 2025-03-17 ENCOUNTER — HOSPITAL ENCOUNTER (OUTPATIENT)
Dept: PEDIATRIC CARDIOLOGY | Facility: HOSPITAL | Age: 25
Discharge: HOME | End: 2025-03-17
Attending: PEDIATRICS
Payer: MEDICAID

## 2025-03-17 DIAGNOSIS — I47.10 SVT (SUPRAVENTRICULAR TACHYCARDIA): ICD-10-CM

## 2025-03-17 DIAGNOSIS — Q25.5 PULMONARY ATRESIA WITH INTACT VENTRICULAR SEPTUM: ICD-10-CM

## 2025-03-17 DIAGNOSIS — I47.19 ATRIAL TACHYCARDIA: ICD-10-CM

## 2025-03-17 DIAGNOSIS — I48.92 ATRIAL FLUTTER, UNSPECIFIED TYPE: ICD-10-CM

## 2025-03-17 DIAGNOSIS — Z95.818 STATUS POST PLACEMENT OF IMPLANTABLE LOOP RECORDER: ICD-10-CM

## 2025-03-17 PROCEDURE — 93298 REM INTERROG DEV EVAL SCRMS: CPT | Mod: 26,,, | Performed by: PEDIATRICS

## 2025-03-17 PROCEDURE — 93298 REM INTERROG DEV EVAL SCRMS: CPT

## 2025-03-28 NOTE — PROGRESS NOTES
Name: Kacey Ruth  MRN: [id number]  : 2000          Subjective:   CC: [facility]D, PA c IVS, SVT.    HPI:    Kacey Ruth is a 24 y.o. 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   Since I last saw her, she is overall doing well. She denies palpitations, and she has not had any issues taking Sotalol.   She underwent ILR replacement on 2024. She has previously been diagnosed with diabetes, it was started on his in bed by the [facility] bariatric surgery center, but she wants to hold off on the bariatric surgery at the moment.    To review, she has a history of pulmonary atresia intact ventricular septum and ASD.  She underwent reconstruction of the right ventricular outflow tract and placement of a bioprosthetic pulmonary valve in early childhood. She has had multiple valve replacements. Her most recent open heart surgery involved placement of a bioprosthetic valve in the pulmonary position and placement of a bioprosthetic valve in the tricuspid position in . She had a heart catheterization for recurrent tricuspid valve insufficiency and stenosis.  The procedure employed a trans-catheter approach. It entailed tricuspid valve balloon valvuloplasty with a Z-Med 25 x 5 cm balloon followed by placement of an Sotelo S3 26 mm valve in 2017. The catheterization also showed that she had only mild pulmonary valve stenosis and insufficiency.  She was then found by Dr. Gonzalez to have SVT vs. Atrial flutter and started on Metoprolol.  On 19, she had ablation of dual loop tachycardia involving CTI and lateral wall.  She continued to have SVT so was admitted for sotalol initiation on 19 and loop recorder implantation.    Past-Medical Hx/Problem List:  Pulmonary Atresia  with Intact Ventricular Septum and ASD  S/P reconstruction of the right ventricular outflow tract and placement of a bioprosthetic pulmonary valve in early childhood.   S/P multiple valve replacements.   Most recent open heart surgery involved placement of a bioprosthetic valve in the pulmonary position and placement of a bioprosthetic valve in the tricuspid position in 2007.   Tricuspid valve insufficiency and stenosis.    S/P catheterization with tricuspid valve balloon valvuloplasty, placement of an Sotelo S3 26 mm valve in Feb 2017. Also showed only mild pulmonary valve stenosis and insufficiency.   SVT vs. Atrial flutter   Started on Metoprolol by Dr. Gonzalez  S/P ablation, 2/6/19, dual loop tachycardia involving CTI and lateral wall.    S/P sotalol initiation on 5/8/19 and loop recorder implantation due to recurrent SVT.  Obesity  DM-II    Family Hx:  No known family history of congenital heart defects or cardiac surgeries in childhood.  No known family members with pacemakers or defibrillators.  No known inherited channelopathies or cardiomyopathies.  No known hx of sudden cardiac death or heart transplant.  No No known heart attack in someone less than 50yoa.    Social Hx:  Lives in [place] with Mother.    Review of Systems:  GEN:  No fevers, No fatigue, No weight-loss, No weight-gain  EYE:  No significant changes in vision, No eye redness, No lens dislocation  ENT: No cough, No congestion, No swelling, No snoring, No hearing loss,   RESP: No increased work of breathing, No dyspnea, + noisy breathing, No hx of pneumothorax  CV:  Rare, very brief nonexertional chest pain on right side, No palpitations, No tachycardia, No activity or exercise intolerance  GI:  No abdominal pain, No nausea, No vomiting, No diarrhea, No constipation  BETITO: Normal UOP  MSK: No pain, No swelling, No joint dislocations, No scoliosis, No extremity swelling  HEME: No easy bruising or bleeding  NEUR: No history of seizures, No  "dizziness, No near-syncope, No syncope  DERM: No Rashes  PSY: + anxiety, No depression  ALL: See below.    Medications & Allergy:  Current Outpatient Medications on File Prior to Visit   Medication Sig Dispense Refill    amLODIPine (NORVASC) 10 MG tablet Take 1 tablet (10 mg total) by mouth once daily. 30 tablet 11    aspirin (ECOTRIN) 81 MG EC tablet TAKE 1 TABLET BY MOUTH EVERY DAY 30 tablet 0    blood sugar diagnostic (BLOOD GLUCOSE TEST) Strp 1 strip by NOT APPLICABLE route as needed.      busPIRone (BUSPAR) 7.5 MG tablet Take 1 tablet by mouth 2 (two) times daily.      hydroCHLOROthiazide (HYDRODIURIL) 25 MG tablet Take 1 tablet (25 mg total) by mouth once daily. 30 tablet 11    lancets (ONETOUCH DELICA PLUS LANCET) 30 gauge Misc 1 lancet  by NOT APPLICABLE route as needed.      OZEMPIC 0.25 mg or 0.5 mg (2 mg/3 mL) pen injector Inject 0.25 mg into the skin every 7 days.      sotaloL (BETAPACE) 120 MG Tab Take 1 tablet (120 mg total) by mouth 2 (two) times daily. 60 tablet 11    ergocalciferol (ERGOCALCIFEROL) 50,000 unit Cap Take 1 capsule by mouth every 7 days. (Patient not taking: Reported on 4/3/2025)       Current Facility-Administered Medications on File Prior to Visit   Medication Dose Route Frequency Provider Last Rate Last Admin    0.9%  NaCl infusion   Intravenous Continuous Slime Salcido NP 0 mL/hr at 02/06/19 1521 New Bag at 07/05/24 0723    sodium chloride 0.9% flush 5 mL  5 mL Intravenous PRN Slime Salcido NP           Review of patient's allergies indicates:  No Known Allergies       Objective:   Vitals:  Vitals:    04/03/25 0953   BP: 120/67   BP Location: Left arm   Patient Position: Sitting   Pulse: 86   SpO2: 95%   Weight: 131.3 kg (289 lb 7.4 oz)   Height: 5' 6" (1.676 m)         Body mass index is 46.72 kg/m².  Body surface area is 2.47 meters squared.    Exam:  GEN: No acute distress, Normal appearing  EYE: Anicteric sclerae  ENT: No drainage, Moist mucous " membranes  PULM: Normal work of breathing;  Clear to auscultation bilaterally, Good air movement throughout.  CV: No chest pain;   II/VI systolic murmur;   No rubs or gallops;  EXT: No cyanosis, No edema   2+ radial and PT pulses bilaterally  ABD: Soft, Non-distended, Non-tender,    No hepatomegaly;  Normal bowel sounds  DERM: No rashes  NEUR: Normal gait, Grossly normal tone.  PSY: Normal mood and affect      Results / Data:   ECG:   (04/03/2025) - Sinus rhythm, RBBB; no significant change in QTc   (09/26/2024) - Sinus rhythm, RBBB; no significant change in QTc   (06/20/2024) - Sinus rhythm, RBBB; no significant change in QTc   (06/14/2023) - Sinus rhythm, RBBB; no significant change in QTc   (01/05/2023) - Sinus rhythm, RBBB  (05/25/2021) - Sinus rhythm, RBBB    ILR:   (03/19/2025)     MDT ILR REMOTE transmission received and data reviewed. Battery: GOOD Current ECG reveals sinus rhythm. NO SYMPTOM triggered episodes noted. 1 TACHY AUTO triggered episodes - ECG reveals sinus tachycardia with non-sustained SVT resolving back into sinus tachycardia. 0.8 % PVCs    MDT ILR REMOTE transmission received and data reviewed. Battery: GOOD    Current ECG reveals sinus rhythm.    NO SYMPTOM triggered episodes noted.    1 TACHY AUTO triggered episodes - ECG reveals sinus tachycardia with non-sustained SVT resolving back into sinus tachycardia.    0.8 % PVCs      (02/13/2025)     MDT ILR REMOTE transmission received and data reviewed. Battery: GOOD Current ECG reveals sinus rhythm. NO AUTO or SYMPTOM triggered episodes noted. 0.8 % PVCs     (01/10/2025)     MDT ILR REMOTE transmission received and data reviewed. Battery: GOOD Current ECG reveals sinus rhythm. NO AUTO or SYMPTOM triggered episodes noted. 0.8 % PVCs     (12/06/2024)     MDT ILR REMOTE transmission received and data reviewed. Battery: GOOD Current ECG reveals sinus rhythm. NO AUTO or SYMPTOM triggered episodes noted. 0.8 % PVCs     (10/18/2024)     MDT ILR REMOTE  transmission received and data reviewed. Battery: GOOD    Current ECG reveals sinus rhythm.    NO AUTO triggered episodes noted.    1 SYMPTOM triggered episodes - ECG reveals sinus rhythm with over-sensed beats.    1.1 % PVCs     (09/13/2024)     MDT ILR REMOTE transmission received and data reviewed. Battery: GOOD    Current ECG reveals sinus rhythm with PVCs    NO AUTO triggered episodes noted.    2 SYMPTOM triggered episodes - ECGs reveal sinus rhythm and sinus tachycardia with PVCs, noise artifact,and over-sensed beats.    1.2 % PVCs     (08/07/2024)     MDT ILR REMOTE transmission received and data reviewed. Battery: GOOD Current ECG reveals sinus rhythm. 1 SYMPTOM triggered episodes - ECG reveals sinus rhythm with PACs. 1 PAUSE AUTO triggered episodes - occurred during loop implant. 1 % PVCs     (03/14/2023)  MDT ILR REMOTE transmission received and data reviewed.   Battery: EOS since December 14, 2022   Current ECG reveals sinus tachycardia. NO SYMPTOM triggered episodes noted. 486 TACHY AUTO triggered episodes - available ECGs reveal sinus tachycardia with rare isolated PVCs, noise artifact, over-sensed and under-sensed beats.    (12/28/2022)  MDT ILR REMOTE transmission received and data reviewed.   Battery: EOS since December 14, 2022  Current ECG reveals sinus rhythm with isolated PACs.  NO SYMPTOM triggered episodes noted.  204 TACHY AUTO triggered episodes   - available ECGs reveal sinus tachycardia with noise artifact, oversensing, undersensing, intermittent PACs, and one PVC    Echo:   (04/03/2025)     CONGENITAL CARDIAC HISTORY:  Pulmonary Atresia with Intact Ventricular Septum and ASD  ? Initial palliative BT shunt.  S/P Reconstruction of the right ventricular outflow tract and placement of a bioprosthetic pulmonary valve in early childhood.  S/P Multiple surgical valve replacements - most recently bioprosthetic valves in the pulmonary position and tricuspid position in 2007.  S/P Catheterization  with tricuspid valve balloon valvuloplasty and Sotelo S3 26 mm valve implant - Feb 2017.     SEGMENTAL CARDIAC CONNECTIONS (previously demonstrated):  Abdominal situs solitus.  Atrial situs solitus.  Atrioventricular alignment is concordant.  D-loop ventricles.  Bioprosthetic valve in tricuspid position.  The mitral valve appears grossly normal.  Qualitative impression of mildly dilated right ventricle.  There is mild dilation of the left ventricle.  The ventriculoarterial alignment is concordant.  Bioprosthetic valve in pulmonary position.  Normal aortic valve.  Cardiac position is levocardia.  There is a left aortic arch with additional branches not well demonstrated.  No coarctation is demonstrated.     IMPRESSION:  Technically difficult acoustic windows with no hemodynamically significant changes compared to most recent echo-  Severely dilated right atrium.  Imaging consistent with percutaneous valve in bioprosthetic valve in tricuspid position.  There is minimal acceleration across the tricuspid valve with mean gradient <10 mmHg.  There is no significant tricuspid insufficiency demonstrated.  Qualitative impression of at least mildly dilated right ventricle with good systolic function.  Very thickened bioprosthetic valve material in pulmonary position with peak velocity 2.6 m/sec and mean gradient <16 mm Hg with mild eccentric jet of insufficiency.  Limited images suggest normal size confluent pulmonary branches.  The left atrial volume index is normal measuring 31 ml/m2.  Qualitative impression of normal mitral valve structure and function with no mitral stenosis or significant insufficiency.  The left ventricle is normal in size and structure.  Very flattened septal motion with reasonable movement of the LV free wall, SF = 34% and EF estimated 65 -70% from apical views.  Global left ventricular longitudinal strain normal - peak average estimated -17.5%.  Normal left ventricular outflow tract with no evidence  of aortic stenosis and a trivial jet of insufficiency.  The structure of the aortic valve was not well demonstrated.  Aortic dimensions:  Sinuses of Valsalva  = 31 mm.  ST junction               = 28 mm.  Ascending aorta       = 31 mm.  Qualitative impression of mildly dilated ascending aorta with normal transverse arch and descending aorta.  There is no evidence for coarctation.  No pericardial effusion.       (06/20/2024)  CONGENITAL CARDIAC HISTORY:  Pulmonary Atresia with Intact Ventricular Septum and ASD  ? Initial palliative BT shunt.  S/P Reconstruction of the right ventricular outflow tract and placement of a bioprosthetic pulmonary valve in early childhood.  S/P Multiple surgical valve replacements - most recently bioprosthetic valves in the pulmonary position and tricuspid position in 2007.  S/P Catheterization with tricuspid valve balloon valvuloplasty and Sotelo S3 26 mm valve implant - Feb 2017.     SEGMENTAL CARDIAC CONNECTIONS (previously demonstrated):  Abdominal situs solitus.  Atrial situs solitus.  Atrioventricular alignment is concordant.  D-loop ventricles.  Bioprosthetic valve in tricuspid position.  The mitral valve appears grossly normal.  Qualitative impression of mildly dilated right ventricle.  There is mild dilation of the left ventricle.  The ventriculoarterial alignment is concordant.  Bioprosthetic valve in pulmonary position.  Normal aortic valve.  Cardiac position is levocardia.  There is a left aortic arch with additional branches not well demonstrated.  No coarctation is demonstrated.        IMPRESSION:  Technically difficult acoustic windows-  IVC/SVC: Normal venous pressure at 3 mmHg.   Severely dilated right atrium.  Structure of the bioprosthetic tricuspid valve is not well demonstrated with mean gradient measured 5 -9 mmHg  There is no significant tricuspid insufficiency demonstrated.  Qualitative impression of at least mildly dilated right ventricle with good systolic  function.  Very thickened bioprosthetic valve material in pulmonary position with peak velocity 2.6 m/sec and mean gradient <16 mm Hg with mild eccentric jet of insufficiency.  Limited images suggest normal size confluent pulmonary branches.  The left atrial volume index is normal measuring 25 ml/m2.  Qualitative impression of normal mitral valve structure and function with no mitral stenosis or significant insufficiency.  The left ventricle is normal in size and structure.  Very flattened septal motion with reasonable movement of the LV free wall, SF = 36% and EF estimated 50-55 % from apical views.  Global left ventricular longitudinal strain normal - peak average measured -18.7%.  Normal left ventricular outflow tract with no evidence of aortic stenosis or significant insufficiency.  The structure of the aortic valve was not well demonstrated.  Aortic dimensions:  Sinuses of Valsalva  = 29 mm.  ST junction               = 26 mm.  Ascending aorta       = 31 mm.  Qualitative impression of mildly dilated ascending aorta with normal transverse arch and descending aorta.  There is no evidence for coarctation.  No pericardial effusion.       CPX:  (06/24/2024)  Test Type:  Protocol:                      Intermediate Ramp  Equipment:                  Treadmill  Cardiac Medications:   Sotalol  Effort and Symptoms:  The patient gave a submaximal effort on today's stress test.  Handrail Support: throughout  Exercise Time: 8:48  Highest RPE: OMNI - 2, Alexandrea - 9  RER: 1.02  The patient reported no concerning symptoms today.  Hemodynamic Response:  Normal SpO2 response to exercise.  Blunted HR and BP response to exercise.  ECG:  Sinus rhythm with RBBB and ST and T-wave abnormalities noted throughout.   Cannot comment on QTc, ST-segment or T-wave changes in setting of RBBB.  Pulmonary Function:  The patient had reduced baseline pulmonary function tests. May be confounded by extensive prior surgical history.  FVC = 2.76  (76%-predicted), FEV1 = 2.16 (69%-predicted), FEV1/FVC = 78%, FEF 25-75 = 1.85 (52%-predicted).  Metabolic:  Peak VO2:    Peak VO2, relative (mL/kg/min) = 13.8 (67%-predicted)    Peak VO2, absolute (mL/min)    = 1806 (67%-predicted)  The patient had a reduced peak oxygen uptake relative to age, sex, and size.  Note: Peak VO2 likely underrepresented in submaximal testing.  O2-Pulse (mL/beat)                = 13 (94%-predicted)  The patient had a normal oxygen uptake to heart rate.  Anaerobic Threshold               = 65% of VO2 peak.  The anaerobic threshold occurred at a normal time during exercise.  Peak End [place] CO2               = 35  The patient had a slightly reduced PETCO2 at peak exercise.  VE/VCO2 slope                                   = 32  The patient exhibited a reduced ventilatory efficiency.  May be confounded by hyperventilation, as below.  Breathing [place]                              = 52.4  The patient exhibited an increased breathing reserve, suggestive of early exercise termination.  Respiratory Rate, peak (Br/min)= 51  The patient experienced hyperventilation at peak exercise.  Heart Rate, peak (BPM)                     = 137 (70% of age predicted max HR)  Heart Rate Reserve= 30%  Work (METS)                          = 3.9       Assessment / Plan:   Kacey Ruth is a 24 y.o. 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      Overall, she is doing well from her cardiac perspective.  She has had no significant recurrence arrhythmias while on her sotalol.  There is extremely brief run of a likely atrial arrhythmia in March, but she did not have any symptoms with this and was very short.  Eighty not believe that this warrants any changes in our current plan or  dosages.    Due to her history of HC HD, I ordered BNP, which can be obtained for next labs for [place] follow-up.      Follow-up:   Combined EP/ACHD clinic in 1 year with ECG and echocardiogram.  Cardiac medications:    Sotalol  Amlodipine, HCTZ  Aspirin  SBE prophylaxis:    Yes.  Antibiotic prophylaxis is required prior to all dental procedures cleanings.    Please contact us if he has any questions or concerns.  Our clinic from his [phone] during office hours. For urgent night and weekend concerns, call [phone] and ask for the pediatric cardiologist on call to be paged.

## 2025-03-31 ENCOUNTER — PATIENT MESSAGE (OUTPATIENT)
Dept: PEDIATRIC CARDIOLOGY | Facility: CLINIC | Age: 25
End: 2025-03-31
Payer: MEDICAID

## 2025-03-31 DIAGNOSIS — I47.19 ATRIAL TACHYCARDIA: Primary | ICD-10-CM

## 2025-03-31 DIAGNOSIS — Z95.4 S/P TRICUSPID VALVE REPLACEMENT: ICD-10-CM

## 2025-03-31 DIAGNOSIS — I36.2 NONRHEUMATIC TRICUSPID VALVE STENOSIS WITH REGURGITATION: ICD-10-CM

## 2025-03-31 DIAGNOSIS — I48.92 ATRIAL FLUTTER, UNSPECIFIED TYPE: ICD-10-CM

## 2025-03-31 DIAGNOSIS — Z95.2 S/P PULMONARY VALVE REPLACEMENT: ICD-10-CM

## 2025-03-31 DIAGNOSIS — Q24.9 ADULT CONGENITAL HEART DISEASE: ICD-10-CM

## 2025-03-31 DIAGNOSIS — Q25.5 PULMONARY ATRESIA WITH INTACT VENTRICULAR SEPTUM: ICD-10-CM

## 2025-04-03 ENCOUNTER — OFFICE VISIT (OUTPATIENT)
Dept: CARDIOLOGY | Facility: CLINIC | Age: 25
End: 2025-04-03
Payer: MEDICAID

## 2025-04-03 ENCOUNTER — HOSPITAL ENCOUNTER (OUTPATIENT)
Dept: CARDIOLOGY | Facility: HOSPITAL | Age: 25
Discharge: HOME | End: 2025-04-03
Attending: PEDIATRICS
Payer: MEDICAID

## 2025-04-03 ENCOUNTER — HOSPITAL ENCOUNTER (OUTPATIENT)
Dept: CARDIOLOGY | Facility: CLINIC | Age: 25
Discharge: HOME | End: 2025-04-03
Payer: MEDICAID

## 2025-04-03 VITALS
HEIGHT: 67 IN | BODY MASS INDEX: 46.52 KG/M2 | HEART RATE: 86 BPM | OXYGEN SATURATION: 95 % | HEIGHT: 66 IN | WEIGHT: 287 LBS | SYSTOLIC BLOOD PRESSURE: 120 MMHG | WEIGHT: 289.44 LBS | DIASTOLIC BLOOD PRESSURE: 67 MMHG | BODY MASS INDEX: 45.04 KG/M2

## 2025-04-03 DIAGNOSIS — I48.4 ATYPICAL ATRIAL FLUTTER: ICD-10-CM

## 2025-04-03 DIAGNOSIS — I48.92 ATRIAL FLUTTER, UNSPECIFIED TYPE: ICD-10-CM

## 2025-04-03 DIAGNOSIS — Q24.9 ADULT CONGENITAL HEART DISEASE: ICD-10-CM

## 2025-04-03 DIAGNOSIS — I36.2 NONRHEUMATIC TRICUSPID VALVE STENOSIS WITH REGURGITATION: ICD-10-CM

## 2025-04-03 DIAGNOSIS — Q25.5 PULMONARY ATRESIA WITH INTACT VENTRICULAR SEPTUM: ICD-10-CM

## 2025-04-03 DIAGNOSIS — I47.19 ATRIAL TACHYCARDIA: ICD-10-CM

## 2025-04-03 DIAGNOSIS — Z95.4 S/P TRICUSPID VALVE REPLACEMENT: ICD-10-CM

## 2025-04-03 DIAGNOSIS — Z95.2 S/P PULMONARY VALVE REPLACEMENT: ICD-10-CM

## 2025-04-03 DIAGNOSIS — Z95.818 STATUS POST PLACEMENT OF IMPLANTABLE LOOP RECORDER: ICD-10-CM

## 2025-04-03 DIAGNOSIS — Q25.5 PULMONARY ATRESIA WITH INTACT VENTRICULAR SEPTUM: Primary | ICD-10-CM

## 2025-04-03 LAB
ASCENDING AORTA: 2.66 CM
AV AREA BY CONTINUOUS VTI: 3.2 CM2
AV INDEX (PROSTH): 0.88
AV LVOT MEAN GRADIENT: 2 MMHG
AV LVOT PEAK GRADIENT: 3 MMHG
AV MEAN GRADIENT: 4 MMHG
AV PEAK GRADIENT: 6 MMHG
AV VALVE AREA BY VELOCITY RATIO: 2.8 CM²
AV VALVE AREA: 3.3 CM2
AV VELOCITY RATIO: 0.75
BSA FOR ECHO PROCEDURE: 2.48 M2
CV ECHO LV RWT: 0.44 CM
DOP CALC AO PEAK VEL: 1.2 M/S
DOP CALC AO VTI: 21.6 CM
DOP CALC LVOT AREA: 3.8 CM2
DOP CALC LVOT DIAMETER: 2.2 CM
DOP CALC LVOT PEAK VEL: 0.9 M/S
DOP CALC LVOT STROKE VOLUME: 71.8 CM3
DOP CALCLVOT PEAK VEL VTI: 18.9 CM
E/E' RATIO: 7 M/S
ECHO EF ESTIMATED: 63 %
ECHO LV POSTERIOR WALL: 1 CM (ref 0.6–1.1)
FRACTIONAL SHORTENING: 33.3 % (ref 28–44)
GLOBAL LONGITUIDAL STRAIN: 17.5 %
INTERVENTRICULAR SEPTUM: 0.9 CM (ref 0.6–1.1)
LA MAJOR: 5.7 CM
LA MINOR: 4.9 CM
LA WIDTH: 4.1 CM
LEFT ATRIUM SIZE: 4 CM
LEFT ATRIUM VOLUME INDEX MOD: 21 ML/M2
LEFT ATRIUM VOLUME INDEX: 31 ML/M2
LEFT ATRIUM VOLUME MOD: 49 ML
LEFT ATRIUM VOLUME: 73 CM3
LEFT INTERNAL DIMENSION IN SYSTOLE: 3 CM (ref 2.1–4)
LEFT VENTRICLE DIASTOLIC VOLUME INDEX: 38.98 ML/M2
LEFT VENTRICLE DIASTOLIC VOLUME: 92 ML
LEFT VENTRICLE MASS INDEX: 60.5 G/M2
LEFT VENTRICLE SYSTOLIC VOLUME INDEX: 14.4 ML/M2
LEFT VENTRICLE SYSTOLIC VOLUME: 34 ML
LEFT VENTRICULAR INTERNAL DIMENSION IN DIASTOLE: 4.5 CM (ref 3.5–6)
LEFT VENTRICULAR MASS: 142.9 G
LV LATERAL E/E' RATIO: 5.5 M/S
LV SEPTAL E/E' RATIO: 8.8 M/S
MV PEAK E VEL: 1.05 M/S
OHS CV RV/LV RATIO: 1.07 CM
OHS QRS DURATION: 164 MS
OHS QTC CALCULATION: 523 MS
PV MEAN GRADIENT: 15 MMHG
PV MV: 1.86 M/S
PV PEAK GRADIENT: 24 MMHG
PV PEAK VELOCITY: 2.46 M/S
RA MAJOR: 5.9 CM
RA WIDTH: 5.97 CM
RIGHT ATRIAL AREA: 33.5 CM2
RIGHT VENTRICLE DIASTOLIC BASEL DIMENSION: 4.8 CM
SINUS: 2.71 CM
TDI LATERAL: 0.19 M/S
TDI SEPTAL: 0.12 M/S
TDI: 0.16 M/S
Z-SCORE OF LEFT VENTRICULAR DIMENSION IN END DIASTOLE: -8.02
Z-SCORE OF LEFT VENTRICULAR DIMENSION IN END SYSTOLE: -5.51

## 2025-04-03 PROCEDURE — 99213 OFFICE O/P EST LOW 20 MIN: CPT | Mod: PBBFAC,25 | Performed by: PEDIATRICS

## 2025-04-03 PROCEDURE — 93303 ECHO TRANSTHORACIC: CPT | Mod: 26,,, | Performed by: PEDIATRICS

## 2025-04-03 PROCEDURE — 93005 ELECTROCARDIOGRAM TRACING: CPT | Mod: PBBFAC | Performed by: PEDIATRICS

## 2025-04-03 PROCEDURE — 93356 MYOCRD STRAIN IMG SPCKL TRCK: CPT | Mod: ,,, | Performed by: PEDIATRICS

## 2025-04-03 PROCEDURE — 93325 DOPPLER ECHO COLOR FLOW MAPG: CPT | Mod: 26,,, | Performed by: PEDIATRICS

## 2025-04-03 PROCEDURE — 99999 PR PBB SHADOW E&M-EST. PATIENT-LVL III: CPT | Mod: PBBFAC,,, | Performed by: PEDIATRICS

## 2025-04-03 PROCEDURE — 93356 MYOCRD STRAIN IMG SPCKL TRCK: CPT

## 2025-04-03 PROCEDURE — 93010 ELECTROCARDIOGRAM REPORT: CPT | Mod: S$PBB,,, | Performed by: PEDIATRICS

## 2025-04-03 PROCEDURE — 93320 DOPPLER ECHO COMPLETE: CPT | Mod: 26,,, | Performed by: PEDIATRICS

## 2025-04-03 RX ORDER — BUSPIRONE HYDROCHLORIDE 7.5 MG/1
1 TABLET ORAL 2 TIMES DAILY
COMMUNITY
Start: 2025-01-10

## 2025-04-03 RX ORDER — BLOOD-GLUCOSE CONTROL, NORMAL
1 EACH MISCELLANEOUS
COMMUNITY

## 2025-04-17 ENCOUNTER — PATIENT MESSAGE (OUTPATIENT)
Dept: PEDIATRIC CARDIOLOGY | Facility: CLINIC | Age: 25
End: 2025-04-17
Payer: MEDICAID

## 2025-04-21 ENCOUNTER — HOSPITAL ENCOUNTER (OUTPATIENT)
Dept: PEDIATRIC CARDIOLOGY | Facility: HOSPITAL | Age: 25
Discharge: HOME | End: 2025-04-21
Attending: PEDIATRICS
Payer: MEDICAID

## 2025-04-21 DIAGNOSIS — Z95.818 STATUS POST PLACEMENT OF IMPLANTABLE LOOP RECORDER: ICD-10-CM

## 2025-04-21 DIAGNOSIS — I47.10 SVT (SUPRAVENTRICULAR TACHYCARDIA): ICD-10-CM

## 2025-04-21 DIAGNOSIS — I47.19 ATRIAL TACHYCARDIA: ICD-10-CM

## 2025-04-21 DIAGNOSIS — I48.92 ATRIAL FLUTTER, UNSPECIFIED TYPE: ICD-10-CM

## 2025-04-21 DIAGNOSIS — Q25.5 PULMONARY ATRESIA WITH INTACT VENTRICULAR SEPTUM: ICD-10-CM

## 2025-04-21 PROCEDURE — 93298 REM INTERROG DEV EVAL SCRMS: CPT

## 2025-04-23 ENCOUNTER — PATIENT MESSAGE (OUTPATIENT)
Dept: PEDIATRIC CARDIOLOGY | Facility: CLINIC | Age: 25
End: 2025-04-23
Payer: MEDICAID

## 2025-05-23 ENCOUNTER — PATIENT MESSAGE (OUTPATIENT)
Dept: PEDIATRIC CARDIOLOGY | Facility: CLINIC | Age: 25
End: 2025-05-23
Payer: MEDICAID

## 2025-05-26 ENCOUNTER — HOSPITAL ENCOUNTER (OUTPATIENT)
Dept: PEDIATRIC CARDIOLOGY | Facility: HOSPITAL | Age: 25
Discharge: HOME | End: 2025-05-26
Attending: PEDIATRICS
Payer: MEDICAID

## 2025-05-26 DIAGNOSIS — Z95.818 STATUS POST PLACEMENT OF IMPLANTABLE LOOP RECORDER: ICD-10-CM

## 2025-05-26 DIAGNOSIS — I47.19 ATRIAL TACHYCARDIA: ICD-10-CM

## 2025-05-26 DIAGNOSIS — I47.10 SVT (SUPRAVENTRICULAR TACHYCARDIA): ICD-10-CM

## 2025-05-26 DIAGNOSIS — Q25.5 PULMONARY ATRESIA WITH INTACT VENTRICULAR SEPTUM: ICD-10-CM

## 2025-05-26 DIAGNOSIS — I48.92 ATRIAL FLUTTER, UNSPECIFIED TYPE: ICD-10-CM

## 2025-05-26 PROCEDURE — 93298 REM INTERROG DEV EVAL SCRMS: CPT

## 2025-05-26 PROCEDURE — 93298 REM INTERROG DEV EVAL SCRMS: CPT | Mod: 26,,, | Performed by: PEDIATRICS

## 2025-05-28 ENCOUNTER — TELEPHONE (OUTPATIENT)
Dept: PEDIATRIC CARDIOLOGY | Facility: CLINIC | Age: 25
End: 2025-05-28
Payer: MEDICAID

## 2025-05-28 NOTE — TELEPHONE ENCOUNTER
Notified patient that she needs to turn bluetooth and carelink grover back on. She states she will do it now.

## 2025-06-30 ENCOUNTER — HOSPITAL ENCOUNTER (OUTPATIENT)
Dept: PEDIATRIC CARDIOLOGY | Facility: HOSPITAL | Age: 25
Discharge: HOME | End: 2025-06-30
Attending: PEDIATRICS
Payer: MEDICAID

## 2025-06-30 DIAGNOSIS — I47.10 SVT (SUPRAVENTRICULAR TACHYCARDIA): ICD-10-CM

## 2025-06-30 DIAGNOSIS — Q25.5 PULMONARY ATRESIA WITH INTACT VENTRICULAR SEPTUM: ICD-10-CM

## 2025-06-30 DIAGNOSIS — I48.92 ATRIAL FLUTTER, UNSPECIFIED TYPE: ICD-10-CM

## 2025-06-30 DIAGNOSIS — I47.19 ATRIAL TACHYCARDIA: ICD-10-CM

## 2025-06-30 DIAGNOSIS — Z95.818 STATUS POST PLACEMENT OF IMPLANTABLE LOOP RECORDER: ICD-10-CM

## 2025-06-30 PROCEDURE — 93298 REM INTERROG DEV EVAL SCRMS: CPT

## 2025-07-02 DIAGNOSIS — I48.92 ATRIAL FLUTTER, UNSPECIFIED TYPE: ICD-10-CM

## 2025-07-02 DIAGNOSIS — Q24.9 ADULT CONGENITAL HEART DISEASE: ICD-10-CM

## 2025-07-02 DIAGNOSIS — I47.19 ATRIAL TACHYCARDIA: ICD-10-CM

## 2025-07-02 DIAGNOSIS — I47.10 SVT (SUPRAVENTRICULAR TACHYCARDIA): Primary | ICD-10-CM

## 2025-07-02 DIAGNOSIS — Z95.818 STATUS POST PLACEMENT OF IMPLANTABLE LOOP RECORDER: ICD-10-CM

## 2025-08-04 ENCOUNTER — HOSPITAL ENCOUNTER (OUTPATIENT)
Dept: PEDIATRIC CARDIOLOGY | Facility: HOSPITAL | Age: 25
Discharge: HOME | End: 2025-08-04
Attending: PEDIATRICS
Payer: MEDICAID

## 2025-08-04 DIAGNOSIS — I48.92 ATRIAL FLUTTER, UNSPECIFIED TYPE: ICD-10-CM

## 2025-08-04 DIAGNOSIS — Z95.818 STATUS POST PLACEMENT OF IMPLANTABLE LOOP RECORDER: ICD-10-CM

## 2025-08-04 DIAGNOSIS — Q24.9 ADULT CONGENITAL HEART DISEASE: ICD-10-CM

## 2025-08-04 DIAGNOSIS — I47.10 SVT (SUPRAVENTRICULAR TACHYCARDIA): ICD-10-CM

## 2025-08-04 DIAGNOSIS — I47.19 ATRIAL TACHYCARDIA: ICD-10-CM

## 2025-08-04 PROCEDURE — 93298 REM INTERROG DEV EVAL SCRMS: CPT

## (undated) DEVICE — DRESSING AQUACEL FOAM 3 X 3

## (undated) DEVICE — CATH TRICUSPID HALO XP 7FRX110

## (undated) DEVICE — CATH RESPONSE QPLR JSN 6F 120

## (undated) DEVICE — ELECTRODE POLYHESIVEPRE-ATTACH

## (undated) DEVICE — INTRODUCER HEMOSTASIS 7.5F

## (undated) DEVICE — GAUZE SPONGE 4X4 12PLY

## (undated) DEVICE — SET TUBING COOL POINT IRR

## (undated) DEVICE — ADHESIVE DERMABOND ADVANCED

## (undated) DEVICE — PAD RADIOLUCENT STAT ADULT

## (undated) DEVICE — DRESSING MEPILEX FLEX 3X3IN

## (undated) DEVICE — DRAPE PED LAP SURG 108X77IN

## (undated) DEVICE — PACK EP DRAPE

## (undated) DEVICE — PACK PACER PERMANENT OMC

## (undated) DEVICE — CATH TACTI ABLAT 75MM 7F 115CM

## (undated) DEVICE — CATH BIDIRECTIONAL DF CRV 7FR

## (undated) DEVICE — PATCH ENSITE PRECISION NAVX SE

## (undated) DEVICE — BANDAGE ELASTOPLAST 3INX5YDS

## (undated) DEVICE — ELECTRODE REM PLYHSV RETURN 9

## (undated) DEVICE — INTRODUCER AGILIS LG CURL